# Patient Record
Sex: FEMALE | Race: WHITE | NOT HISPANIC OR LATINO | Employment: UNEMPLOYED | URBAN - METROPOLITAN AREA
[De-identification: names, ages, dates, MRNs, and addresses within clinical notes are randomized per-mention and may not be internally consistent; named-entity substitution may affect disease eponyms.]

---

## 2017-01-17 ENCOUNTER — OFFICE VISIT (OUTPATIENT)
Dept: URGENT CARE | Facility: CLINIC | Age: 22
End: 2017-01-17
Payer: COMMERCIAL

## 2017-01-17 PROCEDURE — G0383 LEV 4 HOSP TYPE B ED VISIT: HCPCS

## 2017-01-17 PROCEDURE — 99284 EMERGENCY DEPT VISIT MOD MDM: CPT

## 2017-03-24 ENCOUNTER — HOSPITAL ENCOUNTER (EMERGENCY)
Facility: HOSPITAL | Age: 22
Discharge: HOME | End: 2017-03-24
Attending: EMERGENCY MEDICINE | Admitting: EMERGENCY MEDICINE
Payer: COMMERCIAL

## 2017-03-24 ENCOUNTER — APPOINTMENT (EMERGENCY)
Dept: CT IMAGING | Facility: HOSPITAL | Age: 22
End: 2017-03-24
Payer: COMMERCIAL

## 2017-03-24 ENCOUNTER — APPOINTMENT (EMERGENCY)
Dept: ULTRASOUND IMAGING | Facility: HOSPITAL | Age: 22
End: 2017-03-24
Payer: COMMERCIAL

## 2017-03-24 VITALS
SYSTOLIC BLOOD PRESSURE: 136 MMHG | OXYGEN SATURATION: 95 % | DIASTOLIC BLOOD PRESSURE: 65 MMHG | WEIGHT: 153 LBS | HEART RATE: 91 BPM | RESPIRATION RATE: 16 BRPM | BODY MASS INDEX: 27.98 KG/M2 | TEMPERATURE: 98.3 F

## 2017-03-24 DIAGNOSIS — N83.201 RIGHT OVARIAN CYST: Primary | ICD-10-CM

## 2017-03-24 DIAGNOSIS — R10.31 RLQ ABDOMINAL PAIN: ICD-10-CM

## 2017-03-24 LAB
BACTERIA UR QL AUTO: ABNORMAL /HPF
BILIRUB UR QL STRIP: NEGATIVE
CLARITY UR: CLEAR
COLOR UR: YELLOW
GLUCOSE UR STRIP-MCNC: NEGATIVE MG/DL
HCG UR QL: NEGATIVE
HGB UR QL STRIP.AUTO: ABNORMAL
KETONES UR STRIP-MCNC: NEGATIVE MG/DL
LEUKOCYTE ESTERASE UR QL STRIP: NEGATIVE
NITRITE UR QL STRIP: NEGATIVE
NON-SQ EPI CELLS URNS QL MICRO: ABNORMAL /HPF
PH UR STRIP.AUTO: 7 [PH] (ref 4.5–8)
PROT UR STRIP-MCNC: NEGATIVE MG/DL
RBC #/AREA URNS AUTO: ABNORMAL /HPF
SP GR UR STRIP.AUTO: 1.02 (ref 1–1.03)
UROBILINOGEN UR QL STRIP.AUTO: 1 E.U./DL
WBC #/AREA URNS AUTO: ABNORMAL /HPF

## 2017-03-24 PROCEDURE — 81002 URINALYSIS NONAUTO W/O SCOPE: CPT

## 2017-03-24 PROCEDURE — 87086 URINE CULTURE/COLONY COUNT: CPT

## 2017-03-24 PROCEDURE — 96372 THER/PROPH/DIAG INJ SC/IM: CPT

## 2017-03-24 PROCEDURE — 93976 VASCULAR STUDY: CPT

## 2017-03-24 PROCEDURE — 76830 TRANSVAGINAL US NON-OB: CPT

## 2017-03-24 PROCEDURE — 74176 CT ABD & PELVIS W/O CONTRAST: CPT

## 2017-03-24 PROCEDURE — 81025 URINE PREGNANCY TEST: CPT

## 2017-03-24 PROCEDURE — 99284 EMERGENCY DEPT VISIT MOD MDM: CPT

## 2017-03-24 PROCEDURE — 76856 US EXAM PELVIC COMPLETE: CPT

## 2017-03-24 PROCEDURE — 81001 URINALYSIS AUTO W/SCOPE: CPT

## 2017-03-24 RX ORDER — KETOROLAC TROMETHAMINE 30 MG/ML
60 INJECTION, SOLUTION INTRAMUSCULAR; INTRAVENOUS ONCE
Status: COMPLETED | OUTPATIENT
Start: 2017-03-24 | End: 2017-03-24

## 2017-03-24 RX ORDER — NAPROXEN 500 MG/1
500 TABLET ORAL 2 TIMES DAILY WITH MEALS
Qty: 20 TABLET | Refills: 0 | Status: SHIPPED | OUTPATIENT
Start: 2017-03-24 | End: 2018-07-23 | Stop reason: ALTCHOICE

## 2017-03-24 RX ADMIN — KETOROLAC TROMETHAMINE 60 MG: 30 INJECTION, SOLUTION INTRAMUSCULAR at 21:48

## 2017-03-25 LAB — BACTERIA UR CULT: NORMAL

## 2018-01-10 NOTE — PROGRESS NOTES
2016         RE: Shyla Allen                               To: JOSIE Morton    MR#: [id number]   : [address]: [id number]                             Fax: [phone]   (Exam #: [id number])      The LMP of this 23year old,  2, para 1 patient was SEP 2 2015,   giving her an EDDA of 2016 and a current gestational age of 25 weeks   0 days by dates  A sonographic examination was performed on 2016   using real time equipment  The ultrasound examination was performed using   abdominal & vaginal techniques  The patient has a BMI of 28 7  Her blood   pressure today was 114/73  Earliest ultrasound found in her record: 11/9/15   9w 5d  2016 EDDA   Thank you very much for referring this very nice patient for a    consultation and fetal anatomic survey  This is Yuridia's second   pregnancy  Her first pregnancy resulted in a full-term  section   for failure to progress approximately 10 months ago  Other than her    section, she has no other significant medical or surgical   history  She denies the current use of tobacco, alcohol, or drugs  No   medications other than her vitamins and has a significant drug allergies  The patient does have a first cousin once removed with Down syndrome on   her father's side  She is unsure if that child was born to a mother of   advanced maternal age  A review of systems is otherwise negative  On   exam, the patient appears well, in no acute distress, and her abdomen is   nontender              Cardiac motion was observed at 151 bpm       INDICATIONS      fetal anatomical survey   previous       Exam Types      LEVEL II   Transvaginal      RESULTS      Fetus # 1 of 1   Vertex presentation   Fetal growth appeared normal   Placenta Location = Posterior, low lying   No placenta previa   Placenta Grade = I      MEASUREMENTS (* Included In Average GA)      OFD              6 1 cm   AC 16 1 cm        20 weeks 6 days* (68%)   BPD              4 5 cm        19 weeks 4 days* (40%)   HC              16 9 cm        19 weeks 4 days* (33%)   Femur            3 4 cm        20 weeks 5 days* (54%)      Nuchal Fold      3 9 mm      Humerus          3 1 cm        20 weeks 4 days  (63%)   Radius           2 9 cm        22 weeks 0 days   Ulna             3 1 cm        21 weeks 4 days   Tibia            2 8 cm        20 weeks 1 day   (50%)   Fibula           2 9 cm        19 weeks 5 days   Foot             3 7 cm        21 weeks 4 days      Cerebellum       2 0 cm        20 weeks 1 day   Biorbit          3 1 cm        20 weeks 1 day   CisternaMagna    5 5 mm      HC/AC           1 05   FL/AC           0 21   FL/BPD          0 75   EFW (Ac/Fl/Hc)   367 grams - 0 lbs 13 oz      THE AVERAGE GESTATIONAL AGE is 20 weeks 1 day +/- 10 days  AMNIOTIC FLUID      Largest Vertical Pocket = 4 8 cm   Amniotic Fluid: Normal      UTERINE ARTERIES                                  S/D   PI    RI    NOTCH       Left Uterine Artery        2 26  0 92  0 56       Right Uterine Artery       2 63  1 13  0 62      CERVICAL EVALUATION           Supine               Cervical Length: 3 40 cm        Other Test Results         Resp To T F  Pressure: No                    Funneling?: No              Dynamic Changes?: No      ANATOMY      Head                                    Normal   Face/Neck                               Normal   Th  Cav  Normal   Heart                                   Normal   Abd  Cav  Normal   Stomach                                 Normal   Right Kidney                            Normal   Left Kidney                             Normal   Bladder                                 Normal   Abd   Wall                               Normal   Spine                                   Normal   Extrems                                 Normal   Genitalia Normal   Placenta                                Normal   Umbl  Cord                              Normal   Uterus                                  Normal      ANATOMY DETAILS      Visualized Appearing Sonographically Normal:   HEAD: (Calvarium, BPD Level, Lateral Ventricles, Choroid Plexus,   Cerebellum, Cisterna Magna);    FACE/NECK: (Neck, Nuchal Fold, Profile,   Orbits, Nose/Lips, Palate, Face);    TH  CAV : (Diaphragm); HEART:   (Four Chamber View, Proximal Left Outflow, Proximal Right Outflow, Aortic   Arch, Ductal Arch, Short Axis of Greater Vessels, Cardiac Axis,   Interventricular Septum, Interatrial Septum, Cardiac Position);    ABD    CAV , STOMACH, RIGHT KIDNEY, LEFT KIDNEY, BLADDER, ABD  WALL, SPINE:   (Cervical Spine, Thoracic Spine, Lumbar Spine, Sacrum);    EXTREMS: (Lt   Humerus, Rt Humerus, Lt Forearm, Rt Forearm, Lt Hand, Rt Hand, Lt Femur,   Rt Femur, Lt Low Leg, Rt Low Leg, Lt Foot, Rt Foot);    GENITALIA,   PLACENTA, UMBL  CORD, UTERUS      ADNEXA      The left ovary appeared normal and measured 2 8 x 2 3 x 1 6 cm with a   volume of 5 4 cc  The right ovary appeared normal and measured 2 5 x 2 5 x   1 7 cm with a volume of 5 6 cc  IMPRESSION      Jon IUP   20 weeks and 1 day by this ultrasound  (EDDA=JUN 8 2016)   Vertex presentation   Fetal growth appeared normal   Normal anatomy survey   Regular fetal heart rate of 151 bpm   Posterior, low lying placenta   No placenta previa      GENERAL COMMENT      The patient has declined genetic screening, and we discussed that a normal   ultrasound does not exclude all congenital birth defects or karyotypic   abnormalities  The fetal anatomic survey is complete  There is no sonographic evidence   of fetal abnormalities at this time  Good fetal movement and tone are   seen    The amniotic fluid volume appears normal   The placenta is   posterior and it appears sonographically normal although it is low-lying measuring 9 mm from the internal os  No notching of either uterine artery   waveform is appreciated and resistive indices are normal  A transvaginal   ultrasound was performed to assess the cervix, which was not seen well   transabdominally  The cervical length was 3 4 centimeters, which is   normal for the current gestational age  There was no significant   funneling or dynamic changes appreciated  The patient was informed of   today's findings and all of her questions were answered  The limitations   of ultrasound were reviewed with the patient, which she accepts  The patient has a short interval pregnancy  This is a known risk factor   for fetal growth restriction and maternal depletion and therefore I   recommend a fetal growth ultrasound in the third trimester as well as   encourage the patient to continue taking her prenatal vitamins  The   patient has a history of a previous  section  She is unsure if   she would like a trial of labor after  section or a repeat    section  I discussed with her that studies indicate an anterior   delivery interval of less than 18 Months places her at a threefold   increased risk of uterine rupture and given this increased risk, I   generally recommend consideration of a repeat  section although a   trial of labor is not unreasonable in the appropriately counseled patient  Samara was scheduled for a followup growth ultrasound at 28 weeks in   order to reevaluate the placenta location and type of fetal growth given   her short interval pregnancy  Please note, in addition to the time spent discussing the results of the   ultrasound, I spent approximately 15 minutes of face-to-face time with the   patient, greater than 50% of which was spent in counseling and the   coordination of care for this patient  Thank you very much for allowing us to participate in the care of this   very nice patient    Should you have any questions, please do not hesitate   to contact our office  RECOMMENDATION      Growth Ultrasound: at 28 weeks      MAGY Dalal M D     Electronically signed 01/21/16 13:50

## 2018-01-12 NOTE — PROGRESS NOTES
2016         RE: Austen Julian                               To: Juan Pablo Sylvester, M D    MR#: [id number]   : [address]: [id number]                             Fax: [phone]   (Exam #: [id number])      The LMP of this 23year old,  G2, P1-*-*-1 patient was SEP 2 2015, giving   her an EDDA of 2016 and a current gestational age of 26 weeks 6 days   by dates  A sonographic examination was performed on 2016 using   real time equipment  The patient has a BMI of 30 4  Her blood pressure   today was 112/75  Earliest ultrasound found in her record: 11/9/15   9w 5d  2016 EDDA      Cardiac motion was observed at 150 bpm       INDICATIONS      previous    diabetes, gestational, class A2      Exam Types      Level I      RESULTS      Fetus # 1 of 1   Vertex presentation   Fetal growth appeared normal   Placenta Location = Posterior   No placenta previa   Placenta Grade = I      MEASUREMENTS (* Included In Average GA)      AC              32 9 cm        37 weeks 0 days* (>95%)   BPD              7 6 cm        30 weeks 4 days* (<5%)   HC              30 3 cm        33 weeks 1 day * (40%)   Femur            6 2 cm        32 weeks 0 days* (36%)      Cerebellum       4 3 cm        34 weeks 6 days   CisternaMagna    7 2 mm      HC/AC           0 92   FL/AC           0 19   FL/BPD          0 81   EFW (Ac/Fl/Hc)  2546 grams - 5 lbs 10 oz                 (74%)      THE AVERAGE GESTATIONAL AGE is 33 weeks 1 day +/- 18 days  AMNIOTIC FLUID      Q1: 4 6      Q2: 5 6      Q3: 4 1      Q4: 6 2   JAVIER Total = 20 4 cm   Amniotic Fluid: Normal      ANATOMY      Head                                    Normal   Heart                                   Normal   Abd  Cav                                 Normal   Stomach                                 Normal   Right Kidney                            Normal   Left Kidney                             Normal   Bladder Normal   Placenta                                Normal      ANATOMY DETAILS      Visualized Appearing Sonographically Normal:   HEAD: (Calvarium, BPD Level, Lateral Ventricles, Choroid Plexus,   Cerebellum, Cisterna Magna); HEART: (Four Chamber View, Proximal Left   Outflow, Proximal Right Outflow, Cardiac Axis, Interventricular Septum,   Interatrial Septum, Cardiac Position);    ABD  CAV , STOMACH, RIGHT   KIDNEY, LEFT KIDNEY, BLADDER, PLACENTA      ANATOMY COMMENTS      Fetal anatomy has been previously documented; no anomalies were   identified  No fetal structural abnormality is identified on the Level I   survey today  Follow up anatomy of the lateral ventricles, 4 chamber view,   outflow tracts, diaphragm,  kidneys, stomach and bladder appear normal    Fetal interval growth and amniotic fluid volume are normal  Fetal anatomy   has been previously documented; no anomalies were identified  The prior US   was limited in the area of the **** which were seen today and appear   normal  The prior US was limited in the area of the *** which is still   limited on todays scan  No fetal structural abnormality is identified on   the Level I survey today  Follow up anatomy of the lateral ventricles, 4   chamber view, outflow tracts, diaphragm,  kidneys, stomach and bladder   appear normal   Fetal interval growth and amniotic fluid volume are normal       IMPRESSION      Jon IUP   33 weeks and 1 day by this ultrasound  (EDDA=JUN 7 2016)   Vertex presentation   Fetal growth appeared normal   Regular fetal heart rate of 150 bpm   Posterior placenta   No placenta previa      GENERAL COMMENT         I had the pleasure of seeing Gerber Bryant  in the [facility]    today for followup growth scan  She reports normal daily fetal movements  She denies any vaginal bleeding, leakage of fluid, or significant   contractions or pelvic pressure   She does have gestational diabetes and is   maintained on 26 units of Levemir insulin at night  She also has a history   of a prior  will be obtaining another  at 39 weeks and   this current pregnancy  On today's ultrasound, the fetus was in a vertex presentation  The   amniotic fluid appeared very normal today  Fetal growth was within normal   range  The abdominal circumference is rather large and measures 4 weeks   ahead of gestational age  The umbilical artery Doppler pulsatility index   was very normal today and thus today's ultrasound was very reassuring  The   interval anatomy seen today showed no obvious anomalies  She did go on to   have a reactive nonstress test today with moderate variability, the   presence of accelerations, and no decelerations  We discussed the importance of initiating fetal kick counting at least   once daily  We discussed the "10 kicks in 2 hour rule"  I instructed her   to report to you immediately should criteria not be met  Kick counts   should begin at 28 weeks gestation  IMPORTANT  FINDINGS ON TODAY'S ULTRASOUND: Large abdominal circumference   on today's ultrasound         IN SUMMARY:  Today's ultrasound was reassuring as the fetus is growing   well with normal amniotic fluid and a normal umbilical artery Doppler flow   study  The fetus was in a vertex presentation  She should continue to do   her kick counts on a daily basis  A fetal growth evaluation is recommended   in 4 weeks and this was scheduled today  Nonstress tests should be done   twice weekly and fluid checks done once weekly until delivered  She should   continue to fax her blood glucose levels into her diabetes management team       Face-to-face time, in addition to time spent discussing ultrasound results   was 10 minutes, with greater than 50% of the time used for counseling and   coordination of care  A description of the counseling/coordination of care   is described above        Thank you very much for allowing us to participate in the care of your   patient  If you have any questions or concerns about today's visit please   do not hesitate to call me  Thank you very much  Marcos OLIVIA  Maternal Fetal Medicine      Maria Victoria Cm, MAGY GALINDO S  JOSIE Chapman     Maternal-Fetal Medicine   Electronically signed 04/20/16 13:54

## 2018-01-15 NOTE — PROGRESS NOTES
MAY 18 2016         RE: Precious Enamorado                               To: JOSIE Shankar    MR#: [id number]   : [address]: [id number]                             Fax: [phone]   (Exam #: [id number])      The LMP of this 23year old,  G2, P1-0-0-1 patient was SEP 2 2015, giving   her an EDDA of 2016 and a current gestational age of 42 weeks 6 days   by dates  A sonographic examination was performed on MAY 18 2016 using   real time equipment  The ultrasound examination was performed using   abdominal technique  The patient has a BMI of 31 2  Her blood pressure   today was 134/75  Earliest ultrasound found in her record: 11/9/15   9w 5d  2016 EDDA      Cardiac motion was observed at 137 bpm       INDICATIONS      diabetes, gestational, class A2   previous    fetal growth      Exam Types      Level I      RESULTS      Fetus # 1 of 1   Vertex presentation   Placenta Location = Anterior   Placenta Grade = II      The NST was reactive with no decelerations  MEASUREMENTS (* Included In Average GA)      AC              35 7 cm        39 weeks 6 days* (>95%)   BPD              8 5 cm        34 weeks 1 day * (12%)   HC              31 2 cm        34 weeks 4 days* (<5%)   Femur            6 9 cm        35 weeks 0 days* (29%)      Cerebellum       5 0 cm        36 weeks 4 days      HC/AC           0 88   FL/AC           0 19   FL/BPD          0 81   Ceph Index      0 76   EFW (Ac/Fl/Hc)  3235 grams - 7 lbs 2 oz                 (63%)      THE AVERAGE GESTATIONAL AGE is 35 weeks 6 days +/- 21 days  AMNIOTIC FLUID      Q1: 2 8      Q2: 3 7      Q3: 1 5      Q4: 4 8   JAVIER Total = 12 6 cm      ANATOMY DETAILS      Visualized Appearing Sonographically Normal:   HEAD: (Calvarium, BPD Level, Lateral Ventricles, Choroid Plexus,   Cerebellum, Cisterna Magna);     HEART: (Proximal Left Outflow, Proximal   Right Outflow, Cardiac Axis, Interventricular Septum, Interatrial Septum,   Cardiac Position);    STOMACH, RIGHT KIDNEY, LEFT KIDNEY, BLADDER, PLACENTA      Suboptimally Visualized:   HEART: (Four Chamber View)      IMPRESSION      Jon IUP   35 weeks and 6 days by this ultrasound  (EDDA=2016)   Vertex presentation   Regular fetal heart rate of 137 bpm   Anterior placenta      GENERAL COMMENT      On exam today the patient appears well, in no acute distress, and denies   any complaints  Her abdomen is non-tender  Fetal weight is at the 63rd%tile for gestational age; however, the [facility] is   measuring greater than the 95th%tile for gestational age  A large   abdominal circumference can sometimes indicate macrosomia and is seen   commonly in poorly controlled diabetes  The patient is diagnosed with   diabetes and has had some elevation in her postprandials especially most   recently placed on insulin, 20 units of Levemir in the evening and 5 units   of Apidra at lunch and dinner  Good fetal movement and tone are seen  The amniotic fluid volume appears   normal   The placenta is anterior and it appears sonographically normal     The anatomic structures listed above could not be optimally imaged today   because of fetal position; however, these structures were seen on a prior   scan and appeared sonographically normal       The patient was informed of today's findings and all of her questions were   answered  The limitations of ultrasound were reviewed with the patient,   which she accepts  We discussed followup in detail and I recommend she continues to be   diligent about her blood sugars over the next 2 weeks prior to her   scheduled  delivery  She will continue defects in her blood   sugars so we can make appropriate adjustments to reduce the risk for    hypoglycemia, hyperbilirubinemia and NICU admission  She will   continue twice weekly antepartum surveillance until delivery        Please note, in addition to the time spent discussing the results of the   ultrasound, I spent approximately 10 minutes of face-to-face time with the   patient, greater than 50% of which was spent in counseling and the   coordination of care for this patient  Thank you very much for allowing us to participate in the care of this   very nice patient  Should you have any questions, please do not hesitate   to contact our office  MAGY Sarkar M D     Electronically signed 05/18/16 14:42

## 2018-01-15 NOTE — PROGRESS NOTES
Active Problems    1  Insulin controlled gestational diabetes mellitus (GDM) in third trimester (918 83)   (O24 414)    Allergies    1  No Known Drug Allergies    2  No Known Environmental Allergies   3  No Known Food Allergies    Vitals  Signs [Data Includes: Current Encounter]   Recorded: [id number] 50:17WU   Systolic: 987  Diastolic: 76  Height: 5 ft 1 in  Weight: 160 lb   BMI Calculated: 30 23  BSA Calculated: 1 72  Pain Scale: 0    Procedure    G/P 2/1   EDC 16   EGA 34 6     /76   Indication: gestational diabetes  Duration of Test 27 minutes  Result: Reactive and > 15 bpm with movement  Baseline Rate 130 bpm    Deceleration: None  Uterine Activity: Mild, Irregular  JAVIER: 13 2 cms  Comment: occ mild ctx noted  vtx   Placental Grade: II     Required # Stimuli Response: No    Fetal kick counts were reviewed with the patient  Recommend NST: twice weekly  Recommend JAVIER: weekly  Current Meds   1  Charito Contour Next Test In [employer]; TEST 4 TIMES DAILY; Therapy: 31XXU0287 to (Evaluate:58Ggr5445)  Requested for: 2016; Last   Rx:2016 Ordered   2  Charito Microlet Lancets Miscellaneous; 4 times a day as directed; Therapy: 05ZAW6754 to (Evaluate:42Ryk5595)  Requested for: 2016; Last   Rx:2016 Ordered   3  Levemir FlexTouch 100 UNIT/ML Subcutaneous Solution Pen-injector; Inject 20 units   Levemir at bedtime, titrate as directed ;   Therapy: 17Ypz0052 to (Evaluate:62Fha6595)  Requested for: 2016; Last   Rx:2016 Ordered   4  NovoFine 30G X 8 MM Miscellaneous; daily with insulin injection; Therapy: 30Yja0004 to (Evaluate:52Jug8399)  Requested for: 2016; Last   Rx:2016 Ordered   5  Prenatal Vitamins TABS; TAKE 1 TABLET Daily per pt;    Therapy: (Recorded:2014) to Recorded    Future Appointments    Date/Time Provider Specialty Site   2016 03:00 PM  [place] [facility], Schedule  [facility] OUT[facility]   2016 02:30 PM  [place], Schedule  [facility]   2016 01:00 PM  [place], Schedule  [facility]   2016 01:30 PM  [place], Schedule  [facility]  [address] [facility]     Signatures   Electronically signed by : Giselle Giron, ; May  4 2016  1:53PM EST                       (Author)    Electronically signed by : IRMA Lopez ,MD; May  4 2016  4:58PM EST                       (Author)

## 2018-02-09 ENCOUNTER — HOSPITAL ENCOUNTER (EMERGENCY)
Facility: HOSPITAL | Age: 23
Discharge: HOME | End: 2018-02-09
Payer: COMMERCIAL

## 2018-02-09 ENCOUNTER — APPOINTMENT (EMERGENCY)
Dept: CT IMAGING | Facility: HOSPITAL | Age: 23
End: 2018-02-09
Payer: COMMERCIAL

## 2018-02-09 VITALS
OXYGEN SATURATION: 100 % | TEMPERATURE: 98.9 F | WEIGHT: 175 LBS | BODY MASS INDEX: 32.2 KG/M2 | HEART RATE: 88 BPM | SYSTOLIC BLOOD PRESSURE: 148 MMHG | RESPIRATION RATE: 18 BRPM | DIASTOLIC BLOOD PRESSURE: 78 MMHG | HEIGHT: 62 IN

## 2018-02-09 DIAGNOSIS — S46.911A STRAIN OF RIGHT SHOULDER, INITIAL ENCOUNTER: ICD-10-CM

## 2018-02-09 DIAGNOSIS — S09.90XA CLOSED HEAD INJURY, INITIAL ENCOUNTER: Primary | ICD-10-CM

## 2018-02-09 DIAGNOSIS — S00.03XA CONTUSION OF SCALP, INITIAL ENCOUNTER: ICD-10-CM

## 2018-02-09 PROCEDURE — 70450 CT HEAD/BRAIN W/O DYE: CPT

## 2018-02-09 PROCEDURE — 99284 EMERGENCY DEPT VISIT MOD MDM: CPT

## 2018-02-09 PROCEDURE — 96372 THER/PROPH/DIAG INJ SC/IM: CPT

## 2018-02-09 RX ORDER — KETOROLAC TROMETHAMINE 30 MG/ML
30 INJECTION, SOLUTION INTRAMUSCULAR; INTRAVENOUS ONCE
Status: COMPLETED | OUTPATIENT
Start: 2018-02-09 | End: 2018-02-09

## 2018-02-09 RX ORDER — ACETAMINOPHEN 325 MG/1
650 TABLET ORAL ONCE
Status: COMPLETED | OUTPATIENT
Start: 2018-02-09 | End: 2018-02-09

## 2018-02-09 RX ADMIN — ACETAMINOPHEN 650 MG: 325 TABLET, FILM COATED ORAL at 22:35

## 2018-02-09 RX ADMIN — KETOROLAC TROMETHAMINE 30 MG: 30 INJECTION, SOLUTION INTRAMUSCULAR at 22:35

## 2018-02-10 NOTE — ED PROVIDER NOTES
History  Chief Complaint   Patient presents with    Head Injury     Pt c/o falling on ice yesterday and hitting back of head  Denies LOC  C/o headache, dizziness, and blurred vision today      26-year-old female presents here with a chief complaint of headache  She reports that she fell last night on ice and injured her right shoulder and struck the back of her head  She reports that today she was at work and she began to developed a headache  She presents here with continued headache is generalized in nature she has some tenderness over the occipital scalp  She denies loss of consciousness but there is concern for mild concussion  Her right shoulder has full passive range of motion she has some tenderness with abduction but nothing that would be concerning for fracture this is most likely strain of the right shoulder she has tenderness over the posterior rotator cuff musculature  Prior to Admission Medications   Prescriptions Last Dose Informant Patient Reported? Taking?   naproxen (NAPROSYN) 500 mg tablet   No No   Sig: Take 1 tablet by mouth 2 (two) times a day with meals for 10 days      Facility-Administered Medications: None       Past Medical History:   Diagnosis Date    DM type 1 (diabetes mellitus, type 1) ([facility])     Migraine        Past Surgical History:   Procedure Laterality Date     SECTION      [place]  DELIVERY ONLY N/A 6/3/2016    Procedure:  SECTION () REPEAT;  Surgeon: Paula Cornejo MD;  Location: [facility];  Service: Obstetrics       History reviewed  No pertinent family history  I have reviewed and agree with the history as documented  Social History   Substance Use Topics    Smoking status: Never Smoker    Smokeless tobacco: Never Used    Alcohol use No        Review of Systems   Constitutional: Negative for activity change, fatigue and fever  HENT: Negative for congestion, ear pain, rhinorrhea and sore throat  Eyes: Negative  Respiratory: Negative for cough, shortness of breath and wheezing  Gastrointestinal: Negative for abdominal pain, diarrhea, nausea and vomiting  Endocrine: Negative  Genitourinary: Negative for difficulty urinating, dyspareunia, dysuria, flank pain, frequency, menstrual problem, pelvic pain, urgency, vaginal bleeding, vaginal discharge and vaginal pain  Musculoskeletal: Negative for arthralgias and myalgias  Skin: Negative for color change and pallor  Neurological: Positive for headaches  Negative for dizziness, speech difficulty and weakness  Hematological: Negative for adenopathy  Psychiatric/Behavioral: Negative for confusion  Physical Exam  ED Triage Vitals [02/09/18 2025]   Temperature Pulse Respirations Blood Pressure SpO2   98 9 °F (37 2 °C) 95 17 155/74 100 %      Temp Source Heart Rate Source Patient Position - Orthostatic VS BP Location FiO2 (%)   Oral Monitor Sitting Left arm --      Pain Score       5           Orthostatic Vital Signs  Vitals:    02/09/18 2025   BP: 155/74   Pulse: 95   Patient Position - Orthostatic VS: Sitting       Physical Exam   Constitutional: She is oriented to person, place, and time  She appears well-developed and well-nourished  She is cooperative  Non-toxic appearance  She does not have a sickly appearance  She does not appear ill  No distress  HENT:   Head: Normocephalic and atraumatic  Right Ear: Tympanic membrane and external ear normal    Left Ear: Tympanic membrane and external ear normal    Nose: No rhinorrhea, sinus tenderness or nasal deformity  No epistaxis  Right sinus exhibits no maxillary sinus tenderness and no frontal sinus tenderness  Left sinus exhibits no maxillary sinus tenderness and no frontal sinus tenderness  Mouth/Throat: Oropharynx is clear and moist and mucous membranes are normal  Normal dentition  Eyes: EOM are normal  Pupils are equal, round, and reactive to light  Neck: Normal range of motion  Neck supple  Cardiovascular: Normal rate, regular rhythm and normal heart sounds  No murmur heard  Pulmonary/Chest: Effort normal and breath sounds normal  No accessory muscle usage  No respiratory distress  She has no wheezes  She has no rales  She exhibits no tenderness  Abdominal: Soft  She exhibits no distension  There is no guarding  Musculoskeletal: Normal range of motion  She exhibits no edema or tenderness  Cervical back: She exhibits normal range of motion and no bony tenderness  Lymphadenopathy:     She has no cervical adenopathy  Neurological: She is alert and oriented to person, place, and time  She exhibits normal muscle tone  Skin: Skin is warm and dry  No rash noted  No erythema  Psychiatric: She has a normal mood and affect  Nursing note and vitals reviewed  ED Medications  Medications   acetaminophen (TYLENOL) tablet 650 mg (not administered)   ketorolac (TORADOL) injection 30 mg (not administered)       Diagnostic Studies  Results Reviewed     None                 CT head without contrast   Final Result by Lyndsey Saravia MD (02/09 2137)      No acute intracranial abnormality  Left maxillary sinus disease  Workstation performed: [id number]                    Procedures  Procedures       Phone Contacts  ED Phone Contact    ED Course  ED Course                                MDM  Number of Diagnoses or Management Options  Closed head injury, initial encounter: new and requires workup  Contusion of scalp, initial encounter: new and requires workup  Strain of right shoulder, initial encounter: new and requires workup  Diagnosis management comments: CT of the head unremarkable no intracranial hemorrhage  Most likely post concussive headache posttraumatic headache  Recommend decreased activity follow-up with PCP for further evaluation  Return if worsening or not improving         Amount and/or Complexity of Data Reviewed  Tests in the radiology section of CPT®: reviewed and ordered      CritCare Time    Disposition  Final diagnoses:   Closed head injury, initial encounter   Strain of right shoulder, initial encounter   Contusion of scalp, initial encounter     Time reflects when diagnosis was documented in both MDM as applicable and the Disposition within this note     Time User Action Codes Description Comment    2/9/2018 10:18 PM Philly Navarrete Add [S09 90XA] Closed head injury, initial encounter     2/9/2018 10:18 PM Philly Navarrete Add [C73 150I] Strain of right shoulder, initial encounter     2/9/2018 10:18 PM Philly Navarrete Add [S00 03XA] Contusion of scalp, initial encounter       ED Disposition     ED Disposition Condition Comment    Discharge  Azucena Richardson Acabou discharge to home/self care  Condition at discharge: Good        Follow-up Information     Follow up With Specialties Details Why Contact Info    Yoel Park MD Internal Medicine Schedule an appointment as soon as possible for a visit For Continued Evaluation [place] [address] [facility]  [phone]          Patient's Medications   Discharge Prescriptions    No medications on file     No discharge procedures on file      ED Provider  Electronically Signed by           ANYI Jeffrey  02/09/18 9293

## 2018-02-10 NOTE — DISCHARGE INSTRUCTIONS
Contusion in Adults   WHAT YOU NEED TO KNOW:   A contusion is a bruise that appears on your skin after an injury  A bruise happens when small blood vessels tear but skin does not  When blood vessels tear, blood leaks into nearby tissue, such as soft tissue or muscle  DISCHARGE INSTRUCTIONS:   Return to the emergency department if:   · You have new trouble moving the injured area  · You have tingling or numbness in or near the injured area  · Your hand or foot below the bruise gets cold or turns pale  Contact your healthcare provider if:   · You find a new lump in the injured area  · Your symptoms do not improve with treatment after 4 to 5 days  · You have questions or concerns about your condition or care  Medicines: You may need any of the following:  · NSAIDs  help decrease swelling and pain or fever  This medicine is available with or without a doctor's order  NSAIDs can cause stomach bleeding or kidney problems in certain people  If you take blood thinner medicine, always ask your healthcare provider if NSAIDs are safe for you  Always read the medicine label and follow directions  · Prescription pain medicine  may be given  Do not wait until the pain is severe before you take your medicine  · Take your medicine as directed  Contact your healthcare provider if you think your medicine is not helping or if you have side effects  Tell him of her if you are allergic to any medicine  Keep a list of the medicines, vitamins, and herbs you take  Include the amounts, and when and why you take them  Bring the list or the pill bottles to follow-up visits  Carry your medicine list with you in case of an emergency  Follow up with your healthcare provider as directed: You may need to return within a week to check your injury again  Write down your questions so you remember to ask them during your visits    Help a contusion heal:   · Rest the injured area  or use it less than usual  If you bruised your leg or foot, you may need crutches or a cane to help you walk  This will help you keep weight off your injured body part  · Apply ice  to decrease swelling and pain  Ice may also help prevent tissue damage  Use an ice pack, or put crushed ice in a plastic bag  Cover it with a towel and place it on your bruise for 15 to 20 minutes every hour or as directed  · Use compression  to support the area and decrease swelling  Wrap an elastic bandage around the area over the bruised muscle  Make sure the bandage is not too tight  You should be able to fit 1 finger between the bandage and your skin  · Elevate (raise) your injured body part  above the level of your heart to help decrease pain and swelling  Use pillows, blankets, or rolled towels to elevate the area as often as you can  · Do not drink alcohol  as directed  Alcohol may slow healing  · Do not stretch injured muscles  right after your injury  Ask your healthcare provider when and how you may safely stretch after your injury  Gentle stretches can help increase your flexibility  · Do not massage the area or put heating pads  on the bruise right after your injury  Heat and massage may slow healing  Your healthcare provider may tell you to apply heat after several days  At that time, heat will start to help the injury heal   Prevent another contusion:   · Stretch and warm up before you play sports or exercise  · Wear protective gear when you play sports  Examples are shin guards and padding  · If you begin a new physical activity, start slowly to give your body a chance to adjust   © 2017 [address] [employer] [employer] Information is for End User's use only and may not be sold, redistributed or otherwise used for commercial purposes  All illustrations and images included in CareNotes® are the copyrighted property of [employer]  or [employer]  The above information is an  only   It is not intended as medical advice for individual conditions or treatments  Talk to your doctor, nurse or pharmacist before following any medical regimen to see if it is safe and effective for you  Head Injury   WHAT YOU NEED TO KNOW:   A head injury is most often caused by a blow to the head  This may occur from a fall, bicycle injury, sports injury, being struck in the head, or a motor vehicle accident  DISCHARGE INSTRUCTIONS:   Call 911 or have someone else call for any of the following:   · You cannot be woken  · You have a seizure  · You stop responding to others or you faint  · You have blurry or double vision  · Your speech becomes slurred or confused  · You have arm or leg weakness, loss of feeling, or new problems with coordination  · Your pupils are larger than usual or one pupil is a different size than the other  · You have blood or clear fluid coming out of your ears or nose  Return to the emergency department if:   · You have repeated or forceful vomiting  · You feel confused  · Your headache gets worse or becomes severe  · You or someone caring for you notices that you are harder to wake than usual   Contact your healthcare provider if:   · Your symptoms last longer than 6 weeks after the injury  · You have questions or concerns about your condition or care  Medicines:   · Acetaminophen  decreases pain  Acetaminophen is available without a doctor's order  Ask how much to take and how often to take it  Follow directions  Acetaminophen can cause liver damage if not taken correctly  · Take your medicine as directed  Contact your healthcare provider if you think your medicine is not helping or if you have side effects  Tell him or her if you are allergic to any medicine  Keep a list of the medicines, vitamins, and herbs you take  Include the amounts, and when and why you take them  Bring the list or the pill bottles to follow-up visits   Carry your medicine list with you in case of an emergency  Self-care:   · Rest  or do quiet activities for 24 to 48 hours  Limit your time watching TV, using the computer, or doing tasks that require a lot of thinking  Slowly return to your normal activities as directed  Do not play sports or do activities that may cause you to get hit in the head  Ask your healthcare provider when you can return to sports  · Apply ice  on your head for 15 to 20 minutes every hour or as directed  Use an ice pack, or put crushed ice in a plastic bag  Cover it with a towel before you apply it to your skin  Ice helps prevent tissue damage and decreases swelling and pain  · Have someone stay with you for 24 hours  or as directed  This person can monitor you for complications and call [phone]  When you are awake the person should ask you a few questions to see if you are thinking clearly  An example would be to ask your name or your address  Prevent another head injury:   · Wear a helmet that fits properly  Do this when you play sports, or ride a bike, scooter, or skateboard  Helmets help decrease your risk of a serious head injury  Talk to your healthcare provider about other ways you can protect yourself if you play sports  · Wear your seat belt every time you are in a car  This helps to decrease your risk for a head injury if you are in a car accident  Follow up with your healthcare provider as directed:  Write down your questions so you remember to ask them during your visits  © 2017 [address] [employer] [employer] Information is for End User's use only and may not be sold, redistributed or otherwise used for commercial purposes  All illustrations and images included in CareNotes® are the copyrighted property of A D A M , Inc  or [employer]  The above information is an  only  It is not intended as medical advice for individual conditions or treatments   Talk to your doctor, nurse or pharmacist before following any medical regimen to see if it is safe and effective for you  Scalp Contusion in Adults   WHAT YOU NEED TO KNOW:   A scalp contusion is a bruise on your scalp  There is bleeding under the scalp, but the skin is not broken  You may have swelling at the site of the bruise  DISCHARGE INSTRUCTIONS:   Home care:   · Have someone stay with you for 24 to 48 hours after the injury  Give him the signs of serious injury listed below, such as a seizure or trouble moving  You will need immediate care if you develop signs of a serious injury  · Apply ice to your bruise  Ice helps decrease swelling and pain  Ice may also help prevent tissue damage  Use an ice pack, or put crushed ice in a plastic bag  Cover it with a towel and place it on your bruise for 15 to 20 minutes every hour or as directed  Follow up with your healthcare provider as directed:  Write down your questions so you remember to ask them during your visits  Contact your healthcare provider if:   · You have a headache or neck pain that is getting worse  · You are drowsy and confused  · You have trouble staying balanced or walking  · You are irritable for no reason  · You have problems with your vision  · You cannot stop vomiting  Return to the emergency department or have someone call 911 if:   · You have a seizure  · You cannot be awakened  · You are not able to move part of your body  · Your pupils are different sizes  · You have blood or clear fluid coming out of your nose, ears, or mouth  © 2017 [address] Information is for End User's use only and may not be sold, redistributed or otherwise used for commercial purposes  All illustrations and images included in CareNotes® are the copyrighted property of A D A M , Inc  or [address]  The above information is an  only  It is not intended as medical advice for individual conditions or treatments   Talk to your doctor, nurse or pharmacist before following any medical regimen to see if it is safe and effective for you  Shoulder Pain   WHAT YOU NEED TO KNOW:   Shoulder pain is a common problem and can affect your daily activities  Pain can be caused by a problem within your shoulder  Shoulder pain may also be caused by pain that spreads to your shoulder from another part of your body  DISCHARGE INSTRUCTIONS:   Return to the emergency department if:   · You have severe pain  · You cannot move your arm or shoulder  · You have numbness or tingling in your shoulder or arm  Contact your healthcare provider if:   · Your pain gets worse or does not go away with treatment  · You have trouble moving your arm or shoulder  · You have questions or concerns about your condition or care  Medicines: You may need any of the following:  · Acetaminophen  decreases pain and fever  It is available without a doctor's order  Ask how much to take and how often to take it  Follow directions  Acetaminophen can cause liver damage if not taken correctly  · NSAIDs , such as ibuprofen, help decrease swelling, pain, and fever  This medicine is available with or without a doctor's order  NSAIDs can cause stomach bleeding or kidney problems in certain people  If you take blood thinner medicine, always ask your healthcare provider if NSAIDs are safe for you  Always read the medicine label and follow directions  · Take your medicine as directed  Contact your healthcare provider if you think your medicine is not helping or if you have side effects  Tell him of her if you are allergic to any medicine  Keep a list of the medicines, vitamins, and herbs you take  Include the amounts, and when and why you take them  Bring the list or the pill bottles to follow-up visits  Carry your medicine list with you in case of an emergency    Follow up with your healthcare provider or orthopedist as directed:  Write down your questions so you remember to ask them during your visits  Manage your symptoms:   · Apply ice  on your shoulder for 20 to 30 minutes every 2 hours or as directed  Use an ice pack, or put crushed ice in a plastic bag  Cover it with a towel  Ice helps prevent tissue damage and decreases swelling and pain  · Apply heat if ice does not help your symptoms  Apply heat on your shoulder for 20 to 30 minutes every 2 hours for as many days as directed  Heat helps decrease pain and muscle spasms  · Go to physical or occupational therapy as directed  A physical therapist teaches you exercises to help improve movement and strength, and to decrease pain  An occupational therapist teaches you skills to help with your daily activities  Prevent shoulder pain:   · Stretch and strengthen your shoulder  Use proper technique during exercises and sports  · Limit activities as directed  Try to avoid repeated overhead movements  © 2017 26[address] [facility] Information is for End User's use only and may not be sold, redistributed or otherwise used for commercial purposes  All illustrations and images included in CareNotes® are the copyrighted property of [employer]  or [facility]  The above information is an  only  It is not intended as medical advice for individual conditions or treatments  Talk to your doctor, nurse or pharmacist before following any medical regimen to see if it is safe and effective for you

## 2018-03-04 ENCOUNTER — OFFICE VISIT (OUTPATIENT)
Dept: URGENT CARE | Facility: CLINIC | Age: 23
End: 2018-03-04
Payer: COMMERCIAL

## 2018-03-04 VITALS
HEART RATE: 98 BPM | RESPIRATION RATE: 16 BRPM | TEMPERATURE: 98.8 F | OXYGEN SATURATION: 99 % | SYSTOLIC BLOOD PRESSURE: 135 MMHG | DIASTOLIC BLOOD PRESSURE: 75 MMHG

## 2018-03-04 DIAGNOSIS — K04.7 DENTAL INFECTION: Primary | ICD-10-CM

## 2018-03-04 PROCEDURE — 99213 OFFICE O/P EST LOW 20 MIN: CPT | Performed by: NURSE PRACTITIONER

## 2018-03-04 RX ORDER — PENICILLIN V POTASSIUM 500 MG/1
1 TABLET ORAL 4 TIMES DAILY
COMMUNITY
Start: 2017-01-17 | End: 2018-07-23 | Stop reason: ALTCHOICE

## 2018-03-04 RX ORDER — AMOXICILLIN 500 MG/1
500 CAPSULE ORAL EVERY 8 HOURS SCHEDULED
Qty: 21 CAPSULE | Refills: 0 | Status: SHIPPED | COMMUNITY
Start: 2018-03-04 | End: 2018-03-11

## 2018-03-05 NOTE — PROGRESS NOTES
[facility]        NAME: Caesar Collet is a 23 y o  female  : 1995    MRN: [id number]  DATE: 2018  TIME: 7:20 PM    Assessment and Plan   Dental infection [K04 7]  1  Dental infection  amoxicillin (AMOXIL) 500 mg capsule         Patient Instructions       Follow up with PCP in 3-5 days  Proceed to  ER if symptoms worsen  Chief Complaint     Chief Complaint   Patient presents with    Dental Pain     x 1 day         History of Present Illness       Dental Pain    The current episode started yesterday (Started with some pain in left upper jaw yesterday  Swelling above left upper molars strated today  Has had problem with these molars in the past )  The problem has been gradually worsening  The pain is at a severity of 5/10  The pain is moderate  She has tried nothing for the symptoms  Review of Systems   Review of Systems   Constitutional: Negative  HENT: Positive for dental problem (pain and swelling left upper gum  )  Eyes: Negative  Respiratory: Negative  Cardiovascular: Negative  Gastrointestinal: Negative  Genitourinary: Negative  Neurological: Negative  Psychiatric/Behavioral: Negative            Current Medications       Current Outpatient Prescriptions:     amoxicillin (AMOXIL) 500 mg capsule, Take 1 capsule (500 mg total) by mouth every 8 (eight) hours for 7 days, Disp: 21 capsule, Rfl: 0    naproxen (NAPROSYN) 500 mg tablet, Take 1 tablet by mouth 2 (two) times a day with meals for 10 days, Disp: 20 tablet, Rfl: 0    penicillin V potassium (VEETID) 500 mg tablet, Take 1 tablet by mouth 4 (four) times a day, Disp: , Rfl:     Current Allergies     Allergies as of 2018    (No Known Allergies)            The following portions of the patient's history were reviewed and updated as appropriate: allergies, current medications, past family history, past medical history, past social history, past surgical history and problem list      Past Medical History:   Diagnosis Date    DM type 1 (diabetes mellitus, type 1) ([address] )     Migraine        Past Surgical History:   Procedure Laterality Date     SECTION      [place]  DELIVERY ONLY N/A 6/3/2016    Procedure:  SECTION () REPEAT;  Surgeon: Paula Cornejo MD;  Location: [facility];  Service: Obstetrics       No family history on file  Medications have been verified  Objective   /75   Pulse 98   Temp 98 8 °F (37 1 °C)   Resp 16   SpO2 99%        Physical Exam     Physical Exam   Constitutional: She is oriented to person, place, and time  She appears well-developed and well-nourished  No distress  HENT:   Head: Normocephalic and atraumatic  Right Ear: External ear normal    Left Ear: External ear normal    Mouth/Throat: Oropharynx is clear and moist        Eyes: Conjunctivae and EOM are normal  Pupils are equal, round, and reactive to light  Neck: Normal range of motion  Neck supple  Cardiovascular: Normal rate, regular rhythm and normal heart sounds  Pulmonary/Chest: Effort normal and breath sounds normal  No respiratory distress  She has no wheezes  She has no rales  Abdominal: Soft  Lymphadenopathy:     She has no cervical adenopathy  Neurological: She is alert and oriented to person, place, and time  She has normal reflexes  Skin: Skin is warm and dry  No rash noted  She is not diaphoretic  Psychiatric: She has a normal mood and affect  Nursing note and vitals reviewed  She needs to call dentist tomorrow for appointment ASAP

## 2018-04-08 LAB
ABO/RH(D) (HISTORICAL): NORMAL
ALBUMIN SERPL BCP-MCNC: 4.1 G/DL (ref 3.5–5.7)
ALP SERPL-CCNC: 60 IU/L (ref 40–150)
ALT SERPL W P-5'-P-CCNC: 18 IU/L (ref 0–50)
ANION GAP SERPL CALCULATED.3IONS-SCNC: 11.5 MM/L
APTT PPP: 21.6 SEC (ref 24.4–37.6)
AST SERPL W P-5'-P-CCNC: 18 U/L (ref 7–26)
BACTERIA UR QL AUTO: ABNORMAL
BASOPHILS # BLD AUTO: 0 X3/UL (ref 0–0.3)
BASOPHILS # BLD AUTO: 0.5 % (ref 0–2)
BILIRUB SERPL-MCNC: 0.3 MG/DL (ref 0.3–1)
BILIRUB UR QL STRIP: NEGATIVE
BLD GP AB SCN SERPL QL: NEGATIVE
BUN SERPL-MCNC: 10 MG/DL (ref 7–25)
CALCIUM SERPL-MCNC: 11.9 MG/DL (ref 8.6–10.5)
CHLORIDE SERPL-SCNC: 104 MM/L (ref 98–107)
CLARITY UR: CLEAR
CO2 SERPL-SCNC: 23 MM/L (ref 21–31)
COLOR UR: YELLOW
CREAT SERPL-MCNC: 0.56 MG/DL (ref 0.6–1.2)
DEPRECATED RDW RBC AUTO: 13.7 % (ref 11.5–14.5)
EGFR (HISTORICAL): > 60 GFR
EGFR AFRICAN AMERICAN (HISTORICAL): > 60 GFR
EOSINOPHIL # BLD AUTO: 0 X3/UL (ref 0–0.5)
EOSINOPHIL NFR BLD AUTO: 0.3 % (ref 0–5)
GLUCOSE (HISTORICAL): 147 MG/DL (ref 65–99)
GLUCOSE UR STRIP-MCNC: NEGATIVE MG/DL
HCG, QUANTITATIVE (HISTORICAL): ABNORMAL MU/ML (ref 0–2.9)
HCT VFR BLD AUTO: 36.5 % (ref 37–47)
HGB BLD-MCNC: 12.9 G/DL (ref 12–16)
HGB UR QL STRIP.AUTO: ABNORMAL
INR PPP: 1.01 (ref 0.9–1.5)
KETONES UR STRIP-MCNC: NEGATIVE MG/DL
LEUKOCYTE ESTERASE UR QL STRIP: NEGATIVE
LYMPHOCYTES # BLD AUTO: 1.2 X3/UL (ref 1.2–4.2)
LYMPHOCYTES NFR BLD AUTO: 15.9 % (ref 20.5–51.1)
Lab: NORMAL
MCH RBC QN AUTO: 31 PG (ref 26–34)
MCHC RBC AUTO-ENTMCNC: 35.2 G/DL (ref 31–36)
MCV RBC AUTO: 87.9 FL (ref 81–99)
MONOCYTES # BLD AUTO: 0.6 X3/UL (ref 0–1)
MONOCYTES NFR BLD AUTO: 8.3 % (ref 1.7–12)
MUCUS THREADS (HISTORICAL): ABNORMAL /HPF
NEUTROPHILS # BLD AUTO: 5.5 X3/UL (ref 1.4–6.5)
NEUTS SEG NFR BLD AUTO: 75 % (ref 42.2–75.2)
NITRITE UR QL STRIP: NEGATIVE
NON-SQ EPI CELLS URNS QL MICRO: ABNORMAL /HPF
OSMOLALITY, SERUM (HISTORICAL): 272 MOSM (ref 262–291)
PH UR STRIP.AUTO: 7.5 [PH] (ref 4.5–8)
PLATELET # BLD AUTO: 193 X3/UL (ref 130–400)
PMV BLD AUTO: 8.5 FL (ref 8.6–11.7)
POTASSIUM SERPL-SCNC: 3.5 MM/L (ref 3.5–5.5)
PROT UR STRIP-MCNC: NEGATIVE MG/DL
PROTHROMBIN TIME (HISTORICAL): 11.7 SEC (ref 10.1–12.9)
RBC # BLD AUTO: 4.15 X6/UL (ref 3.9–5.2)
RBC #/AREA URNS AUTO: ABNORMAL /HPF
SODIUM SERPL-SCNC: 135 MM/L (ref 134–143)
SP GR UR STRIP.AUTO: 1.02 (ref 1–1.03)
TOTAL PROTEIN (HISTORICAL): 6.6 G/DL (ref 6.4–8.9)
UROBILINOGEN UR QL STRIP.AUTO: 0.2 EU/DL (ref 0.2–8)
WBC # BLD AUTO: 7.3 X3/UL (ref 4.8–10.8)
WBC #/AREA URNS AUTO: ABNORMAL /HPF

## 2018-04-27 LAB
ABO/RH(D) (HISTORICAL): NORMAL
BACTERIA UR QL AUTO: ABNORMAL
BASOPHILS # BLD AUTO: 0 X3/UL (ref 0–0.3)
BASOPHILS # BLD AUTO: 0.4 % (ref 0–2)
BILIRUB UR QL STRIP: NEGATIVE
BLD GP AB SCN SERPL QL: NEGATIVE
CLARITY UR: CLEAR
COLOR UR: YELLOW
DEPRECATED RDW RBC AUTO: 13.3 % (ref 11.5–14.5)
EOSINOPHIL # BLD AUTO: 0.1 X3/UL (ref 0–0.5)
EOSINOPHIL NFR BLD AUTO: 0.8 % (ref 0–5)
EXTERNAL HIV CONFIRMATION: NORMAL
EXTERNAL HIV SCREEN: NORMAL
GLUCOSE UR STRIP-MCNC: NEGATIVE MG/DL
HCT VFR BLD AUTO: 37.2 % (ref 37–47)
HGB BLD-MCNC: 13.1 G/DL (ref 12–16)
HGB UR QL STRIP.AUTO: ABNORMAL
KETONES UR STRIP-MCNC: NEGATIVE MG/DL
LEUKOCYTE ESTERASE UR QL STRIP: NEGATIVE
LYMPHOCYTES # BLD AUTO: 1.4 X3/UL (ref 1.2–4.2)
LYMPHOCYTES NFR BLD AUTO: 18.7 % (ref 20.5–51.1)
MCH RBC QN AUTO: 30.6 PG (ref 26–34)
MCHC RBC AUTO-ENTMCNC: 35.2 G/DL (ref 31–36)
MCV RBC AUTO: 87.1 FL (ref 81–99)
MONOCYTES # BLD AUTO: 0.6 X3/UL (ref 0–1)
MONOCYTES NFR BLD AUTO: 8.3 % (ref 1.7–12)
MUCUS THREADS (HISTORICAL): PRESENT /HPF
NEUTROPHILS # BLD AUTO: 5.2 X3/UL (ref 1.4–6.5)
NEUTS SEG NFR BLD AUTO: 71.8 % (ref 42.2–75.2)
NITRITE UR QL STRIP: NEGATIVE
NON-SQ EPI CELLS URNS QL MICRO: ABNORMAL /HPF
PH UR STRIP.AUTO: 6.5 [PH] (ref 4.5–8)
PLATELET # BLD AUTO: 191 X3/UL (ref 130–400)
PMV BLD AUTO: 9.4 FL (ref 8.6–11.7)
PROT UR STRIP-MCNC: NEGATIVE MG/DL
RBC # BLD AUTO: 4.27 X6/UL (ref 3.9–5.2)
RBC #/AREA URNS AUTO: ABNORMAL /HPF
RPR SCREEN (HISTORICAL): NORMAL
SP GR UR STRIP.AUTO: 1.02 (ref 1–1.03)
UROBILINOGEN UR QL STRIP.AUTO: 0.2 EU/DL (ref 0.2–8)
WBC # BLD AUTO: 7.2 X3/UL (ref 4.8–10.8)
WBC #/AREA URNS AUTO: ABNORMAL /HPF

## 2018-04-28 LAB
HEPATITIS B SURFACE ANTIGEN (HISTORICAL): NEGATIVE
HEPATITIS C ANTIBODY (HISTORICAL): <0.1 S/CO (ref 0–0.9)
HIV1 (HISTORICAL): NON REACTIVE
RUBELLA, IGG (HISTORICAL): 4.68 INDEX

## 2018-05-08 LAB
ADEQUACY: (HISTORICAL): NORMAL
CHLAMYDIA DFA, NAA OR PCR (HISTORICAL): NEGATIVE
DIAGNOSIS (HISTORICAL): NORMAL
N GONORRHOEAE, AMPLIFIED DNA (HISTORICAL): NEGATIVE
NOTE: (HISTORICAL): NORMAL
PERF. INST. (HISTORICAL): NORMAL
QC REVIEWED BY (HISTORICAL): NORMAL

## 2018-07-23 ENCOUNTER — ROUTINE PRENATAL (OUTPATIENT)
Dept: PERINATAL CARE | Facility: CLINIC | Age: 23
End: 2018-07-23
Payer: COMMERCIAL

## 2018-07-23 VITALS
SYSTOLIC BLOOD PRESSURE: 126 MMHG | HEART RATE: 95 BPM | BODY MASS INDEX: 31.65 KG/M2 | WEIGHT: 172 LBS | DIASTOLIC BLOOD PRESSURE: 83 MMHG | HEIGHT: 62 IN

## 2018-07-23 DIAGNOSIS — O09.292 HISTORY OF GESTATIONAL DIABETES IN PRIOR PREGNANCY, CURRENTLY PREGNANT IN SECOND TRIMESTER: Primary | ICD-10-CM

## 2018-07-23 DIAGNOSIS — Z86.32 HISTORY OF GESTATIONAL DIABETES IN PRIOR PREGNANCY, CURRENTLY PREGNANT IN SECOND TRIMESTER: Primary | ICD-10-CM

## 2018-07-23 DIAGNOSIS — Z3A.21 21 WEEKS GESTATION OF PREGNANCY: ICD-10-CM

## 2018-07-23 DIAGNOSIS — O34.219 HISTORY OF CESAREAN SECTION COMPLICATING PREGNANCY: ICD-10-CM

## 2018-07-23 DIAGNOSIS — Z36.86 ENCOUNTER FOR ANTENATAL SCREENING FOR CERVICAL LENGTH: ICD-10-CM

## 2018-07-23 PROCEDURE — 76817 TRANSVAGINAL US OBSTETRIC: CPT | Performed by: OBSTETRICS & GYNECOLOGY

## 2018-07-23 PROCEDURE — 76811 OB US DETAILED SNGL FETUS: CPT | Performed by: OBSTETRICS & GYNECOLOGY

## 2018-07-23 PROCEDURE — 99241 PR OFFICE CONSULTATION NEW/ESTAB PATIENT 15 MIN: CPT | Performed by: OBSTETRICS & GYNECOLOGY

## 2018-07-23 NOTE — PROGRESS NOTES
A transvaginal ultrasound was performed  Sonographer note on use of High Level Disinfection Process (Trophon) for transvaginal probe# 2 used, serial [id number]    Irene Sanchez

## 2018-07-24 ENCOUNTER — LAB (OUTPATIENT)
Dept: LAB | Facility: HOSPITAL | Age: 23
End: 2018-07-24
Attending: SPECIALIST
Payer: COMMERCIAL

## 2018-07-24 ENCOUNTER — TRANSCRIBE ORDERS (OUTPATIENT)
Dept: ADMINISTRATIVE | Facility: HOSPITAL | Age: 23
End: 2018-07-24

## 2018-07-24 DIAGNOSIS — Z86.32 HISTORY OF GESTATIONAL DIABETES IN PRIOR PREGNANCY, CURRENTLY PREGNANT, SECOND TRIMESTER: ICD-10-CM

## 2018-07-24 DIAGNOSIS — Z86.32 HISTORY OF GESTATIONAL DIABETES IN PRIOR PREGNANCY, CURRENTLY PREGNANT IN SECOND TRIMESTER: ICD-10-CM

## 2018-07-24 DIAGNOSIS — O99.810 ABNORMAL GLUCOSE IN PREGNANCY, ANTEPARTUM: Primary | ICD-10-CM

## 2018-07-24 DIAGNOSIS — Z86.32 HISTORY OF GESTATIONAL DIABETES IN PRIOR PREGNANCY, CURRENTLY PREGNANT, SECOND TRIMESTER: Primary | ICD-10-CM

## 2018-07-24 DIAGNOSIS — R35.0 URINARY FREQUENCY: ICD-10-CM

## 2018-07-24 DIAGNOSIS — O09.292 HISTORY OF GESTATIONAL DIABETES IN PRIOR PREGNANCY, CURRENTLY PREGNANT, SECOND TRIMESTER: Primary | ICD-10-CM

## 2018-07-24 DIAGNOSIS — Z86.32 HISTORY OF GESTATIONAL DIABETES: ICD-10-CM

## 2018-07-24 DIAGNOSIS — Z3A.21 21 WEEKS GESTATION OF PREGNANCY: ICD-10-CM

## 2018-07-24 DIAGNOSIS — R35.0 URINARY FREQUENCY: Primary | ICD-10-CM

## 2018-07-24 DIAGNOSIS — O09.292 HISTORY OF GESTATIONAL DIABETES IN PRIOR PREGNANCY, CURRENTLY PREGNANT IN SECOND TRIMESTER: ICD-10-CM

## 2018-07-24 DIAGNOSIS — O09.292 HISTORY OF GESTATIONAL DIABETES IN PRIOR PREGNANCY, CURRENTLY PREGNANT, SECOND TRIMESTER: ICD-10-CM

## 2018-07-24 LAB
BACTERIA UR QL AUTO: ABNORMAL /HPF
BILIRUB UR QL STRIP: NEGATIVE
CLARITY UR: ABNORMAL
COLOR UR: ABNORMAL
EST. AVERAGE GLUCOSE BLD GHB EST-MCNC: 120 MG/DL
GLUCOSE 1H P 50 G GLC PO SERPL-MCNC: 241 MG/DL (ref 40–134)
GLUCOSE UR STRIP-MCNC: NEGATIVE MG/DL
HBA1C MFR BLD: 5.8 % (ref 4.2–6.3)
HGB UR QL STRIP.AUTO: NEGATIVE
KETONES UR STRIP-MCNC: ABNORMAL MG/DL
LEUKOCYTE ESTERASE UR QL STRIP: NEGATIVE
MUCOUS THREADS UR QL AUTO: ABNORMAL
NITRITE UR QL STRIP: POSITIVE
NON-SQ EPI CELLS URNS QL MICRO: ABNORMAL /HPF
PH UR STRIP.AUTO: 6.5 [PH] (ref 5–8)
PROT UR STRIP-MCNC: NEGATIVE MG/DL
RBC #/AREA URNS AUTO: ABNORMAL /HPF
SP GR UR STRIP.AUTO: 1.01 (ref 1–1.03)
UROBILINOGEN UR QL STRIP.AUTO: 0.2 E.U./DL
WBC #/AREA URNS AUTO: ABNORMAL /HPF

## 2018-07-24 PROCEDURE — 36415 COLL VENOUS BLD VENIPUNCTURE: CPT

## 2018-07-24 PROCEDURE — 87186 SC STD MICRODIL/AGAR DIL: CPT

## 2018-07-24 PROCEDURE — 83036 HEMOGLOBIN GLYCOSYLATED A1C: CPT

## 2018-07-24 PROCEDURE — 87086 URINE CULTURE/COLONY COUNT: CPT

## 2018-07-24 PROCEDURE — 82950 GLUCOSE TEST: CPT

## 2018-07-24 PROCEDURE — 81001 URINALYSIS AUTO W/SCOPE: CPT | Performed by: SPECIALIST

## 2018-07-24 PROCEDURE — 87077 CULTURE AEROBIC IDENTIFY: CPT

## 2018-07-26 LAB — BACTERIA UR CULT: ABNORMAL

## 2018-07-31 ENCOUNTER — TELEPHONE (OUTPATIENT)
Dept: PERINATAL CARE | Facility: CLINIC | Age: 23
End: 2018-07-31

## 2018-07-31 ENCOUNTER — OFFICE VISIT (OUTPATIENT)
Dept: PERINATAL CARE | Facility: CLINIC | Age: 23
End: 2018-07-31
Payer: COMMERCIAL

## 2018-07-31 VITALS
DIASTOLIC BLOOD PRESSURE: 66 MMHG | SYSTOLIC BLOOD PRESSURE: 101 MMHG | BODY MASS INDEX: 31.67 KG/M2 | HEIGHT: 62 IN | WEIGHT: 172.1 LBS | HEART RATE: 89 BPM

## 2018-07-31 DIAGNOSIS — Z86.32 HISTORY OF GESTATIONAL DIABETES IN PRIOR PREGNANCY, CURRENTLY PREGNANT IN SECOND TRIMESTER: ICD-10-CM

## 2018-07-31 DIAGNOSIS — O24.410 DIET CONTROLLED GESTATIONAL DIABETES MELLITUS (GDM) IN SECOND TRIMESTER: ICD-10-CM

## 2018-07-31 DIAGNOSIS — O99.810 ABNORMAL GLUCOSE IN PREGNANCY, ANTEPARTUM: ICD-10-CM

## 2018-07-31 DIAGNOSIS — Z3A.21 21 WEEKS GESTATION OF PREGNANCY: ICD-10-CM

## 2018-07-31 DIAGNOSIS — O09.292 HISTORY OF GESTATIONAL DIABETES IN PRIOR PREGNANCY, CURRENTLY PREGNANT IN SECOND TRIMESTER: ICD-10-CM

## 2018-07-31 DIAGNOSIS — O24.410 DIET CONTROLLED GESTATIONAL DIABETES MELLITUS (GDM) IN THIRD TRIMESTER: Primary | ICD-10-CM

## 2018-07-31 DIAGNOSIS — O34.219 HISTORY OF CESAREAN SECTION COMPLICATING PREGNANCY: ICD-10-CM

## 2018-07-31 PROCEDURE — G0108 DIAB MANAGE TRN  PER INDIV: HCPCS

## 2018-07-31 RX ORDER — BLOOD SUGAR DIAGNOSTIC
STRIP MISCELLANEOUS
Qty: 100 EACH | Refills: 5 | Status: SHIPPED | OUTPATIENT
Start: 2018-07-31 | End: 2018-08-02

## 2018-07-31 NOTE — PROGRESS NOTES
DATE:  18  RE: Neftaly Eid    : 1995    EDDA: Estimated Date of Delivery: 12/3/18    EGA: 22w1d    Dear Dr Juan Alberto Ryan,    Thank you for referring your patient to the Critical access hospital, [facility]  at [address]  The patient has a history of insulin controlled gestational diabetes in 2016  The patient received the following education for diabetes and pregnancy   Pathophysiology of diabetes and pregnancy  This includes maternal-fetal complications such as fetal macrosomia,  hypoglycemia, polyhydramnios, increased incidence of  section, pre-term labor and in severe cases, fetal demise and stillbirth   Self-monitoring of blood glucose levels: fasting (goal 60mg/dl to 90mg/dl) and two hours after the start of the meal less (goal less than 120mg/dl)  The patient was provided with a Verio flex blood glucose meter and supplies   Weight gain during in pregnancy  Based on the patients height of 62 inches, Patient was unsure of pre-pregnancy weight stating she thought approximately 165 pounds (BMI 30 17) we would recommend a total weight gain of 11-20 pounds for the pregnancy  o The patients current weight is 78 1 kg (172 lb 1 6 oz) pounds, and her weight gain to date is 7 pounds  Based on this, we are recommending the patient gain no more than 13 pounds for the remainder of the pregnancy   Medical Nutrition Therapy for Diabetes and Pregnancy:  o Basic review of macronutrients   o Meal pattern should consist of three small meals and three snacks daily  o Carbohydrate gram amounts per meal   o Instructions on how to read a food label  o Appropriate serving size of foods  o Incorporating protein at each meal and snack in the importance of protein in relationship to blood glucose control   o Individualized meal plan: 2000 gestational diabetes diet  o Use of food diary to maintain a meal plan     Ultrasounds every 4 weeks at the [address] to evaluate fetal growth   Sick day guidelines   Breastfeeding guidelines   Post-partum diet recommendations   Exercise Guidelines   Report blood glucose levels to [address] weekly or as directed  o Phone: [phone]  If no response in 24 hours, call [phone]   o Fax: [phone]  o Email: [email or web address]   Follow up: To be scheduled based on patient's blood sugars and patient's individual needs  Your patient was also provided insulin teaching  The patient has a history of insulin controlled gestational diabetes in 2016  Noted 1 hour   Please refer to Diabetes and Pregnancy Progress Record for complete documentation of patients blood glucose levels  The patient was instructed on the following:     Levemir Pen use  The patient was not started on insulin today  Considering history and 18 one hour glucose tolerance test result of 241 mg/dl patient was provided with insulin education  Vamshi Thapa Insulin administration times, insulin action   Hypoglycemia signs, symptoms and treatment   Increase in maternal-fetal surveillance with insulin initiation   Side effects of hyperglycemia in pregnancy including macrosomia,  hypoglycemia, polyhydramnios, pre-term labor and stillbirth   Continue to monitor blood glucoses via fingerstick fasting (goal 60 mg/dl to 90 mg/dl) and two hours post prandial (goal less than 120 mg/dl)   Follow up with our office on Thursday, 18 for evaluation of blood glucose levels   Non-stress testing two times weekly and JAVIER testing beginning at 32 weeks gestation per doctor   Ultrasounds every 4 weeks at the [address] per doctor   HbA1c every 6 to 8 weeks, CMP ordered  Thank you for the opportunity to participate in the care of this patient  I can be reached at [phone] should you have any questions    Time spent with patient [phone]; time spent face to face counseling greater than 50% of the appointment      Sincerely,     Taya Jordan, RD,LDN, CDE  Diabetes Educator  Diabetes and Pregnancy Program

## 2018-08-02 ENCOUNTER — TELEPHONE (OUTPATIENT)
Dept: PERINATAL CARE | Facility: CLINIC | Age: 23
End: 2018-08-02

## 2018-08-02 DIAGNOSIS — O24.414 INSULIN CONTROLLED GESTATIONAL DIABETES MELLITUS (GDM) IN SECOND TRIMESTER: Primary | ICD-10-CM

## 2018-08-02 DIAGNOSIS — O24.410 DIET CONTROLLED GESTATIONAL DIABETES MELLITUS (GDM) IN SECOND TRIMESTER: Primary | ICD-10-CM

## 2018-08-02 RX ORDER — INSULIN LISPRO 100 [IU]/ML
INJECTION, SOLUTION INTRAVENOUS; SUBCUTANEOUS
Qty: 5 PEN | Refills: 0 | Status: SHIPPED | OUTPATIENT
Start: 2018-08-02 | End: 2018-10-02 | Stop reason: SDUPTHER

## 2018-08-02 RX ORDER — BLOOD SUGAR DIAGNOSTIC
STRIP MISCELLANEOUS
Qty: 100 EACH | Refills: 5 | Status: SHIPPED | OUTPATIENT
Start: 2018-08-02 | End: 2018-11-28 | Stop reason: HOSPADM

## 2018-08-02 RX ORDER — LANCETS
EACH MISCELLANEOUS
Qty: 102 EACH | Refills: 5 | Status: SHIPPED | OUTPATIENT
Start: 2018-08-02 | End: 2020-07-09 | Stop reason: ALTCHOICE

## 2018-08-02 RX ORDER — INSULIN GLARGINE 100 [IU]/ML
INJECTION, SOLUTION SUBCUTANEOUS
Qty: 5 PEN | Refills: 0 | Status: SHIPPED | OUTPATIENT
Start: 2018-08-02 | End: 2018-08-03 | Stop reason: CLARIF

## 2018-08-02 RX ORDER — PEN NEEDLE, DIABETIC 30 GX3/16"
NEEDLE, DISPOSABLE MISCELLANEOUS 2 TIMES DAILY
Qty: 100 EACH | Refills: 5 | Status: SHIPPED | OUTPATIENT
Start: 2018-08-02 | End: 2018-08-29 | Stop reason: SDUPTHER

## 2018-08-02 NOTE — TELEPHONE ENCOUNTER
Date:  18  RE: Laura Linear    : 1995  Estimated Date of Delivery: 12/3/18  EGA: 22w3d  [facility]N: [facility]    GDM A2    Date Fasting Post-  breakfast Post-  lunch Post-  dinner Before bedtime Carbs Comments   -  95 123 159      8-1 115 158 112 145      8-2 125                                                     Current regimen:  2000 calorie diabetes and pregnancy diet with 3 meals/snacks including protein  SMBG fasting and 2 hours after meals with One Touch Verio meter  Plan:  Add Lantus- 20 units at hs  Add Novolog-6 units with dinner  Continue diet and SMBG   18 A1c 5 8%  Re-order labs week of 18      Date due to report next:  Monday, 18    Shay Solomon, RN  Diabetes Educator   Diabetes and Pregnancy Program

## 2018-08-03 ENCOUNTER — TELEPHONE (OUTPATIENT)
Dept: PERINATAL CARE | Facility: CLINIC | Age: 23
End: 2018-08-03

## 2018-08-03 DIAGNOSIS — O24.410 DIET CONTROLLED GESTATIONAL DIABETES MELLITUS (GDM) IN SECOND TRIMESTER: Primary | ICD-10-CM

## 2018-08-03 RX ORDER — INSULIN GLARGINE 100 [IU]/ML
INJECTION, SOLUTION SUBCUTANEOUS
Qty: 15 ML | Refills: 0 | Status: SHIPPED | OUTPATIENT
Start: 2018-08-03 | End: 2018-09-02 | Stop reason: SDUPTHER

## 2018-08-03 RX ORDER — BLOOD-GLUCOSE METER
EACH MISCELLANEOUS 4 TIMES DAILY
Qty: 1 KIT | Refills: 0 | Status: SHIPPED | OUTPATIENT
Start: 2018-08-03 | End: 2020-07-09 | Stop reason: ALTCHOICE

## 2018-08-03 NOTE — TELEPHONE ENCOUNTER
Date:  18  RE: Madison Boogie    : 1995  Estimated Date of Delivery: 12/3/18  EGA: 22w4d  [facility]GYN: [facility]    GDM A2    Date Fasting Post-  breakfast Post-  lunch Post-  dinner Before bedtime Carbs Comments     95 123 159      8-1 115 158 112 145      8-2 125                                                   Spoke with pharmacist regarding patient's insurance coverage for meter; insulin and pen needle prescriptions  Pharmacist reported that Accu-chek guide, Basaglar and Humalog as well as prescribed pen needles are covered by patient's insurance  Patient was also informed and will  prescriptions today  Current regimen:  2000 calorie diabetes and pregnancy diet with 3 meals/snacks including protein  SMBG fasting and 2 hours after meals with Accu-chek guide glucose meter not the Touch Verio meter  Plan:  Add Basaglar not Lantus- 20 units at hs  Add Humalog not Novolog-6 units with dinner  Patient will start insulin when she is able to  meter and supplies to begin testing blood glucose  Continue diet and SMBG   18 A1c 5 8%  Re-order labs week of 18      Date due to report next:  18    Franca Penny, RADHA,LDN,CDE  Diabetes Educator   Diabetes and Pregnancy Program

## 2018-08-06 ENCOUNTER — TELEPHONE (OUTPATIENT)
Dept: PERINATAL CARE | Facility: CLINIC | Age: 23
End: 2018-08-06

## 2018-08-06 NOTE — TELEPHONE ENCOUNTER
Date:  18  RE: Giancarlo Castillo    : 1995  Estimated Date of Delivery: 12/3/18  EGA: 23w0d  [facility]: [facility]      Date Fasting Post-  breakfast Post-  lunch Post-  dinner Comments   8-2  109 NR NR    8-3 NR NR NR NR    8-4 NR NR NR NR    8-5 124 130 236 81    8-6 115                           Current regimen:  2000 calorie diabetes and pregnancy diet with 3 meals/snacks including protein  SMBG fasting and 2 hours after meals with Accu-chek guide glucose meter not the Touch Verio meter  Unable to get basaglar from pharmacy who tells patient it will be available tonight at 6 PM  Patient did not start Humalog      Plan:  Start basaglar- 20 units at hs  Start Humalog- 6 units with dinner  Continue diet and SMBG      Date due to report next:  Thursday, 18    Giselle Meléndez RN  Diabetes Educator   Diabetes and Pregnancy Program

## 2018-08-09 ENCOUNTER — TELEPHONE (OUTPATIENT)
Dept: PERINATAL CARE | Facility: CLINIC | Age: 23
End: 2018-08-09

## 2018-08-13 ENCOUNTER — TELEPHONE (OUTPATIENT)
Dept: PERINATAL CARE | Facility: CLINIC | Age: 23
End: 2018-08-13

## 2018-08-13 DIAGNOSIS — Z3A.21 21 WEEKS GESTATION OF PREGNANCY: ICD-10-CM

## 2018-08-13 DIAGNOSIS — O34.219 HISTORY OF CESAREAN SECTION COMPLICATING PREGNANCY: ICD-10-CM

## 2018-08-13 DIAGNOSIS — O09.292 HISTORY OF GESTATIONAL DIABETES IN PRIOR PREGNANCY, CURRENTLY PREGNANT IN SECOND TRIMESTER: ICD-10-CM

## 2018-08-13 DIAGNOSIS — Z86.32 HISTORY OF GESTATIONAL DIABETES IN PRIOR PREGNANCY, CURRENTLY PREGNANT IN SECOND TRIMESTER: ICD-10-CM

## 2018-08-13 NOTE — TELEPHONE ENCOUNTER
Note      Date:  18  RE: Kyara Rota    : 1995  Estimated Date of Delivery: 12/3/18  EGA: 24w0d  [facility]N: [facility]        Date Fasting Post-  breakfast Post-  lunch Post-  dinner Before bedtime Carbs Comments    108 97 187 123         8/10 88 115 143 177          102 108 177 141          102 73 200 111                                                                   Current regimen:  basaglar- 24 units at bedtime  Humalog- 6 units before breakfast and before dinner   calorie diabetes and pregnancy diet with 3 meals/snacks including protein  SMBG fasting and 2 hours after meals with Accu-chek guide glucose meter      Plan:  Due to FBS >90 and 2 hour pp >120, adjustments as follows  basaglar- increase from 24 to 28 units at 9 or 10 pm daily  Encouraged to be consistent with timing  Humalog- continue 6 units with breakfast, add 6 units before lunch                  continue 6 units with dinner  Continue diet, encouraged to eat 3 meals and 3 snacks plus no more than 9 to 10 hours of fasting over night  Continue SMBG  Voice message left to contact office to review regimen and schedule follow-up     18 A1c 5 8%      Date due to report next:  Thursday, 18       Transcribed by:  Elena Zepeda blood sugar log via email    Selvin Hill, [address]  Diabetes Educator   Diabetes and Pregnancy Program

## 2018-08-16 ENCOUNTER — TELEPHONE (OUTPATIENT)
Dept: PERINATAL CARE | Facility: CLINIC | Age: 23
End: 2018-08-16

## 2018-08-16 DIAGNOSIS — O09.292 HISTORY OF GESTATIONAL DIABETES IN PRIOR PREGNANCY, CURRENTLY PREGNANT IN SECOND TRIMESTER: ICD-10-CM

## 2018-08-16 DIAGNOSIS — Z86.32 HISTORY OF GESTATIONAL DIABETES IN PRIOR PREGNANCY, CURRENTLY PREGNANT IN SECOND TRIMESTER: ICD-10-CM

## 2018-08-16 DIAGNOSIS — Z3A.21 21 WEEKS GESTATION OF PREGNANCY: ICD-10-CM

## 2018-08-16 DIAGNOSIS — O34.219 HISTORY OF CESAREAN SECTION COMPLICATING PREGNANCY: ICD-10-CM

## 2018-08-16 NOTE — TELEPHONE ENCOUNTER
Note      Date:  18  RE: Marla Moreira  : 1995  Estimated Date of Delivery: 12/3/18  EGA: 24w3d  [facility]N: [facility]        Date Fasting Post-  breakfast Post-  lunch Post-  dinner Comments    89 97 86 145      94 68 200 93     8/15 100 140 140 72      97                                                     Current regimen:  basaglar- 28 units at bedtime  Humalog- 6 units before breakfast, before lunch and before dinner  2000 calorie diabetes and pregnancy diet with 3 meals/snacks including protein  Review of diet log shows not following recommended diet  SMBG fasting and 2 hours after meals with Accu-chek guide glucose meter      Plan:  Due to FBS >90 and 2 hour pp >120, adjustments as follows  basaglar- 28 units at 9 or 10 pm daily  Encouraged to be consistent with timing  Humalog- continue 6 units with breakfast, 6 units before lunch                  and 6 units with dinner  Encouraged to eat 3 meals and 3 snacks including protein plus no more than 9 to 10 hours of fasting over night  Avoid ice cream and chips  Continue SMBG     Schedule follow-up appointment with dietician    18 A1c 5 8%      Date due to report next:  Monday, 18 or during appointment       Transcribed by:  Gerber Pickard blood sugar log via email     ANYI Morales  Diabetes Educator   Diabetes and Pregnancy Program

## 2018-08-20 ENCOUNTER — ULTRASOUND (OUTPATIENT)
Dept: PERINATAL CARE | Facility: CLINIC | Age: 23
End: 2018-08-20
Payer: COMMERCIAL

## 2018-08-20 VITALS
WEIGHT: 175 LBS | SYSTOLIC BLOOD PRESSURE: 130 MMHG | BODY MASS INDEX: 32.2 KG/M2 | DIASTOLIC BLOOD PRESSURE: 78 MMHG | HEIGHT: 62 IN

## 2018-08-20 DIAGNOSIS — Z86.32 HISTORY OF GESTATIONAL DIABETES IN PRIOR PREGNANCY, CURRENTLY PREGNANT IN SECOND TRIMESTER: ICD-10-CM

## 2018-08-20 DIAGNOSIS — O34.219 HISTORY OF CESAREAN SECTION COMPLICATING PREGNANCY: ICD-10-CM

## 2018-08-20 DIAGNOSIS — O09.292 HISTORY OF GESTATIONAL DIABETES IN PRIOR PREGNANCY, CURRENTLY PREGNANT IN SECOND TRIMESTER: ICD-10-CM

## 2018-08-20 DIAGNOSIS — Z3A.25 25 WEEKS GESTATION OF PREGNANCY: ICD-10-CM

## 2018-08-20 DIAGNOSIS — O24.414 INSULIN CONTROLLED GESTATIONAL DIABETES MELLITUS (GDM) IN SECOND TRIMESTER: Primary | ICD-10-CM

## 2018-08-20 PROCEDURE — 76816 OB US FOLLOW-UP PER FETUS: CPT | Performed by: OBSTETRICS & GYNECOLOGY

## 2018-08-20 NOTE — PROGRESS NOTES
MAGY Kyle 51: Ms Anton Mello was seen today at 25w0d for fetal growth and followup missed anatomy ultrasound  See ultrasound report under "OB Procedures" tab  Please don't hesitate to contact our office with any concerns or questions    Jania Neal MD

## 2018-08-20 NOTE — PATIENT INSTRUCTIONS
Thank you for choosing [facility] for your  care today  If you have any questions about your ultrasound or care, please do not hesitate to contact us or your primary obstetrician  Please stay in close contact with diabetes education

## 2018-08-28 ENCOUNTER — TELEPHONE (OUTPATIENT)
Dept: PERINATAL CARE | Facility: CLINIC | Age: 23
End: 2018-08-28

## 2018-08-28 NOTE — TELEPHONE ENCOUNTER
----- Message from Kaylene Del Real sent at 8/27/2018  2:24 PM EDT -----  Regarding: Reschedule pt to a new date  [facility],  I guess this pt cancelled  She disappeared off my list   I see she is rescheduled for Monday, 9/17 at 2 pm for a Level 1 ultrasound at [facility]  Please call her to schedule her with me afterwards  Thanks!   Hector Crespo

## 2018-08-29 ENCOUNTER — TELEPHONE (OUTPATIENT)
Dept: PERINATAL CARE | Facility: CLINIC | Age: 23
End: 2018-08-29

## 2018-08-29 DIAGNOSIS — O24.414 INSULIN CONTROLLED GESTATIONAL DIABETES MELLITUS (GDM) IN SECOND TRIMESTER: ICD-10-CM

## 2018-08-29 RX ORDER — PEN NEEDLE, DIABETIC 30 GX3/16"
NEEDLE, DISPOSABLE MISCELLANEOUS
Qty: 100 EACH | Refills: 1 | Status: SHIPPED | OUTPATIENT
Start: 2018-08-29 | End: 2018-10-18 | Stop reason: SDUPTHER

## 2018-09-02 DIAGNOSIS — O24.410 DIET CONTROLLED GESTATIONAL DIABETES MELLITUS (GDM) IN SECOND TRIMESTER: ICD-10-CM

## 2018-09-11 ENCOUNTER — APPOINTMENT (OUTPATIENT)
Dept: LAB | Facility: HOSPITAL | Age: 23
End: 2018-09-11
Attending: SPECIALIST
Payer: COMMERCIAL

## 2018-09-11 ENCOUNTER — TRANSCRIBE ORDERS (OUTPATIENT)
Dept: ADMINISTRATIVE | Facility: HOSPITAL | Age: 23
End: 2018-09-11

## 2018-09-11 DIAGNOSIS — R35.0 URINARY FREQUENCY: ICD-10-CM

## 2018-09-11 DIAGNOSIS — Z3A.28 28 WEEKS GESTATION OF PREGNANCY: Primary | ICD-10-CM

## 2018-09-11 DIAGNOSIS — Z3A.28 28 WEEKS GESTATION OF PREGNANCY: ICD-10-CM

## 2018-09-11 LAB
ABO GROUP BLD: NORMAL
BACTERIA UR QL AUTO: ABNORMAL /HPF
BILIRUB UR QL STRIP: NEGATIVE
BLD GP AB SCN SERPL QL: NEGATIVE
CLARITY UR: CLEAR
COLOR UR: YELLOW
ERYTHROCYTE [DISTWIDTH] IN BLOOD BY AUTOMATED COUNT: 13.9 % (ref 11.5–14.5)
GLUCOSE UR STRIP-MCNC: NEGATIVE MG/DL
HCT VFR BLD AUTO: 34.3 % (ref 34.8–46.1)
HGB BLD-MCNC: 11.7 G/DL (ref 12–16)
HGB UR QL STRIP.AUTO: NEGATIVE
KETONES UR STRIP-MCNC: NEGATIVE MG/DL
LEUKOCYTE ESTERASE UR QL STRIP: ABNORMAL
MCH RBC QN AUTO: 31.2 PG (ref 26–34)
MCHC RBC AUTO-ENTMCNC: 34 G/DL (ref 31–37)
MCV RBC AUTO: 92 FL (ref 81–99)
MUCOUS THREADS UR QL AUTO: ABNORMAL
NITRITE UR QL STRIP: NEGATIVE
NON-SQ EPI CELLS URNS QL MICRO: ABNORMAL /HPF
PH UR STRIP.AUTO: 6.5 [PH] (ref 5–8)
PLATELET # BLD AUTO: 188 THOUSANDS/UL (ref 149–390)
PMV BLD AUTO: 8.6 FL (ref 8.6–11.7)
PROT UR STRIP-MCNC: NEGATIVE MG/DL
RBC # BLD AUTO: 3.74 MILLION/UL (ref 3.9–5.2)
RBC #/AREA URNS AUTO: ABNORMAL /HPF
RH BLD: NEGATIVE
SP GR UR STRIP.AUTO: 1.02 (ref 1–1.03)
SPECIMEN EXPIRATION DATE: NORMAL
UROBILINOGEN UR QL STRIP.AUTO: 0.2 E.U./DL
WBC # BLD AUTO: 9.3 THOUSAND/UL (ref 4.8–10.8)
WBC #/AREA URNS AUTO: ABNORMAL /HPF

## 2018-09-11 PROCEDURE — 85027 COMPLETE CBC AUTOMATED: CPT

## 2018-09-11 PROCEDURE — 87086 URINE CULTURE/COLONY COUNT: CPT

## 2018-09-11 PROCEDURE — 87186 SC STD MICRODIL/AGAR DIL: CPT

## 2018-09-11 PROCEDURE — 86901 BLOOD TYPING SEROLOGIC RH(D): CPT

## 2018-09-11 PROCEDURE — 86900 BLOOD TYPING SEROLOGIC ABO: CPT

## 2018-09-11 PROCEDURE — 36415 COLL VENOUS BLD VENIPUNCTURE: CPT

## 2018-09-11 PROCEDURE — 81001 URINALYSIS AUTO W/SCOPE: CPT | Performed by: SPECIALIST

## 2018-09-11 PROCEDURE — 87077 CULTURE AEROBIC IDENTIFY: CPT

## 2018-09-11 PROCEDURE — 86850 RBC ANTIBODY SCREEN: CPT

## 2018-09-13 LAB — BACTERIA UR CULT: ABNORMAL

## 2018-09-17 ENCOUNTER — DOCUMENTATION (OUTPATIENT)
Dept: PERINATAL CARE | Facility: CLINIC | Age: 23
End: 2018-09-17

## 2018-09-17 ENCOUNTER — ULTRASOUND (OUTPATIENT)
Dept: PERINATAL CARE | Facility: CLINIC | Age: 23
End: 2018-09-17
Payer: COMMERCIAL

## 2018-09-17 VITALS
SYSTOLIC BLOOD PRESSURE: 117 MMHG | BODY MASS INDEX: 32.94 KG/M2 | WEIGHT: 179 LBS | DIASTOLIC BLOOD PRESSURE: 69 MMHG | HEIGHT: 62 IN | HEART RATE: 94 BPM

## 2018-09-17 DIAGNOSIS — Z3A.29 29 WEEKS GESTATION OF PREGNANCY: ICD-10-CM

## 2018-09-17 DIAGNOSIS — O34.219 PREGNANCY WITH HISTORY OF CESAREAN SECTION, ANTEPARTUM: ICD-10-CM

## 2018-09-17 DIAGNOSIS — O24.414 INSULIN CONTROLLED GESTATIONAL DIABETES MELLITUS (GDM) IN THIRD TRIMESTER: Primary | ICD-10-CM

## 2018-09-17 PROCEDURE — 76816 OB US FOLLOW-UP PER FETUS: CPT | Performed by: OBSTETRICS & GYNECOLOGY

## 2018-09-17 NOTE — PROGRESS NOTES
Normal growth and fluid seen  Recommend a follow-up ultrasound in four weeks  Recommend starting twice weekly NST and weekly JAVIER from 32 weeks on  Recommend delivery after 39 weeks for GDM A2  Recommend she continue to call in her blood sugars as requested to our diabetes educators      Genaro Smith MD

## 2018-09-17 NOTE — PROGRESS NOTES
Note      Date:  18  RE: Mario Salmon  : 1995  Estimated Date of Delivery: 12/3/18  EGA: 29w0d  [facility]N: [facility]     A2GDM     Date Fasting Post-  breakfast Post-  lunch Post-  dinner Comments     84 138 114      96 117 100 130      100 140 200 120      103 140 120 117       86 117 140 88      88 95 140 140      93 110 103 104     86 120 91 79     100 124 140 160     90 80 150 122     97 114 153 125     90 93 120 104     86 70 151 130     76 114 160 150     103 120 130 95     88 93 89 145     103 112 125 117     99 140 170 110    9/3 94 125 109 64     100 170 64 135     94 180 131 157     100 180 168 110     92 123 113 120     96 92 160 99     103 180 146 67    9/10 94 113 163 113     110 83 150 86     106 87 88 134     105 67 135 190     113 80                    Last reported glucose readings 18  Current regimen:  basaglar- 28 units at bedtime  Humalog- 6 units before breakfast, before lunch and before dinner  2000 calorie diabetes and pregnancy diet with 3 meals/snacks including protein  Review of diet log shows not following recommended diet  SMBG fasting and 2 hours after meals with Accu-chek guide glucose meter      Plan:  FBS >90 and 2 hours pp readings inconsistent from 64 to 200 with no consistent pattern  Encouraged patient to report weekly  Has a follow-up with dietician on 18 and recommended to keep a 3 day food log to discuss diet given varying post prandial readings  Due to FBS >90 increase basaglar from 28 to 34 units at 9 or 10 pm daily  Patient does need increase in Humalog but given inconsistent readings will not increase units before meals until diet evaluated      Humalog- continue 6 units with breakfast, 6 units before lunch                  and 6 units with dinner  Encouraged to eat 3 meals and 3 snacks including protein plus no more than 10 hours of fasting over night  Continue SMBG    7/24/18 A1c 5 8%  Due for repeat A1c  Has follow-up with MFM today       Date due to report next:  Thursday, 9-20-18 and then as requested   Minimal once a week for insulin dose adjustments         ANYI Baumann  Diabetes Educator   Diabetes and Pregnancy Program

## 2018-09-19 RX ORDER — INSULIN GLARGINE 100 [IU]/ML
INJECTION, SOLUTION SUBCUTANEOUS
Qty: 15 ML | Refills: 0 | Status: SHIPPED | OUTPATIENT
Start: 2018-09-19 | End: 2018-11-16 | Stop reason: SDUPTHER

## 2018-09-20 ENCOUNTER — TELEPHONE (OUTPATIENT)
Dept: PERINATAL CARE | Facility: CLINIC | Age: 23
End: 2018-09-20

## 2018-09-20 DIAGNOSIS — O24.414 INSULIN CONTROLLED GESTATIONAL DIABETES MELLITUS (GDM) IN SECOND TRIMESTER: Primary | ICD-10-CM

## 2018-09-20 NOTE — TELEPHONE ENCOUNTER
Note      Date:  18  RE: Parth Anthony  : 1995  Estimated Date of Delivery: 12/3/18  EGA: 29w3d  [facility]GYN: [facility]        Date Fasting Post-  breakfast Post-  lunch Post-  dinner Comments    113 80 170 120     9/15 100 150 130 165      107 108 99 132      108 114 130 164      104 102 139 119      94 130 115 122          Current regimen:  basaglar- 34 units at bedtime   Humalog- 6 units before breakfast, before lunch and before dinner   2000 calorie diabetes and pregnancy diet with 3 meals/snacks including protein  SMBG fasting and 2 hours after meals with Accu-chek guide glucose meter      Plan:  Basaglar- increase to 40 units at bedtime  Humalog-   Breakfast- 6 units  Lunch- increase to 7 units  Dawna- increase to 7 units  Follow-up appointment with RD scheduled 18  Continue diet and SMBG  Re-ordered HbA1c, prescription emailed to patient with request to complete this week      Date due to report next:  Thursday, 18       Melva Murdock blood sugar log via email    Jacinda Bernal RN BS CDE  Diabetes Educator   Diabetes and Pregnancy Program

## 2018-09-25 ENCOUNTER — TELEPHONE (OUTPATIENT)
Dept: PERINATAL CARE | Facility: CLINIC | Age: 23
End: 2018-09-25

## 2018-09-25 NOTE — TELEPHONE ENCOUNTER
----- Message from Lisa Jimenez sent at 9/24/2018  3:18 PM EDT -----  Regarding: Bad Day at [address] [facility],  This is another NO Show at [facility]  This is the 4th No Show  I hope the reminder phone calls have been working  Please reschedule her  Try to schedule her when she has a MFM appt since she comes from [place] the [place]  Thanks!   Alisson Reyes

## 2018-10-02 ENCOUNTER — TELEPHONE (OUTPATIENT)
Dept: PERINATAL CARE | Facility: CLINIC | Age: 23
End: 2018-10-02

## 2018-10-02 DIAGNOSIS — O24.414 INSULIN CONTROLLED GESTATIONAL DIABETES MELLITUS (GDM) IN SECOND TRIMESTER: ICD-10-CM

## 2018-10-02 RX ORDER — INSULIN LISPRO 100 [IU]/ML
INJECTION, SOLUTION INTRAVENOUS; SUBCUTANEOUS
Qty: 5 PEN | Refills: 0 | Status: SHIPPED | OUTPATIENT
Start: 2018-10-02 | End: 2018-11-28 | Stop reason: HOSPADM

## 2018-10-02 NOTE — TELEPHONE ENCOUNTER
Please refer to the [facility] ultrasound report in Ob Procedures for additional information regarding the visit to the [facility]  today

## 2018-10-02 NOTE — TELEPHONE ENCOUNTER
Date:  10/02/18  RE: France Hernandez    : 1995  Estimated Date of Delivery: 12/3/18  EGA: 31w1d  [facility]: [facility]      Date Fasting Post-  breakfast Post-  lunch Post-  dinner Before bedtime Carbs Comments    89 130 91 90       88 140 96 101       98 125 130 160       99 70 140 127       94 107 123 103       89 106 160 114       96 130 84 99       94 103 138 124       91 150 117 120       103 113 139 153       90 91 144 128          Current regimen:  basaglar- 40 units at bedtime   Humalog-  Breakfast- 6 units  Lunch- 7 units  Dinner- 7 units  2000 calorie diabetes and pregnancy diet with 3 meals/snacks including protein  SMBG fasting and 2 hours after meals with Accu-chek guide glucose meter  Patient was a no show for appointment with RD on 18  Plan:  basaglar- increase to 48 units at bedtime  Humalog-  Breakfast- [id number] 6 units  Lunch- increase to 8 units  Dinner- increase to 8 units  Continue diet and SMBG  Reminded to complete lab test ordered 18 this week if possible      Date due to report next:  Tuesday, 10-9-18    Shilpi Gordon RN  Diabetes Educator   Diabetes and Pregnancy Program

## 2018-10-08 ENCOUNTER — OFFICE VISIT (OUTPATIENT)
Dept: PERINATAL CARE | Facility: CLINIC | Age: 23
End: 2018-10-08
Payer: COMMERCIAL

## 2018-10-08 ENCOUNTER — ROUTINE PRENATAL (OUTPATIENT)
Dept: PERINATAL CARE | Facility: CLINIC | Age: 23
End: 2018-10-08
Payer: COMMERCIAL

## 2018-10-08 VITALS
SYSTOLIC BLOOD PRESSURE: 120 MMHG | HEART RATE: 90 BPM | HEIGHT: 62 IN | BODY MASS INDEX: 33.13 KG/M2 | DIASTOLIC BLOOD PRESSURE: 76 MMHG | WEIGHT: 180 LBS

## 2018-10-08 DIAGNOSIS — O24.414 INSULIN CONTROLLED GESTATIONAL DIABETES MELLITUS (GDM) IN THIRD TRIMESTER: Primary | ICD-10-CM

## 2018-10-08 DIAGNOSIS — O34.219 PREGNANCY WITH HISTORY OF CESAREAN SECTION, ANTEPARTUM: ICD-10-CM

## 2018-10-08 DIAGNOSIS — Z3A.32 32 WEEKS GESTATION OF PREGNANCY: ICD-10-CM

## 2018-10-08 PROBLEM — K02.9 DENTAL CARIES: Status: ACTIVE | Noted: 2017-01-17

## 2018-10-08 PROCEDURE — 76818 FETAL BIOPHYS PROFILE W/NST: CPT | Performed by: OBSTETRICS & GYNECOLOGY

## 2018-10-08 PROCEDURE — G0108 DIAB MANAGE TRN  PER INDIV: HCPCS | Performed by: DIETITIAN, REGISTERED

## 2018-10-08 PROCEDURE — 76815 OB US LIMITED FETUS(S): CPT | Performed by: OBSTETRICS & GYNECOLOGY

## 2018-10-08 NOTE — PROGRESS NOTES
DATE: 10/08/18   RE: Sydnee Shelton   : 1995  EDDA: Estimated Date of Delivery: 12/3/18  EGA:  32w0d    Dear Dr Charmayne New :     Thank you for referring your patient to the Diabetes and Pregnancy Program at [address] [place] [address]  The patient was seen today for medical nutrition therapy for the treatment of gestational diabetes during pregnancy  In addition to diabetes, the nutrition status is complicated by obesity  The following was reviewed with the patient:      Weight gain during in pregnancy  Based on the patients height of 62  inches, pre-pregnancy weight of 175 pounds (BMI 32), we would recommend a total weight gain of 11-20 pounds for the pregnancy  o The patients current weight is 180  pounds, and her weight gain to date is 5 pounds   Basic review of macronutrients   Meal pattern should consist of three small meals and three snacks daily  Patient reported she eats 3 meals & most often only has 2 snacks (often skips mid-morning snack)   Appropriate serving size of foods   Incorporating protein at each meal and snack in the importance of protein in relationship to blood glucose control   Individualized meal plan: 2000 calorie gestational diabetes diet was reviewed  Diet recall indicates patient is mostly following the diet  Advised her to add protein to mid-morning snack & to always have this snack  Also, needs to add protein to HS snack  Follows the diet perfectly for her 3 meals   Use of food diary to maintain a meal plan  Encouraged patient to have another HbA1c completed soon  States she still has the prescription  18 ultrasound indicated patient is growing appropriately for fetal age  Patient  Verified she is taking the recommended insulin doses she was advised to take   Did not bring her BG results to this appointment  States she is to report them tomorrow per last week's recommendation   Report blood glucose levels to [facility] weekly or as directed  o Phone: [phone]  If no response in 24 hours, call [phone]   o Fax: [phone]  o Email: [email or web address]   Follow up: As Needed    Thank you for the opportunity to participate in the care of this patient  I can be reached at [phone] should you have any questions  Time spent with patient 2:05-2:30 PM; time spent face to face counseling greater than 50% of the appointment      Sincerely,     Bettye Reyes  Diabetes Educator  Diabetes and Pregnancy Program

## 2018-10-09 ENCOUNTER — TELEPHONE (OUTPATIENT)
Dept: PERINATAL CARE | Facility: CLINIC | Age: 23
End: 2018-10-09

## 2018-10-09 NOTE — TELEPHONE ENCOUNTER
Date:  10/09/18  RE: Amberly Tellez    : 1995  Estimated Date of Delivery: 12/3/18  EGA: 32w2d  [facility]N: [facility]        Date Fasting Post-  breakfast Post-  lunch Post-  dinner Before bedtime Carbs Comments   10/1 90 81 105 69         10/2 83 130 101 96         10/3 77 67 107 140         10/4 81 90 119 130         10/5 73 97 127 66         10/6 80 87 140 118         10/7 90 110 120 115         10/8 80 150 108 71         10/9 79                  Current regimen:  basaglar- 48 units at bedtime   Humalog-  Breakfast- 6 units  Lunch- 8 units  Dinner-8 units  2000 calorie diabetes and pregnancy diet with 3 meals/snacks including protein  SMBG fasting and 2 hours after start of meals with Accu-chek guide glucose meter      Plan:  Continue current regimen  Patient had diet education visit yesterday which advised to include more protein regularly at meals and snacks  Advised when repeating meals that cause BG to be > 119 to consistently replace 1 carbohydrate with 1 protein serving to minimize BG rise  Reminded to complete lab test ordered 18 again, this week if possible      Date due to report next:  Tuesday, 10-16-18     Perry Trinh blood sugar log via email    Lokesh Coffey RN BS CDE  Diabetes Educator   Diabetes and Pregnancy Program     ------------  Attestation: I have reviewed the above note from our diabetic educator and agree with the plan as above      Nicolle Arcos, [address] 10/09/18

## 2018-10-12 ENCOUNTER — OB ABSTRACT (OUTPATIENT)
Dept: PERINATAL CARE | Facility: CLINIC | Age: 23
End: 2018-10-12

## 2018-10-12 ENCOUNTER — APPOINTMENT (OUTPATIENT)
Dept: LAB | Facility: HOSPITAL | Age: 23
End: 2018-10-12
Payer: COMMERCIAL

## 2018-10-12 ENCOUNTER — TRANSCRIBE ORDERS (OUTPATIENT)
Dept: ADMINISTRATIVE | Facility: HOSPITAL | Age: 23
End: 2018-10-12

## 2018-10-12 DIAGNOSIS — O34.219 PREGNANCY WITH HISTORY OF CESAREAN SECTION, ANTEPARTUM: ICD-10-CM

## 2018-10-12 DIAGNOSIS — O24.414 INSULIN CONTROLLED GESTATIONAL DIABETES MELLITUS (GDM) IN SECOND TRIMESTER: ICD-10-CM

## 2018-10-12 DIAGNOSIS — Z3A.32 32 WEEKS GESTATION OF PREGNANCY: ICD-10-CM

## 2018-10-12 DIAGNOSIS — R35.0 FREQUENCY OF URINATION: Primary | ICD-10-CM

## 2018-10-12 DIAGNOSIS — O24.410 DIET CONTROLLED GESTATIONAL DIABETES MELLITUS (GDM) IN THIRD TRIMESTER: ICD-10-CM

## 2018-10-12 DIAGNOSIS — O24.414 INSULIN CONTROLLED GESTATIONAL DIABETES MELLITUS (GDM) IN THIRD TRIMESTER: ICD-10-CM

## 2018-10-12 DIAGNOSIS — O09.292 HISTORY OF GESTATIONAL DIABETES IN PRIOR PREGNANCY, CURRENTLY PREGNANT IN SECOND TRIMESTER: ICD-10-CM

## 2018-10-12 DIAGNOSIS — Z86.32 HISTORY OF GESTATIONAL DIABETES IN PRIOR PREGNANCY, CURRENTLY PREGNANT IN SECOND TRIMESTER: ICD-10-CM

## 2018-10-12 LAB
ALBUMIN SERPL BCP-MCNC: 3.4 G/DL (ref 3.5–5.7)
ALP SERPL-CCNC: 59 U/L (ref 40–150)
ALT SERPL W P-5'-P-CCNC: 7 U/L (ref 7–52)
ANION GAP SERPL CALCULATED.3IONS-SCNC: 7 MMOL/L (ref 4–13)
AST SERPL W P-5'-P-CCNC: 14 U/L (ref 13–39)
BILIRUB SERPL-MCNC: 0.3 MG/DL (ref 0.2–1)
BUN SERPL-MCNC: 6 MG/DL (ref 7–25)
CALCIUM ALBUM COR SERPL-MCNC: 11.7 MG/DL (ref 8.3–10.1)
CALCIUM SERPL-MCNC: 11.2 MG/DL (ref 8.6–10.5)
CHLORIDE SERPL-SCNC: 102 MMOL/L (ref 98–107)
CO2 SERPL-SCNC: 24 MMOL/L (ref 21–31)
CREAT SERPL-MCNC: 0.54 MG/DL (ref 0.6–1.2)
EST. AVERAGE GLUCOSE BLD GHB EST-MCNC: 108 MG/DL
GFR SERPL CREATININE-BSD FRML MDRD: 132 ML/MIN/1.73SQ M
GLUCOSE SERPL-MCNC: 191 MG/DL (ref 65–99)
HBA1C MFR BLD: 5.4 % (ref 4.2–6.3)
POTASSIUM SERPL-SCNC: 3.4 MMOL/L (ref 3.5–5.5)
PROT SERPL-MCNC: 6.2 G/DL (ref 6.4–8.9)
SODIUM SERPL-SCNC: 133 MMOL/L (ref 134–143)

## 2018-10-12 PROCEDURE — 83036 HEMOGLOBIN GLYCOSYLATED A1C: CPT

## 2018-10-12 PROCEDURE — 80307 DRUG TEST PRSMV CHEM ANLYZR: CPT | Performed by: SPECIALIST

## 2018-10-12 PROCEDURE — 80053 COMPREHEN METABOLIC PANEL: CPT

## 2018-10-12 PROCEDURE — 36415 COLL VENOUS BLD VENIPUNCTURE: CPT

## 2018-10-12 NOTE — PROGRESS NOTES
Date:  10/12/18  RE: Byron Adkins    : 1995  Estimated Date of Delivery: 12/3/18  EGA: 32w4d  [facility]: [facility]        Date Fasting Post-  breakfast Post-  lunch Post-  dinner Before bedtime Carbs Comments   10- 79 118 120 81         10-10 90 94 113 111         10-11 67 77 130 98         10-12 90 61 120                Current regimen:  basaglar- 48 units at bedtime   Humalog-  Breakfast- 6 units  Lunch- 8 units  Dinner-8 units  2000 calorie diabetes and pregnancy diet with 3 meals/snacks including protein  SMBG fasting and 2 hours after start of meals with Accu-chek guide glucose meter      Plan:  Continue current regimen  Patient has been advised to include more protein regularly at meals and snacks  Advised when repeating meals that cause BG to be > 119 to consistently replace 1 carbohydrate with 1 protein serving to minimize BG rise  Patient may need adjustment of Humalog on next report     Reminded patient to complete lab test ordered for 10/12      Date due to report next:  Tuesday, 10/16    Griffin Dempsey  Diabetes Educator   Diabetes and Pregnancy Program     ------------

## 2018-10-13 LAB
AMPHETAMINES UR QL SCN: NEGATIVE NG/ML
BARBITURATES UR QL SCN: NEGATIVE NG/ML
BENZODIAZ UR QL SCN: NEGATIVE NG/ML
BZE UR QL: NEGATIVE NG/ML
CANNABINOIDS UR QL SCN: NEGATIVE NG/ML
METHADONE UR QL SCN: NEGATIVE NG/ML
OPIATES UR QL: NEGATIVE NG/ML
PCP UR QL: NEGATIVE NG/ML
PROPOXYPH UR QL: NEGATIVE NG/ML

## 2018-10-15 ENCOUNTER — ULTRASOUND (OUTPATIENT)
Dept: PERINATAL CARE | Facility: CLINIC | Age: 23
End: 2018-10-15
Payer: COMMERCIAL

## 2018-10-15 ENCOUNTER — APPOINTMENT (OUTPATIENT)
Dept: PERINATAL CARE | Facility: CLINIC | Age: 23
End: 2018-10-15
Payer: COMMERCIAL

## 2018-10-15 VITALS
HEART RATE: 87 BPM | SYSTOLIC BLOOD PRESSURE: 110 MMHG | DIASTOLIC BLOOD PRESSURE: 77 MMHG | BODY MASS INDEX: 32.76 KG/M2 | HEIGHT: 62 IN | WEIGHT: 178 LBS

## 2018-10-15 DIAGNOSIS — Z3A.33 33 WEEKS GESTATION OF PREGNANCY: ICD-10-CM

## 2018-10-15 DIAGNOSIS — O34.219 PREGNANCY WITH HISTORY OF CESAREAN SECTION, ANTEPARTUM: ICD-10-CM

## 2018-10-15 DIAGNOSIS — O24.414 INSULIN CONTROLLED GESTATIONAL DIABETES MELLITUS (GDM) IN THIRD TRIMESTER: Primary | ICD-10-CM

## 2018-10-15 PROCEDURE — 59025 FETAL NON-STRESS TEST: CPT | Performed by: OBSTETRICS & GYNECOLOGY

## 2018-10-15 PROCEDURE — 76816 OB US FOLLOW-UP PER FETUS: CPT | Performed by: OBSTETRICS & GYNECOLOGY

## 2018-10-15 PROCEDURE — 99212 OFFICE O/P EST SF 10 MIN: CPT | Performed by: OBSTETRICS & GYNECOLOGY

## 2018-10-15 NOTE — PROGRESS NOTES
MAGY Kyle 51: Ms Rony Rodriguez was seen today at 33w0d for NST (found under the pregnancy episode) which I reviewed the RN assessment and agree, and fetal growth ultrasound (see ultrasound report under OB procedures tab)  Please don't hesitate to contact our office with any concerns or questions    Elena Medrano MD

## 2018-10-15 NOTE — PATIENT INSTRUCTIONS
Thank you for choosing [facility] for your  care today  If you have any questions about your ultrasound or care, please do not hesitate to contact us or your primary obstetrician  Please get your flu shot ASAP

## 2018-10-16 ENCOUNTER — OB ABSTRACT (OUTPATIENT)
Dept: PERINATAL CARE | Facility: CLINIC | Age: 23
End: 2018-10-16

## 2018-10-16 NOTE — PROGRESS NOTES
Date:  10/16/18  RE: Olga Ortiz    : 1995  Estimated Date of Delivery: 12/3/18  EGA: 33w1d  [facility]: [facility]          Blood glucose log copied from patient e-mail  Current regimen:  basaglar- 48 units at bedtime   Humalog-  Breakfast- 6 units  Lunch- 8 units  Dinner-8 units  2000 calorie diabetes and pregnancy diet with 3 meals/snacks including protein  SMBG fasting and 2 hours after start of meals with Accu-chek guide glucose meter      Plan:  basaglar- increase to 56 units at bedtime  Humalog-  Breakfast- 6 units  Lunch- increase to 9 units  Dinner- increase to 9 units  Continue diet and SMBG switching to 1 hour after start of meals testing consistently  Spoke with patient yesterday to initiate this    HbA1c- 5 4% and CMP-  mg/dl on 10-12-18 re-order labs week of 18      Date due to report next:  Friday, 10-19-18    Jacinda Bernal RN  Diabetes Educator   Diabetes and Pregnancy Program

## 2018-10-18 DIAGNOSIS — O24.414 INSULIN CONTROLLED GESTATIONAL DIABETES MELLITUS (GDM) IN SECOND TRIMESTER: ICD-10-CM

## 2018-10-18 DIAGNOSIS — O24.414 INSULIN CONTROLLED GESTATIONAL DIABETES MELLITUS (GDM) IN THIRD TRIMESTER: Primary | ICD-10-CM

## 2018-10-18 RX ORDER — PEN NEEDLE, DIABETIC 30 GX3/16"
NEEDLE, DISPOSABLE MISCELLANEOUS
Qty: 100 EACH | Refills: 0 | Status: SHIPPED | OUTPATIENT
Start: 2018-10-18 | End: 2018-11-10 | Stop reason: SDUPTHER

## 2018-10-22 ENCOUNTER — OB ABSTRACT (OUTPATIENT)
Dept: PERINATAL CARE | Facility: CLINIC | Age: 23
End: 2018-10-22

## 2018-10-22 ENCOUNTER — ROUTINE PRENATAL (OUTPATIENT)
Dept: PERINATAL CARE | Facility: CLINIC | Age: 23
End: 2018-10-22
Payer: COMMERCIAL

## 2018-10-22 VITALS
DIASTOLIC BLOOD PRESSURE: 68 MMHG | HEIGHT: 62 IN | HEART RATE: 81 BPM | WEIGHT: 182.6 LBS | BODY MASS INDEX: 33.6 KG/M2 | SYSTOLIC BLOOD PRESSURE: 103 MMHG

## 2018-10-22 DIAGNOSIS — Z86.32 HISTORY OF GESTATIONAL DIABETES IN PRIOR PREGNANCY, CURRENTLY PREGNANT IN THIRD TRIMESTER: ICD-10-CM

## 2018-10-22 DIAGNOSIS — O09.293 HISTORY OF GESTATIONAL DIABETES IN PRIOR PREGNANCY, CURRENTLY PREGNANT IN THIRD TRIMESTER: ICD-10-CM

## 2018-10-22 DIAGNOSIS — O24.414 INSULIN CONTROLLED GESTATIONAL DIABETES MELLITUS (GDM) IN THIRD TRIMESTER: Primary | ICD-10-CM

## 2018-10-22 DIAGNOSIS — O34.219 PREGNANCY WITH HISTORY OF CESAREAN SECTION, ANTEPARTUM: ICD-10-CM

## 2018-10-22 DIAGNOSIS — Z3A.34 34 WEEKS GESTATION OF PREGNANCY: ICD-10-CM

## 2018-10-22 PROCEDURE — 76815 OB US LIMITED FETUS(S): CPT | Performed by: OBSTETRICS & GYNECOLOGY

## 2018-10-22 PROCEDURE — 59025 FETAL NON-STRESS TEST: CPT | Performed by: OBSTETRICS & GYNECOLOGY

## 2018-10-22 NOTE — PROGRESS NOTES
Date:  10/22/18  RE: Byron Adkins    : 1995  Estimated Date of Delivery: 12/3/18  EGA: 34w0d  [facility]: [facility]    Date Fasting Post-  breakfast Post-  lunch Post-  dinner Before bedtime Carbs Comments   10-   150 66      10- 90 89 140 109      10- 84 120 105 120      10-19 90 82 80 103                                      Current regimen:  basaglar- 56 units at bedtime   Humalog-  Breakfast- 6 units  Lunch- 9 units  Dinner-9 units  2000 calorie diabetes and pregnancy diet with 3 meals/snacks including protein  SMBG fasting and 1 hour after start of meals with Accu-chek guide glucose meter      Plan:  Continue current regimen    HbA1c- 5 4% and CMP-  mg/dl on 10-12-18 re-order labs week of 18     Date due to report next:  Tuesday, 10-23-18    Tammy Littlejohn RN  Diabetes Educator   Diabetes and Pregnancy Program

## 2018-10-23 ENCOUNTER — OB ABSTRACT (OUTPATIENT)
Dept: PERINATAL CARE | Facility: CLINIC | Age: 23
End: 2018-10-23

## 2018-10-23 NOTE — PROGRESS NOTES
Date:  10/23/18  RE: Katherine Hinds    : 1995  Estimated Date of Delivery: 12/3/18  EGA: 34w1d  [facility]N: [facility]      Date Fasting Post-  breakfast Post-  lunch Post-  dinner Before bedtime Carbs Comments   10-20 89 130 59 130      10-21 89 94 140 130                                                            Current regimen:  basaglar- 56 units at bedtime   Humalog-  Breakfast- 6 units  Lunch- 9 units  Dinner-9 units  2000 calorie diabetes and pregnancy diet with 3 meals/snacks including protein  SMBG fasting and 1 hour after start of meals with Accu-chek guide glucose meter      Plan:  Continue current regimen    HbA1c- 5 4% and CMP-  mg/dl on 10-12-18 re-order labs week of 18     Date due to report next:  Tuesday, 10-30-18    Jacinda Bernal, RN  Diabetes Educator   [facility]abetes [facility] Pregnancy [facility]

## 2018-10-26 ENCOUNTER — TRANSCRIBE ORDERS (OUTPATIENT)
Dept: ADMINISTRATIVE | Facility: HOSPITAL | Age: 23
End: 2018-10-26

## 2018-10-26 ENCOUNTER — APPOINTMENT (OUTPATIENT)
Dept: LAB | Facility: HOSPITAL | Age: 23
End: 2018-10-26
Attending: SPECIALIST
Payer: COMMERCIAL

## 2018-10-26 DIAGNOSIS — R35.0 FREQUENCY OF URINATION: Primary | ICD-10-CM

## 2018-10-26 DIAGNOSIS — R35.0 FREQUENCY OF URINATION: ICD-10-CM

## 2018-10-26 LAB
BACTERIA UR QL AUTO: ABNORMAL /HPF
BILIRUB UR QL STRIP: NEGATIVE
CLARITY UR: CLEAR
COLOR UR: ABNORMAL
GLUCOSE UR STRIP-MCNC: NEGATIVE MG/DL
HGB UR QL STRIP.AUTO: ABNORMAL
KETONES UR STRIP-MCNC: NEGATIVE MG/DL
LEUKOCYTE ESTERASE UR QL STRIP: ABNORMAL
NITRITE UR QL STRIP: NEGATIVE
NON-SQ EPI CELLS URNS QL MICRO: ABNORMAL /HPF
PH UR STRIP.AUTO: 7 [PH] (ref 5–8)
PROT UR STRIP-MCNC: NEGATIVE MG/DL
RBC #/AREA URNS AUTO: ABNORMAL /HPF
SP GR UR STRIP.AUTO: 1.01 (ref 1–1.03)
UROBILINOGEN UR QL STRIP.AUTO: 0.2 E.U./DL
WBC #/AREA URNS AUTO: ABNORMAL /HPF

## 2018-10-26 PROCEDURE — 81001 URINALYSIS AUTO W/SCOPE: CPT

## 2018-10-26 PROCEDURE — 87086 URINE CULTURE/COLONY COUNT: CPT

## 2018-10-26 PROCEDURE — 87186 SC STD MICRODIL/AGAR DIL: CPT

## 2018-10-26 PROCEDURE — 87077 CULTURE AEROBIC IDENTIFY: CPT

## 2018-10-28 LAB — BACTERIA UR CULT: ABNORMAL

## 2018-10-30 ENCOUNTER — ROUTINE PRENATAL (OUTPATIENT)
Dept: PERINATAL CARE | Facility: CLINIC | Age: 23
End: 2018-10-30
Payer: COMMERCIAL

## 2018-10-30 VITALS
DIASTOLIC BLOOD PRESSURE: 84 MMHG | WEIGHT: 182.2 LBS | BODY MASS INDEX: 33.53 KG/M2 | HEART RATE: 93 BPM | SYSTOLIC BLOOD PRESSURE: 137 MMHG | HEIGHT: 62 IN

## 2018-10-30 DIAGNOSIS — Z3A.35 35 WEEKS GESTATION OF PREGNANCY: ICD-10-CM

## 2018-10-30 DIAGNOSIS — O24.414 INSULIN CONTROLLED GESTATIONAL DIABETES MELLITUS (GDM) IN THIRD TRIMESTER: ICD-10-CM

## 2018-10-30 PROCEDURE — 76815 OB US LIMITED FETUS(S): CPT | Performed by: OBSTETRICS & GYNECOLOGY

## 2018-10-30 PROCEDURE — 59025 FETAL NON-STRESS TEST: CPT | Performed by: OBSTETRICS & GYNECOLOGY

## 2018-10-30 NOTE — PROGRESS NOTES
nursing education was provided by Karolina Kim RN today on kick counting, labor precautions, and on warning signs that should prompt her to call her physician  The NST procedure and expected outcome were explained to patient  Preeclampsia precautions were reviewed where applicable  She was instructed to call her obstetrician with any questions or concerns, to monitor fetal movement, and to notify her obstetrician if she notices decreased fetal movement    She verbalized understanding and all questions were answered  -Karolina Kim RN

## 2018-10-30 NOTE — PROGRESS NOTES
Please refer to the [facility] ultrasound report in Ob Procedures for additional information regarding the visit to the [facility]  today

## 2018-10-31 ENCOUNTER — OB ABSTRACT (OUTPATIENT)
Dept: PERINATAL CARE | Facility: CLINIC | Age: 23
End: 2018-10-31

## 2018-10-31 NOTE — PROGRESS NOTES
Date:  10/31/18  RE: Mejia Metcalf    : 1995  Estimated Date of Delivery: 12/3/18  EGA: 35w2d  OB[facility]GYN: [facility]        Patient e-mailed above glucose readings and copied into EMR  Current regimen:  basaglar- 56 units at bedtime   Humalog-  Breakfast- 6 units  Lunch- 9 units  Dinner-9 units  2000 calorie diabetes and pregnancy diet with 3 meals/snacks including protein  SMBG fasting and 1 hour after start of meals with Accu-chek guide glucose meter      Plan:  Continue above regimen expect increase Humalog before dinner from 9 to 10 units, avoid pizza if possible or add 2 more units of Humalog to before meal to cover pizza  HbA1c- 5 4%, repeat A1c 18      Date due to report next:  Monday, 18 or sooner if needed       ANYI Lundberg  Diabetes Educator   Diabetes [facility] Pregnancy Program

## 2018-11-01 ENCOUNTER — HOSPITAL ENCOUNTER (OUTPATIENT)
Facility: HOSPITAL | Age: 23
Discharge: HOME | End: 2018-11-01
Attending: SPECIALIST | Admitting: SPECIALIST
Payer: COMMERCIAL

## 2018-11-01 VITALS
DIASTOLIC BLOOD PRESSURE: 74 MMHG | WEIGHT: 183 LBS | BODY MASS INDEX: 33.68 KG/M2 | HEIGHT: 62 IN | RESPIRATION RATE: 18 BRPM | HEART RATE: 121 BPM | SYSTOLIC BLOOD PRESSURE: 137 MMHG | TEMPERATURE: 98.8 F

## 2018-11-01 PROCEDURE — 99202 OFFICE O/P NEW SF 15 MIN: CPT

## 2018-11-01 NOTE — PROGRESS NOTES
L&D Triage Note - OB/GYN  Pepe Pagan 20 y o  female MRN: [id number]  Unit/Bed#: L&D 461-55 Encounter: [id number]      Assessment:  20 y o   at 35w3d for rule out  labor  Plan:  1  Clinically improved, reports no contractions  2  Closed cervix on examination  3  Category 1 tracing and no contractions on tocometer  4  Will discharge patient  Discussed alarm symptoms that would warrant immediate return or need to call her OBGYN  Advised Tylenol 650mg q6hrs PO PRN for pain  Discussed case with Dr Rivera Exon      ______________________________________________________________________      Chief Compliant: Contractions earlier in the afternoon  Subjective:  20 y o  [id number] at 35w3d presents from her OB/GYN office after experiencing contractions every two   Patient reports eating lunch without discomfort then began having contractions and back pain radiating to her left leg  Symptoms began about 2pm and was seen by her Obstetritian around 3pm  In the office, cervix was closed but some effacement  She was brought to L&D by EMS  On arrival, about 5pm, patients reported no contractions  She described pelvic pressure rather than pain  She denied vaginal bleeding or fluid leakage and reports good fetal movement  OB history is significant for two previous C-sections due to arrest of dilation  Current and previous pregnancy were complicated by gestation diabetes  She is on Basaglar insulin 56u at night, Humalog 7u with breakfast and 9u with lunch and dinner  She reports good compliance  Home glucose readings range  without any lows  She has also been treated for recurrent UTIs during this pregnancy           Objective:  Vitals:    18 1715   BP: 137/74   Pulse: (!) 121   Resp:    Temp:        SVE: closed/ 80% / -2  FHT:  135 / Moderate 6 - 25 bpm / 15*15, no decelerations  [place]: irritability  SVE performed by Dr Keira Jaquez MD 2018 5:48 PM

## 2018-11-05 ENCOUNTER — ROUTINE PRENATAL (OUTPATIENT)
Dept: PERINATAL CARE | Facility: CLINIC | Age: 23
End: 2018-11-05
Payer: COMMERCIAL

## 2018-11-05 VITALS
BODY MASS INDEX: 33.86 KG/M2 | SYSTOLIC BLOOD PRESSURE: 117 MMHG | HEART RATE: 72 BPM | DIASTOLIC BLOOD PRESSURE: 72 MMHG | WEIGHT: 184 LBS | HEIGHT: 62 IN

## 2018-11-05 DIAGNOSIS — O24.414 INSULIN CONTROLLED GESTATIONAL DIABETES MELLITUS (GDM) IN THIRD TRIMESTER: Primary | ICD-10-CM

## 2018-11-05 DIAGNOSIS — Z3A.36 36 WEEKS GESTATION OF PREGNANCY: ICD-10-CM

## 2018-11-05 PROCEDURE — 59025 FETAL NON-STRESS TEST: CPT | Performed by: OBSTETRICS & GYNECOLOGY

## 2018-11-05 PROCEDURE — 76815 OB US LIMITED FETUS(S): CPT | Performed by: OBSTETRICS & GYNECOLOGY

## 2018-11-06 PROBLEM — Z86.32 HISTORY OF GESTATIONAL DIABETES IN PRIOR PREGNANCY, CURRENTLY PREGNANT IN SECOND TRIMESTER: Status: RESOLVED | Noted: 2018-07-23 | Resolved: 2018-11-06

## 2018-11-06 PROBLEM — O09.292 HISTORY OF GESTATIONAL DIABETES IN PRIOR PREGNANCY, CURRENTLY PREGNANT IN SECOND TRIMESTER: Status: RESOLVED | Noted: 2018-07-23 | Resolved: 2018-11-06

## 2018-11-06 NOTE — PROGRESS NOTES
MAGY Kyle 51: Ms Yaron Morales was seen today at 36w1d for NST (found under the pregnancy episode) which I reviewed the RN assessment and agree, and JAVIER (see ultrasound report under OB procedures tab)  Please don't hesitate to contact our office with any concerns or questions    Iam Lam MD

## 2018-11-09 ENCOUNTER — OB ABSTRACT (OUTPATIENT)
Dept: PERINATAL CARE | Facility: CLINIC | Age: 23
End: 2018-11-09

## 2018-11-09 NOTE — PROGRESS NOTES
Date:  18  RE: Kari Jay    : 1995  Estimated Date of Delivery: 12/3/18  EGA: 36w4d  [facility]GYN: [facility]    Insulin controlled gestational diabetes    Date Fasting Post-  breakfast Post-  lunch Post-  dinner Before bedtime Carbs Comments   10/30/18 90 115 110 80      10/31/18 90 130 120 115      18 87         18 90 109 160 120      11/3/18 89 120 76 120      18 84 76 112 108      18 84 82 105 120      18 82 120 110 115      18 88 130 115 108      18 80 95        18 88 120            Current regimen:  basaglar- 56 units at bedtime   Humalog-  Breakfast- 6 units  Lunch- 9 units  Dinner-10 units  2000 calorie diabetes and pregnancy diet with 3 meals/snacks including protein  SMBG fasting and 1 hour after start of meals with Accu-chek guide glucose meter      Plan:  Continue above Basaglar & Humalog regimen  Was advised to  avoid pizza if possible or add 2 more units of Humalog to before meal to cover pizza  HbA1c- 5 4%, repeat A1c 18      Date due to report next:  Thursday, 11-15-18 or sooner if needed       ANYI Navarro  Diabetes Educator   Diabetes [facility] Pregnancy Program

## 2018-11-10 DIAGNOSIS — O24.414 INSULIN CONTROLLED GESTATIONAL DIABETES MELLITUS (GDM) IN THIRD TRIMESTER: ICD-10-CM

## 2018-11-10 DIAGNOSIS — O24.414 INSULIN CONTROLLED GESTATIONAL DIABETES MELLITUS (GDM) IN SECOND TRIMESTER: ICD-10-CM

## 2018-11-10 RX ORDER — PEN NEEDLE, DIABETIC 32GX 5/32"
NEEDLE, DISPOSABLE MISCELLANEOUS
Qty: 100 EACH | Refills: 0 | Status: SHIPPED | OUTPATIENT
Start: 2018-11-10 | End: 2020-07-09 | Stop reason: ALTCHOICE

## 2018-11-12 ENCOUNTER — ULTRASOUND (OUTPATIENT)
Dept: PERINATAL CARE | Facility: CLINIC | Age: 23
End: 2018-11-12
Payer: COMMERCIAL

## 2018-11-12 VITALS
DIASTOLIC BLOOD PRESSURE: 70 MMHG | HEIGHT: 62 IN | WEIGHT: 183.6 LBS | SYSTOLIC BLOOD PRESSURE: 113 MMHG | HEART RATE: 97 BPM | BODY MASS INDEX: 33.79 KG/M2

## 2018-11-12 DIAGNOSIS — Z3A.37 37 WEEKS GESTATION OF PREGNANCY: ICD-10-CM

## 2018-11-12 DIAGNOSIS — O24.414 INSULIN CONTROLLED GESTATIONAL DIABETES MELLITUS (GDM) IN THIRD TRIMESTER: Primary | ICD-10-CM

## 2018-11-12 DIAGNOSIS — O34.219 PREGNANCY WITH HISTORY OF CESAREAN SECTION, ANTEPARTUM: ICD-10-CM

## 2018-11-12 PROCEDURE — 76816 OB US FOLLOW-UP PER FETUS: CPT | Performed by: OBSTETRICS & GYNECOLOGY

## 2018-11-12 PROCEDURE — 76818 FETAL BIOPHYS PROFILE W/NST: CPT | Performed by: OBSTETRICS & GYNECOLOGY

## 2018-11-12 PROCEDURE — 99213 OFFICE O/P EST LOW 20 MIN: CPT | Performed by: OBSTETRICS & GYNECOLOGY

## 2018-11-12 PROCEDURE — 59025 FETAL NON-STRESS TEST: CPT | Performed by: OBSTETRICS & GYNECOLOGY

## 2018-11-16 ENCOUNTER — OB ABSTRACT (OUTPATIENT)
Dept: PERINATAL CARE | Facility: CLINIC | Age: 23
End: 2018-11-16

## 2018-11-16 DIAGNOSIS — O24.410 DIET CONTROLLED GESTATIONAL DIABETES MELLITUS (GDM) IN SECOND TRIMESTER: ICD-10-CM

## 2018-11-16 RX ORDER — INSULIN GLARGINE 100 [IU]/ML
INJECTION, SOLUTION SUBCUTANEOUS
Qty: 15 ML | Refills: 0 | Status: SHIPPED | OUTPATIENT
Start: 2018-11-16 | End: 2018-11-28 | Stop reason: HOSPADM

## 2018-11-16 NOTE — PROGRESS NOTES
Date:  18  RE: Zelda Pitts    : 1995  Estimated Date of Delivery: 12/3/18  EGA: 37w4d  [facility]GYN: [facility]    Insulin controlled gestational diabetes         Current regimen:  basaglar- 56 units at bedtime   Humalog-  Breakfast- 6 units  Lunch- 9 units  Dinner-10 units  2000 calorie diabetes and pregnancy diet with 3 meals/snacks including protein  SMBG fasting and 1 hour after start of meals with Accu-chek guide glucose meter      Plan:  Continue above Basaglar & Humalog regimen  Was advised to  avoid pizza if possible or add 2 more units of Humalog to before meal to cover pizza  HbA1c- 5 4%, repeat A1c 18      Date due to report next:  Tuesday, 18 or sooner if needed       Frankey Lex  Diabetes Educator   Diabetes [facility] Pregnancy Program

## 2018-11-18 DIAGNOSIS — O24.410 DIET CONTROLLED GESTATIONAL DIABETES MELLITUS (GDM) IN SECOND TRIMESTER: ICD-10-CM

## 2018-11-19 ENCOUNTER — ROUTINE PRENATAL (OUTPATIENT)
Dept: PERINATAL CARE | Facility: CLINIC | Age: 23
End: 2018-11-19
Payer: COMMERCIAL

## 2018-11-19 VITALS
HEIGHT: 62 IN | WEIGHT: 184 LBS | BODY MASS INDEX: 33.86 KG/M2 | HEART RATE: 91 BPM | SYSTOLIC BLOOD PRESSURE: 120 MMHG | DIASTOLIC BLOOD PRESSURE: 77 MMHG

## 2018-11-19 DIAGNOSIS — O34.219 PREGNANCY WITH HISTORY OF CESAREAN SECTION, ANTEPARTUM: ICD-10-CM

## 2018-11-19 DIAGNOSIS — Z3A.38 38 WEEKS GESTATION OF PREGNANCY: ICD-10-CM

## 2018-11-19 DIAGNOSIS — O24.414 INSULIN CONTROLLED GESTATIONAL DIABETES MELLITUS (GDM) IN THIRD TRIMESTER: Primary | ICD-10-CM

## 2018-11-19 PROCEDURE — 76815 OB US LIMITED FETUS(S): CPT | Performed by: OBSTETRICS & GYNECOLOGY

## 2018-11-19 PROCEDURE — 59025 FETAL NON-STRESS TEST: CPT | Performed by: OBSTETRICS & GYNECOLOGY

## 2018-11-19 RX ORDER — INSULIN GLARGINE 100 [IU]/ML
INJECTION, SOLUTION SUBCUTANEOUS
Refills: 0 | OUTPATIENT
Start: 2018-11-19

## 2018-11-20 ENCOUNTER — OB ABSTRACT (OUTPATIENT)
Dept: PERINATAL CARE | Facility: CLINIC | Age: 23
End: 2018-11-20

## 2018-11-20 NOTE — PROGRESS NOTES
Date:  18  RE: Millicent Richard    : 1995  Estimated Date of Delivery: 12/3/18  EGA: 38w1d  [facility]GYN: [facility]        Blood glucose log from patient e-mail  Current regimen:  basaglar- 56 units at bedtime   Humalog- add 2 more units of Humalog before meal to cover pizza  Breakfast- 6 units  Lunch- 9 units  Dinner-10 units  2000 calorie diabetes and pregnancy diet with 3 meals/snacks including protein  SMBG fasting and 1 hour after start of meals with Accu-chek guide glucose meter      Plan:  Continue current regimen  Take 28 units, 1/2 dose, of basaglar , 18 evening prior to scheduled  18  HbA1c- 5 4%, no need to repeat      Date due to report next:  None     aJcinda Bernal, RN  Diabetes Educator   Diabetes [facility] Pregnancy Program

## 2018-11-26 ENCOUNTER — ANESTHESIA (INPATIENT)
Dept: LABOR AND DELIVERY | Facility: HOSPITAL | Age: 23
End: 2018-11-26
Payer: COMMERCIAL

## 2018-11-26 ENCOUNTER — HOSPITAL ENCOUNTER (INPATIENT)
Facility: HOSPITAL | Age: 23
LOS: 2 days | Discharge: HOME | End: 2018-11-28
Attending: SPECIALIST | Admitting: SPECIALIST
Payer: COMMERCIAL

## 2018-11-26 ENCOUNTER — ANESTHESIA EVENT (INPATIENT)
Dept: LABOR AND DELIVERY | Facility: HOSPITAL | Age: 23
End: 2018-11-26
Payer: COMMERCIAL

## 2018-11-26 DIAGNOSIS — Z3A.38 38 WEEKS GESTATION OF PREGNANCY: Primary | ICD-10-CM

## 2018-11-26 PROBLEM — Z67.91 RH NEGATIVE STATE IN ANTEPARTUM PERIOD: Status: ACTIVE | Noted: 2018-11-26

## 2018-11-26 PROBLEM — O26.899 RH NEGATIVE STATE IN ANTEPARTUM PERIOD: Status: ACTIVE | Noted: 2018-11-26

## 2018-11-26 PROBLEM — Z98.891 STATUS POST REPEAT LOW TRANSVERSE CESAREAN SECTION: Status: ACTIVE | Noted: 2018-11-26

## 2018-11-26 LAB
ABO GROUP BLD: NORMAL
BASE EXCESS BLDCOA CALC-SCNC: -2.6 MMOL/L (ref 3–11)
BASE EXCESS BLDCOV CALC-SCNC: -3.2 MMOL/L (ref 1–9)
BLD GP AB SCN SERPL QL: POSITIVE
BLOOD GROUP ANTIBODIES SERPL: NORMAL
ERYTHROCYTE [DISTWIDTH] IN BLOOD BY AUTOMATED COUNT: 13.6 % (ref 11.6–15.1)
GLUCOSE SERPL-MCNC: 99 MG/DL (ref 65–140)
HCO3 BLDCOA-SCNC: 22.9 MMOL/L (ref 17.3–27.3)
HCO3 BLDCOV-SCNC: 21.9 MMOL/L (ref 12.2–28.6)
HCT VFR BLD AUTO: 37.3 % (ref 34.8–46.1)
HGB BLD-MCNC: 12.5 G/DL (ref 11.5–15.4)
MCH RBC QN AUTO: 30.4 PG (ref 26.8–34.3)
MCHC RBC AUTO-ENTMCNC: 33.5 G/DL (ref 31.4–37.4)
MCV RBC AUTO: 91 FL (ref 82–98)
O2 CT VFR BLDCOA CALC: 18.6 ML/DL
OXYHGB MFR BLDCOA: 81.4 %
OXYHGB MFR BLDCOV: 83.3 %
PCO2 BLDCOA: 42.3 MM[HG] (ref 30–60)
PCO2 BLDCOV: 39.6 MM HG (ref 27–43)
PH BLDCOA: 7.35 [PH] (ref 7.23–7.43)
PH BLDCOV: 7.36 [PH] (ref 7.19–7.49)
PLATELET # BLD AUTO: 180 THOUSANDS/UL (ref 149–390)
PMV BLD AUTO: 11 FL (ref 8.9–12.7)
PO2 BLDCOA: 36.9 MM HG (ref 5–25)
PO2 BLDCOV: 38.8 MM HG (ref 15–45)
RBC # BLD AUTO: 4.11 MILLION/UL (ref 3.81–5.12)
RH BLD: NEGATIVE
SAO2 % BLDCOV: 19.1 ML/DL
SPECIMEN EXPIRATION DATE: NORMAL
WBC # BLD AUTO: 12.06 THOUSAND/UL (ref 4.31–10.16)

## 2018-11-26 PROCEDURE — 82948 REAGENT STRIP/BLOOD GLUCOSE: CPT

## 2018-11-26 PROCEDURE — 4A1HXCZ MONITORING OF PRODUCTS OF CONCEPTION, CARDIAC RATE, EXTERNAL APPROACH: ICD-10-PCS | Performed by: SPECIALIST

## 2018-11-26 PROCEDURE — 86592 SYPHILIS TEST NON-TREP QUAL: CPT | Performed by: OBSTETRICS & GYNECOLOGY

## 2018-11-26 PROCEDURE — 86900 BLOOD TYPING SEROLOGIC ABO: CPT | Performed by: OBSTETRICS & GYNECOLOGY

## 2018-11-26 PROCEDURE — 82805 BLOOD GASES W/O2 SATURATION: CPT | Performed by: SPECIALIST

## 2018-11-26 PROCEDURE — 88302 TISSUE EXAM BY PATHOLOGIST: CPT | Performed by: PATHOLOGY

## 2018-11-26 PROCEDURE — 86870 RBC ANTIBODY IDENTIFICATION: CPT | Performed by: SPECIALIST

## 2018-11-26 PROCEDURE — 86850 RBC ANTIBODY SCREEN: CPT | Performed by: OBSTETRICS & GYNECOLOGY

## 2018-11-26 PROCEDURE — 86901 BLOOD TYPING SEROLOGIC RH(D): CPT | Performed by: OBSTETRICS & GYNECOLOGY

## 2018-11-26 PROCEDURE — 85027 COMPLETE CBC AUTOMATED: CPT | Performed by: OBSTETRICS & GYNECOLOGY

## 2018-11-26 PROCEDURE — 0UB70ZZ EXCISION OF BILATERAL FALLOPIAN TUBES, OPEN APPROACH: ICD-10-PCS | Performed by: SPECIALIST

## 2018-11-26 RX ORDER — DEXAMETHASONE SODIUM PHOSPHATE 4 MG/ML
8 INJECTION, SOLUTION INTRA-ARTICULAR; INTRALESIONAL; INTRAMUSCULAR; INTRAVENOUS; SOFT TISSUE ONCE AS NEEDED
Status: ACTIVE | OUTPATIENT
Start: 2018-11-26 | End: 2018-11-27

## 2018-11-26 RX ORDER — ONDANSETRON 2 MG/ML
4 INJECTION INTRAMUSCULAR; INTRAVENOUS ONCE AS NEEDED
Status: DISCONTINUED | OUTPATIENT
Start: 2018-11-26 | End: 2018-11-28 | Stop reason: HOSPADM

## 2018-11-26 RX ORDER — SIMETHICONE 80 MG
80 TABLET,CHEWABLE ORAL 4 TIMES DAILY PRN
Status: DISCONTINUED | OUTPATIENT
Start: 2018-11-26 | End: 2018-11-28 | Stop reason: HOSPADM

## 2018-11-26 RX ORDER — ACETAMINOPHEN 325 MG/1
650 TABLET ORAL EVERY 4 HOURS PRN
Status: DISCONTINUED | OUTPATIENT
Start: 2018-11-26 | End: 2018-11-28 | Stop reason: HOSPADM

## 2018-11-26 RX ORDER — IBUPROFEN 600 MG/1
600 TABLET ORAL EVERY 6 HOURS PRN
Status: DISCONTINUED | OUTPATIENT
Start: 2018-11-27 | End: 2018-11-28 | Stop reason: HOSPADM

## 2018-11-26 RX ORDER — DOCUSATE SODIUM 100 MG/1
100 CAPSULE, LIQUID FILLED ORAL DAILY PRN
Status: DISCONTINUED | OUTPATIENT
Start: 2018-11-26 | End: 2018-11-28 | Stop reason: HOSPADM

## 2018-11-26 RX ORDER — SODIUM CHLORIDE, SODIUM LACTATE, POTASSIUM CHLORIDE, CALCIUM CHLORIDE 600; 310; 30; 20 MG/100ML; MG/100ML; MG/100ML; MG/100ML
125 INJECTION, SOLUTION INTRAVENOUS CONTINUOUS
Status: DISCONTINUED | OUTPATIENT
Start: 2018-11-26 | End: 2018-11-26

## 2018-11-26 RX ORDER — ONDANSETRON 2 MG/ML
4 INJECTION INTRAMUSCULAR; INTRAVENOUS EVERY 8 HOURS PRN
Status: DISCONTINUED | OUTPATIENT
Start: 2018-11-26 | End: 2018-11-26

## 2018-11-26 RX ORDER — ONDANSETRON 2 MG/ML
4 INJECTION INTRAMUSCULAR; INTRAVENOUS EVERY 8 HOURS PRN
Status: DISCONTINUED | OUTPATIENT
Start: 2018-11-26 | End: 2018-11-28 | Stop reason: HOSPADM

## 2018-11-26 RX ORDER — KETOROLAC TROMETHAMINE 30 MG/ML
15 INJECTION, SOLUTION INTRAMUSCULAR; INTRAVENOUS EVERY 6 HOURS SCHEDULED
Status: DISPENSED | OUTPATIENT
Start: 2018-11-26 | End: 2018-11-27

## 2018-11-26 RX ORDER — MORPHINE SULFATE 1 MG/ML
INJECTION, SOLUTION EPIDURAL; INTRATHECAL; INTRAVENOUS AS NEEDED
Status: DISCONTINUED | OUTPATIENT
Start: 2018-11-26 | End: 2018-11-26 | Stop reason: SURG

## 2018-11-26 RX ORDER — SODIUM CHLORIDE 9 MG/ML
125 INJECTION, SOLUTION INTRAVENOUS CONTINUOUS
Status: DISCONTINUED | OUTPATIENT
Start: 2018-11-26 | End: 2018-11-28 | Stop reason: HOSPADM

## 2018-11-26 RX ORDER — KETOROLAC TROMETHAMINE 30 MG/ML
30 INJECTION, SOLUTION INTRAMUSCULAR; INTRAVENOUS EVERY 6 HOURS
Status: DISCONTINUED | OUTPATIENT
Start: 2018-11-26 | End: 2018-11-28

## 2018-11-26 RX ORDER — OXYTOCIN/RINGER'S LACTATE 30/500 ML
62.5 PLASTIC BAG, INJECTION (ML) INTRAVENOUS CONTINUOUS
Status: ACTIVE | OUTPATIENT
Start: 2018-11-26 | End: 2018-11-26

## 2018-11-26 RX ORDER — MORPHINE SULFATE 0.5 MG/ML
INJECTION, SOLUTION EPIDURAL; INTRATHECAL; INTRAVENOUS
Status: DISPENSED
Start: 2018-11-26 | End: 2018-11-26

## 2018-11-26 RX ORDER — OXYCODONE HYDROCHLORIDE 5 MG/1
5 TABLET ORAL EVERY 4 HOURS PRN
Status: DISCONTINUED | OUTPATIENT
Start: 2018-11-27 | End: 2018-11-28 | Stop reason: HOSPADM

## 2018-11-26 RX ORDER — FENTANYL CITRATE/PF 50 MCG/ML
25 SYRINGE (ML) INJECTION
Status: DISCONTINUED | OUTPATIENT
Start: 2018-11-26 | End: 2018-11-28 | Stop reason: HOSPADM

## 2018-11-26 RX ORDER — OXYTOCIN/RINGER'S LACTATE 30/500 ML
PLASTIC BAG, INJECTION (ML) INTRAVENOUS CONTINUOUS PRN
Status: DISCONTINUED | OUTPATIENT
Start: 2018-11-26 | End: 2018-11-26 | Stop reason: SURG

## 2018-11-26 RX ORDER — EPHEDRINE SULFATE 50 MG/ML
INJECTION, SOLUTION INTRAVENOUS AS NEEDED
Status: DISCONTINUED | OUTPATIENT
Start: 2018-11-26 | End: 2018-11-26 | Stop reason: SURG

## 2018-11-26 RX ORDER — ALBUTEROL SULFATE 2.5 MG/3ML
2.5 SOLUTION RESPIRATORY (INHALATION) ONCE AS NEEDED
Status: DISCONTINUED | OUTPATIENT
Start: 2018-11-26 | End: 2018-11-28 | Stop reason: HOSPADM

## 2018-11-26 RX ORDER — METOCLOPRAMIDE HYDROCHLORIDE 5 MG/ML
5 INJECTION INTRAMUSCULAR; INTRAVENOUS EVERY 6 HOURS PRN
Status: ACTIVE | OUTPATIENT
Start: 2018-11-26 | End: 2018-11-27

## 2018-11-26 RX ORDER — BUPIVACAINE HYDROCHLORIDE 7.5 MG/ML
INJECTION, SOLUTION INTRASPINAL AS NEEDED
Status: DISCONTINUED | OUTPATIENT
Start: 2018-11-26 | End: 2018-11-26 | Stop reason: SURG

## 2018-11-26 RX ORDER — CEFAZOLIN SODIUM 2 G/50ML
2000 SOLUTION INTRAVENOUS ONCE
Status: COMPLETED | OUTPATIENT
Start: 2018-11-26 | End: 2018-11-26

## 2018-11-26 RX ORDER — ONDANSETRON 2 MG/ML
4 INJECTION INTRAMUSCULAR; INTRAVENOUS EVERY 4 HOURS PRN
Status: ACTIVE | OUTPATIENT
Start: 2018-11-26 | End: 2018-11-27

## 2018-11-26 RX ORDER — OXYCODONE HYDROCHLORIDE 10 MG/1
10 TABLET ORAL EVERY 4 HOURS PRN
Status: DISCONTINUED | OUTPATIENT
Start: 2018-11-27 | End: 2018-11-28 | Stop reason: HOSPADM

## 2018-11-26 RX ORDER — HYDROMORPHONE HCL/PF 1 MG/ML
1 SYRINGE (ML) INJECTION EVERY 2 HOUR PRN
Status: DISCONTINUED | OUTPATIENT
Start: 2018-11-27 | End: 2018-11-28 | Stop reason: HOSPADM

## 2018-11-26 RX ORDER — BUPIVACAINE HYDROCHLORIDE 7.5 MG/ML
INJECTION, SOLUTION INTRASPINAL
Status: COMPLETED
Start: 2018-11-26 | End: 2018-11-26

## 2018-11-26 RX ORDER — OXYCODONE HYDROCHLORIDE AND ACETAMINOPHEN 5; 325 MG/1; MG/1
2 TABLET ORAL EVERY 6 HOURS PRN
Status: DISPENSED | OUTPATIENT
Start: 2018-11-26 | End: 2018-11-27

## 2018-11-26 RX ORDER — KETOROLAC TROMETHAMINE 30 MG/ML
INJECTION, SOLUTION INTRAMUSCULAR; INTRAVENOUS AS NEEDED
Status: DISCONTINUED | OUTPATIENT
Start: 2018-11-26 | End: 2018-11-26 | Stop reason: SURG

## 2018-11-26 RX ORDER — CALCIUM CARBONATE 200(500)MG
1000 TABLET,CHEWABLE ORAL DAILY PRN
Status: DISCONTINUED | OUTPATIENT
Start: 2018-11-26 | End: 2018-11-28 | Stop reason: HOSPADM

## 2018-11-26 RX ORDER — NALOXONE HYDROCHLORIDE 0.4 MG/ML
0.1 INJECTION, SOLUTION INTRAMUSCULAR; INTRAVENOUS; SUBCUTANEOUS
Status: ACTIVE | OUTPATIENT
Start: 2018-11-26 | End: 2018-11-27

## 2018-11-26 RX ORDER — DIPHENHYDRAMINE HYDROCHLORIDE 50 MG/ML
25 INJECTION INTRAMUSCULAR; INTRAVENOUS EVERY 6 HOURS PRN
Status: DISCONTINUED | OUTPATIENT
Start: 2018-11-26 | End: 2018-11-28 | Stop reason: HOSPADM

## 2018-11-26 RX ADMIN — SODIUM CHLORIDE 125 ML/HR: 0.9 INJECTION, SOLUTION INTRAVENOUS at 18:11

## 2018-11-26 RX ADMIN — BUPIVACAINE HYDROCHLORIDE IN DEXTROSE 1.6 ML: 7.5 INJECTION, SOLUTION SUBARACHNOID at 10:14

## 2018-11-26 RX ADMIN — MORPHINE SULFATE 0.1 MG: 1 INJECTION, SOLUTION EPIDURAL; INTRATHECAL; INTRAVENOUS at 10:14

## 2018-11-26 RX ADMIN — Medication 62.5 MILLI-UNITS/MIN: at 12:30

## 2018-11-26 RX ADMIN — KETOROLAC TROMETHAMINE 30 MG: 30 INJECTION, SOLUTION INTRAMUSCULAR at 11:21

## 2018-11-26 RX ADMIN — EPHEDRINE SULFATE 7.5 MG: 50 INJECTION, SOLUTION INTRAMUSCULAR; INTRAVENOUS; SUBCUTANEOUS at 10:30

## 2018-11-26 RX ADMIN — KETOROLAC TROMETHAMINE 30 MG: 30 INJECTION, SOLUTION INTRAMUSCULAR at 17:46

## 2018-11-26 RX ADMIN — DOCUSATE SODIUM 100 MG: 100 CAPSULE, LIQUID FILLED ORAL at 17:45

## 2018-11-26 RX ADMIN — SODIUM CHLORIDE: 0.9 INJECTION, SOLUTION INTRAVENOUS at 10:29

## 2018-11-26 RX ADMIN — Medication 250 MILLI-UNITS/MIN: at 11:15

## 2018-11-26 RX ADMIN — Medication 250 MILLI-UNITS/MIN: at 10:41

## 2018-11-26 RX ADMIN — DIPHENHYDRAMINE HYDROCHLORIDE 25 MG: 50 INJECTION, SOLUTION INTRAMUSCULAR; INTRAVENOUS at 20:47

## 2018-11-26 RX ADMIN — SODIUM CHLORIDE 125 ML/HR: 0.9 INJECTION, SOLUTION INTRAVENOUS at 08:21

## 2018-11-26 RX ADMIN — SODIUM CHLORIDE: 0.9 INJECTION, SOLUTION INTRAVENOUS at 11:15

## 2018-11-26 RX ADMIN — CEFAZOLIN SODIUM 2000 MG: 2 SOLUTION INTRAVENOUS at 10:17

## 2018-11-26 RX ADMIN — DIPHENHYDRAMINE HYDROCHLORIDE 25 MG: 50 INJECTION, SOLUTION INTRAMUSCULAR; INTRAVENOUS at 14:26

## 2018-11-26 NOTE — ANESTHESIA PROCEDURE NOTES
Spinal Block    Patient location during procedure: OB  Start time: 11/26/2018 10:07 AM  Reason for block: primary anesthetic  Staffing  Anesthesiologist: Sarah Sapp  Performed: anesthesiologist   Preanesthetic Checklist  Completed: patient identified, site marked, surgical consent, pre-op evaluation, timeout performed, IV checked, risks and benefits discussed and monitors and equipment checked  Spinal Block  Patient position: sitting  Prep: Betasept  Patient monitoring: heart rate, cardiac monitor, continuous pulse ox and frequent blood pressure checks  Approach: midline  Location: L3-4  Injection technique: single-shot  Needle  Needle type: pencil-tip   Needle gauge: 24 G  Needle length: 10 cm  Assessment  Sensory level: T4  Injection Assessment:  no paresthesia on injection, negative aspiration for heme and positive aspiration for clear CSF    Post-procedure:  sterile dressing applied

## 2018-11-26 NOTE — DISCHARGE SUMMARY
CS Discharge Summary - Earlene Galvin 20 y o  female MRN: [id number]    Unit/Bed#: L&D 387-75 Encounter: [id number]    Admission Date: 2018     Discharge Date: 2018    Admitting Diagnosis:   1  IUP at 39w0d  2  Hx of prior C/S x2  3  Desire for repeat  section  4  Dental carries  5  A2GDM    Discharge Diagnosis:   Same, delivered    Procedures:   repeat  section, low transverse incision    Admitting Attending: Dr Ez Pereira  Delivery Attending: Dr Ez Pereira  Discharge Attending: Dr Jaylin Marroquin service: none  Consult attending: none    Hospital Course:     Earlene Galvin is a 20 y o  [id number] who was admitted at 39w0d for a scheduled repeat  section  She then underwent an uncomplicated  section delivery and delivered a viable female  at 36  APGARS were 8, 9 at 1 and 5 minutes, respectively   weighed 7lb 1oz  Placenta was delivered at [phone]   was then transferred to  nursery  Patient tolerated the procedure well and was transferred to recovery in stable condition  The patient's post partum course was unremarkable    Preoperative hemaglobin was 12 5, postoperative was 10  Her postoperative pain was well controlled with oral analgesics  On day of discharge, she was ambulating and able to reasonably perform all ADLs  She was voiding and had appropriate bowel function  Pain was well controlled  She was discharged home on post-operative day #2 without complications  Patient was instructed to follow up with her OB as an outpatient and was given appropriate warnings to call provider if she develops signs of infection or uncontrolled pain  Complications:   None    Condition at discharge:   good     Provisions for Follow-Up Care:  See after visit summary for information related to follow-up care and any pertinent home health orders        Disposition:   Home    Planned Readmission:   No    Discharge Medications:   Prenatal vitamin daily for 6 months or the duration of nursing whichever is longer  Motrin 600 mg orally every 6 hours as needed for pain  Tylenol (over the counter) per bottle directions as needed for pain  Hydrocortisone cream 1% (over the counter) applied 1-2x daily to hemorrhoids as needed  Witch hazel pads for hemorrhoidal discomfort as needed      Discharge instructions :   -Do not place anything (no partner, tampons or douche) in your vagina for 6 weeks  -You may walk for exercise for the first 6 weeks then gradually return to your usual activities    -Please do not drive for 1 week if you have no stitches and for 2 weeks if you have stitches or underwent a  delivery     -You may take baths or shower per your preference    -Please look at your bust (breasts) in the mirror daily and call provider for redness or tenderness or increased warmth  - If you have had a  please look at your incision daily as well and call provider for increasing redness or steady drainage from the incision    -Please call your provider if temperature > 100 4*F or 38* C, worsening pain or a foul discharge

## 2018-11-26 NOTE — H&P
History & Physical - OB/GYN   Olga Ortiz 20 y o  female MRN: [id number]  Unit/Bed#: L&D 329-02 Encounter: [id number]    Olga Ortiz is a patient of Dr Mary Ellen Ramirez    Chief complaint:  "I am here for my "    HPI:  Olga Ortiz is a 20 y o  [id number] female with an EDDA of 12/3/2018, by Ultrasound at 39w0d weeks gestation who is being admitted for Elective  Section, Repeat  This pregnancy is complicated by O1DMQ  She has a history of two prior  sections  She would like permanent sterilization and has consent obtained in the office  Contractions:  none  Fetal movement:  present  Vaginal bleeding:   none  Leaking of fluid:  none    Pregnancy Complications:  P7XDA  Rh negative status    PMH:  Past Medical History:   Diagnosis Date    Migraine        PSH:  Past Surgical History:   Procedure Laterality Date     SECTION      [place]  DELIVERY ONLY N/A 6/3/2016    Procedure:  SECTION () REPEAT;  Surgeon: Prachi Aguilera MD;  Location: [facility];  Service: Obstetrics       Social Hx:  Denies EtOH, cigarette, and recreational drug use in pregnancy    OB Hx:  Obstetric History       T2      L2     SAB0   TAB0   Ectopic0   Multiple0   Live Births2       # Outcome Date GA Lbr Richard/2nd Weight Sex Delivery Anes PTL Lv   3 Current            2 Term 16 39w2d  3572 g (7 lb 14 oz) F CS-LTranv Spinal N BERNADETTE      Apgar1:  9                Apgar5: 9   1 Term 04/04/15 40w0d  3941 g (8 lb 11 oz) M CS-LTranv Spinal N BERNADETTE          Meds:  No current facility-administered medications on file prior to encounter  Current Outpatient Prescriptions on File Prior to Encounter   Medication Sig Dispense Refill    ACCU-CHEK FASTCLIX LANCETS MISC Use 4 a day and as directed   102 each 5    ACCU-CHEK GUIDE test strip Test 4 times a day and as instructed 100 each 5    Blood Glucose Monitoring Suppl (ACCU-CHEK GUIDE) w/Device KIT by Device route 4 (four) times a day 1 kit 0    HUMALOG KWIKPEN 100 units/mL injection pen 6 units with breakfast, 8 units with lunch and dinner daily, titrate weekly as directed 5 pen 0    Prenatal Multivit-Min-Fe-FA (PRENATAL VITAMINS PO) Take by mouth         Allergies:  No Known Allergies    Review of Systems   Constitutional: Negative for fatigue and fever  Respiratory: Negative for cough, shortness of breath and wheezing  Gastrointestinal: Negative for diarrhea, nausea and vomiting  Genitourinary: Negative for flank pain, genital sores, hematuria, vaginal bleeding and vaginal discharge  Musculoskeletal: Positive for back pain  Physical Exam   Constitutional: She is oriented to person, place, and time  She appears well-developed and well-nourished  HENT:   Head: Normocephalic and atraumatic  Nose: Nose normal    Eyes: Pupils are equal, round, and reactive to light  EOM are normal    Neck: Normal range of motion  No JVD present  Cardiovascular: Normal rate, regular rhythm and normal heart sounds  Exam reveals no gallop and no friction rub  No murmur heard  Pulmonary/Chest: Effort normal and breath sounds normal  No stridor  No respiratory distress  She has no wheezes  She has no rales  Abdominal: Soft  Bowel sounds are normal  She exhibits no distension  There is no tenderness  There is no rebound and no guarding  gravid   Musculoskeletal: Normal range of motion  Neurological: She is alert and oriented to person, place, and time  Skin: Skin is warm and dry  No rash noted  No pallor  Psychiatric: She has a normal mood and affect  Her behavior is normal  Judgment and thought content normal    Vitals reviewed        Cervix:   deferred    Fetal heart rate:   Variability: Moderate 6-25 bpm    [place]:   Contraction Frequency (minutes): irregular    EFW: 7 lb     GBS: unknown    Membranes: intact    Labs:  Hemoglobin: 11 7 9/11/18; admit pending  Blood type: A-  Antibody: negative  Group B strep: unknown  HIV: negative  Hepatitis B: negative  RPR: non-reactive   Rubella: Immune  Varicella Unknown  1 hour Glucose: abnormal 241    Assessment:   20 y o   at 39w0d admit for repeat  section and bilateral tubal    Plan:   1  IUP at 39w0d   - Intermittent FHR and tocometry monitoring  2  A2GDM   - POCT glucose now  3  Routine antepartum labs   - CBC, RPR, T&S stat  4  Analgesia   - Epidural per patient request  5   FEN   - NPO, IV NS for hydration    D/w Dr Adelita Goldberg, DO  PGY-2 OB/GYN   2018 8:52 AM

## 2018-11-26 NOTE — PROGRESS NOTES
Genevieve Alba  [id number]  11/26/2018  4:26 PM    POST-OP CHECK     S:  Genevieve Alba doing well  Pain controlled  Denies nausea/vomiting  Denies chest pain, shortness of breath  No complaints at this time  O:  Vitals:    11/26/18 1415   BP: 121/72   Pulse: 96   Resp: 18   Temp: 98 3 °F (36 8 °C)   SpO2: 98%       Exam:  Gen: in NAD  CV: RRR  Resp: CTAB  Abd: soft, non-tender, incision c/d/i, fundus firm and +1cm from U  Ext: SCDs b/l    A:  Genevieve Alba is s/p RLTCS with BTL     P:  Routine postop check  IV/PO pain meds as needed  Clear diet, advance as tolerated  Antiemetics for nausea/vomiting prn  Follow up am labs  Adequate UOP, continue to monitor  SCDs for DVT ppx, encourage ambulation as tolerated      Lai Lopez, DO  PGY-2 OB/GYN   11/26/2018 4:26 PM

## 2018-11-26 NOTE — LACTATION NOTE
This note was copied from a baby's chart  CONSULT - LACTATION  Baby Girl  Mercedes Car 0 days female MRN: [id number]    [address] NURSERY Room / Bed: L&D 306(N)/L&D 306(N) Encounter: [id number]    Maternal Information     MOTHER:  Hector Goodwin  Maternal Age: 20 y o    OB History: #: 1, Date: 04/04/15, Sex: Male, Weight: 3941 g (8 lb 11 oz), GA: 40w0d, Delivery: , Low Transverse, Apgar1: None, Apgar5: None, Living: Living, Birth Comments: None    #: 2, Date: 16, Sex: Female, Weight: 3572 g (7 lb 14 oz), GA: 39w2d, Delivery: , Low Transverse, Apgar1: 9, Apgar5: 9, Living: Living, Birth Comments: None    #: 3, Date: 18, Sex: Female, Weight: 3204 g (7 lb 1 oz), GA: 39w0d, Delivery: , Low Transverse, Apgar1: 8, Apgar5: 9, Living: Living, Birth Comments: None   Previouse breast reduction surgery?  No    Lactation history:   Has patient previously breast fed: Yes   How long had patient previously breast fed: attempt and 1 month with supplement   Previous breast feeding complications: Breast/nipple pain, Low milk supply, Other (Comment)     Past Surgical History:   Procedure Laterality Date     SECTION      [place]  DELIVERY ONLY N/A 6/3/2016    Procedure: Narcisa Never () REPEAT;  Surgeon: Marcos Bergeron MD;  Location: [facility];  Service: Obstetrics       Birth information:  YOB: 2020   Time of birth: 10:41 AM   Sex: female   Delivery type: , Low Transverse   Birth Weight: 3204 g (7 lb 1 oz)   Percent of Weight Change: 0%     Gestational Age: 39w0d   [unfilled]    Assessment     Breast and nipple assessment: normal assessment     Assessment: normal assessment    Feeding assessment: feeding well  LATCH:  Latch: Grasps breast, tongue down, lips flanged, rhythmic sucking   Audible Swallowing: A few with stimulation   Type of Nipple: Everted (After stimulation)   Comfort (Breast/Nipple): Filling, red/small blisters/bruises, mild/moderate discomfort   Hold (Positioning): Partial assist, teach one side, mother does other, staff holds   [facility] Score: 7          Feeding recommendations:  breast feed on demand     Met with mother  Provided mother with Ready, Set, Baby booklet  Discussed Skin to Skin contact an benefits to mom and baby  Talked about the delay of the first bath until baby has adjusted  Spoke about the benefits of rooming in  Feeding on cue and what that means for recognizing infant's hunger  Avoidance of pacifiers for the first month discussed  Talked about exclusive breastfeeding for the first 6 months  Positioning and latch reviewed as well as showing images of other feeding positions  Discussed the properties of a good latch in any position  Reviewed hand/manual expression  Discussed s/s that baby is getting enough milk and some s/s that breastfeeding dyad may need further help  Gave information on common concerns, what to expect the first few weeks after delivery, preparing for other caregivers, and how partners can help  Resources for support also provided  Spent time working on different positions that would facilitate better transfer of breastmilk  Deep latch and good suck on left breast using football hold  [employer] MOB and FOB to call for assistance, questions and concerns  Extension number for inpatient lactation support provided      Jovana Leonard RN 11/26/2018 3:41 PM

## 2018-11-26 NOTE — ANESTHESIA PREPROCEDURE EVALUATION
Review of Systems/Medical History  Patient summary reviewed    No history of anesthetic complications     Cardiovascular  Negative cardio ROS Exercise tolerance (METS): >4,     Pulmonary  Negative pulmonary ROS        GI/Hepatic  Negative GI/hepatic ROS          Negative  ROS        Endo/Other  Diabetes well controlled gestational Insulin,      GYN  Currently pregnant , Prior pregnancy/OB history : 3 Parity: 2,          Hematology  Negative hematology ROS      Musculoskeletal  Negative musculoskeletal ROS        Neurology    Headaches,    Psychology   Negative psychology ROS              Physical Exam    Airway    Mallampati score: II  TM Distance: >3 FB  Neck ROM: full     Dental   No notable dental hx     Cardiovascular  Comment: Negative ROS, Rhythm: regular, Rate: normal,     Pulmonary  Breath sounds clear to auscultation,     Other Findings        Anesthesia Plan  ASA Score- 2     Anesthesia Type- spinal with ASA Monitors  Additional Monitors:   Airway Plan:         Plan Factors-Patient not instructed to abstain from smoking on day of procedure  Patient did not smoke on day of surgery  Induction-     Postoperative Plan-     Informed Consent- Anesthetic plan and risks discussed with patient  I personally reviewed this patient with the CRNA  Discussed and agreed on the Anesthesia Plan with the CRNA  Dara Soulier

## 2018-11-26 NOTE — OP NOTE
OPERATIVE REPORT  PATIENT NAME: Edil Sommers    :  1995  MRN: [id number]  Pt Location: [facility] L&D OR ROOM 01    SURGERY DATE: 2018     Section Procedure Note    Indications:   Maternal request for repeat  section    Pre-operative Diagnosis:   39w0d pregnancy  A2GDM  Prior  section x2    Post-operative Diagnosis:   RLTCS and bilateral fimbriectomy    Attending: Stacy Quezada MD  Resident: Taya Pierre DO    Maternal Findings:  Noticeably large, clear window at lower uterine segment  Normal tubes and ovaries bilaterally  No difficulty from skin incision to delivery     Findings:  Viable female weighing 7lbs 1oz;  Apgar scores of 8 at one minute and 9 at five minutes  Clear amniotic fluid  Normal placenta with 3-vessel cord    Arterial and Venous Gases:  Umbilical Cord Venous Blood Gas:    Results from last 7 days  Lab Units 18  1013   PH COV  7 360   PCO2 COV mm HG 39 6   HCO3 COV mmol/L 21 9   BASE EXC COV mmol/L -3 2*   O2 CT CD VB mL/dL 19 1   O2 HGB, VENOUS CORD % 54 4     Umbilical Cord Arterial Blood Gas:    Results from last 7 days  Lab Units 18  1013   PH COA  7 352   PCO2 COA  42 3   PO2 COA mm HG 36 9*   HCO3 COA mmol/L 22 9   BASE EXC COA mmol/L -2 6*   O2 CONTENT CORD ART ml/dl 18 6   O2 HGB, ARTERIAL CORD % 81 4       Specimens: Arterial and venous cord gases, cord blood, segment of umbilical cord, placenta to storage    Estimated Blood Loss: 500 mL    Drains: Barrera catheter           Complications:  None; patient tolerated the procedure well  Disposition: PACU            Condition: stable    Procedure Details   The patient was seen prior to the procedure  Risks, benefits, possible complications, alternate treatment options, and expected outcomes were discussed with the patient    The patient agreed with the proposed plan and gave informed consent for a scheduled repeat  section with bilateral tubal ligation as patient desired permanent sterilization  Consent for the tubal was signed in the office and confirmed prior to the procedure  The patient was taken to the [facility] Operating Room where she received spinal anesthesia  For infection prophylaxis, she received 2g Ancef preoperatively  Fetal heart tones in the OR were assessed and noted to be within normal limits and a Barrera catheter and SCDs were placed  The abdomen was prepped with Chloraprep, the vagina was prepped with Betadine, and following appropriate drying time, the patient was draped in the usual sterile manner  A Time Out was held and the above information confirmed  The patient was identified as Jacqueline Delcid and the procedure verified as a  Delivery for RLTCS  A Pfannenstiel incision was made and carried down through the underlying subcutaneous tissue to the fascia using a scalpel  The rectus fascia was then nicked in the midline and dissected laterally using Bragg scissors  The superior edge of the  fascial incision was grasped with Kocher clamps bilaterally, tented upward and the underlying rectus muscles were dissected off sharply with Bragg scissors  This was repeated on the inferior edge of the fascia and dissected down to the pubic rami  The rectus muscles were  and the peritoneum was identified, entered, and extended longitudinally with blunt dissection  The bladder blade was inserted  The lower uterine segment was noted to be intact but entirely clear  An allis was used to bluntly dissect the uterine segment and perform the amniotomy bluntly  The fetal headwas palpated, elevated, and delivered through the uterine incision followed by the body without difficulty  There was noted to be spontaneous cry and good tone  The umbilical cord was doubly clamped and cut after 30 seconds to allow for delayed cord clamping  The infant was handed off to the  providers    Arterial and venous cord gases, cord blood, and a segment of umbilical cord were obtained for evaluation  The placenta delivered spontaneously with uterine fundal massage and appeared normal  The uterus was exteriorized and cleaned out with a moist lap sponge  The uterine incision was closed with a running locked suture of 0 Vicryl  A second layer of the same suture was used to imbricate the first   Hemostasis was noted to be excellent  Normal saline solution was used to irrigate the posterior culdesac  The left fallopian tube was grasped with a Lilia and angled anteriorly  Metzenbaum scissors were used to dissect the left fimbriae and fallopian tube sharply  0 chromic was used to ligate the left remaining mesosalpinx  Bleeding was noted to be hemostatic  The right fallopian tube was grasped with a Francisco Javier and angled anteriorly  There were larger vessels coursing inferior to the Fallopian tube  Fimbriectomy was unable to be performed  A small window was created in an avascular portion of the mesosalpinx  0 chromic suture was used to proximally and distally ligate the fallopian tube  There was noted to be some bleeding from the mesosalpinx  An additional 0-chromic stitch was thrown in a figure 8 fashion through the mesosalpinx to the remaining fallopian tube proximally  Adequate hemostasis was noted  The doubly ligated right fallopian tube was dissected sharply with Metzenbaum scissors  There was hemostasis at the dissection site  The uterus was then returned to the abdomen  The paracolic gutters were inspected and cleared of all clots and debris with irrigation and moist lap sponges  The peritoneum was closed with 2-0 plain in a running non-lucked fashion  The rectus muscles were closed in a running non-locked fashion of 2-0 plain  Then fascia was closed with a running suture of 0 Vicryl  The skin was closed with a subcuticular running suture of 4-0 Monocryl  Benzoin and steri-strip dressings were applied        The patient appeared to tolerate the procedure very well  Lap sponge, needle, and instrument counts were correct x2  The patient was transferred to her postpartum recovery room in stable condition and her infant went to the  nursery  Attending Attestation: Dr Koby Davalos MD was present for the entire procedure      Vero Taylor,   PGY-2 OB/GYN   2018 11:51 AM

## 2018-11-27 LAB
ABO GROUP BLD: NORMAL
BASOPHILS # BLD AUTO: 0.03 THOUSANDS/ΜL (ref 0–0.1)
BASOPHILS NFR BLD AUTO: 0 % (ref 0–1)
BLD GP AB SCN SERPL QL: NEGATIVE
EOSINOPHIL # BLD AUTO: 0.06 THOUSAND/ΜL (ref 0–0.61)
EOSINOPHIL NFR BLD AUTO: 1 % (ref 0–6)
ERYTHROCYTE [DISTWIDTH] IN BLOOD BY AUTOMATED COUNT: 13.8 % (ref 11.6–15.1)
FETAL CELL SCN BLD QL ROSETTE: NEGATIVE
HCT VFR BLD AUTO: 30.5 % (ref 34.8–46.1)
HGB BLD-MCNC: 10 G/DL (ref 11.5–15.4)
IMM GRANULOCYTES # BLD AUTO: 0.1 THOUSAND/UL (ref 0–0.2)
IMM GRANULOCYTES NFR BLD AUTO: 1 % (ref 0–2)
LYMPHOCYTES # BLD AUTO: 1.65 THOUSANDS/ΜL (ref 0.6–4.47)
LYMPHOCYTES NFR BLD AUTO: 20 % (ref 14–44)
MCH RBC QN AUTO: 30.6 PG (ref 26.8–34.3)
MCHC RBC AUTO-ENTMCNC: 32.8 G/DL (ref 31.4–37.4)
MCV RBC AUTO: 93 FL (ref 82–98)
MONOCYTES # BLD AUTO: 0.54 THOUSAND/ΜL (ref 0.17–1.22)
MONOCYTES NFR BLD AUTO: 7 % (ref 4–12)
NEUTROPHILS # BLD AUTO: 5.82 THOUSANDS/ΜL (ref 1.85–7.62)
NEUTS SEG NFR BLD AUTO: 71 % (ref 43–75)
NRBC BLD AUTO-RTO: 0 /100 WBCS
PLATELET # BLD AUTO: 134 THOUSANDS/UL (ref 149–390)
PMV BLD AUTO: 11.4 FL (ref 8.9–12.7)
RBC # BLD AUTO: 3.27 MILLION/UL (ref 3.81–5.12)
RH BLD: NEGATIVE
RPR SER QL: NORMAL
WBC # BLD AUTO: 8.2 THOUSAND/UL (ref 4.31–10.16)

## 2018-11-27 PROCEDURE — 85025 COMPLETE CBC W/AUTO DIFF WBC: CPT | Performed by: OBSTETRICS & GYNECOLOGY

## 2018-11-27 PROCEDURE — 86901 BLOOD TYPING SEROLOGIC RH(D): CPT | Performed by: SPECIALIST

## 2018-11-27 PROCEDURE — 86850 RBC ANTIBODY SCREEN: CPT | Performed by: SPECIALIST

## 2018-11-27 PROCEDURE — 86900 BLOOD TYPING SEROLOGIC ABO: CPT | Performed by: SPECIALIST

## 2018-11-27 PROCEDURE — 85461 HEMOGLOBIN FETAL: CPT | Performed by: SPECIALIST

## 2018-11-27 RX ADMIN — KETOROLAC TROMETHAMINE 30 MG: 30 INJECTION, SOLUTION INTRAMUSCULAR at 00:11

## 2018-11-27 RX ADMIN — HUMAN RHO(D) IMMUNE GLOBULIN 300 MCG: 300 INJECTION, SOLUTION INTRAMUSCULAR at 11:41

## 2018-11-27 RX ADMIN — DOCUSATE SODIUM 100 MG: 100 CAPSULE, LIQUID FILLED ORAL at 17:16

## 2018-11-27 RX ADMIN — Medication 1 TABLET: at 09:34

## 2018-11-27 RX ADMIN — OXYCODONE HYDROCHLORIDE AND ACETAMINOPHEN 2 TABLET: 5; 325 TABLET ORAL at 01:04

## 2018-11-27 RX ADMIN — OXYCODONE HYDROCHLORIDE AND ACETAMINOPHEN 2 TABLET: 5; 325 TABLET ORAL at 17:16

## 2018-11-27 RX ADMIN — DOCUSATE SODIUM 100 MG: 100 CAPSULE, LIQUID FILLED ORAL at 09:35

## 2018-11-27 RX ADMIN — KETOROLAC TROMETHAMINE 30 MG: 30 INJECTION, SOLUTION INTRAMUSCULAR at 06:12

## 2018-11-27 RX ADMIN — OXYCODONE HYDROCHLORIDE 5 MG: 5 TABLET ORAL at 21:08

## 2018-11-27 RX ADMIN — OXYCODONE HYDROCHLORIDE AND ACETAMINOPHEN 2 TABLET: 5; 325 TABLET ORAL at 10:24

## 2018-11-27 RX ADMIN — KETOROLAC TROMETHAMINE 30 MG: 30 INJECTION, SOLUTION INTRAMUSCULAR at 12:02

## 2018-11-27 NOTE — PROGRESS NOTES
Progress Note - OB/GYN   Kimmy Melchor 20 y o  female MRN: [id number]  Unit/Bed#: L&D 306-01 Encounter: [id number]    Assessment:  Post partum Day #1 s/p rLTCS + BTL, stable, baby in room    Plan:  1) Post op   - Barrera removed at midnight (order said 0600), voiding spontaneously    - EBL 500cc, Hgb 12 5 -> 10 0  2) Rh negative   - Rhogam ordered  3) Continue routine post partum care   Encourage ambulation   Encourage breastfeeding   Patient would like discharge home tomorrow, on POD#2     Subjective/Objective   Chief Complaint:     Post delivery  Patient is doing well  Lochia WNL  Pain well controlled  Subjective:     Pain: yes, cramping, improved with meds  Tolerating PO: yes  Voiding: yes  Flatus: no  BM: yes  Ambulating: yes  Breastfeeding:  yes  Chest pain: no  Shortness of breath: no  Leg pain: no  Lochia: WNL    Objective:     Vitals: /65 (BP Location: Right arm)   Pulse 81   Temp 98 °F (36 7 °C) (Oral)   Resp 18   Ht 5' 2" (1 575 m)   Wt 83 5 kg (184 lb)   LMP 02/01/2018 (Within Weeks)   SpO2 98%   Breastfeeding? Yes   BMI 33 65 kg/m²       Intake/Output Summary (Last 24 hours) at 11/27/18 0626  Last data filed at 11/27/18 0300   Gross per 24 hour   Intake          3727 08 ml   Output             3600 ml   Net           127 08 ml       Lab Results   Component Value Date    WBC 8 20 11/27/2018    HGB 10 0 (L) 11/27/2018    HCT 30 5 (L) 11/27/2018    MCV 93 11/27/2018     (L) 11/27/2018       Physical Exam:     Gen: AAOx3, NAD  CV: RRR  Lungs: CTA b/l  Abd: Soft, non-tender, non-distended, no rebound or guarding  Uterine fundus firm and non-tender, 1 cm below the umbilicus     Ext: Non tender    Incision: dressing in place, c/d/i      Cinda Linares MD  OB/GYN PGY-1  11/27/2018  6:26 AM

## 2018-11-27 NOTE — LACTATION NOTE
This note was copied from a baby's chart  Assisted mom with breastfeeding  Baby crying at breast and not wanting to latch  Enc  Mom to hand express into baby's mouth, but baby became more agitated  We were able to calm baby by dripping glucose water drops onto nipple  She then latched and fed well for 5 minutes, but then fell asleep and would not eat any more

## 2018-11-27 NOTE — UTILIZATION REVIEW
Notification of Maternity Inpatient Admission/Maternity Inpatient Authorization Request  This is a Notification of Maternity Inpatient Admission/Maternity Inpatient Authorization Request to our facility [address] [place] [address]  Please be advised that this patient is currently in our facility under Inpatient Status  Below you will find the Birth/[place] Summary, Attending Physician and Facilitys information including NPI#  and contact information for the Utilization Review Department where the patient is receiving care services  Facility: [address] [place] [address]  Address: Derik Murphy, [address]  Phone: [phone] Tax ID: [id number]  NPI: [id number]  Medicare ID: [id number]    Place of Service Code: 24   Place of Service Name: Inpatient Hospital  Presentation Date & Time: 2018  7:54 AM  Inpatient Admission Date & Time: 18  Discharge Date & Time: No discharge date for patient encounter  Discharge Disposition (if discharged): Home/Self Care  Attending Physician & NPI: Chris Mayen Md [[id number]]  JOSIE Munguia    Specialty- Obstetrics and Gynecology  [facility] ID- [id number]  Primary Office:  [address] [place] [address] AFFILIATED WITH [address]  Phone 1: [phone]  Fax: [phone]  Mother of [place] Information: Sara Suarez   MRN: [id number] YOB: 1995   Mother's Admitting Diagnosis: Encounter for  delivery without indication [O82]  Estimated Date of Delivery: 12/3/18  Type of Delivery: , Low Transverse    Delivering clinician: Chris Mayen   OB History      Para Term  AB Living    3 3 3     3    SAB TAB Ectopic Multiple Live Births          0 3        [place] Name & MRN:   Information for the patient's :  Joe Caicedo Girl  Chris Piper) [[id number]]     [place] Delivery Information:  Sex: female  Delivered 2018 10:41 AM by , Low Transverse; Gestational Age: 36w0d    [place] Measurements:  Weight: 7 lb 1 oz (3204 g); Height: 20"    APGAR 1 minute 5 minutes 10 minutes   Totals: 8 9      Thank you,  [address] [facility] Review Department  Phone: Devin signs - [phone]; Fax [phone]  ATTENTION: Please call with any questions or concerns to [phone]  and carefully follow the prompts so that you are directed to the right person  Send all requests for admission clinical reviews, approved or denied determinations and any other requests to fax [phone]   All voicemails are confidential

## 2018-11-27 NOTE — LACTATION NOTE
This note was copied from a baby's chart  Mother verbalized breastfeeding is going fair but she is having some difficulty  Enc to call for assistance as needed,phone # given

## 2018-11-27 NOTE — LACTATION NOTE
This note was copied from a baby's chart  Assisted mom with breastfeeding  Baby slips off breast easily and is chewing more than sucking  Tight frenulum noted  Demo  to mom how to position in football hold, how to hand express, and how to get a deep latch  Baby fed about 10 minutes out of the 20-25 minutes we attempted, she was also sleepy  Discussed her seeking help at the [facility] support center if she continues to have breastfeeding difficulties  Enc   Mom to call for assistance as needed,phone # given

## 2018-11-28 VITALS
WEIGHT: 184 LBS | SYSTOLIC BLOOD PRESSURE: 118 MMHG | HEART RATE: 80 BPM | RESPIRATION RATE: 18 BRPM | TEMPERATURE: 98.1 F | DIASTOLIC BLOOD PRESSURE: 68 MMHG | HEIGHT: 62 IN | BODY MASS INDEX: 33.86 KG/M2 | OXYGEN SATURATION: 98 %

## 2018-11-28 RX ORDER — IBUPROFEN 600 MG/1
600 TABLET ORAL EVERY 6 HOURS PRN
Qty: 30 TABLET | Refills: 0 | Status: SHIPPED | OUTPATIENT
Start: 2018-11-28 | End: 2021-05-18

## 2018-11-28 RX ORDER — CALCIUM CARBONATE 200(500)MG
1000 TABLET,CHEWABLE ORAL DAILY PRN
Refills: 0 | Status: SHIPPED | OUTPATIENT
Start: 2018-11-28 | End: 2020-07-09 | Stop reason: ALTCHOICE

## 2018-11-28 RX ADMIN — OXYCODONE HYDROCHLORIDE 10 MG: 10 TABLET ORAL at 09:55

## 2018-11-28 RX ADMIN — DOCUSATE SODIUM 100 MG: 100 CAPSULE, LIQUID FILLED ORAL at 09:54

## 2018-11-28 RX ADMIN — IBUPROFEN 600 MG: 600 TABLET, FILM COATED ORAL at 00:51

## 2018-11-28 RX ADMIN — OXYCODONE HYDROCHLORIDE 10 MG: 10 TABLET ORAL at 15:29

## 2018-11-28 RX ADMIN — Medication 1 TABLET: at 07:24

## 2018-11-28 NOTE — PLAN OF CARE
Problem: PAIN - ADULT  Goal: Verbalizes/displays adequate comfort level or baseline comfort level  Interventions:  - Encourage patient to monitor pain and request assistance  - Assess pain using appropriate pain scale  - Administer analgesics based on type and severity of pain and evaluate response  - Implement non-pharmacological measures as appropriate and evaluate response  - Consider cultural and social influences on pain and pain management  - Notify physician/advanced practitioner if interventions unsuccessful or patient reports new pain   Outcome: Progressing      Problem: SAFETY ADULT  Goal: Patient will remain free of falls  INTERVENTIONS:  - Assess patient frequently for physical needs  -  Identify cognitive and physical deficits and behaviors that affect risk of falls    -  [place] fall precautions as indicated by assessment   - Educate patient/family on patient safety including physical limitations  - Instruct patient to call for assistance with activity based on assessment  - Modify environment to reduce risk of injury  - Consider OT/PT consult to assist with strengthening/mobility   Outcome: Progressing    Goal: Maintain or return to baseline ADL function  INTERVENTIONS:  -  Assess patient's ability to carry out ADLs; assess patient's baseline for ADL function and identify physical deficits which impact ability to perform ADLs (bathing, care of mouth/teeth, toileting, grooming, dressing, etc )  - Assess/evaluate cause of self-care deficits   - Assess range of motion  - Assess patient's mobility; develop plan if impaired  - Assess patient's need for assistive devices and provide as appropriate  - Encourage maximum independence but intervene and supervise when necessary  ¯ Involve family in performance of ADLs  ¯ Assess for home care needs following discharge   ¯ Request OT consult to assist with ADL evaluation and planning for discharge  ¯ Provide patient education as appropriate   Outcome: Progressing    Goal: Maintain or return mobility status to optimal level  INTERVENTIONS:  - Assess patient's baseline mobility status (ambulation, transfers, stairs, etc )    - Identify cognitive and physical deficits and behaviors that affect mobility  - Identify mobility aids required to assist with transfers and/or ambulation (gait belt, sit-to-stand, lift, walker, cane, etc )  - [place] fall precautions as indicated by assessment  - Record patient progress and toleration of activity level on Mobility SBAR; progress patient to next Phase/Stage  - Instruct patient to call for assistance with activity based on assessment  - Request Rehabilitation consult to assist with strengthening/weightbearing, etc    Outcome: Progressing      Problem: Knowledge Deficit  Goal: Patient/family/caregiver demonstrates understanding of disease process, treatment plan, medications, and discharge instructions  Complete learning assessment and assess knowledge base    Interventions:  - Provide teaching at level of understanding  - Provide teaching via preferred learning methods   Outcome: Progressing      Problem: DISCHARGE PLANNING  Goal: Discharge to home or other facility with appropriate resources  INTERVENTIONS:  - Identify barriers to discharge w/patient and caregiver  - Arrange for needed discharge resources and transportation as appropriate  - Identify discharge learning needs (meds, wound care, etc )  - Arrange for interpretive services to assist at discharge as needed  - Refer to Case Management Department for coordinating discharge planning if the patient needs post-hospital services based on physician/advanced practitioner order or complex needs related to functional status, cognitive ability, or social support system   Outcome: Progressing

## 2018-11-28 NOTE — LACTATION NOTE
This note was copied from a baby's chart  Discussed risks for early supplementation: over feeding, longer digestion times, engorgement for mom, lower milk supply for mom, and nipple confusion  Benefits of breast feeding for infant's intestinal tract, less engorgement for mom, protection from multiple disease processes as infant develops, avoidance of over feeding for infant, less nipple confusion, and increased health benefits for mom  Met with mother to go over feeding log since birth for the first week  Emphasized 8 or more (12) feedings in a 24 hour period, what to expect for the number of diapers per day of life and the progression of properties of the  stooling pattern  Discussed s/s that breastfeeding is going well after day 4 and when to get help from a pediatrician or lactation support person after day 4  Booklet included Breast Pumping Instructions, When You Go Back to Work or School, and Breastfeeding Resources for after discharge including access to the number for the [employer]  Encouraged MOB to call for assistance, questions, and concerns about breastfeeding  Extension provided

## 2018-11-28 NOTE — PROGRESS NOTES
Progress Note - OB/GYN   Kennedi Barrera 20 y o  female MRN: [id number]  Unit/Bed#: L&D 306-01 Encounter: [id number]    Assessment:  Post partum Day #2 s/p rLTCS + BTL, stable, baby in room    Plan:  1) Post op   - Voiding spontaneously   - EBL 500cc, Hgb 12 5 -> 10 0  2) Rh negative   - Rhogam ordered  3) Continue routine post partum care   Encourage ambulation   Encourage breastfeeding   Patient would like discharge today    Subjective/Objective   Chief Complaint:     Post delivery  Patient is doing well  Lochia WNL  Pain well controlled  Subjective:     Pain: yes, cramping, improved with meds  Tolerating PO: yes  Voiding: yes  Flatus: yes  BM: no  Ambulating: yes  Breastfeeding:  yes  Chest pain: no  Shortness of breath: no  Leg pain: no  Lochia: WNL    Objective:     Vitals: /67 (BP Location: Left arm)   Pulse 94   Temp 98 3 °F (36 8 °C) (Oral)   Resp 12   Ht 5' 2" (1 575 m)   Wt 83 5 kg (184 lb)   LMP 02/01/2018 (Within Weeks)   SpO2 97%   Breastfeeding? Yes   BMI 33 65 kg/m²       Intake/Output Summary (Last 24 hours) at 11/28/18 0602  Last data filed at 11/28/18 0501   Gross per 24 hour   Intake                0 ml   Output             3300 ml   Net            -3300 ml       Lab Results   Component Value Date    WBC 8 20 11/27/2018    HGB 10 0 (L) 11/27/2018    HCT 30 5 (L) 11/27/2018    MCV 93 11/27/2018     (L) 11/27/2018       Physical Exam:     Gen: AAOx3, NAD  CV: RRR  Lungs: CTA b/l  Abd: Soft, non-tender, non-distended, no rebound or guarding  Uterine fundus firm and non-tender, 1 cm below the umbilicus     Ext: Non tender    Incision: dressing in place, c/d/i      Gio Tong MD  OB/GYN PGY-1  11/28/2018  6:02 AM

## 2018-11-28 NOTE — DISCHARGE INSTRUCTIONS
WHAT YOU NEED TO KNOW:   A , or  section, is abdominal surgery to deliver your baby  DISCHARGE INSTRUCTIONS:   Call 911 for any of the following:   · You feel lightheaded, short of breath, and have chest pain  · You cough up blood  Seek care immediately if:   · Blood soaks through your bandage  · Your stitches come apart  · Your arm or leg feels warm, tender, and painful  It may look swollen and red  Contact your OB if:   · You have heavy vaginal bleeding that fills 1 or more sanitary pads in 1 hour  · You have a fever  · Your incision is swollen, red, or draining pus  · You have questions or concerns about yourself or your baby  Medicines: You may  need any of the following:  · Prescription pain medicine  may be given  Ask how to take this medicine safely  · Acetaminophen  decreases pain and fever  It is available without a doctor's order  Ask how much to take and how often to take it  Follow directions  Acetaminophen can cause liver damage if not taken correctly  · NSAIDs , such as ibuprofen, help decrease swelling, pain, and fever  NSAIDs can cause stomach bleeding or kidney problems in certain people  If you take blood thinner medicine, always ask your healthcare provider if NSAIDs are safe for you  Always read the medicine label and follow directions  · Take your medicine as directed  Contact your healthcare provider if you think your medicine is not helping or if you have side effects  Tell him or her if you are allergic to any medicine  Keep a list of the medicines, vitamins, and herbs you take  Include the amounts, and when and why you take them  Bring the list or the pill bottles to follow-up visits  Carry your medicine list with you in case of an emergency  Wound care:  Carefully wash your wound with soap and water every day  Keep your wound clean and dry  Wear loose, comfortable clothes that do not rub against your wound   Ask your OB about bathing and showering  Limit activity as directed:   · Ask when it is safe for you to drive, walk up stairs, lift heavy objects, and have sex  · Ask when it is okay to exercise, and what types of exercise to do  Start slowly and do more as you get stronger  Drink liquids as directed:  Liquids help keep you hydrated after your procedure and decrease your risk for a blood clot  Ask how much liquid to drink each day and which liquids are best for you  Follow up with your OB as directed: You may need to return to have your stitches or staples removed  Write down your questions so you remember to ask them during your visits  © 2017 [address] Information is for End User's use only and may not be sold, redistributed or otherwise used for commercial purposes  All illustrations and images included in CareNotes® are the copyrighted property of A D [employer] M [employer]  or [employer]  The above information is an  only  It is not intended as medical advice for individual conditions or treatments  Talk to your doctor, nurse or pharmacist before following any medical regimen to see if it is safe and effective for you   Section   WHAT YOU SHOULD KNOW:   A  delivery, or , is abdominal surgery to deliver your baby  There are many reasons you may need a   · A  may be scheduled before labor if you had a  with your last baby  It may be scheduled if your baby is not positioned normally, or you are pregnant with more than 1 baby  · Your caregiver may perform an emergency  during labor to prevent life-threatening complications for you or your baby  A  may be done if your cervix does not dilate after several hours of active labor  · Other reasons for a  include maternal infections and problems with the placenta  AFTER YOU LEAVE:   Medicines:   · Prescription pain medicine  may be given   Ask how to take this medicine safely  · Acetaminophen  decreases pain and fever  It is available without a doctor's order  Ask how much to take and how often to take it  Follow directions  Acetaminophen can cause liver damage if not taken correctly  · NSAIDs  help decrease swelling and pain or fever  This medicine is available with or without a doctor's order  NSAIDs can cause stomach bleeding or kidney problems in certain people  If you take blood thinner medicine, always ask your obstetrician if NSAIDs are safe for you  Always read the medicine label and follow directions  · Take your medicine as directed  Contact your obstetrician (OB) if you think your medicine is not helping or if you have side effects  Tell him if you are allergic to any medicine  Keep a list of the medicines, vitamins, and herbs you take  Include the amounts, and when and why you take them  Bring the list or the pill bottles to follow-up visits  Carry your medicine list with you in case of an emergency  Follow up with your OB as directed: You may need to return to have your stitches or staples removed  Write down your questions so you remember to ask them during your visits  Wound care:  Carefully wash your wound with soap and water every day  Keep your wound clean and dry  Wear loose, comfortable clothes that do not rub against your wound  Ask your OB about bathing and showering  Drink plenty of liquids: You can lower your risk for a blood clot if you drink plenty of liquids  Ask how much liquid to drink each day and which liquids are best for you  Limit activity until you have fully recovered from surgery:   · Ask when it is safe for you to drive, walk up stairs, lift heavy objects, and have sex  · Ask when it is okay to exercise, and what types of exercise to do  Start slowly and do more as you get stronger  Contact your OB if:   · You have heavy vaginal bleeding that fills 1 or more sanitary pads in 1 hour      · You have a fever      · Your incision is swollen, red, or draining pus  · You have questions or concerns about yourself or your baby  Seek care immediately or call 911 if:   · Blood soaks through your bandage  · Your stitches come apart  · You feel lightheaded, short of breath, and have chest pain  · You cough up blood  · Your arm or leg feels warm, tender, and painful  It may look swollen and red  © 2014 [address] is for End User's use only and may not be sold, redistributed or otherwise used for commercial purposes  All illustrations and images included in CareNotes® are the copyrighted property of A D A M [employer] Inc  or [employer]  The above information is an  only  It is not intended as medical advice for individual conditions or treatments  Talk to your doctor, nurse or pharmacist before following any medical regimen to see if it is safe and effective for you

## 2018-12-04 LAB — PLACENTA IN STORAGE: NORMAL

## 2019-10-24 ENCOUNTER — OFFICE VISIT (OUTPATIENT)
Dept: URGENT CARE | Facility: CLINIC | Age: 24
End: 2019-10-24
Payer: COMMERCIAL

## 2019-10-24 VITALS
BODY MASS INDEX: 34.04 KG/M2 | TEMPERATURE: 99.8 F | HEIGHT: 62 IN | HEART RATE: 106 BPM | DIASTOLIC BLOOD PRESSURE: 60 MMHG | OXYGEN SATURATION: 97 % | RESPIRATION RATE: 18 BRPM | SYSTOLIC BLOOD PRESSURE: 120 MMHG | WEIGHT: 185 LBS

## 2019-10-24 DIAGNOSIS — R42 VERTIGO: Primary | ICD-10-CM

## 2019-10-24 PROCEDURE — G0382 LEV 3 HOSP TYPE B ED VISIT: HCPCS | Performed by: PHYSICIAN ASSISTANT

## 2019-10-24 RX ORDER — MECLIZINE HYDROCHLORIDE 25 MG/1
25 TABLET ORAL EVERY 12 HOURS PRN
Qty: 30 TABLET | Refills: 0 | Status: SHIPPED | OUTPATIENT
Start: 2019-10-24 | End: 2020-07-09 | Stop reason: ALTCHOICE

## 2019-10-24 NOTE — PROGRESS NOTES
[facility]        NAME: Luis Daniel Goins is a 30 y o  female  : 1995    MRN: [id number]  DATE: 2019  TIME: 4:53 PM    Assessment and Plan   Vertigo [R42]  1  Vertigo  meclizine (ANTIVERT) 25 mg tablet         Patient Instructions     Follow up with PCP in 3-5 days  Proceed to  ER if symptoms worsen  Chief Complaint     Chief Complaint   Patient presents with    Dizziness     c/o of dizziness and nausea for two days  History of Present Illness       28-year-old female presents for evaluation of dizziness and nausea  States it does not ongoing for approximately 2 days  Patient describes the dizziness as the room is spinning  She has no history of vertigo  She denies any lightheadedness, headaches, change in vision  Denies weakness or change in speech  Denies recent illness  She notes no chance of pregnancy  Denies syncope  Review of Systems   Review of Systems   Constitutional: Negative for chills, fatigue and fever  HENT: Negative for congestion, ear pain, sinus pain, sore throat and trouble swallowing  Eyes: Negative for pain, discharge and redness  Respiratory: Negative for cough, chest tightness, shortness of breath and wheezing  Cardiovascular: Negative for chest pain, palpitations and leg swelling  Gastrointestinal: Negative for abdominal pain, diarrhea, nausea and vomiting  Musculoskeletal: Negative for arthralgias, joint swelling and myalgias  Skin: Negative for rash  Neurological: Positive for dizziness  Negative for tremors, syncope, speech difficulty, weakness, light-headedness, numbness and headaches  Current Medications       Current Outpatient Medications:     ACCU-CHEK FASTCLIX LANCETS MISC, Use 4 a day and as directed   (Patient not taking: Reported on 10/24/2019), Disp: 102 each, Rfl: 5    BD PEN NEEDLE NERISSA U/F 32G X 4 MM MISC, USE 4 TIMES A DAY AS DIRECTED (Patient not taking: Reported on 10/24/2019), Disp: 100 each, Rfl: 0    Blood Glucose Monitoring Suppl (ACCU-CHEK GUIDE) w/Device KIT, by Device route 4 (four) times a day (Patient not taking: Reported on 10/24/2019), Disp: 1 kit, Rfl: 0    calcium carbonate (TUMS) 500 mg chewable tablet, Chew 2 tablets (1,000 mg total) daily as needed for indigestion or heartburn (Patient not taking: Reported on 10/24/2019), Disp: , Rfl: 0    ibuprofen (MOTRIN) 600 mg tablet, Take 1 tablet (600 mg total) by mouth every 6 (six) hours as needed for mild pain (Patient not taking: Reported on 10/24/2019), Disp: 30 tablet, Rfl: 0    meclizine (ANTIVERT) 25 mg tablet, Take 1 tablet (25 mg total) by mouth every 12 (twelve) hours as needed for dizziness or nausea, Disp: 30 tablet, Rfl: 0    Prenatal Multivit-Min-Fe-FA (PRENATAL VITAMINS PO), Take by mouth, Disp: , Rfl:     Current Allergies     Allergies as of 10/24/2019    (No Known Allergies)            The following portions of the patient's history were reviewed and updated as appropriate: allergies, current medications, past family history, past medical history, past social history, past surgical history and problem list      Past Medical History:   Diagnosis Date    Migraine        Past Surgical History:   Procedure Laterality Date     SECTION      [place]  DELIVERY ONLY N/A 6/3/2016    Procedure: True Spice () REPEAT;  Surgeon: Klever Swanson MD;  Location: [facility];  Service: Obstetrics    [place]  DELIVERY ONLY N/A 2018    Procedure: True Spice () REPEAT;  Surgeon: Klever Swanson MD;  Location: [facility];  Service: Obstetrics    TUBAL LIGATION N/A 2018    Procedure: LIGATION/COAGULATION TUBAL;  Surgeon: Klever Swanson MD;  Location: [facility];  Service: Obstetrics       Family History   Problem Relation Age of Onset    Down syndrome Cousin         paternal cousin         Medications have been verified          Objective   /60   Pulse (!) 106   Temp 99 8 °F (37 7 °C) (Tympanic)   Resp 18   Ht 5' 2" (1 575 m)   Wt 83 9 kg (185 lb)   LMP 09/23/2019   SpO2 97%   BMI 33 84 kg/m²        Physical Exam     Physical Exam   Constitutional: She is oriented to person, place, and time  She appears well-developed and well-nourished  No distress  HENT:   Head: Normocephalic  Right Ear: External ear normal    Left Ear: External ear normal    Mouth/Throat: Oropharynx is clear and moist    Eyes: Pupils are equal, round, and reactive to light  Conjunctivae and EOM are normal    Neck: Normal range of motion  Neck supple  Cardiovascular: Normal rate, regular rhythm and normal heart sounds  No murmur heard  Pulmonary/Chest: Effort normal and breath sounds normal  No respiratory distress  She has no wheezes  Abdominal: Soft  Bowel sounds are normal  There is no tenderness  Musculoskeletal: Normal range of motion  Lymphadenopathy:     She has no cervical adenopathy  Neurological: She is alert and oriented to person, place, and time  She has normal reflexes  Skin: Skin is warm and dry  Psychiatric: She has a normal mood and affect  Nursing note and vitals reviewed

## 2019-10-25 ENCOUNTER — HOSPITAL ENCOUNTER (EMERGENCY)
Facility: HOSPITAL | Age: 24
Discharge: HOME | End: 2019-10-25
Attending: EMERGENCY MEDICINE | Admitting: EMERGENCY MEDICINE
Payer: COMMERCIAL

## 2019-10-25 VITALS
SYSTOLIC BLOOD PRESSURE: 131 MMHG | HEART RATE: 95 BPM | OXYGEN SATURATION: 99 % | WEIGHT: 180 LBS | DIASTOLIC BLOOD PRESSURE: 72 MMHG | TEMPERATURE: 98.3 F | HEIGHT: 61 IN | RESPIRATION RATE: 18 BRPM | BODY MASS INDEX: 33.99 KG/M2

## 2019-10-25 DIAGNOSIS — R42 VERTIGO: Primary | ICD-10-CM

## 2019-10-25 LAB — GLUCOSE SERPL-MCNC: 159 MG/DL (ref 65–140)

## 2019-10-25 PROCEDURE — 82948 REAGENT STRIP/BLOOD GLUCOSE: CPT

## 2019-10-25 PROCEDURE — 99284 EMERGENCY DEPT VISIT MOD MDM: CPT | Performed by: EMERGENCY MEDICINE

## 2019-10-25 PROCEDURE — 99284 EMERGENCY DEPT VISIT MOD MDM: CPT

## 2019-10-25 RX ORDER — DIAZEPAM 2 MG/1
2 TABLET ORAL EVERY 6 HOURS PRN
Qty: 20 TABLET | Refills: 0 | Status: SHIPPED | OUTPATIENT
Start: 2019-10-25 | End: 2020-07-09 | Stop reason: ALTCHOICE

## 2019-10-25 RX ORDER — ONDANSETRON 4 MG/1
4 TABLET, ORALLY DISINTEGRATING ORAL EVERY 8 HOURS PRN
Qty: 20 TABLET | Refills: 0 | Status: SHIPPED | OUTPATIENT
Start: 2019-10-25 | End: 2020-07-09 | Stop reason: ALTCHOICE

## 2019-10-25 NOTE — ED PROVIDER NOTES
History  Chief Complaint   Patient presents with    Dizziness     pt c/o dizziness and nausea that started 3 days ago and was seen at urgent care and was given medication for vertigo and it did not help; pt woke up this evening feeling worse and had new onset of tingling in r hand    Nausea     Patient is a 78-year-old female  She has had vertigo in the past   She has been experiencing vertigo now for several days  It is never lasted this long  It is intermittent  It is positional   It is associated with nausea but no vomiting  No tinnitus or other auditory complaints  No hearing loss  No sinusitis or URI  She had no other focal neurologic symptoms  She was ambulating well  She went to urgent care and was prescribed Antivert  This morning she awoke with vertigo  She also had some numbness to her right hand that last couple seconds  Otherwise no other numbness or paresthesias  No paralysis or weakness  No problem with her speech  No problems with her vision  Currently patient is without vertigo  Prior to Admission Medications   Prescriptions Last Dose Informant Patient Reported? Taking? ACCU-CHEK FASTCLIX LANCETS MISC  Self No No   Sig: Use 4 a day and as directed     Patient not taking: Reported on 10/24/2019   BD PEN NEEDLE NERISSA U/F 32G X 4 MM MISC   No No   Sig: USE 4 TIMES A DAY AS DIRECTED   Patient not taking: Reported on 10/24/2019   Blood Glucose Monitoring Suppl (ACCU-CHEK GUIDE) w/Device KIT  Self No No   Sig: by Device route 4 (four) times a day   Patient not taking: Reported on 10/24/2019   Prenatal Multivit-Min-Fe-FA (PRENATAL VITAMINS PO)  Self Yes No   Sig: Take by mouth   calcium carbonate (TUMS) 500 mg chewable tablet   No No   Sig: Chew 2 tablets (1,000 mg total) daily as needed for indigestion or heartburn   Patient not taking: Reported on 10/24/2019   ibuprofen (MOTRIN) 600 mg tablet   No No   Sig: Take 1 tablet (600 mg total) by mouth every 6 (six) hours as needed for mild pain   Patient not taking: Reported on 10/24/2019   meclizine (ANTIVERT) 25 mg tablet   No No   Sig: Take 1 tablet (25 mg total) by mouth every 12 (twelve) hours as needed for dizziness or nausea      Facility-Administered Medications: None       Past Medical History:   Diagnosis Date    Migraine        Past Surgical History:   Procedure Laterality Date     SECTION      [place]  DEL[place] ONLY N/A 6/3/2016    Procedure: Sherie White () REPEAT;  Surgeon: Mony Abdul MD;  Location: [facility];  Service: Obstetrics    [place]  DEL[place] ONLY N/A 2018    Procedure: Sherie White () REPEAT;  Surgeon: Mony Abdul MD;  Location: [facility];  Service: Obstetrics    TUBAL LIGATION N/A 2018    Procedure: LIGATION/COAGULATION TUBAL;  Surgeon: Mony Abdul MD;  Location: [facility];  Service: Obstetrics       Family History   Problem Relation Age of Onset    Down syndrome Cousin         paternal cousin     I have reviewed and agree with the history as documented  Social History     Tobacco Use    Smoking status: Never Smoker    Smokeless tobacco: Never Used   Substance Use Topics    Alcohol use: No    Drug use: No        Review of Systems   Constitutional: Negative for chills and fever  HENT: Negative for rhinorrhea and sore throat  Eyes: Negative for pain, redness and visual disturbance  Respiratory: Negative for cough and shortness of breath  Cardiovascular: Negative for chest pain and leg swelling  Gastrointestinal: Positive for nausea  Negative for abdominal pain, diarrhea and vomiting  Endocrine: Negative for polydipsia and polyuria  Genitourinary: Negative for dysuria, frequency, hematuria, vaginal bleeding and vaginal discharge  Musculoskeletal: Negative for back pain and neck pain  Skin: Negative for rash and wound  Allergic/Immunologic: Negative for immunocompromised state  Neurological: Positive for dizziness   Negative for weakness, numbness and headaches  Hematological: Does not bruise/bleed easily  Psychiatric/Behavioral: Negative for hallucinations and suicidal ideas  All other systems reviewed and are negative  Physical Exam  Physical Exam   Constitutional: She is oriented to person, place, and time  She appears well-developed and well-nourished  No distress  HENT:   Head: Normocephalic and atraumatic  Mouth/Throat: Oropharynx is clear and moist    Tympanic membranes are normal    Eyes: Pupils are equal, round, and reactive to light  Conjunctivae and EOM are normal  Right eye exhibits no discharge  Left eye exhibits no discharge  No scleral icterus  No nystagmus  Neck: Normal range of motion  Neck supple  Cardiovascular: Normal rate, regular rhythm, normal heart sounds and intact distal pulses  Exam reveals no gallop and no friction rub  No murmur heard  Pulmonary/Chest: Effort normal and breath sounds normal  No stridor  No respiratory distress  She has no wheezes  She has no rales  Abdominal: Soft  Bowel sounds are normal  She exhibits no distension  There is no tenderness  There is no rebound and no guarding  Musculoskeletal: Normal range of motion  She exhibits no edema, tenderness or deformity  Neurological: She is alert and oriented to person, place, and time  She has normal strength  No cranial nerve deficit or sensory deficit  GCS eye subscore is 4  GCS verbal subscore is 5  GCS motor subscore is 6  Finger to nose testing is normal   Heel to shin testing is normal   There is no ataxia  Normal gait  Skin: Skin is warm and dry  No rash noted  She is not diaphoretic  Psychiatric: She has a normal mood and affect  Her behavior is normal    Vitals reviewed        Vital Signs  ED Triage Vitals [10/25/19 0500]   Temperature Pulse Respirations Blood Pressure SpO2   98 3 °F (36 8 °C) 95 18 131/72 99 %      Temp Source Heart Rate Source Patient Position - Orthostatic VS BP Location FiO2 (%)   Oral Monitor Sitting Right arm --      Pain Score       No Pain           Vitals:    10/25/19 0500   BP: 131/72   Pulse: 95   Patient Position - Orthostatic VS: Sitting         Visual Acuity      ED Medications  Medications - No data to display    Diagnostic Studies  Results Reviewed     None                 No orders to display              Procedures  Procedures       ED Course                               [facility]  Number of Diagnoses or Management Options  Diagnosis management comments: Neurologic exam is normal   There is nothing to suggest a cerebellar stroke  The numbness to the right hand only lasted a couple seconds  I do not feel this represents a TIA  She is currently without vertigo  There is no neck pain to suggest a vertebral artery dissection  Again there are no cerebellar signs on physical exam   This vertigo is most likely peripheral   Appropriate for discharge and outpatient management  Disposition  Final diagnoses:   Vertigo     Time reflects when diagnosis was documented in both MDM as applicable and the Disposition within this note     Time User Action Codes Description Comment    10/25/2019  5:39 AM June Garcia Add [R42] Vertigo       ED Disposition     ED Disposition Condition Date/Time Comment    Discharge Stable Fri Oct 25, 2019  5:39 AM Tosin Gilliland discharge to home/self care              Follow-up Information     Follow up With Specialties Details Why Contact Info    Adrian Navarro DO Otolaryngology In 1 week  [address]  [phone]            Patient's Medications   Discharge Prescriptions    DIAZEPAM (VALIUM) 2 MG TABLET    Take 1 tablet (2 mg total) by mouth every 6 (six) hours as needed (vertigo)       Start Date: 10/25/2019End Date: --       Order Dose: 2 mg       Quantity: 20 tablet    Refills: 0    ONDANSETRON (ZOFRAN-ODT) 4 MG DISINTEGRATING TABLET    Take 1 tablet (4 mg total) by mouth every 8 (eight) hours as needed for nausea or vomiting       Start Date: 10/25/2019End Date: --       Order Dose: 4 mg       Quantity: 20 tablet    Refills: 0     No discharge procedures on file      ED Provider  Electronically Signed by           Kim Kim MD  10/25/19 6909

## 2020-02-12 ENCOUNTER — OFFICE VISIT (OUTPATIENT)
Dept: SURGERY | Facility: CLINIC | Age: 25
End: 2020-02-12
Payer: COMMERCIAL

## 2020-02-12 VITALS
BODY MASS INDEX: 33.79 KG/M2 | HEIGHT: 61 IN | DIASTOLIC BLOOD PRESSURE: 62 MMHG | HEART RATE: 92 BPM | WEIGHT: 179 LBS | SYSTOLIC BLOOD PRESSURE: 130 MMHG | TEMPERATURE: 99.5 F | RESPIRATION RATE: 18 BRPM

## 2020-02-12 DIAGNOSIS — R10.32 LEFT GROIN PAIN: Primary | ICD-10-CM

## 2020-02-12 PROCEDURE — 99203 OFFICE O/P NEW LOW 30 MIN: CPT | Performed by: SURGERY

## 2020-02-12 NOTE — PROGRESS NOTES
Assessment/Plan:    Left groin pain  Carlos Bain is a 30year old female with PMH of three prior  sections presenting for a 2 week history of bulge in left groin region that began when she bent to lift something at home  She was seen by her gynecologist and was given 1 week of antibiotic therapy with the suspicion that the mass was lymphadenopathy  She returned to GYN 1 week later with the mass still present  She was then referred here for evaluation for left inguinal hernia  The pain associated with the mass worsened Monday 2/10 after she caught a falling patient at work  She describes a constant tearing pain since this time and has not tried any treatments for it  She admits to mild discomfort when bearing down for a bowel movement but denies bloody stools, constipation, dysuria or hematuria  She takes no daily medications and has NKDA  Family history: Father- HTN and hyperthyroidism, mother-hyperthyroidism  Surgical history: 2 uncomplicated  sections and 1 uncomplicated  section with tubal ligation  Social history: Never smoker, very infrequent alcohol use, no illicit drug use    Physical Exam:  Well appearing female in no acute distress  Obese body habitus  Heart: RRR no MRG  Lungs: CTA b/l no WRR  Abdomen: Soft NTND abdomen, Slight tenderness to palpation in LLQ  No asymmetry noted  No obvious bulges noted in the left groin  No fascial defects noted in umbilicus, right or left inguinal area  Assessment:  30year old female with history of three prior  sections presenting for a 2 week history of bulge in left groin region  Left groin pain  At this time there are no obvious defects in the fascial layer  Plan:  Please follow up in one month for re-evaluation of symptoms  Surgery not recommended at this time  Please call the office if symptoms worsen         Diagnoses and all orders for this visit:    Left groin pain          Subjective:      Patient ID: Mariana Montalvomika is a 30 y o  female  Attending Physician Statement  I discussed with the student  I agree with the student's documented findings and plan of care  The following portions of the patient's history were reviewed and updated as appropriate: allergies, current medications, past family history, past medical history, past social history, past surgical history and problem list     Review of Systems   Constitutional: Negative  HENT: Negative  Eyes: Negative  Respiratory: Negative  Gastrointestinal: Negative for abdominal distention, abdominal pain, blood in stool, constipation and diarrhea  Endocrine: Negative  Genitourinary: Negative  Musculoskeletal: Negative  Skin: Negative  Objective:      /62 (BP Location: Left arm, Patient Position: Sitting, Cuff Size: Standard)   Pulse 92   Temp 99 5 °F (37 5 °C) (Tympanic)   Resp 18   Ht 5' 1" (1 549 m)   Wt 81 2 kg (179 lb)   LMP 01/23/2020   BMI 33 82 kg/m²          Physical Exam   Constitutional: She appears well-developed and well-nourished  No distress  Cardiovascular: Normal rate, regular rhythm and normal heart sounds  Abdominal: Soft  Bowel sounds are normal  She exhibits no distension and no mass  There is no tenderness  There is no guarding  Soft NTND abdomen, Slight tenderness to palpation in LLQ  No asymmetry noted  No obvious bulges noted in the left groin  No fascial defects noted in umbilicus, right or left inguinal area  Skin: No rash noted  She is not diaphoretic  No erythema

## 2020-02-12 NOTE — PROGRESS NOTES
Assessment/Plan:    Left groin pain  Johnnie Weaver is a 30year old female with PMH of three prior  sections presenting for a 2 week history of bulge in left groin region that began when she bent to lift something at home  She was seen by her gynecologist and was given 1 week of antibiotic therapy with the suspicion that the mass was lymphadenopathy  She returned to GYN 1 week later with the mass still present  She was then referred here for evaluation for left inguinal hernia  The pain associated with the mass worsened Monday 2/10 after she caught a falling patient at work  She describes a constant tearing pain since this time and has not tried any treatments for it  She admits to mild discomfort when bearing down for a bowel movement but denies bloody stools, constipation, dysuria or hematuria  She takes no daily medications and has NKDA  Family history: Father- HTN and hyperthyroidism, mother-hyperthyroidism  Surgical history: 2 uncomplicated  sections and 1 uncomplicated  section with tubal ligation  Social history: Never smoker, very infrequent alcohol use, no illicit drug use    Physical Exam:  Well appearing female in no acute distress  Obese body habitus  Heart: RRR no MRG  Lungs: CTA b/l no WRR  Abdomen: Soft NTND abdomen, Slight tenderness to palpation in LLQ  No asymmetry noted  No obvious bulges noted in the left groin  No fascial defects noted in umbilicus, right or left inguinal area  Assessment:  30year old female with history of three prior  sections presenting for a 2 week history of bulge in left groin region  Left groin pain  At this time there are no obvious defects in the fascial layer  Plan:  Please follow up in one month for re-evaluation of symptoms  Surgery not recommended at this time  Please call the office if symptoms worsen  Her symptom complex may be secondary to an acute folliculitis related to shaving in her left groin      I advised avoidance of shaving in this area and I look for to seeing her back in a month's time to reassess her symptom complex  Diagnoses and all orders for this visit:    Left groin pain          Subjective:      Patient ID: Theron Terry is a 30 y o  female  Patient is here today for [facility]  Patient stated that she noticed the inguinal hernia two weeks ago when she went to bend over to pick something up she felt a ball at the groin area  Patient made an appointment with Dr Bobby Clement thinking it had to do with her  scar and he did put her on antibiotics  Patient is still having pain with some nausea last night  Kimball Needle      The following portions of the patient's history were reviewed and updated as appropriate: allergies, current medications, past family history, past medical history, past social history, past surgical history and problem list     Review of Systems   Constitutional: Negative  HENT: Negative  Eyes: Negative  Respiratory: Negative  Cardiovascular: Negative  Gastrointestinal: Negative  Endocrine: Negative  Genitourinary: Negative  Musculoskeletal: Negative  Skin: Negative  Allergic/Immunologic: Negative  Neurological: Negative  Hematological: Negative  Psychiatric/Behavioral: Negative  Objective:      /62 (BP Location: Left arm, Patient Position: Sitting, Cuff Size: Standard)   Pulse 92   Temp 99 5 °F (37 5 °C) (Tympanic)   Resp 18   Ht 5' 1" (1 549 m)   Wt 81 2 kg (179 lb)   LMP 2020   BMI 33 82 kg/m²          Physical Exam   Constitutional: She is oriented to person, place, and time  She appears well-developed and well-nourished  HENT:   Head: Normocephalic and atraumatic  Eyes: Pupils are equal, round, and reactive to light  Conjunctivae are normal    Neck: Normal range of motion  Neck supple  Cardiovascular: Normal rate and regular rhythm  Pulmonary/Chest: Effort normal and breath sounds normal    Abdominal: Soft  Bowel sounds are normal    Abdomen soft, obese  No evidence of active soft tissue infection in the groin either on the right or left side  No evidence of ventral incisional hernias no evidence of inguinal or femoral hernias no evidence of umbilical hernias  Musculoskeletal: Normal range of motion  Neurological: She is alert and oriented to person, place, and time  Skin: Skin is warm and dry     Psychiatric: Her behavior is normal  Judgment and thought content normal

## 2020-02-12 NOTE — ASSESSMENT & PLAN NOTE
Rodger Vincent is a 30year old female with PMH of three prior  sections presenting for a 2 week history of bulge in left groin region that began when she bent to lift something at home  She was seen by her gynecologist and was given 1 week of antibiotic therapy with the suspicion that the mass was lymphadenopathy  She returned to GYN 1 week later with the mass still present  She was then referred here for evaluation for left inguinal hernia  The pain associated with the mass worsened Monday 2/10 after she caught a falling patient at work  She describes a constant tearing pain since this time and has not tried any treatments for it  She admits to mild discomfort when bearing down for a bowel movement but denies bloody stools, constipation, dysuria or hematuria  She takes no daily medications and has NKDA  Family history: Father- HTN and hyperthyroidism, mother-hyperthyroidism  Surgical history: 2 uncomplicated  sections and 1 uncomplicated  section with tubal ligation  Social history: Never smoker, very infrequent alcohol use, no illicit drug use    Physical Exam:  Well appearing female in no acute distress  Obese body habitus  Heart: RRR no MRG  Lungs: CTA b/l no WRR  Abdomen: Soft NTND abdomen, Slight tenderness to palpation in LLQ  No asymmetry noted  No obvious bulges noted in the left groin  No fascial defects noted in umbilicus, right or left inguinal area  Assessment:  30year old female with history of three prior  sections presenting for a 2 week history of bulge in left groin region  Left groin pain  At this time there are no obvious defects in the fascial layer  Plan:  Please follow up in one month for re-evaluation of symptoms  Surgery not recommended at this time  Please call the office if symptoms worsen

## 2020-03-16 ENCOUNTER — OFFICE VISIT (OUTPATIENT)
Dept: SURGERY | Facility: CLINIC | Age: 25
End: 2020-03-16
Payer: COMMERCIAL

## 2020-03-16 VITALS
BODY MASS INDEX: 33.61 KG/M2 | WEIGHT: 178 LBS | HEIGHT: 61 IN | TEMPERATURE: 98.5 F | HEART RATE: 92 BPM | SYSTOLIC BLOOD PRESSURE: 120 MMHG | DIASTOLIC BLOOD PRESSURE: 72 MMHG

## 2020-03-16 DIAGNOSIS — R10.32 LEFT GROIN PAIN: Primary | ICD-10-CM

## 2020-03-16 PROBLEM — T14.8XXA MUSCULOSKELETAL STRAIN: Status: ACTIVE | Noted: 2020-03-16

## 2020-03-16 PROCEDURE — 99213 OFFICE O/P EST LOW 20 MIN: CPT | Performed by: SURGERY

## 2020-03-16 NOTE — PATIENT INSTRUCTIONS
Ibuprofen 400 mg to 600 mg by mouth every 6 hours as needed for left groin pain or discomfort    Take the medication with a snack or meal

## 2020-03-16 NOTE — PROGRESS NOTES
Assessment/Plan:    Musculoskeletal strain  The patient returns for 1 month follow-up regarding her history of left groin pain  Patient denies any new or progressive symptoms referable to the left groin  She does admit to being asymptomatic most times  She does note exacerbation or recurrence of symptoms with strenuous activity  Interestingly she also notes an increase in symptoms around the time of her menses  On physical examination she is awake alert oriented  Competent reliable as a historian  Inspection and palpation of the left groin reveals no evidence of lump or a palpable mass to suggest the presence of a left inguinal hernia or left inguinal lymphadenopathy  I believe her symptom complex is most likely secondary to musculoskeletal strain  I have advised ibuprofen 400 mg to 600 mg by mouth every 6 hours to be taken with a snack remedial as needed for the pain  I have encouraged her to follow up with the practice at any point the future with questions or concerns  Left groin pain  Patient notes intermittent left groin pain associated both with activity as well as around the time of her menses  I have advised ibuprofen to be taken p r n  For they musculoskeletal pain  I have advised follow-up with Dr Roberth Quezada of gynecology who she is scheduled to see in May for the left groin pain associated with menses to further investigate the possibility of an endometriosis playing a role in her symptom complex  Diagnoses and all orders for this visit:    Left groin pain          Subjective:      Patient ID: Khurram Verduzco is a 30 y o  female  Patient is here today for one follow up [facility]  Patient states that the hernia has been good unless she picks something she gets pain at the left inguinal site and also can notice it when she gets mesntrual cycle   Docia Donate      The following portions of the patient's history were reviewed and updated as appropriate: allergies, current medications, past family history, past medical history, past social history, past surgical history and problem list     Review of Systems   Constitutional: Negative  HENT: Negative  Eyes: Negative  Respiratory: Negative  Cardiovascular: Negative  Gastrointestinal: Negative  Endocrine: Negative  Genitourinary: Negative  Musculoskeletal: Negative  Skin: Negative  Allergic/Immunologic: Negative  Neurological: Negative  Hematological: Negative  Psychiatric/Behavioral: Negative  Objective:      /72 (BP Location: Left arm, Patient Position: Sitting, Cuff Size: Standard)   Pulse 92   Temp 98 5 °F (36 9 °C) (Tympanic)   Ht 5' 1" (1 549 m)   Wt 80 7 kg (178 lb)   BMI 33 63 kg/m²          Physical Exam   Constitutional: She is oriented to person, place, and time  She appears well-developed and well-nourished  HENT:   Head: Normocephalic and atraumatic  Eyes: Pupils are equal, round, and reactive to light  Conjunctivae are normal    Neck: Normal range of motion  Neck supple  Cardiovascular: Normal rate and regular rhythm  Pulmonary/Chest: Effort normal and breath sounds normal    Abdominal: Soft  Bowel sounds are normal    Normal examination of the left groin  No physical findings to suggest the presence of an inguinal or incisional hernia  Musculoskeletal: Normal range of motion  Neurological: She is alert and oriented to person, place, and time  Skin: Skin is warm and dry     Psychiatric: Her behavior is normal  Judgment and thought content normal

## 2020-03-16 NOTE — ASSESSMENT & PLAN NOTE
Patient notes intermittent left groin pain associated both with activity as well as around the time of her menses  I have advised ibuprofen to be taken p r n  For they musculoskeletal pain  I have advised follow-up with Dr Luis Welch of gynecology who she is scheduled to see in May for the left groin pain associated with menses to further investigate the possibility of an endometriosis playing a role in her symptom complex

## 2020-03-16 NOTE — ASSESSMENT & PLAN NOTE
The patient returns for 1 month follow-up regarding her history of left groin pain  Patient denies any new or progressive symptoms referable to the left groin  She does admit to being asymptomatic most times  She does note exacerbation or recurrence of symptoms with strenuous activity  Interestingly she also notes an increase in symptoms around the time of her menses  On physical examination she is awake alert oriented  Competent reliable as a historian  Inspection and palpation of the left groin reveals no evidence of lump or a palpable mass to suggest the presence of a left inguinal hernia or left inguinal lymphadenopathy  I believe her symptom complex is most likely secondary to musculoskeletal strain  I have advised ibuprofen 400 mg to 600 mg by mouth every 6 hours to be taken with a snack remedial as needed for the pain  I have encouraged her to follow up with the practice at any point the future with questions or concerns

## 2020-06-08 ENCOUNTER — OFFICE VISIT (OUTPATIENT)
Dept: URGENT CARE | Facility: CLINIC | Age: 25
End: 2020-06-08
Payer: COMMERCIAL

## 2020-06-08 VITALS
DIASTOLIC BLOOD PRESSURE: 86 MMHG | HEART RATE: 97 BPM | TEMPERATURE: 97.9 F | WEIGHT: 174 LBS | SYSTOLIC BLOOD PRESSURE: 140 MMHG | BODY MASS INDEX: 32.02 KG/M2 | OXYGEN SATURATION: 98 % | RESPIRATION RATE: 16 BRPM | HEIGHT: 62 IN

## 2020-06-08 DIAGNOSIS — S61.211A LACERATION OF LEFT INDEX FINGER WITHOUT FOREIGN BODY WITHOUT DAMAGE TO NAIL, INITIAL ENCOUNTER: Primary | ICD-10-CM

## 2020-06-08 PROCEDURE — 90471 IMMUNIZATION ADMIN: CPT | Performed by: PHYSICIAN ASSISTANT

## 2020-06-08 PROCEDURE — G0382 LEV 3 HOSP TYPE B ED VISIT: HCPCS | Performed by: PHYSICIAN ASSISTANT

## 2020-06-08 PROCEDURE — 12001 RPR S/N/AX/GEN/TRNK 2.5CM/<: CPT | Performed by: PHYSICIAN ASSISTANT

## 2020-06-08 PROCEDURE — 90715 TDAP VACCINE 7 YRS/> IM: CPT

## 2020-06-30 ENCOUNTER — APPOINTMENT (OUTPATIENT)
Dept: LAB | Facility: HOSPITAL | Age: 25
End: 2020-06-30
Attending: SPECIALIST
Payer: COMMERCIAL

## 2020-06-30 ENCOUNTER — TRANSCRIBE ORDERS (OUTPATIENT)
Dept: LAB | Facility: HOSPITAL | Age: 25
End: 2020-06-30

## 2020-06-30 DIAGNOSIS — Z01.818 PRE-OP EVALUATION: ICD-10-CM

## 2020-06-30 DIAGNOSIS — Z01.818 PRE-OP EVALUATION: Primary | ICD-10-CM

## 2020-06-30 LAB
ALBUMIN SERPL BCP-MCNC: 4.4 G/DL (ref 3.5–5.7)
ANION GAP SERPL CALCULATED.3IONS-SCNC: 9 MMOL/L (ref 4–13)
BUN SERPL-MCNC: 6 MG/DL (ref 7–25)
CALCIUM ALBUM COR SERPL-MCNC: 11.6 MG/DL (ref 8.3–10.1)
CALCIUM SERPL-MCNC: 11.9 MG/DL (ref 8.3–10.1)
CALCIUM SERPL-MCNC: 11.9 MG/DL (ref 8.6–10.5)
CHLORIDE SERPL-SCNC: 98 MMOL/L (ref 98–107)
CO2 SERPL-SCNC: 27 MMOL/L (ref 21–31)
CREAT SERPL-MCNC: 0.55 MG/DL (ref 0.6–1.2)
ERYTHROCYTE [DISTWIDTH] IN BLOOD BY AUTOMATED COUNT: 13.7 % (ref 11.5–14.5)
GFR SERPL CREATININE-BSD FRML MDRD: 130 ML/MIN/1.73SQ M
GLUCOSE SERPL-MCNC: 216 MG/DL (ref 65–99)
HCT VFR BLD AUTO: 41.8 % (ref 42–47)
HGB BLD-MCNC: 14.4 G/DL (ref 12–16)
MCH RBC QN AUTO: 31.2 PG (ref 26–34)
MCHC RBC AUTO-ENTMCNC: 34.4 G/DL (ref 31–37)
MCV RBC AUTO: 91 FL (ref 81–99)
PLATELET # BLD AUTO: 188 THOUSANDS/UL (ref 149–390)
PMV BLD AUTO: 9.3 FL (ref 8.6–11.7)
POTASSIUM SERPL-SCNC: 3.8 MMOL/L (ref 3.5–5.5)
RBC # BLD AUTO: 4.61 MILLION/UL (ref 3.9–5.2)
SODIUM SERPL-SCNC: 134 MMOL/L (ref 134–143)
WBC # BLD AUTO: 7.6 THOUSAND/UL (ref 4.8–10.8)

## 2020-06-30 PROCEDURE — 80048 BASIC METABOLIC PNL TOTAL CA: CPT

## 2020-06-30 PROCEDURE — 82040 ASSAY OF SERUM ALBUMIN: CPT

## 2020-06-30 PROCEDURE — 85027 COMPLETE CBC AUTOMATED: CPT

## 2020-06-30 PROCEDURE — 36415 COLL VENOUS BLD VENIPUNCTURE: CPT

## 2020-07-08 PROCEDURE — U0003 INFECTIOUS AGENT DETECTION BY NUCLEIC ACID (DNA OR RNA); SEVERE ACUTE RESPIRATORY SYNDROME CORONAVIRUS 2 (SARS-COV-2) (CORONAVIRUS DISEASE [COVID-19]), AMPLIFIED PROBE TECHNIQUE, MAKING USE OF HIGH THROUGHPUT TECHNOLOGIES AS DESCRIBED BY CMS-2020-01-R: HCPCS | Performed by: SPECIALIST

## 2020-07-09 ENCOUNTER — LAB REQUISITION (OUTPATIENT)
Dept: LAB | Facility: HOSPITAL | Age: 25
End: 2020-07-09
Payer: COMMERCIAL

## 2020-07-09 DIAGNOSIS — Z01.818 ENCOUNTER FOR OTHER PREPROCEDURAL EXAMINATION: ICD-10-CM

## 2020-07-09 DIAGNOSIS — Z11.59 ENCOUNTER FOR SCREENING FOR OTHER VIRAL DISEASES: ICD-10-CM

## 2020-07-09 NOTE — PRE-PROCEDURE INSTRUCTIONS
Pre-Surgery Instructions:   Medication Instructions    ibuprofen (MOTRIN) 600 mg tablet Instructed patient per Anesthesia Guidelines  LDo 7/9    Spoke to Nisa Alexander    My Surgical Experience    The following information was developed to assist you to prepare for your operation  What do I need to do before coming to the hospital?   Arrange for a responsible person to drive you to and from the hospital    Arrange care for your children at home  Children are not allowed in the recovery areas of the hospital  Plan to wear clothing that is easy to put on and take off  Urine HCG on arrival  Bathing  o Shower the evening before and the morning of your surgery with Hibiclens or DIAL, an antibacterial soap  Please refer to the Pre Op Showering Instructions for Surgery Patients Sheet   o Remove nail polish and all body piercing jewelry  o Do not shave any body part for at least 24 hours before surgery-this includes face, arms, legs and upper body  Food  o Nothing to eat or drink after midnight the night before your surgery  This includes candy and chewing gum  o Do not drink alcohol 24 hours before surgery  Medicine  o Follow instructions you received from your surgeon about which medicines you may take on the day of surgery  o If instructed to take medicine on the morning of surgery, take pills with just a small sip of water  Call your prescribing doctor for specific infroamtion on what to do if you take insulin    What should I bring to the hospital?    Bring:  Corry Owens or a walker, if you have them, for foot or knee surgery   A list of the daily medicines, vitamins, minerals, herbals and nutritional supplements you take   Include the dosages of medicines and the time you take them each day   Glasses, dentures or hearing aids   Minimal clothing; you will be wearing hospital sleepwear   Photo ID; required to verify your identity   If you have a Living Will or Power of , bring a copy of the documents   If you have an ostomy, bring an extra pouch and any supplies you use    Do not bring   Medicines or inhalers   Money, valuables or jewelry    What other information should I know about the day of surgery?  Notify your surgeons if you develop a cold, sore throat, cough, fever, rash or any other illness   Report to the Ambulatory Surgical/Same Day Surgery Unit   You will be instructed to stop at Registration only if you have not been pre-registered   Inform your  fi they do not stay that they will be asked by the staff to leave a phone number where they can be reached   Be available to be reached before surgery  In the event the operating room schedule changes, you may be asked to come in earlier or later than expected    *It is important to tell your doctor and others involved in your health care if you are taking or have been taking any non-prescription drugs, vitamins, minerals, herbals or other nutritional supplements   Any of these may interact with some food or medicines and cause a reaction

## 2020-07-10 LAB
INPATIENT: NORMAL
SARS-COV-2 RNA SPEC QL NAA+PROBE: NOT DETECTED

## 2020-07-12 ENCOUNTER — ANESTHESIA EVENT (OUTPATIENT)
Dept: PERIOP | Facility: HOSPITAL | Age: 25
End: 2020-07-12
Payer: COMMERCIAL

## 2020-07-12 NOTE — ANESTHESIA PREPROCEDURE EVALUATION
Review of Systems/Medical History  Patient summary reviewed  Chart reviewed      Cardiovascular  Negative cardio ROS    Pulmonary  Negative pulmonary ROS        GI/Hepatic  Negative GI/hepatic ROS          Negative  ROS        Endo/Other  Diabetes gestational ,   Obesity  morbid obesity   GYN  Negative gynecology ROS          Hematology  Negative hematology ROS      Musculoskeletal  Negative musculoskeletal ROS        Neurology    Headaches,    Psychology           Physical Exam    Airway    Mallampati score: II  TM Distance: >3 FB  Neck ROM: full     Dental   No notable dental hx     Cardiovascular  Comment: Negative ROS, Rhythm: regular, Rate: normal, Cardiovascular exam normal    Pulmonary  Pulmonary exam normal Breath sounds clear to auscultation,     Other Findings        Anesthesia Plan  ASA Score- 2     Anesthesia Type- IV sedation with anesthesia with ASA Monitors  Additional Monitors:   Airway Plan:         Plan Factors-    Induction- intravenous  Postoperative Plan- Plan for postoperative opioid use  Informed Consent- Anesthetic plan and risks discussed with patient  I personally reviewed this patient with the CRNA  Discussed and agreed on the Anesthesia Plan with the CRNA  Dandre Elaine

## 2020-07-13 ENCOUNTER — HOSPITAL ENCOUNTER (OUTPATIENT)
Facility: HOSPITAL | Age: 25
Setting detail: OUTPATIENT SURGERY
Discharge: HOME | End: 2020-07-13
Attending: SPECIALIST | Admitting: SPECIALIST
Payer: COMMERCIAL

## 2020-07-13 ENCOUNTER — ANESTHESIA (OUTPATIENT)
Dept: PERIOP | Facility: HOSPITAL | Age: 25
End: 2020-07-13
Payer: COMMERCIAL

## 2020-07-13 VITALS
TEMPERATURE: 98.4 F | HEIGHT: 62 IN | DIASTOLIC BLOOD PRESSURE: 85 MMHG | WEIGHT: 180 LBS | RESPIRATION RATE: 18 BRPM | OXYGEN SATURATION: 98 % | SYSTOLIC BLOOD PRESSURE: 141 MMHG | HEART RATE: 58 BPM | BODY MASS INDEX: 33.13 KG/M2

## 2020-07-13 DIAGNOSIS — N92.0 EXCESSIVE AND FREQUENT MENSTRUATION WITH REGULAR CYCLE: ICD-10-CM

## 2020-07-13 LAB
EXT PREGNANCY TEST URINE: NEGATIVE
EXT. CONTROL: NORMAL

## 2020-07-13 PROCEDURE — 81025 URINE PREGNANCY TEST: CPT | Performed by: ANESTHESIOLOGY

## 2020-07-13 PROCEDURE — 88305 TISSUE EXAM BY PATHOLOGIST: CPT | Performed by: PATHOLOGY

## 2020-07-13 RX ORDER — FENTANYL CITRATE 50 UG/ML
INJECTION, SOLUTION INTRAMUSCULAR; INTRAVENOUS AS NEEDED
Status: DISCONTINUED | OUTPATIENT
Start: 2020-07-13 | End: 2020-07-13 | Stop reason: SURG

## 2020-07-13 RX ORDER — PROPOFOL 10 MG/ML
INJECTION, EMULSION INTRAVENOUS CONTINUOUS PRN
Status: DISCONTINUED | OUTPATIENT
Start: 2020-07-13 | End: 2020-07-13 | Stop reason: SURG

## 2020-07-13 RX ORDER — ONDANSETRON 2 MG/ML
4 INJECTION INTRAMUSCULAR; INTRAVENOUS EVERY 6 HOURS PRN
Status: DISCONTINUED | OUTPATIENT
Start: 2020-07-13 | End: 2020-07-13 | Stop reason: HOSPADM

## 2020-07-13 RX ORDER — IBUPROFEN 200 MG
600 TABLET ORAL ONCE
Status: COMPLETED | OUTPATIENT
Start: 2020-07-13 | End: 2020-07-13

## 2020-07-13 RX ORDER — ONDANSETRON 2 MG/ML
4 INJECTION INTRAMUSCULAR; INTRAVENOUS ONCE AS NEEDED
Status: DISCONTINUED | OUTPATIENT
Start: 2020-07-13 | End: 2020-07-13 | Stop reason: HOSPADM

## 2020-07-13 RX ORDER — MIDAZOLAM HYDROCHLORIDE 2 MG/2ML
INJECTION, SOLUTION INTRAMUSCULAR; INTRAVENOUS AS NEEDED
Status: DISCONTINUED | OUTPATIENT
Start: 2020-07-13 | End: 2020-07-13 | Stop reason: SURG

## 2020-07-13 RX ORDER — LIDOCAINE HYDROCHLORIDE 10 MG/ML
INJECTION, SOLUTION EPIDURAL; INFILTRATION; INTRACAUDAL; PERINEURAL AS NEEDED
Status: DISCONTINUED | OUTPATIENT
Start: 2020-07-13 | End: 2020-07-13 | Stop reason: SURG

## 2020-07-13 RX ORDER — SODIUM CHLORIDE 9 MG/ML
INJECTION, SOLUTION INTRAVENOUS AS NEEDED
Status: DISCONTINUED | OUTPATIENT
Start: 2020-07-13 | End: 2020-07-13 | Stop reason: HOSPADM

## 2020-07-13 RX ORDER — FENTANYL CITRATE/PF 50 MCG/ML
25 SYRINGE (ML) INJECTION
Status: DISCONTINUED | OUTPATIENT
Start: 2020-07-13 | End: 2020-07-13 | Stop reason: HOSPADM

## 2020-07-13 RX ORDER — KETOROLAC TROMETHAMINE 30 MG/ML
INJECTION, SOLUTION INTRAMUSCULAR; INTRAVENOUS AS NEEDED
Status: DISCONTINUED | OUTPATIENT
Start: 2020-07-13 | End: 2020-07-13 | Stop reason: SURG

## 2020-07-13 RX ORDER — SODIUM CHLORIDE, SODIUM LACTATE, POTASSIUM CHLORIDE, CALCIUM CHLORIDE 600; 310; 30; 20 MG/100ML; MG/100ML; MG/100ML; MG/100ML
125 INJECTION, SOLUTION INTRAVENOUS CONTINUOUS
Status: DISCONTINUED | OUTPATIENT
Start: 2020-07-13 | End: 2020-07-13 | Stop reason: HOSPADM

## 2020-07-13 RX ORDER — PROPOFOL 10 MG/ML
INJECTION, EMULSION INTRAVENOUS AS NEEDED
Status: DISCONTINUED | OUTPATIENT
Start: 2020-07-13 | End: 2020-07-13 | Stop reason: SURG

## 2020-07-13 RX ADMIN — SODIUM CHLORIDE, SODIUM LACTATE, POTASSIUM CHLORIDE, AND CALCIUM CHLORIDE 125 ML/HR: .6; .31; .03; .02 INJECTION, SOLUTION INTRAVENOUS at 11:12

## 2020-07-13 RX ADMIN — IBUPROFEN 600 MG: 200 TABLET, FILM COATED ORAL at 13:12

## 2020-07-13 RX ADMIN — LIDOCAINE HYDROCHLORIDE 30 MG: 10 INJECTION, SOLUTION EPIDURAL; INFILTRATION; INTRACAUDAL; PERINEURAL at 11:53

## 2020-07-13 RX ADMIN — KETOROLAC TROMETHAMINE 30 MG: 30 INJECTION, SOLUTION INTRAMUSCULAR; INTRAVENOUS at 12:05

## 2020-07-13 RX ADMIN — FENTANYL CITRATE 25 MCG: 50 INJECTION INTRAMUSCULAR; INTRAVENOUS at 12:29

## 2020-07-13 RX ADMIN — FENTANYL CITRATE 50 MCG: 50 INJECTION INTRAMUSCULAR; INTRAVENOUS at 11:48

## 2020-07-13 RX ADMIN — PROPOFOL 50 MG: 10 INJECTION, EMULSION INTRAVENOUS at 11:57

## 2020-07-13 RX ADMIN — PROPOFOL 100 MCG/KG/MIN: 10 INJECTION, EMULSION INTRAVENOUS at 11:53

## 2020-07-13 RX ADMIN — MIDAZOLAM HYDROCHLORIDE 2 MG: 1 INJECTION, SOLUTION INTRAMUSCULAR; INTRAVENOUS at 11:48

## 2020-07-13 NOTE — H&P
H&P Note - Gynecology   Fabio Angeles 30 y o  female MRN: [id number]    Unit/Bed#: OR POOL Encounter: [id number]        ASSESSMENT:  30 y o  female with menometrorrhagia    PLAN:  D&C, zeeshan      Patient of Dr Mayra Dobbs      HPI:  24-year-old woman with menorrhagia over the past several cycles  She status post previous deliveries by  section as well as tubal ligation  She does not desire hormonal manipulation  OBJECTIVE    Historical Information     Past Medical History:   Diagnosis Date    Gestational diabetes     Migraine        Past Surgical History:   Procedure Laterality Date    CERVICAL BIOPSY  W/ LOOP ELECTRODE EXCISION  ?      SECTION  15, 16, 18    ND  DELIVERY ONLY N/A 6/3/2016    Procedure: Jh Ramses () REPEAT;  Surgeon: Ger Cash MD;  Location: [facility] ;  Service: Obstetrics    ND  DELIVERY ONLY N/A 2018    Procedure: Jh Ramses () REPEAT;  Surgeon: Ger Cash MD;  Location: [facility] ;  Service: Obstetrics    TUBAL LIGATION N/A 2018    Procedure: LIGATION/COAGULATION TUBAL;  Surgeon: Ger Cash MD;  Location: [facility] ;  Service: Obstetrics       OB History    Para Term  AB Living   3 3 3 0 0 3   SAB TAB Ectopic Multiple Live Births   0 0 0 0 3      # Outcome Date GA Lbr Richard/2nd Weight Sex Delivery Anes PTL Lv   3 Term 18 39w0d  3204 g (7 lb 1 oz) F CS-LTranv Spinal N BERNADETTE      Name: Elfego Cruz  ([facility])      Claudio Dang: 8  Apgar5: 9   2 Term 16 39w2d  3572 g (7 lb 14 oz) F CS-LTranv Spinal N BERNADETTE      Apgar1: 9  Apgar5: 9   1 Term 04/04/15 40w0d  3941 g (8 lb 11 oz) M CS-LTranv Spinal N BERNADETTE       Family History   Problem Relation Age of Onset   Linda Chandu Down syndrome Cousin         paternal cousin    Hyperthyroidism Mother     Hyperthyroidism Father     Hypertension Father     No Known Problems Sister     No Known Problems Brother     No Known Problems Paternal Grandmother     Diabetes Paternal Grandfather        Social History     Socioeconomic History    Marital status: /Civil Union     Spouse name: Not on file    Number of children: Not on file    Years of education: Not on file    Highest education level: Not on file   Occupational History    Not on file   Social Needs    Financial resource strain: Not on file    Food insecurity:     Worry: Not on file     Inability: Not on file    Transportation needs:     Medical: Not on file     Non-medical: Not on file   Tobacco Use    Smoking status: Never Smoker    Smokeless tobacco: Never Used   Substance and Sexual Activity    Alcohol use: Yes     Frequency: Monthly or less     Drinks per session: 1 or 2     Binge frequency: Never    Drug use: No    Sexual activity: Yes     Partners: Male     Birth control/protection: Female Sterilization   Lifestyle    Physical activity:     Days per week: Not on file     Minutes per session: Not on file    Stress: Not on file   Relationships    Social connections:     Talks on phone: Not on file     Gets together: Not on file     Attends [employer] service: Not on file     Active member of club or organization: Not on file     Attends meetings of clubs or organizations: Not on file     Relationship status: Not on file    Intimate partner violence:     Fear of current or ex partner: Not on file     Emotionally abused: Not on file     Physically abused: Not on file     Forced sexual activity: Not on file   Other Topics Concern    Not on file   Social History Narrative    Not on file       Social History     Substance and Sexual Activity   Alcohol Use Yes    Frequency: Monthly or less    Drinks per session: 1 or 2    Binge frequency: Never     Social History     Substance and Sexual Activity   Drug Use No     Social History     Tobacco Use   Smoking Status Never Smoker   Smokeless Tobacco Never Used       Medications Prior to Admission   Medication  ibuprofen (MOTRIN) 600 mg tablet     No current facility-administered medications on file prior to encounter  Current Outpatient Medications on File Prior to Encounter   Medication Sig Dispense Refill    ibuprofen (MOTRIN) 600 mg tablet Take 1 tablet (600 mg total) by mouth every 6 (six) hours as needed for mild pain 30 tablet 0       No Known Allergies    Patient Vitals for the past 24 hrs:   BP Temp Temp src Pulse Resp SpO2 Height Weight   07/13/20 1107 134/62 97 8 °F (36 6 °C) Temporal 66 16 98 % 5' 2" (1 575 m) 81 6 kg (180 lb)     Oxygen Therapy  SpO2: 98 %  I/O     None        No intake or output data in the 24 hours ending 07/13/20 1139    Physical Exam:   General: No Acute Distress   Cardiovascular: Regular Rate and Rhythm   Lungs: Clear to Auscultation Bilaterally, no wheezing, rhonchi or rales   Abdomen: non-tender, no rebound or guarding; small left inguinal hernia    Lower Extremities: negative Lee's sign bilaterally   Neuro: Alert, cooperative    Gyn:      Normal external genitalia                Vaginal: no lesions     Cervix: unremarkable appearance      Uterus: unremarkable size   anteverted     Adnexa: no palpable masses         Laboratory Studies:                    Invalid input(s): GLU, CA                                Invalid input(s): COLORUA        Invalid input(s): GLU            No results found for: HCG, PROGESTERONE          ABO Grouping   Date Value Ref Range Status   11/27/2018 A  Final   04/05/2015 A  Final     Rh Factor   Date Value Ref Range Status   11/27/2018 Negative  Final   04/05/2015 Negative  Final     Antibody Screen   Date Value Ref Range Status   04/27/2018 Negative  Final     Specimen Expiration Date   Date Value Ref Range Status   11/26/2018 [id number]  Final       Medications:  Medication Administration - last 24 hours from 07/12/2020 1139 to 07/13/2020 1139       Date/Time Order Dose Route Action Action by     07/13/2020 1112 lactated ringers infusion 125 mL/hr Intravenous New Bag Dalton Hernandez RN          Continuous Infusions:    lactated ringers 125 mL/hr Last Rate: 125 mL/hr (07/13/20 1112)       Scheduled Meds:    Current Facility-Administered Medications:  lactated ringers 125 mL/hr Intravenous Continuous Liam Del Real MD Last Rate: 125 mL/hr (07/13/20 1112)       PRN Meds:      Invasive  Devices: Invasive Devices     Peripheral Intravenous Line            Peripheral IV 07/13/20 Left Hand less than 1 day                Additional Vitals:    Temp  Min: 97 8 °F (36 6 °C)  Max: 97 8 °F (36 6 °C)    IBW: 50 1 kg            Other consulting services: I have personally reviewed pertinent reports  Imaging Studies: I have personally reviewed pertinent reports  EKG, Pathology, Microbiology, and Other Studies: I have personally reviewed pertinent reports          Code Status: Prior  Advance Directive and Living Will:      Power of :    POLST:

## 2020-07-13 NOTE — DISCHARGE INSTRUCTIONS
Dilation and Curettage   WHAT YOU SHOULD KNOW:   Dilation and curettage (D and C) is a procedure to remove tissue from the lining of your uterus  AFTER YOU LEAVE:   Medicines:   · Pain medicine: You may be given medicine to take away or decrease pain  Do not wait until the pain is severe before you take your medicine  · Antibiotics: This medicine will help fight or prevent an infection  · Take your medicine as directed  Call your healthcare provider if you think your medicine is not helping or if you have side effects  Tell him if you are allergic to any medicine  Keep a list of the medicines, vitamins, and herbs you take  Include the amounts, and when and why you take them  Bring the list or the pill bottles to follow-up visits  Carry your medicine list with you in case of an emergency  Follow up with your healthcare provider as directed:  Write down your questions so you remember to ask them during your visits  Activity:  Ask your primary healthcare provider when you can return to your normal activities  Contact your primary healthcare provider if:   · You have foul-smelling vaginal discharge  · You do not get your monthly period  · You feel depressed or anxious  · You feel very tired and weak  · You have questions or concerns about your condition or care  Seek care immediately or call 911 if:   · You have heavy vaginal bleeding that soaks 1 pad in 1 hour for 2 hours in a row  · You have a fever and abdominal cramps  · Your pain does not get better, even after treatment  © 2014 [address] [employer] is for End User's use only and may not be sold, redistributed or otherwise used for commercial purposes  All illustrations and images included in CareNotes® are the copyrighted property of [employer] , [employer]  or Talib Biggs  The above information is an  only  It is not intended as medical advice for individual conditions or treatments  Talk to your doctor, nurse or pharmacist before following any medical regimen to see if it is safe and effective for you

## 2020-07-13 NOTE — OP NOTE
OPERATIVE REPORT  PATIENT NAME: Scarlet Aranda    :  1995  MRN: [id number]  Pt Location: [facility] OR ROOM 02    SURGERY DATE: 2020    Surgeon(s) and Role:     Kailee Valentine MD - Primary    Preop Diagnosis:  Excessive and frequent menstruation with regular cycle [N92 0]    Post-Op Diagnosis Codes:     * Excessive and frequent menstruation with regular cycle [N92 0]    Procedure(s) (LRB):  DILATATION AND CURETTAGE (D&C) (N/A)  ABLATION ENDOMETRIAL KATHY (N/A)    Specimen(s):  ID Type Source Tests Collected by Time Destination   1 : ENDOCERVICAL CURRETTINGS Tissue Cervix, Endocervical TISSUE EXAM Storm Damon MD 2020 1200    2 : ENDOMETRIAL CURRETTINGS Tissue Endometrium TISSUE EXAM Storm Damon MD 2020 1200        Estimated Blood Loss:   Minimal    Drains:  * No LDAs found *    Anesthesia Type:   IV Sedation with Anesthesia    Operative Indications:  Excessive and frequent menstruation with regular cycle [N92 0]       Operative Findings:  Uterus sounded to 9 cm  Cavity smooth and regular in contour  Moderate amount of endometrial tissue obtained  Kathy Procedure carried out uneventfully  Complications:   None    Procedure and Technique:  Patient was taken to the operating room  She was placed in the dorsosupine position  She was then given some IV sedation  She was then changed to the dorsal lithotomy position and prepped and draped sterilely for a vaginal procedure  A speculum was placed in the vagina and the cervix was grasped with a single-tooth tenaculum  Endocervical curettings were then obtained using a kavorkian curette  Uterus was then sounded to 9 cm  Cervix was dilated using Ramos dilators  A small sharp curette was then introduced into the uterine cavity which was then curetted of a moderate amount of endometrial tissue  Cavity was noted to be smooth and regular in contour    Kathy procedure was then carried out uneventfully set at 4 cm depth and 2 minutes duration  All vaginal instruments were removed at the end of the procedure  Patient tolerated procedure well  Pad, instrument and needle counts were correct  The patient was then brought to the recovery room in stable condition     I was present for the entire procedure    Patient Disposition:  PACU     SIGNATURE: Roxana Schumacher MD  DATE: July 13, 2020  TIME: 12:17 PM

## 2020-07-13 NOTE — NURSING NOTE
Received patient offering no complaints  Resting quietly in bed  Tolerating po food and fluids  Complaining of lower abdominal cramping   6/10  Motrin ordered and given to patient  Ambulated to BR voided easily  Reported relief in cramping after voiding 2/10  Pad with small amount of dried blood  New pad given  Instructions reviewed  AVS provided  Instructed to call MD if any changes in condition  Left unit in stable condition with spouse

## 2020-07-13 NOTE — ANESTHESIA POSTPROCEDURE EVALUATION
Post-Op Assessment Note    CV Status:  Stable  Pain Score: 0    Pain management: adequate     Mental Status:  Alert   Hydration Status:  Stable   PONV Controlled:  Controlled   Airway Patency:  Patent   Post Op Vitals Reviewed: Yes      Staff: CRNA           BP   144/90   Temp      Pulse  81   Resp 20   SpO2 98

## 2021-01-11 ENCOUNTER — OFFICE VISIT (OUTPATIENT)
Dept: URGENT CARE | Facility: CLINIC | Age: 26
End: 2021-01-11
Payer: COMMERCIAL

## 2021-01-11 VITALS
HEART RATE: 98 BPM | RESPIRATION RATE: 18 BRPM | SYSTOLIC BLOOD PRESSURE: 122 MMHG | DIASTOLIC BLOOD PRESSURE: 84 MMHG | TEMPERATURE: 98.6 F | OXYGEN SATURATION: 97 %

## 2021-01-11 DIAGNOSIS — J06.9 ACUTE RESPIRATORY DISEASE: Primary | ICD-10-CM

## 2021-01-11 PROCEDURE — U0005 INFEC AGEN DETEC AMPLI PROBE: HCPCS | Performed by: PHYSICIAN ASSISTANT

## 2021-01-11 PROCEDURE — U0003 INFECTIOUS AGENT DETECTION BY NUCLEIC ACID (DNA OR RNA); SEVERE ACUTE RESPIRATORY SYNDROME CORONAVIRUS 2 (SARS-COV-2) (CORONAVIRUS DISEASE [COVID-19]), AMPLIFIED PROBE TECHNIQUE, MAKING USE OF HIGH THROUGHPUT TECHNOLOGIES AS DESCRIBED BY CMS-2020-01-R: HCPCS | Performed by: PHYSICIAN ASSISTANT

## 2021-01-11 PROCEDURE — G0382 LEV 3 HOSP TYPE B ED VISIT: HCPCS | Performed by: PHYSICIAN ASSISTANT

## 2021-01-11 NOTE — PROGRESS NOTES
[facility]        NAME: Cole Bravo is a 30 y o  female  : 1995    MRN: [id number]  DATE: 2021  TIME: 6:29 PM    Assessment and Plan   Acute respiratory disease [J06 9]  1  Acute respiratory disease  Novel Coronavirus (COVID-19), PCR [facility] - Office Collection       Patient Instructions     Vitamin D3 2000 IU daily  Vitamin C 1g every 12 hours  Multivitamin daily  Fluids and rest  Over the counter cold medication as needed  Follow up with PCP in 3-5 days  Proceed to  ER if symptoms worsen  Chief Complaint     Chief Complaint   Patient presents with    Cold Like Symptoms     Pt c/o cough x 2 weeks, nasal congestion, and left ear pain         History of Present Illness       Exposed to + COVID contact  URI   This is a new problem  Episode onset: 2 weeks  There has been no fever  Associated symptoms include congestion, coughing (occasionally productive), ear pain (L ear) and rhinorrhea  Pertinent negatives include no diarrhea, headaches, nausea, rash, sinus pain, sneezing, sore throat, vomiting or wheezing  Treatments tried: dayquil  Review of Systems   Review of Systems   Constitutional: Negative for activity change, appetite change, chills, fatigue and fever  HENT: Positive for congestion, ear pain (L ear) and rhinorrhea  Negative for ear discharge, postnasal drip, sinus pressure, sinus pain, sneezing, sore throat and trouble swallowing  Respiratory: Positive for cough (occasionally productive)  Negative for chest tightness, shortness of breath and wheezing  Gastrointestinal: Negative for diarrhea, nausea and vomiting  Musculoskeletal: Negative for myalgias  Skin: Negative for rash  Neurological: Negative for light-headedness and headaches           Current Medications       Current Outpatient Medications:     ibuprofen (MOTRIN) 600 mg tablet, Take 1 tablet (600 mg total) by mouth every 6 (six) hours as needed for mild pain (Patient not taking: Reported on 2021), Disp: 30 tablet, Rfl: 0    Current Allergies     Allergies as of 2021    (No Known Allergies)            The following portions of the patient's history were reviewed and updated as appropriate: allergies, current medications, past family history, past medical history, past social history, past surgical history and problem list      Past Medical History:   Diagnosis Date    Gestational diabetes     Migraine        Past Surgical History:   Procedure Laterality Date    CERVICAL BIOPSY  W/ LOOP ELECTRODE EXCISION  ?   SECTION  15, 16, 18    DILATION AND CURETTAGE OF UTERUS WITH HYSTEROSOCPY N/A 2020    Procedure: DILATATION AND CURETTAGE (D&C); Surgeon: Matti Frye MD;  Location: [address] [place] [address] OR;  Service: Gynecology    ENDOMETRIAL ABLATION N/A 2020    Procedure: Winsted Curet;  Surgeon: Matti Frye MD;  Location: [address] [place] [address] OR;  Service: Gynecology    ME  DELIVERY ONLY N/A 6/3/2016    Procedure: Mayme Moment () REPEAT;  Surgeon: Matti Frye MD;  Location: [facility];  Service: Obstetrics    ME  DELIVERY ONLY N/A 2018    Procedure: Mayme Moment () REPEAT;  Surgeon: Matti Frye MD;  Location: [facility];  Service: Obstetrics    TUBAL LIGATION N/A 2018    Procedure: LIGATION/COAGULATION TUBAL;  Surgeon: Matti Frye MD;  Location: [facility];  Service: Obstetrics       Family History   Problem Relation Age of Onset    Down syndrome Cousin         paternal cousin    Hyperthyroidism Mother     Hyperthyroidism Father     Hypertension Father     No Known Problems Sister     No Known Problems Brother     No Known Problems Paternal Grandmother     Diabetes Paternal Grandfather          Medications have been verified  Objective   /84   Pulse 98   Temp 98 6 °F (37 °C) (Temporal)   Resp 18   SpO2 97%   No LMP recorded         Physical Exam     Physical Exam  Vitals signs reviewed  Constitutional:       General: She is not in acute distress  Appearance: She is well-developed  She is not diaphoretic  HENT:      Head: Normocephalic  Right Ear: Tympanic membrane and external ear normal       Left Ear: Tympanic membrane and external ear normal       Nose: Nose normal       Mouth/Throat:      Pharynx: No oropharyngeal exudate or posterior oropharyngeal erythema  Eyes:      Conjunctiva/sclera: Conjunctivae normal    Cardiovascular:      Rate and Rhythm: Normal rate and regular rhythm  Heart sounds: Normal heart sounds  No murmur  No friction rub  No gallop  Pulmonary:      Effort: Pulmonary effort is normal  No respiratory distress  Breath sounds: Normal breath sounds  No wheezing or rales  Chest:      Chest wall: No tenderness  Lymphadenopathy:      Cervical: No cervical adenopathy  Skin:     General: Skin is warm  Neurological:      Mental Status: She is alert and oriented to person, place, and time  Psychiatric:         Behavior: Behavior normal          Thought Content:  Thought content normal          Judgment: Judgment normal

## 2021-01-11 NOTE — PATIENT INSTRUCTIONS
Vitamin D3 2000 IU daily  Vitamin C 1g every 12 hours  Multivitamin daily  Fluids and rest  Over the counter cold medication as needed  Follow up with PCP in 3-5 days  Proceed to  ER if symptoms worsen  [address]    Your healthcare provider and/or public health staff have evaluated you and have determined that you do not need to remain in the hospital at this time  At this time you can be isolated at home where you will be monitored by staff from your local or state health department  You should carefully follow the prevention and isolation steps below until a healthcare provider or local or state health department says that you can return to your normal activities  Stay home except to get medical care    People who are mildly ill with COVID-19 are able to isolate at home during their illness  You should restrict activities outside your home, except for getting medical care  Do not go to work, school, or public areas  Avoid using public transportation, ride-sharing, or taxis  Separate yourself from other people and animals in your home    People: As much as possible, you should stay in a specific room and away from other people in your home  Also, you should use a separate bathroom, if available  Animals: You should restrict contact with pets and other animals while you are sick with COVID-19, just like you would around other people  Although there have not been reports of pets or other animals becoming sick with COVID-19, it is still recommended that people sick with COVID-19 limit contact with animals until more information is known about the virus  When possible, have another member of your household care for your animals while you are sick  If you are sick with COVID-19, avoid contact with your pet, including petting, snuggling, being kissed or licked, and sharing food   If you must care for your pet or be around animals while you are sick, wash your hands before and after you interact with pets and wear a facemask  See COVID-19 and Animals for more information  Call ahead before visiting your doctor    If you have a medical appointment, call the healthcare provider and tell them that you have or may have COVID-19  This will help the healthcare providers office take steps to keep other people from getting infected or exposed  Wear a facemask    You should wear a facemask when you are around other people (e g , sharing a room or vehicle) or pets and before you enter a healthcare providers office  If you are not able to wear a facemask (for example, because it causes trouble breathing), then people who live with you should not stay in the same room with you, or they should wear a facemask if they enter your room  Cover your coughs and sneezes    Cover your mouth and nose with a tissue when you cough or sneeze  Throw used tissues in a lined trash can  Immediately wash your hands with soap and water for at least 20 seconds or, if soap and water are not available, clean your hands with an alcohol-based hand  that contains at least 60% alcohol  Clean your hands often    Wash your hands often with soap and water for at least 20 seconds, especially after blowing your nose, coughing, or sneezing; going to the bathroom; and before eating or preparing food  If soap and water are not readily available, use an alcohol-based hand  with at least 60% alcohol, covering all surfaces of your hands and rubbing them together until they feel dry  Soap and water are the best option if hands are visibly dirty  Avoid touching your eyes, nose, and mouth with unwashed hands  Avoid sharing personal household items    You should not share dishes, drinking glasses, cups, eating utensils, towels, or bedding with other people or pets in your home  After using these items, they should be washed thoroughly with soap and water      Clean all high-touch surfaces everyday    High touch surfaces include counters, tabletops, doorknobs, bathroom fixtures, toilets, phones, keyboards, tablets, and bedside tables  Also, clean any surfaces that may have blood, stool, or body fluids on them  Use a household cleaning spray or wipe, according to the label instructions  Labels contain instructions for safe and effective use of the cleaning product including precautions you should take when applying the product, such as wearing gloves and making sure you have good ventilation during use of the product  Monitor your symptoms    Seek prompt medical attention if your illness is worsening (e g , difficulty breathing)  Before seeking care, call your healthcare provider and tell them that you have, or are being evaluated for, COVID-19  Put on a facemask before you enter the facility  These steps will help the healthcare providers office to keep other people in the office or waiting room from getting infected or exposed  Ask your healthcare provider to call the local or [facility] health department  Persons who are placed under active monitoring or facilitated self-monitoring should follow instructions provided by their local health department or occupational health professionals, as appropriate  If you have a medical emergency and need to call 911, notify the dispatch personnel that you have, or are being evaluated for COVID-19  If possible, put on a facemask before emergency medical services arrive      Discontinuing home isolation    Patients with confirmed COVID-19 should remain under home isolation precautions until the following conditions are met:   - They have had no fever for at least 24 hours (that is one full day of no fever without the use medicine that reduces fevers)  AND  - other symptoms have improved (for example, when their cough or shortness of breath have improved)  AND  - If had mild or moderate illness, at least 10 days have passed since their symptoms first appeared or if severe illness (needed oxygen) or immunosuppressed, at least 20 days have passed since symptoms first appeared  Patients with confirmed COVID-19 should also notify close contacts (including their workplace) and ask that they self-quarantine  Currently, close contact is defined as being within 6 feet for 15 minutes or more from the period 24 hours starting 48 hours before symptom onset to the time at which the patient went into isolation  Close contacts of patients diagnosed with COVID-19 should be instructed by the patient to self-quarantine for 14 days from the last time of their last contact with the patient       Source: [employer] is

## 2021-01-13 LAB — SARS-COV-2 RNA SPEC QL NAA+PROBE: NOT DETECTED

## 2021-02-08 ENCOUNTER — OFFICE VISIT (OUTPATIENT)
Dept: URGENT CARE | Facility: CLINIC | Age: 26
End: 2021-02-08
Payer: COMMERCIAL

## 2021-02-08 VITALS
RESPIRATION RATE: 18 BRPM | HEIGHT: 62 IN | WEIGHT: 170 LBS | SYSTOLIC BLOOD PRESSURE: 150 MMHG | OXYGEN SATURATION: 97 % | HEART RATE: 84 BPM | BODY MASS INDEX: 31.28 KG/M2 | TEMPERATURE: 98.5 F | DIASTOLIC BLOOD PRESSURE: 84 MMHG

## 2021-02-08 DIAGNOSIS — K08.89 TOOTH PAIN: Primary | ICD-10-CM

## 2021-02-08 PROCEDURE — G0382 LEV 3 HOSP TYPE B ED VISIT: HCPCS | Performed by: PHYSICIAN ASSISTANT

## 2021-02-08 NOTE — PATIENT INSTRUCTIONS
No signs of infection   Stick to a soft diet today   Advil/Tylenol for the pain   Ice the area tonight   Tomorrow morning call the dental clinic

## 2021-02-08 NOTE — PROGRESS NOTES
[facility]        NAME: Tammi Mena is a 30 y o  female  : 1995    MRN: [id number]  DATE: 2021  TIME: 6:17 PM    Assessment and Plan   Tooth pain [K08 89]  1  Tooth pain       No signs of infection   Will see dental clinic tm    Patient Instructions   Patient Instructions   No signs of infection   Stick to a soft diet today   Advil/Tylenol for the pain   Ice the area tonight   Tomorrow morning call the dental clinic         Follow up with PCP in 3-5 days  Proceed to  ER if symptoms worsen  Chief Complaint     Chief Complaint   Patient presents with    Dental Pain     started last night, left side of face, pt sates that she lost a filling? History of Present Illness       The patient is a 57-year-old female presenting with left-sided dental pain that began last night  She does not know if the pain is coming from the bottom or top teeth  She states that she has fillings in a few of the back bottom teeth on the left side but that she thinks that the fillings fell out because she does not see them anymore  Denies fever, chills, chest pain, shortness of breath  States that she does not regularly see a dentist        Review of Systems   Review of Systems   Constitutional: Negative for activity change, appetite change, chills, diaphoresis, fatigue and fever  HENT: Positive for dental problem  Negative for drooling, sinus pressure and sinus pain  Respiratory: Negative for cough, chest tightness and shortness of breath  Cardiovascular: Negative for chest pain and palpitations  Gastrointestinal: Negative for abdominal pain, nausea and vomiting           Current Medications       Current Outpatient Medications:     ibuprofen (MOTRIN) 600 mg tablet, Take 1 tablet (600 mg total) by mouth every 6 (six) hours as needed for mild pain (Patient not taking: Reported on 2021), Disp: 30 tablet, Rfl: 0    Current Allergies     Allergies as of 2021    (No Known Allergies)            The following portions of the patient's history were reviewed and updated as appropriate: allergies, current medications, past family history, past medical history, past social history, past surgical history and problem list      Past Medical History:   Diagnosis Date    Gestational diabetes     Migraine        Past Surgical History:   Procedure Laterality Date    CERVICAL BIOPSY  W/ LOOP ELECTRODE EXCISION  2017?   SECTION  15, 16, 18    DILATION AND CURETTAGE OF UTERUS WITH HYSTEROSOCPY N/A 2020    Procedure: DILATATION AND CURETTAGE (D&C); Surgeon: Omar Akbar MD;  Location: [facility] MAIN OR;  Service: Gynecology    ENDOMETRIAL ABLATION N/A 2020    Procedure: Algis Menghini;  Surgeon: Omar Akbra MD;  Location: [facility] MAIN OR;  Service: Gynecology    [place]  DELIVERY ONLY N/A 6/3/2016    Procedure: Adam Navneet () REPEAT;  Surgeon: Omar Akbar MD;  [place] ;  Service: Obstetrics    [place]  DELIVERY ONLY N/A 2018    Procedure: Adam Navneet () REPEAT;  Surgeon: Omar Akbar MD;  [place] [facility] [place];  Service: Obstetrics    TUBAL LIGATION N/A 2018    Procedure: LIGATION/COAGULATION TUBAL;  Surgeon: Omar Akbar MD;  [place] ;  Service: Obstetrics       Family History   Problem Relation Age of Onset    Down syndrome Cousin         paternal cousin    Hyperthyroidism Mother     Hyperthyroidism Father     Hypertension Father     No Known Problems Sister     No Known Problems Brother     No Known Problems Paternal Grandmother     Diabetes Paternal Grandfather          Medications have been verified  Objective   /84   Pulse 84   Temp 98 5 °F (36 9 °C) (Temporal)   Resp 18   Ht 5' 2" (1 575 m)   Wt 77 1 kg (170 lb)   LMP 2021 (Exact Date)   SpO2 97%   BMI 31 09 kg/m²        Physical Exam     Physical Exam  Vitals signs reviewed     Constitutional: General: She is not in acute distress  Appearance: Normal appearance  She is normal weight  She is not ill-appearing, toxic-appearing or diaphoretic  HENT:      Head: Normocephalic and atraumatic  Mouth/Throat:        Comments: No signs of infection  No erythema, swelling, purulent discharge  Cardiovascular:      Rate and Rhythm: Normal rate and regular rhythm  Heart sounds: Normal heart sounds  No murmur  No friction rub  No gallop  Pulmonary:      Effort: Pulmonary effort is normal  No respiratory distress  Breath sounds: Normal breath sounds  No stridor  No wheezing, rhonchi or rales  Chest:      Chest wall: No tenderness  Skin:     General: Skin is warm and dry  Capillary Refill: Capillary refill takes less than 2 seconds  Neurological:      Mental Status: She is alert

## 2021-05-18 ENCOUNTER — HOSPITAL ENCOUNTER (EMERGENCY)
Facility: HOSPITAL | Age: 26
Discharge: HOME | End: 2021-05-18
Payer: COMMERCIAL

## 2021-05-18 ENCOUNTER — APPOINTMENT (EMERGENCY)
Dept: CT IMAGING | Facility: HOSPITAL | Age: 26
End: 2021-05-18
Payer: COMMERCIAL

## 2021-05-18 VITALS
SYSTOLIC BLOOD PRESSURE: 128 MMHG | OXYGEN SATURATION: 100 % | RESPIRATION RATE: 16 BRPM | WEIGHT: 172 LBS | DIASTOLIC BLOOD PRESSURE: 80 MMHG | BODY MASS INDEX: 31.65 KG/M2 | HEART RATE: 88 BPM | HEIGHT: 62 IN | TEMPERATURE: 98.1 F

## 2021-05-18 DIAGNOSIS — S06.0X0A CONCUSSION WITHOUT LOSS OF CONSCIOUSNESS, INITIAL ENCOUNTER: Primary | ICD-10-CM

## 2021-05-18 DIAGNOSIS — S09.90XA CLOSED HEAD INJURY, INITIAL ENCOUNTER: ICD-10-CM

## 2021-05-18 PROCEDURE — 99284 EMERGENCY DEPT VISIT MOD MDM: CPT | Performed by: NURSE PRACTITIONER

## 2021-05-18 PROCEDURE — 99284 EMERGENCY DEPT VISIT MOD MDM: CPT

## 2021-05-18 PROCEDURE — 70450 CT HEAD/BRAIN W/O DYE: CPT

## 2021-05-18 RX ORDER — ACETAMINOPHEN 325 MG/1
975 TABLET ORAL ONCE
Status: COMPLETED | OUTPATIENT
Start: 2021-05-18 | End: 2021-05-18

## 2021-05-18 RX ORDER — ONDANSETRON 4 MG/1
4 TABLET, ORALLY DISINTEGRATING ORAL ONCE
Status: COMPLETED | OUTPATIENT
Start: 2021-05-18 | End: 2021-05-18

## 2021-05-18 RX ORDER — ONDANSETRON 4 MG/1
4 TABLET, ORALLY DISINTEGRATING ORAL EVERY 8 HOURS PRN
Qty: 9 TABLET | Refills: 0 | Status: SHIPPED | OUTPATIENT
Start: 2021-05-18 | End: 2021-08-09 | Stop reason: ALTCHOICE

## 2021-05-18 RX ADMIN — ACETAMINOPHEN 975 MG: 325 TABLET, FILM COATED ORAL at 10:24

## 2021-05-18 RX ADMIN — ONDANSETRON 4 MG: 4 TABLET, ORALLY DISINTEGRATING ORAL at 10:24
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
## 2021-05-18 NOTE — Clinical Note
Luca Lee was seen and treated in our emergency department on 5/18/2021  Diagnosis:     Samara  may return to work on return date  She may return on this date: 05/21/2021         If you have any questions or concerns, please don't hesitate to call        ANYI Lockhart    ______________________________           _______________          _______________  Hospital Representative                              Date                                Time

## 2021-07-20 ENCOUNTER — NURSE TRIAGE (OUTPATIENT)
Dept: OTHER | Facility: OTHER | Age: 26
End: 2021-07-20

## 2021-07-20 DIAGNOSIS — Z20.822 SUSPECTED SEVERE ACUTE RESPIRATORY SYNDROME CORONAVIRUS 2 (SARS-COV-2) INFECTION: Primary | ICD-10-CM

## 2021-07-20 PROCEDURE — U0005 INFEC AGEN DETEC AMPLI PROBE: HCPCS | Performed by: FAMILY MEDICINE

## 2021-07-20 PROCEDURE — U0003 INFECTIOUS AGENT DETECTION BY NUCLEIC ACID (DNA OR RNA); SEVERE ACUTE RESPIRATORY SYNDROME CORONAVIRUS 2 (SARS-COV-2) (CORONAVIRUS DISEASE [COVID-19]), AMPLIFIED PROBE TECHNIQUE, MAKING USE OF HIGH THROUGHPUT TECHNOLOGIES AS DESCRIBED BY CMS-2020-01-R: HCPCS | Performed by: FAMILY MEDICINE

## 2021-07-20 NOTE — TELEPHONE ENCOUNTER
"I need to get my whole family tested for COVID  My Dad tested positive and I was with him last Wednesday  Now I have symptoms "    Patient stated that no other family members were exposed to her father or have symptoms  1  Were you within 6 feet or less, for up to 15 minutes or more with a person that has a confirmed COVID-19 test? Exposed to her father who tested positive yesterday  2  What was the date of your exposure? 7/14/2021  3  Are you experiencing any symptoms attributed to the virus?  (Assess for SOB, cough, fever, difficulty breathing)   Started 7/17/2021  Intermittent, productive to dry cough  Diarrhea  4  HIGH RISK: Do you have any history heart or lung conditions, weakened immune system, diabetes, Asthma, CHF, HIV, COPD, Chemo, renal failure, sickle cell, etc?   Denied  5  PREGNANCY: Are you pregnant or did you recently give birth?  Denied

## 2021-07-21 ENCOUNTER — TELEPHONE (OUTPATIENT)
Dept: OTHER | Facility: OTHER | Age: 26
End: 2021-07-21

## 2021-07-21 LAB — SARS-COV-2 RNA RESP QL NAA+PROBE: POSITIVE

## 2021-07-21 NOTE — TELEPHONE ENCOUNTER
The patient was called for notification of a test result for COVID-19  The patient answered and was provided with the following information:    Your test for the novel coronavirus, also known as COVID-19, was positive  The sample showed that the virus was present  We are going to go through some general information to keep you safe at home and schedule a virtual visit for you to be evaluated by your provider  If your symptoms are generally mild and stable, you are safe to isolate yourself at home  If you develop difficulty breathing before your scheduled visit with your provider, you should contact the office immediately or go to the nearest ED for evaluation  Treatment of coronavirus does not require an antibiotic  The most important thing you can do is to remain home except to receive medical care  Do not go to work, school, or public areas  Wash your hands often with soap and water for at least 20 seconds  Alternatively, you may use hand  with at least 60% alcohol content  Cover your coughs and sneezes and use a facemask when around others if possible  Clean all high-touch surfaces every day such as doorknobs and cellphones  Positive COVID-19 test results are reportable to the [place] Department of Health  You may receive a call from trained public health staff to conduct an interview  It is important to answer their call  They will ask you to verify who you are  During the call they will ask you about what symptoms you have, what you did before you got sick, and who you were close to while sick  The health department does this to make sure everyone stays healthy and to reduce the spread of the virus  If you would like to verify if the caller does in fact work in contact tracing, call the [address] [place] [address] at [employer] ([phone])       For additional information, please visit the [facility] website: www health [place] gov     If you have any additional questions, we can schedule a virtual visit for you with a provider or call the [facility] hotline [phone], option 7, for care advice    For additional information, please visit the Coronavirus FAQ on the [facility] home page ([employer]  org)  Patient called Ellie Redd today and left a message to establish care, and is waiting for a return call to set up an appointment

## 2021-07-21 NOTE — TELEPHONE ENCOUNTER
First attempt  The patient was called for notification of a test result for COVID-19  The patient did not answer the phone and a voicemail was left requesting a call back to [phone], Option 7

## 2021-07-22 ENCOUNTER — TELEMEDICINE (OUTPATIENT)
Dept: FAMILY MEDICINE CLINIC | Facility: CLINIC | Age: 26
End: 2021-07-22
Payer: COMMERCIAL

## 2021-07-22 DIAGNOSIS — U07.1 COVID-19 VIRUS INFECTION: Primary | ICD-10-CM

## 2021-07-22 PROCEDURE — 99212 OFFICE O/P EST SF 10 MIN: CPT | Performed by: NURSE PRACTITIONER

## 2021-07-22 NOTE — PROGRESS NOTES
Virtual Regular Visit    Verification of patient location:    Patient is currently located in the state of [place]  Patient is currently located in a state in which I am licensed    Assessment/Plan:    Problem List Items Addressed This Visit        Other    COVID-19 virus infection - Primary     recommend vit c, vit d, and zinc                     Reason for visit is   Chief Complaint   Patient presents with   31 60 98        Enc[facility] provider [facility], [facility]     Provider located at [address]  [address] [address] [phone]      Recent Visits  No visits were found meeting these conditions  Showing recent visits within past 7 days and meeting all other requirements  Today's Visits  Date Type Provider Dept   07/22/21 Telemedicine ANYI Blankenship [address] D[address] Hawk today's visits and meeting all other requirements  Future Appointments  No visits were found meeting these conditions  Showing future appointments within next 150 days and meeting all other requirements       The patient was identified by name and date of birth  Abdirahman Huerta was informed that this is a telemedicine visit and that the visit is being conducted through [address] [place] [address] Now and patient was informed that this is a secure, HIPAA-compliant platform  She agrees to proceed     My office door was closed  No one else was in the room  She acknowledged consent and understanding of privacy and security of the video platform  The patient has agreed to participate and understands they can discontinue the visit at any time  Patient is aware this is a billable service  Subjective  Abdirahman Huerta is a 27 y o  female who presents today for covid follow up  She reports on wednesay she went to visit her uncle in the hospital,  another uncle came to was coughing, he was later to be +covid  Alex Wiggins she reports a cough, Sunday headache   Father was same symptoms  +  She tested + yesterday  she reports cough, productive a times  No HA, no n/v  no fever  No sob  HPI     Past Medical History:   Diagnosis Date    Gestational diabetes     Migraine        Past Surgical History:   Procedure Laterality Date    CERVICAL BIOPSY  W/ LOOP ELECTRODE EXCISION  ?   SECTION  15, 16, 18    DILATION AND CURETTAGE OF UTERUS WITH HYSTEROSOCPY N/A 2020    Procedure: DILATATION AND CURETTAGE (D&C); Surgeon: Padma Camarillo MD;  Location: [facility] MAIN OR;  Service: Gynecology    ENDOMETRIAL ABLATION N/A 2020    Procedure: Amarilis Heimlich;  Surgeon: Padma Camarillo MD;  Location: [address] MAIN OR;  Service: Gynecology    [place]  DELIVERY ONLY N/A 6/3/2016    Procedure: Haily Díaz () REPEAT;  Surgeon: Padma Camarillo MD;  Location: [facility];  Service: Obstetrics    MA  DELIVERY ONLY N/A 2018    Procedure: Haily Díaz () REPEAT;  Surgeon: Padma Camarillo MD;  [place] ;  Service: Obstetrics    TUBAL LIGATION N/A 2018    Procedure: LIGATION/COAGULATION TUBAL;  Surgeon: Padma Camarillo MD;  Location[place] [facility] ;  Service: Obstetrics       Current Outpatient Medications   Medication Sig Dispense Refill    ondansetron (ZOFRAN-ODT) 4 mg disintegrating tablet Take 1 tablet (4 mg total) by mouth every 8 (eight) hours as needed for nausea or vomiting for up to 3 days 9 tablet 0     No current facility-administered medications for this visit  No Known Allergies    Review of Systems   Constitutional: Negative for activity change, chills, fatigue and fever  HENT: Negative for congestion, ear discharge, ear pain, sinus pressure, sinus pain, sore throat, tinnitus and trouble swallowing  Eyes: Negative for photophobia, pain, discharge, itching and visual disturbance  Respiratory: Positive for cough  Negative for chest tightness, shortness of breath and wheezing      Cardiovascular: Negative for chest pain and leg swelling  Gastrointestinal: Negative for abdominal distention, abdominal pain, constipation, diarrhea, nausea and vomiting  Endocrine: Negative for polydipsia, polyphagia and polyuria  Genitourinary: Negative for dysuria and frequency  Musculoskeletal: Negative for arthralgias, myalgias, neck pain and neck stiffness  Skin: Negative for color change  Neurological: Negative for dizziness, syncope, weakness, numbness and headaches  Hematological: Does not bruise/bleed easily  Psychiatric/Behavioral: Negative for behavioral problems, confusion, self-injury, sleep disturbance and suicidal ideas  The patient is not nervous/anxious  Video Exam    There were no vitals filed for this visit  Physical Exam  Constitutional:       Appearance: Normal appearance  Pulmonary:      Effort: Pulmonary effort is normal       Comments: Talking in full sentences, no tachypnea noted  Occasional dry cough noted  Neurological:      General: No focal deficit present  Mental Status: She is alert and oriented to person, place, and time  Psychiatric:         Mood and Affect: Mood normal          Behavior: Behavior normal           I spent 15 minutes with patient today in which greater than 50% of the time was spent in counseling/coordination of care regarding treatment plan     VIRTUAL VISIT DISCLAIMER      Hailey Ramon verbally agrees to participate in [employer]  Pt is aware that [employer] could be limited without vital signs or the ability to perform a full hands-on physical exam  Yuridia gAuilar understands she or the provider may request at any time to terminate the video visit and request the patient to seek care or treatment in person

## 2021-07-26 ENCOUNTER — TELEMEDICINE (OUTPATIENT)
Dept: FAMILY MEDICINE CLINIC | Facility: CLINIC | Age: 26
End: 2021-07-26
Payer: COMMERCIAL

## 2021-07-26 DIAGNOSIS — U07.1 COVID-19 VIRUS INFECTION: Primary | ICD-10-CM

## 2021-07-26 PROCEDURE — 99212 OFFICE O/P EST SF 10 MIN: CPT | Performed by: NURSE PRACTITIONER

## 2021-07-26 NOTE — LETTER
July 26, 2021     Patient: Corin Ha   YOB: 1995   Date of Visit: 7/26/2021       To Whom it May Concern:    Gio Stuart is under my professional care  She was seen in my office on 7/26/2021  She may return to work on 7/28/21  If you have any questions or concerns, please don't hesitate to call           Sincerely,          ANYI Chaparro        CC: No Recipients

## 2021-08-09 ENCOUNTER — OFFICE VISIT (OUTPATIENT)
Dept: FAMILY MEDICINE CLINIC | Facility: CLINIC | Age: 26
End: 2021-08-09
Payer: COMMERCIAL

## 2021-08-09 VITALS
HEIGHT: 62 IN | TEMPERATURE: 98.4 F | WEIGHT: 146 LBS | SYSTOLIC BLOOD PRESSURE: 120 MMHG | BODY MASS INDEX: 26.87 KG/M2 | OXYGEN SATURATION: 98 % | DIASTOLIC BLOOD PRESSURE: 78 MMHG | HEART RATE: 85 BPM

## 2021-08-09 DIAGNOSIS — Z23 ENCOUNTER FOR IMMUNIZATION: ICD-10-CM

## 2021-08-09 DIAGNOSIS — R73.09 ELEVATED GLUCOSE: ICD-10-CM

## 2021-08-09 DIAGNOSIS — Z00.00 ENCOUNTER FOR WELL ADULT EXAM WITHOUT ABNORMAL FINDINGS: ICD-10-CM

## 2021-08-09 DIAGNOSIS — Z12.4 SCREENING FOR CERVICAL CANCER: ICD-10-CM

## 2021-08-09 PROCEDURE — 3008F BODY MASS INDEX DOCD: CPT | Performed by: NURSE PRACTITIONER

## 2021-08-09 PROCEDURE — 99385 PREV VISIT NEW AGE 18-39: CPT | Performed by: NURSE PRACTITIONER

## 2021-08-09 PROCEDURE — 86580 TB INTRADERMAL TEST: CPT

## 2021-08-09 PROCEDURE — 1036F TOBACCO NON-USER: CPT | Performed by: NURSE PRACTITIONER

## 2021-08-09 PROCEDURE — 3725F SCREEN DEPRESSION PERFORMED: CPT | Performed by: NURSE PRACTITIONER

## 2021-08-09 NOTE — PROGRESS NOTES
[facility]    NAME: Corin Ha  AGE: 27 y o  SEX: female  : 1995     DATE: 2021     Assessment and Plan:     Problem List Items Addressed This Visit        Other    Elevated glucose     2020 elevated glucose at 212  Did have gestational diabetes in 2018, obtain AIC          Relevant Orders    Hemoglobin A1C      Other Visit Diagnoses     Encounter for well adult exam without abnormal findings        Relevant Orders    CBC and differential    Comprehensive metabolic panel    Lipid Panel with Direct LDL reflex    Screening for cervical cancer        Encounter for immunization        Relevant Orders    TB Skin Test (Completed)          Immunizations and preventive care screenings were discussed with patient today  Appropriate education was printed on patient's after visit summary  Counseling:  Alcohol/drug use: discussed moderation in alcohol intake, the recommendations for healthy alcohol use, and avoidance of illicit drug use  Dental Health: discussed importance of regular tooth brushing, flossing, and dental visits  Injury prevention: discussed safety/seat belts, safety helmets, smoke detectors, carbon dioxide detectors, and smoking near bedding or upholstery  Sexual health: discussed sexually transmitted diseases, partner selection, use of condoms, avoidance of unintended pregnancy, and contraceptive alternatives  · Exercise: the importance of regular exercise/physical activity was discussed  Recommend exercise 3-5 times per week for at least 30 minutes  No follow-ups on file  Chief Complaint:     Chief Complaint   Patient presents with   [address]     NP    Physical Exam      History of Present Illness:     Adult Annual Physical   Patient here for a comprehensive physical exam  The patient reports no problems   Needing ppd and work physical for employment as V[employer]    Diet and Physical Activity  · Diet/Nutrition: well balanced diet  · Exercise: no formal exercise  Depression Screening  PHQ-9 Depression Screening    PHQ-9:   Frequency of the following problems over the past two weeks:      Little interest or pleasure in doing things: 0 - not at all  Feeling down, depressed, or hopeless: 0 - not at all  PHQ-2 Score: 0       General Health  · Sleep: sleeps poorly  · Hearing: normal - bilateral   · Vision: wears glasses  · Dental: no dental visits for >1 year  /GYN Health  · Last menstrual period: Regular skips at time  · Contraceptive method: tubaligation   · History of STDs?: no      Review of Systems:     Review of Systems   Constitutional: Negative for activity change, chills, fatigue and fever  HENT: Negative for congestion, ear discharge, ear pain, sinus pressure, sinus pain, sore throat, tinnitus and trouble swallowing  Eyes: Negative for photophobia, pain, discharge, itching and visual disturbance  Respiratory: Negative for cough, chest tightness, shortness of breath and wheezing  Cardiovascular: Negative for chest pain and leg swelling  Gastrointestinal: Negative for abdominal distention, abdominal pain, constipation, diarrhea, nausea and vomiting  Endocrine: Negative for polydipsia, polyphagia and polyuria  Genitourinary: Negative for dysuria and frequency  Musculoskeletal: Negative for arthralgias, myalgias, neck pain and neck stiffness  Skin: Negative for color change  Neurological: Negative for dizziness, syncope, weakness, numbness and headaches  Hematological: Does not bruise/bleed easily  Psychiatric/Behavioral: Negative for behavioral problems, confusion, self-injury, sleep disturbance and suicidal ideas  The patient is not nervous/anxious         Past Medical History:     Past Medical History:   Diagnosis Date    Gestational diabetes     Migraine       Past Surgical History:     Past Surgical History:   Procedure Laterality Date  CERVICAL BIOPSY  W/ LOOP ELECTRODE EXCISION  2017?   SECTION  15, 16, 18    DILATION AND CURETTAGE OF UTERUS WITH HYSTEROSOCPY N/A 2020    Procedure: DILATATION AND CURETTAGE (D&C); Surgeon: Bradford Rodriguez MD;  Location: [facility] MAIN OR;  Service: Gynecology    ENDOMETRIAL ABLATION N/A 2020    Procedure: Tiffany Acre;  Surgeon: Bradford Rodriguez MD;  Location: [facility] MAIN OR;  Service: Gynecology    [place]  DELIVERY ONLY N/A 6/3/2016    Procedure: [place]i [place] () REPEAT;  Surgeon: Bradford Rodriguez MD;  [facility];  Service: Obstetrics    [place]  DELIVERY ONLY N/A 2018    Procedure: Addi [place] () REPEAT;  Surgeon: Bradford Rodriguez MD;  [facility];  Service: Obstetrics    TUBAL LIGATION N/A 2018    Procedure: LIGATION/COAGULATION TUBAL;  Surgeon: Bradford Rodriguez MD;  Location: [facility];  Service: Obstetrics      Social History:     Social History     Socioeconomic History    Marital status: /Civil Union     Spouse name: None    Number of children: None    Years of education: None    Highest education level: None   Occupational History    None   Tobacco Use    Smoking status: Never Smoker    Smokeless tobacco: Never Used   Vaping Use    Vaping Use: Never used   Substance and Sexual Activity    Alcohol use: Not Currently    Drug use: No    Sexual activity: Yes     Partners: Male     Birth control/protection: Female Sterilization   Other Topics Concern    None   Social History Narrative    None     Social Determinants of Health     Financial Resource Strain:     Difficulty of Paying Living Expenses:    Food Insecurity:     Worried About Running Out of Food in the Last Year:     Ran Out of Food in the Last Year:    Transportation Needs:     Lack of Transportation (Medical):      Lack of Transportation (Non-Medical):    Physical Activity:     Days of Exercise per Week:     Minutes of Exercise per Session:    Stress:     Feeling of Stress :    Social Connections:     Frequency of Communication with Friends and Family:     Frequency of Social Gatherings with Friends and Family:     Attends Temple Services:     Active Member of Clubs or Organizations:     Attends Club or Organization Meetings:     Marital Status:    Intimate Partner Violence:     Fear of Current or Ex-Partner:     Emotionally Abused:     Physically Abused:     Sexually Abused:       Family History:     Family History   Problem Relation Age of Onset    Hyperthyroidism Mother     Hyperthyroidism Father     Hypertension Father     No Known Problems Sister     No Known Problems Brother     No Known Problems Son     No Known Problems Daughter     No Known Problems Daughter     No Known Problems Paternal Grandmother     Diabetes Paternal Grandfather     Down syndrome Cousin         paternal cousin      Current Medications:     No current outpatient medications on file  No current facility-administered medications for this visit  Allergies:     No Known Allergies   Physical Exam:     /78 (BP Location: Left arm, Patient Position: Sitting)   Pulse 85   Temp 98 4 °F (36 9 °C)   Ht 5' 2" (1 575 m)   Wt 66 2 kg (146 lb)   SpO2 98%   BMI 26 70 kg/m²     BMI Counseling: Body mass index is 26 7 kg/m²  The BMI is above normal  Nutrition recommendations include reducing portion sizes  Exercise recommendations include moderate aerobic physical activity for 150 minutes/week  Physical Exam  Constitutional:       Appearance: Normal appearance  She is well-developed  HENT:      Head: Normocephalic  Right Ear: Tympanic membrane, ear canal and external ear normal       Left Ear: Tympanic membrane, ear canal and external ear normal       Nose: Nose normal  No congestion or rhinorrhea        Mouth/Throat:      Mouth: Mucous membranes are moist       Pharynx: No oropharyngeal exudate or posterior oropharyngeal erythema  Eyes:      Extraocular Movements: Extraocular movements intact  Conjunctiva/sclera: Conjunctivae normal       Pupils: Pupils are equal, round, and reactive to light  Cardiovascular:      Rate and Rhythm: Normal rate and regular rhythm  Pulses: Normal pulses  Heart sounds: Normal heart sounds  Pulmonary:      Effort: Pulmonary effort is normal       Breath sounds: Normal breath sounds  No wheezing or rhonchi  Abdominal:      General: Bowel sounds are normal  There is no distension  Palpations: Abdomen is soft  Tenderness: There is no abdominal tenderness  Musculoskeletal:         General: No swelling, tenderness, deformity or signs of injury  Normal range of motion  Cervical back: Normal range of motion and neck supple  Right lower leg: No edema  Left lower leg: No edema  Skin:     General: Skin is warm and dry  Findings: No bruising, erythema, lesion or rash  Neurological:      General: No focal deficit present  Mental Status: She is alert and oriented to person, place, and time  Psychiatric:         Mood and Affect: Mood normal          Behavior: Behavior normal          Thought Content:  Thought content normal          Judgment: Judgment normal           Jessica Taylor, [facility] [address]

## 2021-08-11 ENCOUNTER — CLINICAL SUPPORT (OUTPATIENT)
Dept: FAMILY MEDICINE CLINIC | Facility: CLINIC | Age: 26
End: 2021-08-11

## 2021-08-11 DIAGNOSIS — Z11.1 ENCOUNTER FOR PPD SKIN TEST READING: Primary | ICD-10-CM

## 2021-08-11 LAB
INDURATION: 0 MM
TB SKIN TEST: NEGATIVE

## 2021-08-13 ENCOUNTER — LAB (OUTPATIENT)
Dept: LAB | Facility: CLINIC | Age: 26
End: 2021-08-13
Payer: COMMERCIAL

## 2021-08-13 DIAGNOSIS — Z00.00 ENCOUNTER FOR WELL ADULT EXAM WITHOUT ABNORMAL FINDINGS: ICD-10-CM

## 2021-08-13 DIAGNOSIS — R73.09 ELEVATED GLUCOSE: ICD-10-CM

## 2021-08-13 LAB
ALBUMIN SERPL BCP-MCNC: 3.8 G/DL (ref 3.5–5)
ALP SERPL-CCNC: 56 U/L (ref 46–116)
ALT SERPL W P-5'-P-CCNC: 26 U/L (ref 12–78)
ANION GAP SERPL CALCULATED.3IONS-SCNC: 5 MMOL/L (ref 4–13)
AST SERPL W P-5'-P-CCNC: 18 U/L (ref 5–45)
BASOPHILS # BLD AUTO: 0.04 THOUSANDS/ΜL (ref 0–0.1)
BASOPHILS NFR BLD AUTO: 1 % (ref 0–1)
BILIRUB SERPL-MCNC: 0.83 MG/DL (ref 0.2–1)
BUN SERPL-MCNC: 7 MG/DL (ref 5–25)
CALCIUM SERPL-MCNC: 11.8 MG/DL (ref 8.3–10.1)
CHLORIDE SERPL-SCNC: 104 MMOL/L (ref 100–108)
CHOLEST SERPL-MCNC: 212 MG/DL (ref 50–200)
CO2 SERPL-SCNC: 25 MMOL/L (ref 21–32)
CREAT SERPL-MCNC: 0.49 MG/DL (ref 0.6–1.3)
EOSINOPHIL # BLD AUTO: 0.02 THOUSAND/ΜL (ref 0–0.61)
EOSINOPHIL NFR BLD AUTO: 0 % (ref 0–6)
ERYTHROCYTE [DISTWIDTH] IN BLOOD BY AUTOMATED COUNT: 12.8 % (ref 11.6–15.1)
EST. AVERAGE GLUCOSE BLD GHB EST-MCNC: 217 MG/DL
GFR SERPL CREATININE-BSD FRML MDRD: 133 ML/MIN/1.73SQ M
GLUCOSE P FAST SERPL-MCNC: 231 MG/DL (ref 65–99)
HBA1C MFR BLD: 9.2 %
HCT VFR BLD AUTO: 42.8 % (ref 34.8–46.1)
HDLC SERPL-MCNC: 50 MG/DL
HGB BLD-MCNC: 15.1 G/DL (ref 11.5–15.4)
IMM GRANULOCYTES # BLD AUTO: 0.05 THOUSAND/UL (ref 0–0.2)
IMM GRANULOCYTES NFR BLD AUTO: 1 % (ref 0–2)
LDLC SERPL CALC-MCNC: 116 MG/DL (ref 0–100)
LYMPHOCYTES # BLD AUTO: 1.47 THOUSANDS/ΜL (ref 0.6–4.47)
LYMPHOCYTES NFR BLD AUTO: 21 % (ref 14–44)
MCH RBC QN AUTO: 31.1 PG (ref 26.8–34.3)
MCHC RBC AUTO-ENTMCNC: 35.3 G/DL (ref 31.4–37.4)
MCV RBC AUTO: 88 FL (ref 82–98)
MONOCYTES # BLD AUTO: 0.55 THOUSAND/ΜL (ref 0.17–1.22)
MONOCYTES NFR BLD AUTO: 8 % (ref 4–12)
NEUTROPHILS # BLD AUTO: 5.01 THOUSANDS/ΜL (ref 1.85–7.62)
NEUTS SEG NFR BLD AUTO: 69 % (ref 43–75)
NRBC BLD AUTO-RTO: 0 /100 WBCS
PLATELET # BLD AUTO: 187 THOUSANDS/UL (ref 149–390)
PMV BLD AUTO: 11.5 FL (ref 8.9–12.7)
POTASSIUM SERPL-SCNC: 3.4 MMOL/L (ref 3.5–5.3)
PROT SERPL-MCNC: 7.5 G/DL (ref 6.4–8.2)
RBC # BLD AUTO: 4.85 MILLION/UL (ref 3.81–5.12)
SODIUM SERPL-SCNC: 134 MMOL/L (ref 136–145)
TRIGL SERPL-MCNC: 231 MG/DL
WBC # BLD AUTO: 7.14 THOUSAND/UL (ref 4.31–10.16)

## 2021-08-13 PROCEDURE — 36415 COLL VENOUS BLD VENIPUNCTURE: CPT

## 2021-08-13 PROCEDURE — 80061 LIPID PANEL: CPT

## 2021-08-13 PROCEDURE — 80053 COMPREHEN METABOLIC PANEL: CPT

## 2021-08-13 PROCEDURE — 83036 HEMOGLOBIN GLYCOSYLATED A1C: CPT

## 2021-08-13 PROCEDURE — 85025 COMPLETE CBC W/AUTO DIFF WBC: CPT

## 2021-08-25 ENCOUNTER — CLINICAL SUPPORT (OUTPATIENT)
Dept: FAMILY MEDICINE CLINIC | Facility: CLINIC | Age: 26
End: 2021-08-25
Payer: COMMERCIAL

## 2021-08-25 DIAGNOSIS — Z23 ENCOUNTER FOR IMMUNIZATION: Primary | ICD-10-CM

## 2021-08-25 PROCEDURE — 86580 TB INTRADERMAL TEST: CPT

## 2021-08-27 ENCOUNTER — CLINICAL SUPPORT (OUTPATIENT)
Dept: FAMILY MEDICINE CLINIC | Facility: CLINIC | Age: 26
End: 2021-08-27

## 2021-08-27 DIAGNOSIS — Z11.1 ENCOUNTER FOR PPD SKIN TEST READING: Primary | ICD-10-CM

## 2021-08-27 LAB
INDURATION: 0 MM
TB SKIN TEST: NEGATIVE

## 2022-02-24 ENCOUNTER — OFFICE VISIT (OUTPATIENT)
Dept: URGENT CARE | Facility: CLINIC | Age: 27
End: 2022-02-24
Payer: COMMERCIAL

## 2022-02-24 VITALS
WEIGHT: 140 LBS | HEART RATE: 86 BPM | RESPIRATION RATE: 18 BRPM | TEMPERATURE: 98.4 F | BODY MASS INDEX: 25.76 KG/M2 | HEIGHT: 62 IN | OXYGEN SATURATION: 100 %

## 2022-02-24 DIAGNOSIS — R05.1 ACUTE COUGH: Primary | ICD-10-CM

## 2022-02-24 PROCEDURE — G0382 LEV 3 HOSP TYPE B ED VISIT: HCPCS | Performed by: PHYSICIAN ASSISTANT

## 2022-02-24 PROCEDURE — 87636 SARSCOV2 & INF A&B AMP PRB: CPT | Performed by: PHYSICIAN ASSISTANT

## 2022-02-24 NOTE — PROGRESS NOTES
[facility]        NAME: Edin Nicole is a 27 y o  female  : 1995    MRN: [id number]  DATE: 2022  TIME: 9:02 AM    Assessment and Plan   Acute cough [R05 1]  1  Acute cough  Covid/Flu-Office Collect         Patient Instructions       Continue to monitor symptoms  If new or worsening symptoms develop, go immediately to Er  Drink plenty of fluids  Follow up with Family Doctor this week  Chief Complaint     Chief Complaint   Patient presents with    Cough     started tuesday     Headache    Fever    Chills         History of Present Illness       Cough  This is a new problem  Episode onset: 3 days ago  The problem has been gradually worsening  The problem occurs every few minutes  The cough is non-productive  Associated symptoms include a fever (Tmax 101), headaches, myalgias, nasal congestion, postnasal drip and a sore throat  Pertinent negatives include no chest pain, chills, ear pain, rash, rhinorrhea, shortness of breath or wheezing  Nothing aggravates the symptoms  Treatments tried: Tylenol  The treatment provided no relief  Not vaccinated  No known sick contacts  Pt works at [employer]  Review of Systems   Review of Systems   Constitutional: Positive for fever (Tmax 101)  Negative for chills, diaphoresis and fatigue  HENT: Positive for postnasal drip, sinus pressure, sinus pain, sneezing and sore throat  Negative for congestion, ear pain, rhinorrhea and voice change  Eyes: Negative  Respiratory: Positive for cough  Negative for chest tightness, shortness of breath and wheezing  Cardiovascular: Negative for chest pain and palpitations  Gastrointestinal: Negative for abdominal pain, constipation, diarrhea, nausea and vomiting  Endocrine: Negative  Genitourinary: Negative for dysuria  Musculoskeletal: Positive for myalgias  Negative for back pain and neck pain  Skin: Negative for pallor and rash  Allergic/Immunologic: Negative  Neurological: Positive for headaches  Negative for dizziness and syncope  Hematological: Negative  Psychiatric/Behavioral: Negative  Current Medications     No current outpatient medications on file  Current Allergies     Allergies as of 2022    (No Known Allergies)            The following portions of the patient's history were reviewed and updated as appropriate: allergies, current medications, past family history, past medical history, past social history, past surgical history and problem list      Past Medical History:   Diagnosis Date    Gestational diabetes     Migraine        Past Surgical History:   Procedure Laterality Date    CERVICAL BIOPSY  W/ LOOP ELECTRODE EXCISION  ?   SECTION  15, 16, 18    DILATION AND CURETTAGE OF UTERUS WITH HYSTEROSOCPY N/A 2020    Procedure: DILATATION AND CURETTAGE (D&C);   Surgeon: Jamila Kruse MD;  Location: [address] [place] [address] MAIN OR;  Service: Gynecology    ENDOMETRIAL ABLATION N/A 2020    Procedure: ABLATION ENDOMETRIAL JOSHUA;  Surgeon: Jamila Kruse MD;  Location: [address] [place], [place] [address] MAIN OR;  Service: Gynecology    [place]  DELIVERY ONLY N/A 6/3/2016    Procedure: [place] () REPEAT;  Surgeon: Jamila Kruse MD;  Location: [facility];  Service: Obstetrics    [place]  DELIVERY ONLY N/A 2018    Procedure: [place] () REPEAT;  Surgeon: Jamila Kruse MD;  Location: [facility];  Service: Obstetrics    TUBAL LIGATION N/A 2018    Procedure: LIGATION/COAGULATION TUBAL;  Surgeon: Jamila Kruse MD;  Location: [facility];  Service: Obstetrics       Family History   Problem Relation Age of Onset    Hyperthyroidism Mother     Hyperthyroidism Father     Hypertension Father     No Known Problems Sister     No Known Problems Brother     No Known Problems Son     No Known Problems Daughter     No Known Problems Daughter     No Known Problems Paternal Grandmother     Diabetes Paternal Grandfather     Down syndrome Cousin         paternal cousin         Medications have been verified  Objective   Pulse 86   Temp 98 4 °F (36 9 °C) (Oral)   Resp 18   Ht 5' 2" (1 575 m)   Wt 63 5 kg (140 lb)   LMP 02/10/2022 (Approximate)   SpO2 100%   BMI 25 61 kg/m²        Physical Exam     Physical Exam  Vitals and nursing note reviewed  Constitutional:       General: She is not in acute distress  Appearance: Normal appearance  She is well-developed  She is not ill-appearing or diaphoretic  HENT:      Head: Normocephalic and atraumatic  Right Ear: Tympanic membrane and external ear normal       Left Ear: Tympanic membrane and external ear normal       Nose: Congestion present  No rhinorrhea  Mouth/Throat:      Pharynx: Posterior oropharyngeal erythema present  No oropharyngeal exudate  Eyes:      General:         Right eye: No discharge  Left eye: No discharge  Conjunctiva/sclera: Conjunctivae normal    Cardiovascular:      Rate and Rhythm: Normal rate and regular rhythm  Heart sounds: Normal heart sounds  Pulmonary:      Effort: Pulmonary effort is normal  No respiratory distress  Breath sounds: Normal breath sounds  No wheezing, rhonchi or rales  Musculoskeletal:      Cervical back: Normal range of motion and neck supple  Lymphadenopathy:      Cervical: No cervical adenopathy  Skin:     General: Skin is warm  Capillary Refill: Capillary refill takes less than 2 seconds  Coloration: Skin is not pale  Findings: No rash  Neurological:      Mental Status: She is alert

## 2022-02-24 NOTE — PATIENT INSTRUCTIONS
Patient Instructions   COVID testing initiated  Results may take up to 5-10 days to return, but often come back sooner (2-4 days)     If the patient has a [facility] My Chart account, results may be accessed on line  If the patient does not have the [facility] Chart account, please establish one so results can be accessed  This will be the easiest and quickest way to get a copy of your test results if you require printed documentation  If patient is symptomatic and until results are obtained, home quarantine / self isolation strongly encouraged  If testing is done for screening purposes and patient is not symptomatic, we still recommend masking, social distancing, good hygiene practices be followed  If COVID test is positive, patient / care giver will be contacted by ordering provider or designated staff  If COVID test is positive, please call the primary care provider office to inform of positive test and request follow up evaluation appointment  (Generally, primary care providers are doing telemedicine visits with their positive COVID patients )  If COVID test is positive, please again review all information below  Further questions may be addressed by the primary care provider or the [address] [place] [address] [place] at [phone]  If the patient / caregiver has not heard about test results or has been unable to access results on the patient My Chart account in a timely fashion, please call the provider's office where test was ordered (or Hot Line if applicable)  to inquire about results  If results are negative and patient / care giver has been found to have already accessed results through the [facility] My Chart anders, no call will be made  Until results are obtained, home quarantine / self isolation strongly encouraged       If the patient would develop profound weakness, chest pain, shortness of breath please proceed to an emergency room for further evaluation otherwise we do recommend that patient follow-up with their primary care provider in the next 5-7 days if not improving  Symptomatic treatment as needed for symptoms relief based on age / medical status of patient  Things like warm salt water gargles, Tylenol or Ibuprofen (if not contraindicated), drinking plenty of fluids, nasal saline rinses / spray, warm tea with honey (not for patients less than 1 year of age),  etc may provide symptoms relief  [address]     Your healthcare provider and/or public health staff have evaluated you and have determined that you do not need to remain in the hospital at this time  At this time you can be isolated at home where you will be monitored by staff from your local or state health department  You should carefully follow the prevention and isolation steps below until a healthcare provider or local or state health department says that you can return to your normal activities  Stay home except to get medical care     People who are mildly ill with COVID-19 are able to isolate at home during their illness  You should restrict activities outside your home, except for getting medical care  Do not go to work, school, or public areas  Avoid using public transportation, ride-sharing, or taxis  Separate yourself from other people and animals in your home     People: As much as possible, you should stay in a specific room and away from other people in your home  Also, you should use a separate bathroom, if available  Animals: You should restrict contact with pets and other animals while you are sick with COVID-19, just like you would around other people  Although there have not been reports of pets or other animals becoming sick with COVID-19, it is still recommended that people sick with COVID-19 limit contact with animals until more information is known about the virus   When possible, have another member of your household care for your animals while you are sick  If you are sick with COVID-19, avoid contact with your pet, including petting, snuggling, being kissed or licked, and sharing food  If you must care for your pet or be around animals while you are sick, wash your hands before and after you interact with pets and wear a facemask  See COVID-19 and Animals for more information  Call ahead before visiting your doctor     If you have a medical appointment, call the healthcare provider and tell them that you have or may have COVID-19  This will help the healthcare providers office take steps to keep other people from getting infected or exposed  Wear a facemask     You should wear a facemask when you are around other people (e g , sharing a room or vehicle) or pets and before you enter a healthcare providers office  If you are not able to wear a facemask (for example, because it causes trouble breathing), then people who live with you should not stay in the same room with you, or they should wear a facemask if they enter your room  Cover your coughs and sneezes     Cover your mouth and nose with a tissue when you cough or sneeze  Throw used tissues in a lined trash can  Immediately wash your hands with soap and water for at least 20 seconds or, if soap and water are not available, clean your hands with an alcohol-based hand  that contains at least 60% alcohol  Clean your hands often     Wash your hands often with soap and water for at least 20 seconds, especially after blowing your nose, coughing, or sneezing; going to the bathroom; and before eating or preparing food  If soap and water are not readily available, use an alcohol-based hand  with at least 60% alcohol, covering all surfaces of your hands and rubbing them together until they feel dry  Soap and water are the best option if hands are visibly dirty  Avoid touching your eyes, nose, and mouth with unwashed hands       Avoid sharing personal household items     You should not share dishes, drinking glasses, cups, eating utensils, towels, or bedding with other people or pets in your home  After using these items, they should be washed thoroughly with soap and water  Clean all high-touch surfaces everyday     High touch surfaces include counters, tabletops, doorknobs, bathroom fixtures, toilets, phones, keyboards, tablets, and bedside tables  Also, clean any surfaces that may have blood, stool, or body fluids on them  Use a household cleaning spray or wipe, according to the label instructions  Labels contain instructions for safe and effective use of the cleaning product including precautions you should take when applying the product, such as wearing gloves and making sure you have good ventilation during use of the product  Monitor your symptoms     Seek prompt medical attention if your illness is worsening (e g , difficulty breathing)  Before seeking care, call your healthcare provider and tell them that you have, or are being evaluated for, COVID-19  Put on a facemask before you enter the facility  These steps will help the healthcare providers office to keep other people in the office or waiting room from getting infected or exposed  Ask your healthcare provider to call the local or [facility] health department  Persons who are placed under active monitoring or facilitated self-monitoring should follow instructions provided by their local health department or occupational health professionals, as appropriate  If you have a medical emergency and need to call 911, notify the dispatch personnel that you have, or are being evaluated for COVID-19  If possible, put on a facemask before emergency medical services arrive       Discontinuing home isolation     Patients with confirmed COVID-19 should remain under home isolation precautions until the following conditions are met:   § They have had no fever for at least 24 hours (that is one full day of no fever without the use medicine that reduces fevers)  AND  § other symptoms have improved (for example, when their cough or shortness of breath have improved)  AND  § at least 10 days have passed since their symptoms first appeared     Patients with confirmed COVID-19 should also notify close contacts (including their workplace) and ask that they self-quarantine  Currently, close contact is defined as being within 6 feet for for a cumulative total of 15 minutes or more over a 24 hour period starting from 2 days before illness onset  (or, for asymptomatic patients, 2 days prior to test specimen collection)  Close contacts of patients diagnosed with COVID-19 should be instructed by the patient to self-quarantine for 14 days from the last time of their last contact with the patient        Source: [employer]

## 2022-02-24 NOTE — LETTER
February 24, 2022     Patient: Nadeem Seo   YOB: 1995   Date of Visit: 2/24/2022       To Whom It May Concern:    If Covid tests are negative, patient may return to work/school when fever free for 24 hours without the use of a fever reducing agent  If covid test is positive, patient may return to work/school five days after the onset of symptoms as long as they are fever free for 24 hours without the use of a fever reducing agent  Upon return, the patient must then adhere to strict masking for an additional 5 days  If you have any questions or concerns, please don't hesitate to call           Sincerely,        Cosmo Dahl PA-C    CC: No Recipients

## 2022-02-25 LAB
FLUAV RNA RESP QL NAA+PROBE: NEGATIVE
FLUBV RNA RESP QL NAA+PROBE: NEGATIVE
SARS-COV-2 RNA RESP QL NAA+PROBE: POSITIVE

## 2022-03-10 ENCOUNTER — OFFICE VISIT (OUTPATIENT)
Dept: URGENT CARE | Facility: CLINIC | Age: 27
End: 2022-03-10
Payer: COMMERCIAL

## 2022-03-10 VITALS
WEIGHT: 140 LBS | HEIGHT: 62 IN | TEMPERATURE: 98.3 F | HEART RATE: 94 BPM | BODY MASS INDEX: 25.76 KG/M2 | OXYGEN SATURATION: 99 % | RESPIRATION RATE: 18 BRPM

## 2022-03-10 DIAGNOSIS — R07.81 RIB PAIN ON RIGHT SIDE: Primary | ICD-10-CM

## 2022-03-10 PROCEDURE — G0382 LEV 3 HOSP TYPE B ED VISIT: HCPCS | Performed by: PHYSICIAN ASSISTANT

## 2022-03-10 NOTE — PATIENT INSTRUCTIONS
Ice area, Tylenol/ibuprofen p r n  For pain if it returns  Get re-evaluated if new or worsening symptoms develop or if symptoms return otherwise follow-up with your PCP in 5-7 days

## 2022-03-10 NOTE — PROGRESS NOTES
[address] [facility]        NAME: Agustina Price is a 27 y o  female  : 1995    MRN: [id number]  DATE: March 10, 2022  TIME: 9:03 AM    Assessment and Plan   Rib pain on right side [R07 81]  1  Rib pain on right side           Patient Instructions   Patient Instructions   Ice area, Tylenol/ibuprofen p r n  For pain if it returns  Get re-evaluated if new or worsening symptoms develop or if symptoms return otherwise follow-up with your PCP in 5-7 days  Follow up with PCP in 3-5 days  Proceed to  ER if symptoms worsen  Chief Complaint     Chief Complaint   Patient presents with    Chest Pain     rib pain, right lower ribs, started yesterday, denies injury          History of Present Illness       Patient presents with right sided rib pain for 20 minutes last night  It went away for a while then returned and since then has come and gone  Last time she felt the pain was about 4 in the morning  Currently has no pain or any symptoms  Had some nausea last night too  Pain not affected by movements  Pushing on the area when the pain is occurring causes it to worsen  Pain described as "a side sticker" when it occurs  Denies SOB, injury to the area, fevers, vomiting, diarrhea, constipation, bruising, swelling, or deformity  Review of Systems   Review of Systems   Constitutional: Negative for fatigue and fever  Respiratory: Negative for cough, chest tightness and shortness of breath  Cardiovascular: Positive for chest pain  Negative for palpitations  Gastrointestinal: Negative for abdominal pain, diarrhea, nausea and vomiting  Neurological: Negative for weakness  Psychiatric/Behavioral: Negative for confusion  Current Medications     No current outpatient medications on file      Current Allergies     Allergies as of 03/10/2022    (No Known Allergies)            The following portions of the patient's history were reviewed and updated as appropriate: allergies, current medications, past family history, past medical history, past social history, past surgical history and problem list      Past Medical History:   Diagnosis Date    Gestational diabetes     Migraine        Past Surgical History:   Procedure Laterality Date    CERVICAL BIOPSY  W/ LOOP ELECTRODE EXCISION  2017?   SECTION  15, 16, 18    DILATION AND CURETTAGE OF UTERUS WITH HYSTEROSOCPY N/A 2020    Procedure: DILATATION AND CURETTAGE (D&C); Surgeon: Kimmy Early MD;  [facility];  Service: Gynecology    ENDOMETRIAL ABLATION N/A 2020    Procedure: ABLATION ENDOMETRIAL JOSHUA;  Surgeon: Kimmy Early MD;  Location: [facility];  Service: Gynecology    [place]  DELIVERY ONLY N/A 6/3/2016    Procedure: Zhang Td () REPEAT;  Surgeon: Kimmy Early MD;  [facility];  Service: Obstetrics    GA  DELIVERY ONLY N/A 2018    Procedure: Zhang Td () REPEAT;  Surgeon: Kimmy Early MD;  [place] ;  Service: Obstetrics    TUBAL LIGATION N/A 2018    Procedure: LIGATION/COAGULATION TUBAL;  Surgeon: Kimmy Early MD;  [place] ;  Service: Obstetrics       Family History   Problem Relation Age of Onset    Hyperthyroidism Mother     Hyperthyroidism Father     Hypertension Father     No Known Problems Sister     No Known Problems Brother     No Known Problems Son     No Known Problems Daughter     No Known Problems Daughter     No Known Problems Paternal Grandmother     Diabetes Paternal Grandfather     Down syndrome Cousin         paternal cousin         Medications have been verified  Objective   Pulse 94   Temp 98 3 °F (36 8 °C) (Temporal)   Resp 18   Ht 5' 2" (1 575 m)   Wt 63 5 kg (140 lb)   LMP 02/10/2022 (Approximate)   SpO2 99%   BMI 25 61 kg/m²        Physical Exam     Physical Exam  Constitutional:       General: She is not in acute distress       Appearance: Normal appearance  She is not ill-appearing or diaphoretic  Eyes:      Conjunctiva/sclera: Conjunctivae normal    Cardiovascular:      Rate and Rhythm: Normal rate and regular rhythm  Heart sounds: Normal heart sounds  Pulmonary:      Effort: Pulmonary effort is normal  No respiratory distress  Breath sounds: Normal breath sounds  No wheezing or rales  Abdominal:      General: Abdomen is flat  Bowel sounds are normal       Palpations: Abdomen is soft  Tenderness: There is no abdominal tenderness  There is no guarding or rebound  Musculoskeletal:        Arms:    Skin:     General: Skin is warm and dry  Neurological:      Mental Status: She is alert     Psychiatric:         Mood and Affect: Mood normal          Behavior: Behavior normal

## 2022-04-09 ENCOUNTER — HOSPITAL ENCOUNTER (EMERGENCY)
Facility: HOSPITAL | Age: 27
Discharge: HOME | End: 2022-04-09
Attending: EMERGENCY MEDICINE | Admitting: EMERGENCY MEDICINE
Payer: COMMERCIAL

## 2022-04-09 ENCOUNTER — APPOINTMENT (EMERGENCY)
Dept: RADIOLOGY | Facility: HOSPITAL | Age: 27
End: 2022-04-09
Payer: COMMERCIAL

## 2022-04-09 VITALS
TEMPERATURE: 98.4 F | RESPIRATION RATE: 18 BRPM | SYSTOLIC BLOOD PRESSURE: 116 MMHG | OXYGEN SATURATION: 98 % | HEART RATE: 76 BPM | DIASTOLIC BLOOD PRESSURE: 63 MMHG

## 2022-04-09 DIAGNOSIS — M79.602 LEFT ARM PAIN: Primary | ICD-10-CM

## 2022-04-09 PROCEDURE — 73060 X-RAY EXAM OF HUMERUS: CPT

## 2022-04-09 PROCEDURE — 99283 EMERGENCY DEPT VISIT LOW MDM: CPT

## 2022-04-09 PROCEDURE — 99284 EMERGENCY DEPT VISIT MOD MDM: CPT | Performed by: PHYSICIAN ASSISTANT

## 2022-04-09 RX ORDER — ACETAMINOPHEN 325 MG/1
975 TABLET ORAL ONCE
Status: COMPLETED | OUTPATIENT
Start: 2022-04-09 | End: 2022-04-09

## 2022-04-09 RX ADMIN — ACETAMINOPHEN 975 MG: 325 TABLET ORAL at 18:52

## 2022-04-09 RX ADMIN — IBUPROFEN 600 MG: 200 TABLET, FILM COATED ORAL at 18:52

## 2022-04-09 NOTE — DISCHARGE INSTRUCTIONS
Take 600mg of Ibuprofen every 6-8 hours with food as needed for pain  You may also take 1000mg of Tylenol every 6-8 hours as needed for pain with a maximum dose of 3000mg of tylenol per day  They are safe to take together or you may alternate them if you prefer

## 2022-04-09 NOTE — Clinical Note
Tamar Rodrigues was seen and treated in our emergency department on 4/9/2022  Diagnosis: arm pain    Kaley Chamberlain    She may return on this date: 04/10/2022         If you have any questions or concerns, please don't hesitate to call        Joshua Cruz PA-C    ______________________________           _______________          _______________  Hospital Representative                              Date                                Time

## 2022-04-09 NOTE — Clinical Note
Martin Woods was seen and treated in our emergency department on 4/9/2022  Diagnosis: arm pain    Familia Rivera    She may return on this date: 04/10/2022         If you have any questions or concerns, please don't hesitate to call        Stan Forrester PA-C    ______________________________           _______________          _______________  Hospital Representative                              Date                                Time

## 2022-04-10 NOTE — ED PROVIDER NOTES
History  Chief Complaint   Patient presents with    Arm Injury     left arm injury yesterday  Patient reports tripping and falling on left side     20-year-old female presents accompanied by her  complaining of left arm pain  Patient reports that she tripped over her dog's leash yesterday falling on her left side on her arm  She states that she did not notice significant pain yesterday however today she did  She denies hitting her head, neck or back  Denies any recent fevers, chills, chest pain, shortness of breath, abdominal pain, pelvic pain, urinary symptoms  She took Tylenol for her pain with minimal relief  None       Past Medical History:   Diagnosis Date    Gestational diabetes     Migraine        Past Surgical History:   Procedure Laterality Date    CERVICAL BIOPSY  W/ LOOP ELECTRODE EXCISION  ?   SECTION  15, 16, 18    DILATION AND CURETTAGE OF UTERUS WITH HYSTEROSOCPY N/A 2020    Procedure: DILATATION AND CURETTAGE (D&C);   Surgeon: Bree Ya MD;  Location: [address] [place] [address] OR;  Service: Gynecology    ENDOMETRIAL ABLATION N/A 2020    Procedure: ABLATION ENDOMETRIAL JOSHUA;  Surgeon: Bree Ya MD;  Location: [address] [place] [address] OR;  Service: Gynecology    [place]  DELIVERY ONLY N/A 6/3/2016    Procedure: Lizz Gordon () REPEAT;  Surgeon: Bree Ya MD;  Location: [facility];  Service: Obstetrics    [place]  DELIVERY ONLY N/A 2018    Procedure: Lizz Leyva () REPEAT;  Surgeon: Bree Ya MD;  Location: [facility];  Service: Obstetrics    TUBAL LIGATION N/A 2018    Procedure: LIGATION/COAGULATION TUBAL;  Surgeon: Bree Ya MD;  Location: [facility];  Service: Obstetrics       Family History   Problem Relation Age of Onset    Hyperthyroidism Mother     Hyperthyroidism Father     Hypertension Father     No Known Problems Sister     No Known Problems Brother     No Known Problems Son     No Known Problems Daughter     No Known Problems Daughter     No Known Problems Paternal Grandmother     Diabetes Paternal Grandfather     Down syndrome Cousin         paternal cousin     I have reviewed and agree with the history as documented  E-Cigarette/Vaping    E-Cigarette Use Never User      E-Cigarette/Vaping Substances    Nicotine No     THC No     CBD No     Flavoring No     Other No     Unknown No      Social History     Tobacco Use    Smoking status: Never Smoker    Smokeless tobacco: Never Used   Vaping Use    Vaping Use: Never used   Substance Use Topics    Alcohol use: Not Currently    Drug use: No       Review of Systems   Constitutional: Negative for chills, fatigue and fever  HENT: Negative for ear pain and sore throat  Eyes: Negative for pain  Respiratory: Negative for cough, shortness of breath and wheezing  Cardiovascular: Negative for chest pain, palpitations and leg swelling  Gastrointestinal: Negative for abdominal pain, constipation, diarrhea, nausea and vomiting  Endocrine: Negative for polyuria  Genitourinary: Negative for dysuria and pelvic pain  Musculoskeletal: Positive for arthralgias  Negative for myalgias, neck pain and neck stiffness  Skin: Negative for rash  Neurological: Negative for dizziness, syncope, light-headedness and headaches  All other systems reviewed and are negative  Physical Exam  Physical Exam  Constitutional:       General: She is not in acute distress  Appearance: Normal appearance  She is well-developed  HENT:      Head: Normocephalic and atraumatic  Right Ear: External ear normal       Left Ear: External ear normal       Mouth/Throat:      Pharynx: No oropharyngeal exudate  Eyes:      Extraocular Movements: Extraocular movements intact  Cardiovascular:      Rate and Rhythm: Normal rate and regular rhythm  Heart sounds: Normal heart sounds     Pulmonary:      Effort: Pulmonary effort is normal       Breath sounds: Normal breath sounds  Abdominal:      General: Bowel sounds are normal       Palpations: Abdomen is soft  Tenderness: There is no abdominal tenderness  Musculoskeletal:         General: Normal range of motion  Cervical back: Normal range of motion  Comments: Full range of motion of bilateral upper extremities  Some diffuse tenderness to palpation in left tricep  No significant bony tenderness throughout left clavicle, shoulder, elbow, wrist and hand  No scaphoid tenderness  Brisk cap refill  Distal sensation intact  5/5 strength in bilateral  strength and wrist extension  Skin:     General: Skin is warm  Capillary Refill: Capillary refill takes less than 2 seconds  Neurological:      General: No focal deficit present  Mental Status: She is alert and oriented to person, place, and time     Psychiatric:         Mood and Affect: Mood normal          Vital Signs  ED Triage Vitals   Temperature Pulse Respirations Blood Pressure SpO2   04/09/22 1823 04/09/22 1823 04/09/22 1823 04/09/22 1924 04/09/22 1823   98 4 °F (36 9 °C) 66 16 116/63 100 %      Temp Source Heart Rate Source Patient Position - Orthostatic VS BP Location FiO2 (%)   04/09/22 1823 04/09/22 1823 04/09/22 1924 04/09/22 1924 --   Tympanic Monitor Sitting Right arm       Pain Score       04/09/22 1823       6           Vitals:    04/09/22 1823 04/09/22 1924   BP:  116/63   Pulse: 66 76   Patient Position - Orthostatic VS:  Sitting         Visual Acuity      ED Medications  Medications   ibuprofen (MOTRIN) tablet 600 mg (600 mg Oral Given 4/9/22 1852)   acetaminophen (TYLENOL) tablet 975 mg (975 mg Oral Given 4/9/22 1852)       Diagnostic Studies  Results Reviewed     None                 XR humerus LEFT   ED Interpretation by Lesia Carrera PA-C (04/09 1919)   No obvious acute osseous abnormality                 Procedures  Procedures         ED Course MDM  Number of Diagnoses or Management Options  Left arm pain  Diagnosis management comments: Patient presented complaining of left arm pain after falling  No cardiopulmonary complaints  Pain is reproducible on exam   Clearly musculoskeletal   Doubt cardiac cause  Patient without significant bony tenderness  Plain film x-ray negative for any obvious fractures  Patient is neurovascularly intact  No weakness  Pain improved in the ED with Tylenol and ibuprofen  Work note was provided  Recommend PCP follow-up  Return precautions advised  Patient agreeable to plan  Disposition  Final diagnoses:   Left arm pain     Time reflects when diagnosis was documented in both MDM as applicable and the Disposition within this note     Time User Action Codes Description Comment    4/9/2022  6:37 PM Chelsey Schmitt Add [S05 461] Left arm pain       ED Disposition     ED Disposition Condition Date/Time Comment    Discharge Stable Sat Apr 9, 2022  6:37 PM Luis Daniel Goins discharge to home/self care  Follow-up Information     Follow up With Specialties Details Why [address], [address] [facility], Nurse Practitioner  As needed [address] [address] [address] [facility]  [phone]            There are no discharge medications for this patient  No discharge procedures on file      PDMP Review     None          ED Provider  Electronically Signed by           Kita Kern PA-C  04/09/22 2052

## 2022-05-24 NOTE — PROGRESS NOTES
Fetal testing is reassuring  She lives 60 min away and can only come for a weekly full bpp for testing      Lb Palacios MD
irregular
irregular
q1-2m
q2-4 min

## 2022-06-15 ENCOUNTER — HOSPITAL ENCOUNTER (EMERGENCY)
Facility: HOSPITAL | Age: 27
Discharge: HOME | End: 2022-06-15
Attending: EMERGENCY MEDICINE
Payer: COMMERCIAL

## 2022-06-15 VITALS
WEIGHT: 141 LBS | HEART RATE: 112 BPM | RESPIRATION RATE: 16 BRPM | TEMPERATURE: 98 F | DIASTOLIC BLOOD PRESSURE: 89 MMHG | OXYGEN SATURATION: 97 % | SYSTOLIC BLOOD PRESSURE: 157 MMHG | BODY MASS INDEX: 25.79 KG/M2

## 2022-06-15 DIAGNOSIS — L02.91 ABSCESS: ICD-10-CM

## 2022-06-15 DIAGNOSIS — R10.31 RIGHT GROIN PAIN: Primary | ICD-10-CM

## 2022-06-15 LAB
ALBUMIN SERPL BCP-MCNC: 3.8 G/DL (ref 3.5–5)
ALP SERPL-CCNC: 64 U/L (ref 46–116)
ALT SERPL W P-5'-P-CCNC: 20 U/L (ref 12–78)
ANION GAP SERPL CALCULATED.3IONS-SCNC: 13 MMOL/L (ref 4–13)
AST SERPL W P-5'-P-CCNC: 7 U/L (ref 5–45)
BACTERIA UR QL AUTO: NORMAL /HPF
BASOPHILS # BLD AUTO: 0.05 THOUSANDS/ΜL (ref 0–0.1)
BASOPHILS NFR BLD AUTO: 1 % (ref 0–1)
BILIRUB SERPL-MCNC: 0.28 MG/DL (ref 0.2–1)
BILIRUB UR QL STRIP: NEGATIVE
BUN SERPL-MCNC: 7 MG/DL (ref 5–25)
CALCIUM SERPL-MCNC: 11.5 MG/DL (ref 8.3–10.1)
CHLORIDE SERPL-SCNC: 97 MMOL/L (ref 100–108)
CLARITY UR: CLEAR
CO2 SERPL-SCNC: 21 MMOL/L (ref 21–32)
COLOR UR: YELLOW
CREAT SERPL-MCNC: 0.81 MG/DL (ref 0.6–1.3)
EOSINOPHIL # BLD AUTO: 0.11 THOUSAND/ΜL (ref 0–0.61)
EOSINOPHIL NFR BLD AUTO: 1 % (ref 0–6)
ERYTHROCYTE [DISTWIDTH] IN BLOOD BY AUTOMATED COUNT: 12 % (ref 11.6–15.1)
EXT PREG TEST URINE: NORMAL
EXT. CONTROL ED NAV: NORMAL
GFR SERPL CREATININE-BSD FRML MDRD: 99 ML/MIN/1.73SQ M
GLUCOSE SERPL-MCNC: 370 MG/DL (ref 65–140)
GLUCOSE UR STRIP-MCNC: ABNORMAL MG/DL
HCT VFR BLD AUTO: 41.8 % (ref 34.8–46.1)
HGB BLD-MCNC: 15.2 G/DL (ref 11.5–15.4)
HGB UR QL STRIP.AUTO: NEGATIVE
IMM GRANULOCYTES # BLD AUTO: 0.07 THOUSAND/UL (ref 0–0.2)
IMM GRANULOCYTES NFR BLD AUTO: 1 % (ref 0–2)
KETONES UR STRIP-MCNC: ABNORMAL MG/DL
LEUKOCYTE ESTERASE UR QL STRIP: ABNORMAL
LIPASE SERPL-CCNC: 73 U/L (ref 73–393)
LYMPHOCYTES # BLD AUTO: 2.3 THOUSANDS/ΜL (ref 0.6–4.47)
LYMPHOCYTES NFR BLD AUTO: 21 % (ref 14–44)
MCH RBC QN AUTO: 31.9 PG (ref 26.8–34.3)
MCHC RBC AUTO-ENTMCNC: 36.4 G/DL (ref 31.4–37.4)
MCV RBC AUTO: 88 FL (ref 82–98)
MONOCYTES # BLD AUTO: 0.82 THOUSAND/ΜL (ref 0.17–1.22)
MONOCYTES NFR BLD AUTO: 8 % (ref 4–12)
NEUTROPHILS # BLD AUTO: 7.5 THOUSANDS/ΜL (ref 1.85–7.62)
NEUTS SEG NFR BLD AUTO: 68 % (ref 43–75)
NITRITE UR QL STRIP: NEGATIVE
NON-SQ EPI CELLS URNS QL MICRO: NORMAL /HPF
NRBC BLD AUTO-RTO: 0 /100 WBCS
PH UR STRIP.AUTO: 6 [PH]
PLATELET # BLD AUTO: 199 THOUSANDS/UL (ref 149–390)
PMV BLD AUTO: 10.6 FL (ref 8.9–12.7)
POTASSIUM SERPL-SCNC: 3.6 MMOL/L (ref 3.5–5.3)
PROT SERPL-MCNC: 7.5 G/DL (ref 6.4–8.2)
PROT UR STRIP-MCNC: NEGATIVE MG/DL
RBC # BLD AUTO: 4.76 MILLION/UL (ref 3.81–5.12)
RBC #/AREA URNS AUTO: NORMAL /HPF
SODIUM SERPL-SCNC: 131 MMOL/L (ref 136–145)
SP GR UR STRIP.AUTO: 1.01 (ref 1–1.03)
UROBILINOGEN UR QL STRIP.AUTO: 0.2 E.U./DL
WBC # BLD AUTO: 10.85 THOUSAND/UL (ref 4.31–10.16)
WBC #/AREA URNS AUTO: NORMAL /HPF

## 2022-06-15 PROCEDURE — 87591 N.GONORRHOEAE DNA AMP PROB: CPT | Performed by: EMERGENCY MEDICINE

## 2022-06-15 PROCEDURE — 85025 COMPLETE CBC W/AUTO DIFF WBC: CPT | Performed by: EMERGENCY MEDICINE

## 2022-06-15 PROCEDURE — 99283 EMERGENCY DEPT VISIT LOW MDM: CPT

## 2022-06-15 PROCEDURE — 81025 URINE PREGNANCY TEST: CPT | Performed by: SURGERY

## 2022-06-15 PROCEDURE — 99284 EMERGENCY DEPT VISIT MOD MDM: CPT | Performed by: SURGERY

## 2022-06-15 PROCEDURE — 81001 URINALYSIS AUTO W/SCOPE: CPT | Performed by: EMERGENCY MEDICINE

## 2022-06-15 PROCEDURE — 80053 COMPREHEN METABOLIC PANEL: CPT | Performed by: EMERGENCY MEDICINE

## 2022-06-15 PROCEDURE — 93005 ELECTROCARDIOGRAM TRACING: CPT

## 2022-06-15 PROCEDURE — 83690 ASSAY OF LIPASE: CPT | Performed by: EMERGENCY MEDICINE

## 2022-06-15 PROCEDURE — 87491 CHLMYD TRACH DNA AMP PROBE: CPT | Performed by: EMERGENCY MEDICINE

## 2022-06-15 PROCEDURE — 36415 COLL VENOUS BLD VENIPUNCTURE: CPT

## 2022-06-15 RX ORDER — AMOXICILLIN AND CLAVULANATE POTASSIUM 875; 125 MG/1; MG/1
1 TABLET, FILM COATED ORAL ONCE
Status: DISCONTINUED | OUTPATIENT
Start: 2022-06-15 | End: 2022-06-16 | Stop reason: HOSPADM

## 2022-06-15 RX ORDER — AMOXICILLIN AND CLAVULANATE POTASSIUM 875; 125 MG/1; MG/1
1 TABLET, FILM COATED ORAL EVERY 12 HOURS SCHEDULED
Qty: 14 TABLET | Refills: 0 | Status: SHIPPED | OUTPATIENT
Start: 2022-06-15 | End: 2022-06-22

## 2022-06-15 RX ORDER — NAPROXEN 500 MG/1
500 TABLET ORAL 2 TIMES DAILY WITH MEALS
Qty: 10 TABLET | Refills: 0 | Status: SHIPPED | OUTPATIENT
Start: 2022-06-15 | End: 2022-06-27

## 2022-06-16 LAB
ATRIAL RATE: 101 BPM
P AXIS: 54 DEGREES
PR INTERVAL: 176 MS
QRS AXIS: 57 DEGREES
QRSD INTERVAL: 84 MS
QT INTERVAL: 314 MS
QTC INTERVAL: 407 MS
T WAVE AXIS: 32 DEGREES
VENTRICULAR RATE: 101 BPM

## 2022-06-16 PROCEDURE — 93010 ELECTROCARDIOGRAM REPORT: CPT | Performed by: INTERNAL MEDICINE

## 2022-06-16 NOTE — ED PROVIDER NOTES
History  Chief Complaint   Patient presents with    Groin Pain     B/l groin pain with lumps, worse with walking or bending over      Luis Daniel Buster is a 27 y o  female with a past medical history of gestational diabetes presenting today with right-sided groin pain  Patient reports that she began to experience right-sided groin pain beginning yesterday has been getting progressively worse prompting the ED visit  The patient reports that she has a small lump on the scan that is erythematous  She denies any fevers, chills, nausea, vomiting, diarrhea  She denies any urinary symptoms such as dysuria, hematuria or GI symptoms such as nausea, vomiting, diarrhea, hematochezia, hematuria, melena, cough  She has had symptoms like this in the past had similar lumps in the past, however they resolved spontaneously  No further complaints at this time  None       Past Medical History:   Diagnosis Date    Gestational diabetes     Migraine        Past Surgical History:   Procedure Laterality Date    CERVICAL BIOPSY  W/ LOOP ELECTRODE EXCISION  ?   SECTION  15, 16, 18    DILATION AND CURETTAGE OF UTERUS WITH HYSTEROSOCPY N/A 2020    Procedure: DILATATION AND CURETTAGE (D&C);   Surgeon: Mckenzie Henderson MD;  Location: [address] [place] [address] OR;  Service: Gynecology    ENDOMETRIAL ABLATION N/A 2020    Procedure: Richarddiamondn Small;  Surgeon: Mckenzie Henderson MD;  Location: [address] [place] [address] OR;  Service: Gynecology    PA  DELIVERY ONLY N/A 6/3/2016    Procedure: [place] () REPEAT;  Surgeon: Mckenzie Henderson MD;  [facility];  Service: Obstetrics    PA  DELIVERY ONLY N/A 2018    Procedure: [place] () REPEAT;  Surgeon: Mckenzie Henderson MD;  [facility];  Service: Obstetrics    TUBAL LIGATION N/A 2018    Procedure: LIGATION/COAGULATION TUBAL;  Surgeon: Mckenzie Henderson MD;  [facility];  Service: Obstetrics Family History   Problem Relation Age of Onset    Hyperthyroidism Mother     Hyperthyroidism Father     Hypertension Father     No Known Problems Sister     No Known Problems Brother     No Known Problems Son     No Known Problems Daughter     No Known Problems Daughter     No Known Problems Paternal Grandmother     Diabetes Paternal Grandfather     Down syndrome Cousin         paternal cousin     I have reviewed and agree with the history as documented  E-Cigarette/Vaping    E-Cigarette Use Never User      E-Cigarette/Vaping Substances    Nicotine No     THC No     CBD No     Flavoring No     Other No     Unknown No      Social History     Tobacco Use    Smoking status: Never Smoker    Smokeless tobacco: Never Used   Vaping Use    Vaping Use: Never used   Substance Use Topics    Alcohol use: Not Currently    Drug use: No       Review of Systems   Constitutional: Negative for activity change, chills, diaphoresis and fever  HENT: Negative for congestion, ear discharge, ear pain, rhinorrhea, sore throat and trouble swallowing  Eyes: Negative for pain, discharge, redness and visual disturbance  Respiratory: Negative for cough, chest tightness, shortness of breath and wheezing  Cardiovascular: Negative for chest pain, palpitations and leg swelling  Gastrointestinal: Negative for abdominal distention, abdominal pain, blood in stool, constipation, diarrhea, nausea and vomiting  Genitourinary: Negative for difficulty urinating, dysuria, flank pain, frequency, hematuria and urgency  Musculoskeletal: Negative for arthralgias, myalgias, neck pain and neck stiffness  Skin: Positive for wound  Negative for color change and rash  Neurological: Negative for dizziness, syncope, facial asymmetry, weakness, light-headedness, numbness and headaches  Physical Exam  Physical Exam  Vitals reviewed  Exam conducted with a chaperone present     Constitutional:       General: She is not in acute distress  Appearance: Normal appearance  She is not ill-appearing  HENT:      Head: Normocephalic and atraumatic  Right Ear: Tympanic membrane normal       Left Ear: Tympanic membrane normal       Nose: Nose normal  No congestion or rhinorrhea  Mouth/Throat:      Mouth: Mucous membranes are moist       Pharynx: Oropharynx is clear  No oropharyngeal exudate or posterior oropharyngeal erythema  Eyes:      Extraocular Movements: Extraocular movements intact  Conjunctiva/sclera: Conjunctivae normal       Pupils: Pupils are equal, round, and reactive to light  Cardiovascular:      Rate and Rhythm: Normal rate and regular rhythm  Pulses: Normal pulses  Heart sounds: Normal heart sounds  Pulmonary:      Effort: Pulmonary effort is normal  No respiratory distress  Breath sounds: Normal breath sounds  No wheezing  Abdominal:      General: Abdomen is flat  Bowel sounds are normal  There is no distension  Palpations: Abdomen is soft  There is no mass  Tenderness: There is no abdominal tenderness  There is no right CVA tenderness, left CVA tenderness or guarding  Hernia: No hernia is present  Musculoskeletal:         General: No swelling, tenderness or deformity  Normal range of motion  Cervical back: Normal range of motion and neck supple  No rigidity or tenderness  Right lower leg: No edema  Left lower leg: No edema  Skin:     General: Skin is warm and dry  Capillary Refill: Capillary refill takes less than 2 seconds  Coloration: Skin is not jaundiced  Findings: Erythema present  No rash  Comments: TTP over the mons pubis  There is no drainable abscess to the right groin noted at this time  Neurological:      General: No focal deficit present  Mental Status: She is alert and oriented to person, place, and time  Cranial Nerves: No cranial nerve deficit  Sensory: No sensory deficit        Motor: No weakness  Coordination: Coordination normal       Gait: Gait normal       Deep Tendon Reflexes: Reflexes normal          Vital Signs  ED Triage Vitals   Temperature Pulse Respirations Blood Pressure SpO2   06/15/22 1906 06/15/22 1906 06/15/22 1906 06/15/22 1906 06/15/22 1906   98 °F (36 7 °C) (!) 111 18 160/96 98 %      Temp src Heart Rate Source Patient Position - Orthostatic VS BP Location FiO2 (%)   -- 06/15/22 1948 06/15/22 1948 06/15/22 1948 --    Monitor Sitting Left arm       Pain Score       --                  Vitals:    06/15/22 1906 06/15/22 1948   BP: 160/96 157/89   Pulse: (!) 111 (!) 112   Patient Position - Orthostatic VS:  Sitting         Visual Acuity      ED Medications  Medications   amoxicillin-clavulanate (AUGMENTIN) 875-125 mg per tablet 1 tablet (has no administration in time range)       Diagnostic Studies  Results Reviewed     Procedure Component Value Units Date/Time    Urine Microscopic [[id number]]  (Normal) Collected: 06/15/22 2032    Lab Status: Final result Specimen: Urine, Clean Catch Updated: 06/15/22 2114     RBC, UA 0-1 /hpf      WBC, UA 0-1 /hpf      Epithelial Cells Occasional /hpf      Bacteria, UA None Seen /hpf     UA (URINE) with reflex to Scope [[id number]]  (Abnormal) Collected: 06/15/22 2032    Lab Status: Final result Specimen: Urine, Clean Catch Updated: 06/15/22 2054     Color, UA Yellow     Clarity, UA Clear     Specific Gravity, UA 1 015     pH, UA 6 0     Leukocytes, UA Elevated glucose may cause decreased leukocyte values  See urine microscopic for [facility] result/     Nitrite, UA Negative     Protein, UA Negative mg/dl      Glucose, UA >=1000 (1%) mg/dl      Ketones, UA Trace mg/dl      Urobilinogen, UA 0 2 E U /dl      Bilirubin, UA Negative     Blood, UA Negative    Chlamydia/GC amplified DNA by PCR [[id number]] Collected: 06/15/22 2032    Lab Status:  In process Specimen: Urine, Other Updated: 06/15/22 2039    POCT pregnancy, urine [[id number]]  (Normal) Resulted: 06/15/22 2032    Lab Status: Final result Updated: 06/15/22 2032     EXT PREG TEST UR (Ref: Negative) neg     Control valid    Lipase [[id number]]  (Normal) Collected: 06/15/22 1954    Lab Status: Final result Specimen: Blood from Arm, Right Updated: 06/15/22 2021     Lipase 73 u/L     Comprehensive metabolic panel [[id number]]  (Abnormal) Collected: 06/15/22 1954    Lab Status: Final result Specimen: Blood from Arm, Right Updated: 06/15/22 2021     Sodium 131 mmol/L      Potassium 3 6 mmol/L      Chloride 97 mmol/L      CO2 21 mmol/L      ANION GAP 13 mmol/L      BUN 7 mg/dL      Creatinine 0 81 mg/dL      Glucose 370 mg/dL      Calcium 11 5 mg/dL      AST 7 U/L      ALT 20 U/L      Alkaline Phosphatase 64 U/L      Total Protein 7 5 g/dL      Albumin 3 8 g/dL      Total Bilirubin 0 28 mg/dL      eGFR 99 ml/min/1 73sq m     Narrative:      [place] guidelines for Chronic Kidney Disease (CKD):     Stage 1 with normal or high GFR (GFR > 90 mL/min/1 73 square meters)    Stage 2 Mild CKD (GFR = 60-89 mL/min/1 73 square meters)    Stage 3A Moderate CKD (GFR = 45-59 mL/min/1 73 square meters)    Stage 3B Moderate CKD (GFR = 30-44 mL/min/1 73 square meters)    Stage 4 Severe CKD (GFR = 15-29 mL/min/1 73 square meters)    Stage 5 End Stage CKD (GFR <15 mL/min/1 73 square meters)  Note: GFR calculation is accurate only with a steady state creatinine    CBC and differential [[id number]]  (Abnormal) Collected: 06/15/22 1954    Lab Status: Final result Specimen: Blood from Arm, Right Updated: 06/15/22 2000     WBC 10 85 Thousand/uL      RBC 4 76 Million/uL      Hemoglobin 15 2 g/dL      Hematocrit 41 8 %      MCV 88 fL      MCH 31 9 pg      MCHC 36 4 g/dL      RDW 12 0 %      MPV 10 6 fL      Platelets 465 Thousands/uL      nRBC 0 /100 WBCs      Neutrophils Relative 68 %      Immat GRANS % 1 %      Lymphocytes Relative 21 %      Monocytes Relative 8 %      Eosinophils Relative 1 %      Basophils Relative 1 %      Neutrophils Absolute 7 50 Thousands/µL      Immature Grans Absolute 0 07 Thousand/uL      Lymphocytes Absolute 2 30 Thousands/µL      Monocytes Absolute 0 82 Thousand/µL      Eosinophils Absolute 0 11 Thousand/µL      Basophils Absolute 0 05 Thousands/µL                  No orders to display              Procedures  Procedures         ED Course  ED Course as of 06/15/22 2126   Wed Karl 15, 2022   2012 Asked nursing staff to move the patient to another room for more privacy  Will perform chaperoned examination at that time  2029 Glucose, Random(!): 370  Patient updated on elevated blood sugar and need for follow-up with primary care provider  MDM  Number of Diagnoses or Management Options  Abscess: minor  Right groin pain: minor  Diagnosis management comments: Recommended warm compress to the right groin area and close follow-up with general surgery for further evaluation  Strict return criteria were discussed with the patient including but not limited to fevers, chills, new or worsening symptoms  Need for follow up regarding elevated blood sugar was discussed with the patient  All questions answered appropriately          Amount and/or Complexity of Data Reviewed  Clinical lab tests: ordered and reviewed  Tests in the medicine section of CPT®: ordered and reviewed  Review and summarize past medical records: yes  Discuss the patient with other providers: yes    Risk of Complications, Morbidity, and/or Mortality  Presenting problems: moderate  Diagnostic procedures: low  Management options: low    Patient Progress  Patient progress: stable      Disposition  Final diagnoses:   Right groin pain   Abscess     Time reflects when diagnosis was documented in both MDM as applicable and the Disposition within this note     Time User Action Codes Description Comment    6/15/2022  9:16 PM Marisela Spine Add [R10 31] Right groin pain     6/15/2022  9:18 PM Spencer Fisher Add [L02 91] Abscess       ED Disposition     ED Disposition   Discharge    Condition   Stable    Date/Time   Wed Karl 15, 2022  9:16 PM    Comment   Cole Bravo discharge to home/self care  Follow-up Information     Follow up With Specialties Details Why Contact Info Additional [address] [facility] Emergency Department Emergency Medicine Go to  If symptoms worsen 34 [facility] [address] [facility] Emergency Department, [address] [facility], [place], [place],   [address], [address] [facility], Nurse Practitioner Schedule an appointment as soon as possible for a visit   [address] [facility] [phone] [facility] Surgery Call today To schedule an appointment for follow up within the next 1 week in regards to abscess of right groin   44 [facility], [address] [address] [facility], [place], [place], [address] [phone]          Patient's Medications   Discharge Prescriptions    AMOXICILLIN-CLAVULANATE (AUGMENTIN) 875-125 MG PER TABLET    Take 1 tablet by mouth every 12 (twelve) hours for 7 days       Start Date: 6/15/2022 End Date: 6/22/2022       Order Dose: 1 tablet       Quantity: 14 tablet    Refills: 0    NAPROXEN (NAPROSYN) 500 MG TABLET    Take 1 tablet (500 mg total) by mouth 2 (two) times a day with meals for 5 days       Start Date: 6/15/2022 End Date: 6/20/2022       Order Dose: 500 mg       Quantity: 10 tablet    Refills: 0           PDMP Review     None          ED Provider  Electronically Signed by           Luca Ward PA-C  06/15/22 1558

## 2022-06-16 NOTE — DISCHARGE INSTRUCTIONS
Please return to the ED if you begin to experience any new or worsening symptoms, chest pain, shortness of breath, lightheadedness, dizziness, changes to vision, passing out, numbness, tingling, or weakness in the extremities, difficulty walking/swallowing/breathing  Return with any fevers, chills, worsening pain to the right groin  Please follow-up with General surgery within the next 1 week for reevaluation

## 2022-06-17 LAB
C TRACH DNA SPEC QL NAA+PROBE: NEGATIVE
N GONORRHOEA DNA SPEC QL NAA+PROBE: NEGATIVE

## 2022-06-22 ENCOUNTER — CONSULT (OUTPATIENT)
Dept: SURGERY | Facility: CLINIC | Age: 27
End: 2022-06-22
Payer: COMMERCIAL

## 2022-06-22 VITALS
HEIGHT: 62 IN | SYSTOLIC BLOOD PRESSURE: 118 MMHG | WEIGHT: 141 LBS | DIASTOLIC BLOOD PRESSURE: 72 MMHG | BODY MASS INDEX: 25.95 KG/M2

## 2022-06-22 DIAGNOSIS — R10.31 RIGHT GROIN PAIN: ICD-10-CM

## 2022-06-22 DIAGNOSIS — L02.91 ABSCESS: ICD-10-CM

## 2022-06-22 PROCEDURE — 99203 OFFICE O/P NEW LOW 30 MIN: CPT | Performed by: SURGERY

## 2022-06-22 NOTE — PROGRESS NOTES
Assessment/Plan:     1  Right groin pain  -     Ambulatory Referral to General Surgery    2  Abscess  -     Ambulatory Referral to General Surgery      Resolved  F/u prn  It sounds like in the past she may have had a bartholin cyst which would be treated by gyn if it recurs  Subjective:      Patient ID: Alexis Cook is a 32 y o  female  Triage Notes:    Irlanda Jasmine is following up after a visit to the ED 6/15 for groin pain/boil for which abx were prescribed  Intermittent pain - improving  No current swelling/redness  She has a history of something similar but describes it as more within the labia  The following portions of the patient's history were reviewed and updated as appropriate:   She  has a past medical history of Gestational diabetes and Migraine  She   Patient Active Problem List    Diagnosis Date Noted    Type 2 diabetes mellitus with hyperglycemia, without long-term current use of insulin ([facility] 2022    Elevated glucose 2022     She  has a past surgical history that includes pr  delivery only (N/A, 6/3/2016); pr  delivery only (N/A, 2018); Tubal ligation (N/A, 2018);  section (15, 16, 18); Cervical biopsy w/ loop electrode excision (2017?); DILATION AND CURETTAGE OF UTERUS WITH HYSTEROSOCPY (N/A, 2020); and Endometrial ablation (N/A, 2020)  Her family history includes Diabetes in her paternal grandfather; Down syndrome in her cousin; Hypertension in her father; Hyperthyroidism in her father and mother; No Known Problems in her brother, daughter, daughter, paternal grandmother, sister, and son  She  reports that she has never smoked  She has never used smokeless tobacco  She reports previous alcohol use  She reports that she does not use drugs  No current outpatient medications on file prior to visit  No current facility-administered medications on file prior to visit  She has No Known Allergies       Review of Systems      Objective:      /72   Ht 5' 2" (1 575 m)   Wt 64 kg (141 lb)   BMI 25 79 kg/m²     Below is the patient's most recent value for Albumin, ALT, AST, BUN, Calcium, Chloride, Cholesterol, CO2, Creatinine, GFR, Glucose, HDL, Hematocrit, Hemoglobin, Hemoglobin A1C, LDL, Magnesium, Phosphorus, Platelets, Potassium, PSA, Sodium, Triglycerides, and WBC  Lab Results   Component Value Date    ALT 20 06/15/2022    AST 7 06/15/2022    BUN 7 06/15/2022    CALCIUM 11 5 (H) 06/15/2022    CL 97 (L) 06/15/2022    CO2 21 06/15/2022    CREATININE 0 81 06/15/2022    HDL 50 08/13/2021    HCT 41 8 06/15/2022    HGB 15 2 06/15/2022    HGBA1C 10 0 (A) 06/27/2022     06/15/2022    K 3 6 06/15/2022     04/08/2018    TRIG 231 (H) 08/13/2021    WBC 10 85 (H) 06/15/2022     Note: for a comprehensive list of the patient's lab results, access the Results Review activity       Physical Exam  Skin:     Comments: No swelling, tenderness or erythema             Procedures

## 2022-06-26 PROBLEM — K02.9 DENTAL CARIES: Status: RESOLVED | Noted: 2017-01-17 | Resolved: 2022-06-26

## 2022-06-26 PROBLEM — T14.8XXA MUSCULOSKELETAL STRAIN: Status: RESOLVED | Noted: 2020-03-16 | Resolved: 2022-06-26

## 2022-06-26 PROBLEM — Z98.891 STATUS POST REPEAT LOW TRANSVERSE CESAREAN SECTION: Status: RESOLVED | Noted: 2018-11-26 | Resolved: 2022-06-26

## 2022-06-26 PROBLEM — R10.32 LEFT GROIN PAIN: Status: RESOLVED | Noted: 2020-02-12 | Resolved: 2022-06-26

## 2022-06-26 PROBLEM — N92.0 MENORRHAGIA: Status: RESOLVED | Noted: 2020-07-13 | Resolved: 2022-06-26

## 2022-06-26 PROBLEM — U07.1 COVID-19 VIRUS INFECTION: Status: RESOLVED | Noted: 2021-07-22 | Resolved: 2022-06-26

## 2022-06-26 PROBLEM — R73.09 ELEVATED GLUCOSE: Status: RESOLVED | Noted: 2021-08-09 | Resolved: 2022-06-26

## 2022-06-27 ENCOUNTER — OFFICE VISIT (OUTPATIENT)
Dept: FAMILY MEDICINE CLINIC | Facility: CLINIC | Age: 27
End: 2022-06-27
Payer: COMMERCIAL

## 2022-06-27 VITALS
HEIGHT: 62 IN | BODY MASS INDEX: 26.13 KG/M2 | OXYGEN SATURATION: 98 % | SYSTOLIC BLOOD PRESSURE: 110 MMHG | TEMPERATURE: 99.4 F | DIASTOLIC BLOOD PRESSURE: 74 MMHG | WEIGHT: 142 LBS | HEART RATE: 96 BPM

## 2022-06-27 DIAGNOSIS — R73.09 ELEVATED GLUCOSE: ICD-10-CM

## 2022-06-27 DIAGNOSIS — E11.65 TYPE 2 DIABETES MELLITUS WITH HYPERGLYCEMIA, WITHOUT LONG-TERM CURRENT USE OF INSULIN (HCC): Primary | ICD-10-CM

## 2022-06-27 PROBLEM — O24.414 INSULIN CONTROLLED GESTATIONAL DIABETES MELLITUS (GDM) IN THIRD TRIMESTER: Status: RESOLVED | Noted: 2018-09-17 | Resolved: 2022-06-27

## 2022-06-27 PROBLEM — Z67.91 RH NEGATIVE STATE IN ANTEPARTUM PERIOD: Status: RESOLVED | Noted: 2018-11-26 | Resolved: 2022-06-27

## 2022-06-27 PROBLEM — O26.899 RH NEGATIVE STATE IN ANTEPARTUM PERIOD: Status: RESOLVED | Noted: 2018-11-26 | Resolved: 2022-06-27

## 2022-06-27 LAB
LEFT EYE DIABETIC RETINOPATHY: NORMAL
LEFT EYE IMAGE QUALITY: NORMAL
LEFT EYE MACULAR EDEMA: NORMAL
LEFT EYE OTHER RETINOPATHY: NORMAL
RIGHT EYE DIABETIC RETINOPATHY: NORMAL
RIGHT EYE IMAGE QUALITY: NORMAL
RIGHT EYE MACULAR EDEMA: NORMAL
RIGHT EYE OTHER RETINOPATHY: NORMAL
SEVERITY (EYE EXAM): NORMAL
SL AMB POCT HEMOGLOBIN AIC: 10 (ref ?–6.5)

## 2022-06-27 PROCEDURE — 3046F HEMOGLOBIN A1C LEVEL >9.0%: CPT | Performed by: NURSE PRACTITIONER

## 2022-06-27 PROCEDURE — 92250 FUNDUS PHOTOGRAPHY W/I&R: CPT | Performed by: NURSE PRACTITIONER

## 2022-06-27 PROCEDURE — 2025F 7 FLD RTA PHOTO W/O RTNOPTHY: CPT | Performed by: NURSE PRACTITIONER

## 2022-06-27 PROCEDURE — 99214 OFFICE O/P EST MOD 30 MIN: CPT | Performed by: NURSE PRACTITIONER

## 2022-06-27 PROCEDURE — 1036F TOBACCO NON-USER: CPT | Performed by: NURSE PRACTITIONER

## 2022-06-27 PROCEDURE — 83036 HEMOGLOBIN GLYCOSYLATED A1C: CPT | Performed by: NURSE PRACTITIONER

## 2022-06-27 PROCEDURE — 3008F BODY MASS INDEX DOCD: CPT | Performed by: NURSE PRACTITIONER

## 2022-06-27 RX ORDER — BLOOD-GLUCOSE METER
KIT MISCELLANEOUS
Qty: 1 KIT | Refills: 0 | Status: SHIPPED | OUTPATIENT
Start: 2022-06-27

## 2022-06-27 RX ORDER — METFORMIN HYDROCHLORIDE 500 MG/1
1000 TABLET, EXTENDED RELEASE ORAL
Qty: 180 TABLET | Refills: 1 | Status: SHIPPED | OUTPATIENT
Start: 2022-06-27 | End: 2022-07-12 | Stop reason: SDUPTHER

## 2022-06-27 RX ORDER — LANCETS 33 GAUGE
EACH MISCELLANEOUS
Qty: 100 EACH | Refills: 3 | Status: SHIPPED | OUTPATIENT
Start: 2022-06-27

## 2022-06-27 RX ORDER — PEN NEEDLE, DIABETIC 31 GX5/16"
NEEDLE, DISPOSABLE MISCELLANEOUS
Qty: 30 EACH | Refills: 0 | Status: SHIPPED | OUTPATIENT
Start: 2022-06-27 | End: 2022-07-22 | Stop reason: SDUPTHER

## 2022-06-27 RX ORDER — BLOOD SUGAR DIAGNOSTIC
STRIP MISCELLANEOUS
Qty: 100 EACH | Refills: 3 | Status: SHIPPED | OUTPATIENT
Start: 2022-06-27

## 2022-06-27 NOTE — PATIENT INSTRUCTIONS
10% - bad control"> 10% - bad control,Hemoglobin A1c (HbA1c) greater than 10% indicating poor diabetic control,Haemoglobin A1c greater than 10% indicating poor diabetic control">   Diabetes Mellitus Type 2 in Adults, Ambulatory Care   GENERAL INFORMATION:   Diabetes mellitus type 2  is a disease that affects how your body uses glucose (sugar)  Insulin helps move sugar out of the blood so it can be used for energy  Normally, when the blood sugar level increases, the pancreas makes more insulin  Type 2 diabetes develops because either the body cannot make enough insulin, or it cannot use the insulin correctly  After many years, your pancreas may stop making insulin  Common symptoms include the following:   · More hunger or thirst than usual     · Frequent urination     · Weight loss without trying     · Blurred vision  Seek immediate care for the following symptoms:   · Severe abdominal pain, or pain that spreads to your back  You may also be vomiting  · Trouble staying awake or focusing    · Shaking or sweating    · Blurred or double vision    · Breath has a fruity, sweet smell    · Breathing is deep and labored, or rapid and shallow    · Heartbeat is fast and weak  Treatment for diabetes mellitus type 2  includes keeping your blood sugar at a normal level  You must eat the right foods, and exercise regularly  You may also need medicine if you cannot control your blood sugar level with nutrition and exercise  Manage diabetes mellitus type 2:   · Check your blood sugar level  You will be taught how to check a small drop of blood in a glucose monitor  Ask your healthcare provider when and how often to check during the day  Ask your healthcare provider what your blood sugar levels should be when you check them  · Keep track of carbohydrates (sugar and starchy foods)  Your blood sugar level can get too high if you eat too many carbohydrates   Your dietitian will help you plan meals and snacks that have the right amount of carbohydrates  · Eat low-fat foods  Some examples are skinless chicken and low-fat milk  · Eat less sodium (salt)  Some examples of high-sodium foods to limit are soy sauce, potato chips, and soup  Do not add salt to food you cook  Limit your use of table salt  · Eat high-fiber foods  Foods that are a good source of fiber include vegetables, whole grain bread, and beans  · Limit alcohol  Alcohol affects your blood sugar level and can make it harder to manage your diabetes  Women should limit alcohol to 1 drink a day  Men should limit alcohol to 2 drinks a day  A drink of alcohol is 12 ounces of beer, 5 ounces of wine, or 1½ ounces of liquor  · Get regular exercise  Exercise can help keep your blood sugar level steady, decrease your risk of heart disease, and help you lose weight  Exercise for at least 30 minutes, 5 days a week  Include muscle strengthening activities 2 days each week  Work with your healthcare provider to create an exercise plan  · Check your feet each day  for injuries or open sores  Ask your healthcare provider for activities you can do if you have an open sore  · Quit smoking  If you smoke, it is never too late to quit  Smoking can worsen the problems that may occur with diabetes  Ask your healthcare provider for information about how to stop smoking if you are having trouble quitting  · Ask about your weight:  Ask healthcare providers if you need to lose weight, and how much to lose  Ask them to help you with a weight loss program  Even a 10 to 15 pound weight loss can help you manage your blood sugar level  · Carry medical alert identification  Wear medical alert jewelry or carry a card that says you have diabetes  Ask your healthcare provider where to get these items  · Ask about vaccines  Diabetes puts you at risk of serious illness if you get the flu, pneumonia, or hepatitis   Ask your healthcare provider if you should get a flu, pneumonia, or hepatitis B vaccine, and when to get the vaccine  Follow up with your healthcare provider as directed:  Write down your questions so you remember to ask them during your visits  CARE AGREEMENT:   You have the right to help plan your care  Learn about your health condition and how it may be treated  Discuss treatment options with your caregivers to decide what care you want to receive  You always have the right to refuse treatment  The above information is an  only  It is not intended as medical advice for individual conditions or treatments  Talk to your doctor, nurse or pharmacist before following any medical regimen to see if it is safe and effective for you  © 2014 [address] is for End User's use only and may not be sold, redistributed or otherwise used for commercial purposes  All illustrations and images included in CareNotes® are the copyrighted property of [employer]  or [employer]  Basic Carbohydrate Counting   AMBULATORY CARE:   Carbohydrate counting  is a way to plan your meals by counting the amount of carbohydrate in foods  Carbohydrates are the sugars, starches, and fiber found in fruit, grains, vegetables, and milk products  Carbohydrates increase your blood sugar levels  Carbohydrate counting can help you eat the right amount of carbohydrate to keep your blood sugar levels under control  What you need to know about planning meals using carbohydrate counting:  · A dietitian or healthcare provider will help you develop a healthy meal plan that works best for you  You will be taught how much carbohydrate to eat or drink for each meal and snack  Your meal plan will be based on your age, weight, usual food intake, and physical activity level  If you have diabetes, it will also include your blood sugar levels and diabetes medicine   Once you know how much carbohydrate you should eat, you can decide what type of food you want to eat  · You will need to know what foods contain carbohydrate and how much they contain  Keep track of the amount of carbohydrate in meals and snacks in order to follow your meal plan  Do not avoid carbohydrates or skip meals  Your blood sugar may fall too low if you do not eat enough carbohydrate or you skip meals  Foods that contain carbohydrate:   · Breads:  Each serving of food listed below contains about 15 g of carbohydrate   ? 1 slice of bread (1 ounce) or 1 flour or corn tortilla (6 inch)    ? ½ of a hamburger bun or ¼ of a large bagel (about 1 ounce)    ? 1 pancake (about 4 inches across and ¼ inch thick)    · Cereals and grains:  Serving sizes of ready-to-eat cereals vary  Look at the serving size and the total carbohydrate amount listed on the food label  Each serving of food listed below contains about 15 g of carbohydrate   ? ¾ cup of dry, unsweetened, ready-to-eat cereal or ¼ cup of low-fat granola     ? ½ cup of oatmeal or other cooked cereal     ? ? cup of cooked rice or pasta    · Starchy vegetables and beans:  Each serving of food listed below contains about 15 g of carbohydrate   ? ½ cup of corn, green peas, sweet potatoes, or mashed potatoes    ? ¼ of a large baked potato    ? ½ cup of beans, lentils, and peas (garbanzo, carmona, kidney, white, split, black-eyed)    · Crackers and snacks:  Each serving of food listed below contains about 15 g of carbohydrate   ? 3 genesis cracker squares or 8 animal crackers     ? 6 saltine-type crackers    ? 3 cups of popcorn or ¾ ounce of pretzels, potato chips, or tortilla chips    · Fruit:  Each serving of food listed below contains about 15 g of carbohydrate   ? 1 small (4 ounce) piece of fresh fruit or ¾ to 1 cup of fresh fruit    ? ½ cup of canned or frozen fruit, packed in natural juice    ? ½ cup (4 ounces) of unsweetened fruit juice    ?  2 tablespoons of dried fruit    · Desserts or sugary foods:  Each serving of food listed below contains about 15 g of carbohydrate   ? 2-inch square unfrosted cake or brownie     ? 2 small cookies    ? ½ cup of ice cream, frozen yogurt, or nondairy frozen yogurt    ? ¼ cup of sherbet or sorbet    ? 1 tablespoon of regular syrup, jam, or jelly    ? 2 tablespoons of light syrup    · Milk and yogurt:  Foods from the milk group contain about 12 g of carbohydrate per serving  ? 1 cup of fat-free or low-fat milk    ? 1 cup of soy milk    ? ? cup of fat-free, yogurt sweetened with artificial sweetener    · Non-starchy vegetables:  Each serving contains about 5 g of carbohydrate   Three servings of non-starch vegetables count as 1 carbohydrate serving  ? ½ cup of cooked vegetables or 1 cup of raw vegetables  This includes beets, broccoli, cabbage, cauliflower, cucumber, mushrooms, tomatoes, and zucchini    ? ½ cup of vegetable juice    How to use carbohydrate counting to plan meals:   · Count carbohydrate amounts using serving sizes:      ? Pasta dinner example: You plan to have pasta, tossed salad, and an 8-ounce glass of milk  Your healthcare provider tells you that you may have 4 carbohydrate servings for dinner  One carbohydrate serving of pasta is ? cup  One cup of pasta will equal 3 carbohydrate servings  An 8-ounce glass of milk will count as 1 carbohydrate serving  These amounts of food would equal 4 carbohydrate servings  One cup of tossed salad does not count toward your carbohydrate servings  · Count carbohydrate amounts using food labels:  Find the total amount of carbohydrate in a packaged food by reading the food label  Food labels tell you the serving size of the food and the total carbohydrate amount in each serving  Find the serving size on the food label and then decide how many servings you will eat  Multiply the number of servings you plan to eat by the carbohydrate amount per serving  ? Granola bar snack example:   Your meal plan allows you to have 2 carbohydrate servings (30 grams) of carbohydrate for a snack  You plan to eat 1 package of granola bars, which contains 2 bars  According to the food label, the serving size of food in this package is 1 bar  Each serving (1 bar) contains 25 grams of carbohydrate  The total amount of carbohydrate in this package of granola bars would be 50 g  Based on your meal plan, you should eat only 1 bar  Follow up with your doctor as directed:  Write down your questions so you remember to ask them during your visits  © Copyright [employer] 2022 Information is for End User's use only and may not be sold, redistributed or otherwise used for commercial purposes  All illustrations and images included in CareNotes® are the copyrighted property of [employer]  or [facility]   The above information is an  only  It is not intended as medical advice for individual conditions or treatments  Talk to your doctor, nurse or pharmacist before following any medical regimen to see if it is safe and effective for you  Foot Care for People with Diabetes   AMBULATORY CARE:   What you need to know about foot care:   · Foot care helps protect your feet and prevent foot ulcers or sores  Long-term high blood sugar levels can damage the blood vessels and nerves in your legs and feet  This damage makes it hard to feel pressure, pain, temperature, and touch  You may not be able to feel a cut or sore, or shoes that are too tight  Foot care is needed to prevent serious problems, such as an infection or amputation  · Diabetes may cause your toes to become crooked or curved under  These changes may affect the way you walk and can lead to increased pressure on your foot  The pressure can decrease blood flow to your feet  Lack of blood flow increases your risk for a foot ulcer  Do not ignore small problems, such as dry skin or small wounds  These can become life-threatening over time without proper care      Call your care team provider if: · Your feet become numb, weak, or hard to move  · You have pus draining from a sore on your foot  · You have a wound on your foot that gets bigger, deeper, or does not heal      · You see blisters, cuts, scratches, calluses, or sores on your foot  · You have a fever, and your feet become red, warm, and swollen  · Your toenails become thick, curled, or yellow  · You find it hard to check your feet because your vision is poor  · You have questions or concerns about your condition or care  How to care for your feet:   · Check your feet each day  Look at your whole foot, including the bottom, and between and under your toes  Check for wounds, corns, and calluses  Use a mirror to see the bottom of your feet  The skin on your feet may be shiny, tight, or darker than normal  Your feet may also be cold and pale  Feel your feet by running your hands along the tops, bottoms, sides, and between your toes  Redness, swelling, and warmth are signs of blood flow problems that can lead to a foot ulcer  Do not try to remove corns or calluses yourself  · Wash your feet each day with soap and warm water  Do not use hot water, because this can injure your foot  Dry your feet gently with a towel after you wash them  Dry between and under your toes  · Apply lotion or a moisturizer on your dry feet  Ask your care team provider what lotions are best to use  Do not put lotion or moisturizer between your toes  Moisture between your toes could lead to skin breakdown  · Cut your toenails correctly  File or cut your toenails straight across  Use a soft brush to clean around your toenails  If your toenails are very thick, you may need to have a care team provider or specialist cut them  · Protect your feet  Do not walk barefoot or wear your shoes without socks  Check your shoes for rocks or other objects that can hurt your feet  Wear cotton socks to help keep your feet dry   Wear socks without toe seams, or wear them with the seams inside out  Change your socks each day  Do not wear socks that are dirty or damp  · Wear shoes that fit well  Wear shoes that do not rub against any area of your feet  Your shoes should be ½ to ¾ inch (1 to 2 centimeters) longer than your feet  Your shoes should also have extra space around the widest part of your feet  Walking or athletic shoes with laces or straps that adjust are best  Ask your care team provider for help to choose shoes that fit you best  Ask him or her if you need to wear an insert, orthotic, or bandage on your feet  · Go to your follow-up visits  Your care team provider will do a foot exam at least once a year  You may need a foot exam more often if you have nerve damage, foot deformities, or ulcers  He will check for nerve damage and how well you can feel your feet  He will check your shoes to see if they fit well  · Do not smoke  Smoking can damage your blood vessels and put you at increased risk for foot ulcers  Ask your care team provider for information if you currently smoke and need help to quit  E-cigarettes or smokeless tobacco still contain nicotine  Talk to your care team provider before you use these products  Follow up with your diabetes care team provider or foot specialist as directed: You will need to have your feet checked at least once a year  You may need a foot exam more often if you have nerve damage, foot deformities, or ulcers  Write down your questions so you remember to ask them during your visits  © Copyright [employer] 2022 Information is for End User's use only and may not be sold, redistributed or otherwise used for commercial purposes  All illustrations and images included in CareNotes® are the copyrighted property of [employer]  or [place]   The above information is an  only  It is not intended as medical advice for individual conditions or treatments   Talk to your doctor, nurse or pharmacist before following any medical regimen to see if it is safe and effective for you  10% - bad control"> 10% - bad control,Hemoglobin A1c (HbA1c) greater than 10% indicating poor diabetic control,Haemoglobin A1c greater than 10% indicating poor diabetic control">   Diabetes Mellitus Type 2 in Adults, Ambulatory Care   GENERAL INFORMATION:   Diabetes mellitus type 2  is a disease that affects how your body uses glucose (sugar)  Insulin helps move sugar out of the blood so it can be used for energy  Normally, when the blood sugar level increases, the pancreas makes more insulin  Type 2 diabetes develops because either the body cannot make enough insulin, or it cannot use the insulin correctly  After many years, your pancreas may stop making insulin  Common symptoms include the following:   · More hunger or thirst than usual     · Frequent urination     · Weight loss without trying     · Blurred vision  Seek immediate care for the following symptoms:   · Severe abdominal pain, or pain that spreads to your back  You may also be vomiting  · Trouble staying awake or focusing    · Shaking or sweating    · Blurred or double vision    · Breath has a fruity, sweet smell    · Breathing is deep and labored, or rapid and shallow    · Heartbeat is fast and weak  Treatment for diabetes mellitus type 2  includes keeping your blood sugar at a normal level  You must eat the right foods, and exercise regularly  You may also need medicine if you cannot control your blood sugar level with nutrition and exercise  Manage diabetes mellitus type 2:   · Check your blood sugar level  You will be taught how to check a small drop of blood in a glucose monitor  Ask your healthcare provider when and how often to check during the day  Ask your healthcare provider what your blood sugar levels should be when you check them  · Keep track of carbohydrates (sugar and starchy foods)    Your blood sugar level can get too high if you eat too many carbohydrates  Your dietitian will help you plan meals and snacks that have the right amount of carbohydrates  · Eat low-fat foods  Some examples are skinless chicken and low-fat milk  · Eat less sodium (salt)  Some examples of high-sodium foods to limit are soy sauce, potato chips, and soup  Do not add salt to food you cook  Limit your use of table salt  · Eat high-fiber foods  Foods that are a good source of fiber include vegetables, whole grain bread, and beans  · Limit alcohol  Alcohol affects your blood sugar level and can make it harder to manage your diabetes  Women should limit alcohol to 1 drink a day  Men should limit alcohol to 2 drinks a day  A drink of alcohol is 12 ounces of beer, 5 ounces of wine, or 1½ ounces of liquor  · Get regular exercise  Exercise can help keep your blood sugar level steady, decrease your risk of heart disease, and help you lose weight  Exercise for at least 30 minutes, 5 days a week  Include muscle strengthening activities 2 days each week  Work with your healthcare provider to create an exercise plan  · Check your feet each day  for injuries or open sores  Ask your healthcare provider for activities you can do if you have an open sore  · Quit smoking  If you smoke, it is never too late to quit  Smoking can worsen the problems that may occur with diabetes  Ask your healthcare provider for information about how to stop smoking if you are having trouble quitting  · Ask about your weight:  Ask healthcare providers if you need to lose weight, and how much to lose  Ask them to help you with a weight loss program  Even a 10 to 15 pound weight loss can help you manage your blood sugar level  · Carry medical alert identification  Wear medical alert jewelry or carry a card that says you have diabetes  Ask your healthcare provider where to get these items  · Ask about vaccines    Diabetes puts you at risk of serious illness if you get the flu, pneumonia, or hepatitis  Ask your healthcare provider if you should get a flu, pneumonia, or hepatitis B vaccine, and when to get the vaccine  Follow up with your healthcare provider as directed:  Write down your questions so you remember to ask them during your visits  CARE AGREEMENT:   You have the right to help plan your care  Learn about your health condition and how it may be treated  Discuss treatment options with your caregivers to decide what care you want to receive  You always have the right to refuse treatment  The above information is an  only  It is not intended as medical advice for individual conditions or treatments  Talk to your doctor, nurse or pharmacist before following any medical regimen to see if it is safe and effective for you  © 2014 [address] is for End User's use only and may not be sold, redistributed or otherwise used for commercial purposes  All illustrations and images included in CareNotes® are the copyrighted property of [employer]  or [employer]ib Biggs  Type 2 Diabetes in Adults: New Diagnosis   AMBULATORY CARE:   Type 2 diabetes  is a disease that affects how your body uses glucose (sugar)  Normally, when the blood sugar level increases, the pancreas makes more insulin  Insulin helps move sugar out of the blood so it can be used for energy  Type 2 diabetes develops because either the body cannot make enough insulin, or it cannot use the insulin correctly  Type 2 diabetes can be controlled to prevent damage to your heart, blood vessels, and other organs  Common symptoms include the following:   · Numbness in your fingers or toes    · Blurred vision    · Frequent urination    · More hunger or thirst than usual    Have someone call your local emergency number (911 in the [address] [place] [address],[address]) if:   · You have any of the following signs of a stroke:      ? Numbness or drooping on one side of your face     ?  Weakness in an arm or leg    ? Confusion or difficulty speaking    ? Dizziness, a severe headache, or vision loss    · You have any of the following signs of a heart attack:      ? Squeezing, pressure, or pain in your chest    ? You may  also have any of the following:     § Discomfort or pain in your back, neck, jaw, stomach, or arm    § Shortness of breath    § Nausea or vomiting    § Lightheadedness or a sudden cold sweat    · You have trouble breathing  Seek care immediately if:   · You have severe abdominal pain  · You vomit for more than 2 hours  · You have trouble staying awake or focusing  · You are shaking or sweating  · You feel weak or more tired than usual     · You have blurred or double vision  · Your breath has a fruity, sweet smell  Call your doctor or diabetes care team if:   · Your arms and legs are swollen  · You have an upset stomach and cannot eat the foods on your meal plan  · You feel dizzy, have headaches, or are easily irritated  · Your skin is red, warm, dry, or swollen  · You have a wound that does not heal     · You have numbness in your arms or legs  · You have trouble coping with your illness, or you feel anxious or depressed  · You have questions or concerns about your condition or care  Treatment for type 2 diabetes  helps prevent or delay complications, including heart and kidney disease  You must eat healthy meals and do regular physical activity  You may  need any of the following:  · Diabetes medicines or insulin  may be given to decrease the amount of sugar in your blood  · Blood pressure medicine  may be given to lower your blood pressure  · Cholesterol-lowering medicine  may be given to prevent heart disease  · Antiplatelets , such as aspirin, help prevent blood clots  Take your antiplatelet medicine exactly as directed  These medicines make it more likely for you to bleed or bruise   If you are told to take aspirin, do not take acetaminophen or ibuprofen instead  · Take your medicine as directed  Contact your healthcare provider if you think your medicine is not helping or if you have side effects  Tell him or her if you are allergic to any medicine  Keep a list of the medicines, vitamins, and herbs you take  Include the amounts, and when and why you take them  Bring the list or the pill bottles to follow-up visits  Carry your medicine list with you in case of an emergency  Diabetes education:  Diabetes education will start right away  Diabetes education may also happen later to refresh your memory  Your diabetes care team may include physicians, nurse practitioners, and physician assistants  It may also include nurses, dietitians, exercise specialists, pharmacists, dentists, and podiatrists  Family members, or others who are close to you, may also be part of the team  You and your team will make goals and plans to manage diabetes and other health problems  The plans and goals will be specific to your needs  Members of your diabetes care team teach you the following:  · About nutrition:  A dietitian will help you make a meal plan to keep your blood sugar level steady  You will learn how food affects your blood sugar levels  You will also learn to keep track of sugar and starchy foods (carbohydrates)  Do not skip meals  Your blood sugar level may drop too low if you have taken diabetes medicine and do not eat  You may be taught to use the plate method for portion control  With the plate method, ½ of your plate contains vegetables  The other half is divided so that ¼ contains protein or meat, and ¼ contains starches, such as potatoes  · About physical activity and diabetes: You will learn why physical activity, such as walking, is important  You and your care team provider will make a plan for your activity  Your care team provider will tell you what a healthy weight is for you   He or she will help you make a plan to get to that weight and stay there  Maintain a healthy weight to help delay or prevent complications of diabetes  · About your blood sugar level: You will learn what your blood sugar level should be  You will be given information on when and how to check your blood sugar level  You may need to check by testing a drop of blood in a glucose monitor  You may instead be given a continuous glucose monitoring (CGM) device  A sensor is placed in your abdomen or on your arm  You put a transmitter on the sensor to get a reading that shows up on the monitor  You will learn what to do if your level is too high or too low  Write down the times of your checks and your levels  Take them to all follow-up appointments  · About diabetes medicine: You may need oral diabetes medicine, insulin, or both to help control your blood sugar levels  Your healthcare provider will teach you how and when to take oral diabetes medicine  You will also be taught about side effects oral diabetes medicine can cause  Insulin may be added if oral diabetes medicine becomes less effective over time  Insulin may be injected, or given through a pump or pen  You and your care team will discuss which method is best for you  ? An insulin pump  is an implanted device that gives your insulin 24 hours a day  An insulin pump prevents the need for multiple insulin injections in a day  ? An insulin pen  is a device prefilled with the right amount of insulin  ? You and your family members will be taught how to draw up and give insulin  if this is the best method for you  Your education team will also teach you how to dispose of needles and syringes  ? You will learn how much insulin you need  and when to give it  You will be taught when not to give insulin  You will also be taught what to do if your blood sugar level drops too low  This may happen if you take insulin and do not eat the right amount of carbohydrates      Other ways to help manage type 2 diabetes:   · Talk to your care team if you have increased stress about your diagnosis  When you feel stressed about your diagnosis, it can cause you not to take care of yourself properly  Your care team can help by offering tips about self-care  Your care team may suggest you talk to a mental health provider  The provider can listen and offer help with self-care issues  · Check your feet each day for sores  Wear shoes and socks that fit correctly  Do not trim your toenails  Go to a podiatrist  Ask your care team for more information about foot care  · Do not smoke  Nicotine and other chemicals in cigarettes and cigars can cause lung damage and make diabetes harder to manage  Ask your care team provider for information if you currently smoke and need help to quit  E-cigarettes or smokeless tobacco still contain nicotine  Talk to your care team provider before you use these products  · Drink water instead of sugary drinks such as soft drinks and fruit juices  Sugary drinks cause high blood sugar and cause an increase in your weight  Your healthcare provider may tell you that diet drinks do not help with weight loss  · Know the risks if you choose to drink alcohol  Alcohol can cause your blood sugar levels to be low if you use insulin  Alcohol can cause high blood sugar levels and weight gain if you drink too much  A drink of alcohol is 12 ounces of beer, 5 ounces of wine, or 1½ ounces of liquor  Your care team can tell you how many drinks are okay to have in 24 hours and in 1 week  · Check your blood pressure as directed  If you have high blood pressure (BP), talk to your care team about your BP goals  Together you can create a plan to lower your BP if needed and keep it in a healthy range  The plan may include lifestyle changes or medicines  A normal BP is 119/79 or lower  A normal blood pressure can help prevent or delay certain complications from diabetes  Examples include retinopathy (eye damage) and kidney damage  · Wear medical alert identification  Wear medical alert jewelry or carry a card that says you have diabetes  Ask your care team provider where to get these items  · Ask about vaccines you may need  You have a higher risk for serious illness if you get the flu, pneumonia, COVID-19, or hepatitis  Ask your provider if you should get a flu, pneumonia, or hepatitis B vaccine, and when to get the COVID-19 vaccine  Risks of type 2 diabetes: It is important to learn to keep your diabetes in control  Uncontrolled diabetes can damage your nerves, veins, and arteries  Your risk for dementia increases faster the longer your diabetes is not controlled  High blood sugar levels may damage other body tissues and organs over time  Damage to arteries may increase your risk for heart attack and stroke  Nerve damage may also lead to other heart, stomach, and nerve problems  Diabetes can become life-threatening if it is not controlled  Follow up with your care team providers as directed: You may need to return to have your A1c checked every 3 months  You will need to have your feet checked during at least 1 visit each year  You will need an eye exam 1 time each year to check for retinopathy  You will also need urine tests every year to check for kidney problems  You may need tests to monitor for heart disease, such as an EKG, stress test, blood pressure monitoring, and blood tests  Write down your questions so you remember to ask them during your visits  © Copyright [employer] 2022 Information is for End User's use only and may not be sold, redistributed or otherwise used for commercial purposes  All illustrations and images included in CareNotes® are the copyrighted property of [employer]  or [place]   The above information is an  only  It is not intended as medical advice for individual conditions or treatments   Talk to your doctor, nurse or pharmacist before following any medical regimen to see if it is safe and effective for you  Basic Carbohydrate Counting   AMBULATORY CARE:   Carbohydrate counting  is a way to plan your meals by counting the amount of carbohydrate in foods  Carbohydrates are the sugars, starches, and fiber found in fruit, grains, vegetables, and milk products  Carbohydrates increase your blood sugar levels  Carbohydrate counting can help you eat the right amount of carbohydrate to keep your blood sugar levels under control  What you need to know about planning meals using carbohydrate counting:  · A dietitian or healthcare provider will help you develop a healthy meal plan that works best for you  You will be taught how much carbohydrate to eat or drink for each meal and snack  Your meal plan will be based on your age, weight, usual food intake, and physical activity level  If you have diabetes, it will also include your blood sugar levels and diabetes medicine  Once you know how much carbohydrate you should eat, you can decide what type of food you want to eat  · You will need to know what foods contain carbohydrate and how much they contain  Keep track of the amount of carbohydrate in meals and snacks in order to follow your meal plan  Do not avoid carbohydrates or skip meals  Your blood sugar may fall too low if you do not eat enough carbohydrate or you skip meals  Foods that contain carbohydrate:   · Breads:  Each serving of food listed below contains about 15 g of carbohydrate   ? 1 slice of bread (1 ounce) or 1 flour or corn tortilla (6 inch)    ? ½ of a hamburger bun or ¼ of a large bagel (about 1 ounce)    ? 1 pancake (about 4 inches across and ¼ inch thick)    · Cereals and grains:  Serving sizes of ready-to-eat cereals vary  Look at the serving size and the total carbohydrate amount listed on the food label   Each serving of food listed below contains about 15 g of carbohydrate      ? ¾ cup of dry, unsweetened, ready-to-eat cereal or ¼ cup of low-fat granola     ? ½ cup of oatmeal or other cooked cereal     ? ? cup of cooked rice or pasta    · Starchy vegetables and beans:  Each serving of food listed below contains about 15 g of carbohydrate   ? ½ cup of corn, green peas, sweet potatoes, or mashed potatoes    ? ¼ of a large baked potato    ? ½ cup of beans, lentils, and peas (garbanzo, carmona, kidney, white, split, black-eyed)    · Crackers and snacks:  Each serving of food listed below contains about 15 g of carbohydrate   ? 3 genesis cracker squares or 8 animal crackers     ? 6 saltine-type crackers    ? 3 cups of popcorn or ¾ ounce of pretzels, potato chips, or tortilla chips    · Fruit:  Each serving of food listed below contains about 15 g of carbohydrate   ? 1 small (4 ounce) piece of fresh fruit or ¾ to 1 cup of fresh fruit    ? ½ cup of canned or frozen fruit, packed in natural juice    ? ½ cup (4 ounces) of unsweetened fruit juice    ? 2 tablespoons of dried fruit    · Desserts or sugary foods:  Each serving of food listed below contains about 15 g of carbohydrate   ? 2-inch square unfrosted cake or brownie     ? 2 small cookies    ? ½ cup of ice cream, frozen yogurt, or nondairy frozen yogurt    ? ¼ cup of sherbet or sorbet    ? 1 tablespoon of regular syrup, jam, or jelly    ? 2 tablespoons of light syrup    · Milk and yogurt:  Foods from the milk group contain about 12 g of carbohydrate per serving  ? 1 cup of fat-free or low-fat milk    ? 1 cup of soy milk    ? ? cup of fat-free, yogurt sweetened with artificial sweetener    · Non-starchy vegetables:  Each serving contains about 5 g of carbohydrate   Three servings of non-starch vegetables count as 1 carbohydrate serving  ? ½ cup of cooked vegetables or 1 cup of raw vegetables  This includes beets, broccoli, cabbage, cauliflower, cucumber, mushrooms, tomatoes, and zucchini    ?  ½ cup of vegetable juice    How to use carbohydrate counting to plan meals:   · Count carbohydrate amounts using serving sizes:      ? Pasta dinner example: You plan to have pasta, tossed salad, and an 8-ounce glass of milk  Your healthcare provider tells you that you may have 4 carbohydrate servings for dinner  One carbohydrate serving of pasta is ? cup  One cup of pasta will equal 3 carbohydrate servings  An 8-ounce glass of milk will count as 1 carbohydrate serving  These amounts of food would equal 4 carbohydrate servings  One cup of tossed salad does not count toward your carbohydrate servings  · Count carbohydrate amounts using food labels:  Find the total amount of carbohydrate in a packaged food by reading the food label  Food labels tell you the serving size of the food and the total carbohydrate amount in each serving  Find the serving size on the food label and then decide how many servings you will eat  Multiply the number of servings you plan to eat by the carbohydrate amount per serving  ? Granola bar snack example: Your meal plan allows you to have 2 carbohydrate servings (30 grams) of carbohydrate for a snack  You plan to eat 1 package of granola bars, which contains 2 bars  According to the food label, the serving size of food in this package is 1 bar  Each serving (1 bar) contains 25 grams of carbohydrate  The total amount of carbohydrate in this package of granola bars would be 50 g  Based on your meal plan, you should eat only 1 bar  Follow up with your doctor as directed:  Write down your questions so you remember to ask them during your visits  © Copyright [employer] 2022 Information is for End User's use only and may not be sold, redistributed or otherwise used for commercial purposes  All illustrations and images included in CareNotes® are the copyrighted property of A D A M [employer] Inc  or [facility] [place]   The above information is an  only   It is not intended as medical advice for individual conditions or treatments  Talk to your doctor, nurse or pharmacist before following any medical regimen to see if it is safe and effective for you  Foot Care for People with Diabetes   AMBULATORY CARE:   What you need to know about foot care:   · Foot care helps protect your feet and prevent foot ulcers or sores  Long-term high blood sugar levels can damage the blood vessels and nerves in your legs and feet  This damage makes it hard to feel pressure, pain, temperature, and touch  You may not be able to feel a cut or sore, or shoes that are too tight  Foot care is needed to prevent serious problems, such as an infection or amputation  · Diabetes may cause your toes to become crooked or curved under  These changes may affect the way you walk and can lead to increased pressure on your foot  The pressure can decrease blood flow to your feet  Lack of blood flow increases your risk for a foot ulcer  Do not ignore small problems, such as dry skin or small wounds  These can become life-threatening over time without proper care  Call your care team provider if:   · Your feet become numb, weak, or hard to move  · You have pus draining from a sore on your foot  · You have a wound on your foot that gets bigger, deeper, or does not heal      · You see blisters, cuts, scratches, calluses, or sores on your foot  · You have a fever, and your feet become red, warm, and swollen  · Your toenails become thick, curled, or yellow  · You find it hard to check your feet because your vision is poor  · You have questions or concerns about your condition or care  How to care for your feet:   · Check your feet each day  Look at your whole foot, including the bottom, and between and under your toes  Check for wounds, corns, and calluses  Use a mirror to see the bottom of your feet  The skin on your feet may be shiny, tight, or darker than normal  Your feet may also be cold and pale   Feel your feet by running your hands along the tops, bottoms, sides, and between your toes  Redness, swelling, and warmth are signs of blood flow problems that can lead to a foot ulcer  Do not try to remove corns or calluses yourself  · Wash your feet each day with soap and warm water  Do not use hot water, because this can injure your foot  Dry your feet gently with a towel after you wash them  Dry between and under your toes  · Apply lotion or a moisturizer on your dry feet  Ask your care team provider what lotions are best to use  Do not put lotion or moisturizer between your toes  Moisture between your toes could lead to skin breakdown  · Cut your toenails correctly  File or cut your toenails straight across  Use a soft brush to clean around your toenails  If your toenails are very thick, you may need to have a care team provider or specialist cut them  · Protect your feet  Do not walk barefoot or wear your shoes without socks  Check your shoes for rocks or other objects that can hurt your feet  Wear cotton socks to help keep your feet dry  Wear socks without toe seams, or wear them with the seams inside out  Change your socks each day  Do not wear socks that are dirty or damp  · Wear shoes that fit well  Wear shoes that do not rub against any area of your feet  Your shoes should be ½ to ¾ inch (1 to 2 centimeters) longer than your feet  Your shoes should also have extra space around the widest part of your feet  Walking or athletic shoes with laces or straps that adjust are best  Ask your care team provider for help to choose shoes that fit you best  Ask him or her if you need to wear an insert, orthotic, or bandage on your feet  · Go to your follow-up visits  Your care team provider will do a foot exam at least once a year  You may need a foot exam more often if you have nerve damage, foot deformities, or ulcers  He will check for nerve damage and how well you can feel your feet   He will check your shoes to see if they fit well  · Do not smoke  Smoking can damage your blood vessels and put you at increased risk for foot ulcers  Ask your care team provider for information if you currently smoke and need help to quit  E-cigarettes or smokeless tobacco still contain nicotine  Talk to your care team provider before you use these products  Follow up with your diabetes care team provider or foot specialist as directed: You will need to have your feet checked at least once a year  You may need a foot exam more often if you have nerve damage, foot deformities, or ulcers  Write down your questions so you remember to ask them during your visits  © [employer] 2022 Information is for End User's use only and may not be sold, redistributed or otherwise used for commercial purposes  All illustrations and images included in CareNotes® are the copyrighted property of A [employer] M [employer]  or [place]   The above information is an  only  It is not intended as medical advice for individual conditions or treatments  Talk to your doctor, nurse or pharmacist before following any medical regimen to see if it is safe and effective for you  10% - bad control"> 10% - bad control,Hemoglobin A1c (HbA1c) greater than 10% indicating poor diabetic control,Haemoglobin A1c greater than 10% indicating poor diabetic control">   Diabetes Mellitus Type 2 in Adults, Ambulatory Care   GENERAL INFORMATION:   Diabetes mellitus type 2  is a disease that affects how your body uses glucose (sugar)  Insulin helps move sugar out of the blood so it can be used for energy  Normally, when the blood sugar level increases, the pancreas makes more insulin  Type 2 diabetes develops because either the body cannot make enough insulin, or it cannot use the insulin correctly  After many years, your pancreas may stop making insulin    Common symptoms include the following:   · More hunger or thirst than usual · Frequent urination     · Weight loss without trying     · Blurred vision  Seek immediate care for the following symptoms:   · Severe abdominal pain, or pain that spreads to your back  You may also be vomiting  · Trouble staying awake or focusing    · Shaking or sweating    · Blurred or double vision    · Breath has a fruity, sweet smell    · Breathing is deep and labored, or rapid and shallow    · Heartbeat is fast and weak  Treatment for diabetes mellitus type 2  includes keeping your blood sugar at a normal level  You must eat the right foods, and exercise regularly  You may also need medicine if you cannot control your blood sugar level with nutrition and exercise  Manage diabetes mellitus type 2:   · Check your blood sugar level  You will be taught how to check a small drop of blood in a glucose monitor  Ask your healthcare provider when and how often to check during the day  Ask your healthcare provider what your blood sugar levels should be when you check them  · Keep track of carbohydrates (sugar and starchy foods)  Your blood sugar level can get too high if you eat too many carbohydrates  Your dietitian will help you plan meals and snacks that have the right amount of carbohydrates  · Eat low-fat foods  Some examples are skinless chicken and low-fat milk  · Eat less sodium (salt)  Some examples of high-sodium foods to limit are soy sauce, potato chips, and soup  Do not add salt to food you cook  Limit your use of table salt  · Eat high-fiber foods  Foods that are a good source of fiber include vegetables, whole grain bread, and beans  · Limit alcohol  Alcohol affects your blood sugar level and can make it harder to manage your diabetes  Women should limit alcohol to 1 drink a day  Men should limit alcohol to 2 drinks a day  A drink of alcohol is 12 ounces of beer, 5 ounces of wine, or 1½ ounces of liquor  · Get regular exercise    Exercise can help keep your blood sugar level steady, decrease your risk of heart disease, and help you lose weight  Exercise for at least 30 minutes, 5 days a week  Include muscle strengthening activities 2 days each week  Work with your healthcare provider to create an exercise plan  · Check your feet each day  for injuries or open sores  Ask your healthcare provider for activities you can do if you have an open sore  · Quit smoking  If you smoke, it is never too late to quit  Smoking can worsen the problems that may occur with diabetes  Ask your healthcare provider for information about how to stop smoking if you are having trouble quitting  · Ask about your weight:  Ask healthcare providers if you need to lose weight, and how much to lose  Ask them to help you with a weight loss program  Even a 10 to 15 pound weight loss can help you manage your blood sugar level  · Carry medical alert identification  Wear medical alert jewelry or carry a card that says you have diabetes  Ask your healthcare provider where to get these items  · Ask about vaccines  Diabetes puts you at risk of serious illness if you get the flu, pneumonia, or hepatitis  Ask your healthcare provider if you should get a flu, pneumonia, or hepatitis B vaccine, and when to get the vaccine  Follow up with your healthcare provider as directed:  Write down your questions so you remember to ask them during your visits  CARE AGREEMENT:   You have the right to help plan your care  Learn about your health condition and how it may be treated  Discuss treatment options with your caregivers to decide what care you want to receive  You always have the right to refuse treatment  The above information is an  only  It is not intended as medical advice for individual conditions or treatments  Talk to your doctor, nurse or pharmacist before following any medical regimen to see if it is safe and effective for you    © 2014 [address] is for End User's use only and may not be sold, redistributed or otherwise used for commercial purposes  All illustrations and images included in CareNotes® are the copyrighted property of [employer] M [employer]  or [employer]  Basic Carbohydrate Counting   AMBULATORY CARE:   Carbohydrate counting  is a way to plan your meals by counting the amount of carbohydrate in foods  Carbohydrates are the sugars, starches, and fiber found in fruit, grains, vegetables, and milk products  Carbohydrates increase your blood sugar levels  Carbohydrate counting can help you eat the right amount of carbohydrate to keep your blood sugar levels under control  What you need to know about planning meals using carbohydrate counting:  · A dietitian or healthcare provider will help you develop a healthy meal plan that works best for you  You will be taught how much carbohydrate to eat or drink for each meal and snack  Your meal plan will be based on your age, weight, usual food intake, and physical activity level  If you have diabetes, it will also include your blood sugar levels and diabetes medicine  Once you know how much carbohydrate you should eat, you can decide what type of food you want to eat  · You will need to know what foods contain carbohydrate and how much they contain  Keep track of the amount of carbohydrate in meals and snacks in order to follow your meal plan  Do not avoid carbohydrates or skip meals  Your blood sugar may fall too low if you do not eat enough carbohydrate or you skip meals  Foods that contain carbohydrate:   · Breads:  Each serving of food listed below contains about 15 g of carbohydrate   ? 1 slice of bread (1 ounce) or 1 flour or corn tortilla (6 inch)    ? ½ of a hamburger bun or ¼ of a large bagel (about 1 ounce)    ? 1 pancake (about 4 inches across and ¼ inch thick)    · Cereals and grains:  Serving sizes of ready-to-eat cereals vary   Look at the serving size and the total carbohydrate amount listed on the food label  Each serving of food listed below contains about 15 g of carbohydrate   ? ¾ cup of dry, unsweetened, ready-to-eat cereal or ¼ cup of low-fat granola     ? ½ cup of oatmeal or other cooked cereal     ? ? cup of cooked rice or pasta    · Starchy vegetables and beans:  Each serving of food listed below contains about 15 g of carbohydrate   ? ½ cup of corn, green peas, sweet potatoes, or mashed potatoes    ? ¼ of a large baked potato    ? ½ cup of beans, lentils, and peas (garbanzo, carmona, kidney, white, split, black-eyed)    · Crackers and snacks:  Each serving of food listed below contains about 15 g of carbohydrate   ? 3 genesis cracker squares or 8 animal crackers     ? 6 saltine-type crackers    ? 3 cups of popcorn or ¾ ounce of pretzels, potato chips, or tortilla chips    · Fruit:  Each serving of food listed below contains about 15 g of carbohydrate   ? 1 small (4 ounce) piece of fresh fruit or ¾ to 1 cup of fresh fruit    ? ½ cup of canned or frozen fruit, packed in natural juice    ? ½ cup (4 ounces) of unsweetened fruit juice    ? 2 tablespoons of dried fruit    · Desserts or sugary foods:  Each serving of food listed below contains about 15 g of carbohydrate   ? 2-inch square unfrosted cake or brownie     ? 2 small cookies    ? ½ cup of ice cream, frozen yogurt, or nondairy frozen yogurt    ? ¼ cup of sherbet or sorbet    ? 1 tablespoon of regular syrup, jam, or jelly    ? 2 tablespoons of light syrup    · Milk and yogurt:  Foods from the milk group contain about 12 g of carbohydrate per serving  ? 1 cup of fat-free or low-fat milk    ? 1 cup of soy milk    ? ? cup of fat-free, yogurt sweetened with artificial sweetener    · Non-starchy vegetables:  Each serving contains about 5 g of carbohydrate   Three servings of non-starch vegetables count as 1 carbohydrate serving  ? ½ cup of cooked vegetables or 1 cup of raw vegetables   This includes beets, broccoli, cabbage, cauliflower, cucumber, mushrooms, tomatoes, and zucchini    ? ½ cup of vegetable juice    How to use carbohydrate counting to plan meals:   · Count carbohydrate amounts using serving sizes:      ? Pasta dinner example: You plan to have pasta, tossed salad, and an 8-ounce glass of milk  Your healthcare provider tells you that you may have 4 carbohydrate servings for dinner  One carbohydrate serving of pasta is ? cup  One cup of pasta will equal 3 carbohydrate servings  An 8-ounce glass of milk will count as 1 carbohydrate serving  These amounts of food would equal 4 carbohydrate servings  One cup of tossed salad does not count toward your carbohydrate servings  · Count carbohydrate amounts using food labels:  Find the total amount of carbohydrate in a packaged food by reading the food label  Food labels tell you the serving size of the food and the total carbohydrate amount in each serving  Find the serving size on the food label and then decide how many servings you will eat  Multiply the number of servings you plan to eat by the carbohydrate amount per serving  ? Granola bar snack example: Your meal plan allows you to have 2 carbohydrate servings (30 grams) of carbohydrate for a snack  You plan to eat 1 package of granola bars, which contains 2 bars  According to the food label, the serving size of food in this package is 1 bar  Each serving (1 bar) contains 25 grams of carbohydrate  The total amount of carbohydrate in this package of granola bars would be 50 g  Based on your meal plan, you should eat only 1 bar  Follow up with your doctor as directed:  Write down your questions so you remember to ask them during your visits  © Copyright [employer] 2022 Information is for End User's use only and may not be sold, redistributed or otherwise used for commercial purposes   All illustrations and images included in CareNotes® are the copyrighted property of A D A M [employer] Inc  or [address] [employer]   The above information is an  only  It is not intended as medical advice for individual conditions or treatments  Talk to your doctor, nurse or pharmacist before following any medical regimen to see if it is safe and effective for you  Foot Care for People with Diabetes   AMBULATORY CARE:   What you need to know about foot care:   · Foot care helps protect your feet and prevent foot ulcers or sores  Long-term high blood sugar levels can damage the blood vessels and nerves in your legs and feet  This damage makes it hard to feel pressure, pain, temperature, and touch  You may not be able to feel a cut or sore, or shoes that are too tight  Foot care is needed to prevent serious problems, such as an infection or amputation  · Diabetes may cause your toes to become crooked or curved under  These changes may affect the way you walk and can lead to increased pressure on your foot  The pressure can decrease blood flow to your feet  Lack of blood flow increases your risk for a foot ulcer  Do not ignore small problems, such as dry skin or small wounds  These can become life-threatening over time without proper care  Call your care team provider if:   · Your feet become numb, weak, or hard to move  · You have pus draining from a sore on your foot  · You have a wound on your foot that gets bigger, deeper, or does not heal      · You see blisters, cuts, scratches, calluses, or sores on your foot  · You have a fever, and your feet become red, warm, and swollen  · Your toenails become thick, curled, or yellow  · You find it hard to check your feet because your vision is poor  · You have questions or concerns about your condition or care  How to care for your feet:   · Check your feet each day  Look at your whole foot, including the bottom, and between and under your toes  Check for wounds, corns, and calluses  Use a mirror to see the bottom of your feet  The skin on your feet may be shiny, tight, or darker than normal  Your feet may also be cold and pale  Feel your feet by running your hands along the tops, bottoms, sides, and between your toes  Redness, swelling, and warmth are signs of blood flow problems that can lead to a foot ulcer  Do not try to remove corns or calluses yourself  · Wash your feet each day with soap and warm water  Do not use hot water, because this can injure your foot  Dry your feet gently with a towel after you wash them  Dry between and under your toes  · Apply lotion or a moisturizer on your dry feet  Ask your care team provider what lotions are best to use  Do not put lotion or moisturizer between your toes  Moisture between your toes could lead to skin breakdown  · Cut your toenails correctly  File or cut your toenails straight across  Use a soft brush to clean around your toenails  If your toenails are very thick, you may need to have a care team provider or specialist cut them  · Protect your feet  Do not walk barefoot or wear your shoes without socks  Check your shoes for rocks or other objects that can hurt your feet  Wear cotton socks to help keep your feet dry  Wear socks without toe seams, or wear them with the seams inside out  Change your socks each day  Do not wear socks that are dirty or damp  · Wear shoes that fit well  Wear shoes that do not rub against any area of your feet  Your shoes should be ½ to ¾ inch (1 to 2 centimeters) longer than your feet  Your shoes should also have extra space around the widest part of your feet  Walking or athletic shoes with laces or straps that adjust are best  Ask your care team provider for help to choose shoes that fit you best  Ask him or her if you need to wear an insert, orthotic, or bandage on your feet  · Go to your follow-up visits  Your care team provider will do a foot exam at least once a year   You may need a foot exam more often if you have nerve damage, foot deformities, or ulcers  He will check for nerve damage and how well you can feel your feet  He will check your shoes to see if they fit well  · Do not smoke  Smoking can damage your blood vessels and put you at increased risk for foot ulcers  Ask your care team provider for information if you currently smoke and need help to quit  E-cigarettes or smokeless tobacco still contain nicotine  Talk to your care team provider before you use these products  Follow up with your diabetes care team provider or foot specialist as directed: You will need to have your feet checked at least once a year  You may need a foot exam more often if you have nerve damage, foot deformities, or ulcers  Write down your questions so you remember to ask them during your visits  © Copyright [employer] 2022 Information is for End User's use only and may not be sold, redistributed or otherwise used for commercial purposes  All illustrations and images included in CareNotes® are the copyrighted property of A D A M , Inc  or [facility] [employer]   The above information is an  only  It is not intended as medical advice for individual conditions or treatments  Talk to your doctor, nurse or pharmacist before following any medical regimen to see if it is safe and effective for you

## 2022-06-27 NOTE — ASSESSMENT & PLAN NOTE
Lab Results   Component Value Date    HGBA1C 10 0 (A) 06/27/2022   DW patient options and will start the Metformin and Ozempic and rto for a recheck 3 months

## 2022-06-27 NOTE — PROGRESS NOTES
Assessment/Plan:         Problem List Items Addressed This Visit        Endocrine    Type 2 diabetes mellitus with hyperglycemia, without long-term current use of insulin ([facility] [address] ) - Primary       Lab Results   Component Value Date    HGBA1C 10 0 (A) 06/27/2022   DW patient options and will start the Metformin and Ozempic and rto for a recheck 3 months            Relevant Medications    metFORMIN (GLUCOPHAGE-XR) 500 mg 24 hr tablet    Semaglutide,0 25 or 0 5MG/DOS, 2 MG/1 5ML SOPN    Blood Glucose Monitoring Suppl (OneTouch Verio Reflect) w/Device KIT    glucose blood (OneTouch Verio) test strip    OneTouch Delica Lancets 85T MISC    Insulin Pen Needle (B-D UF III MINI PEN NEEDLES) 31G X 5 MM MISC    Other Relevant Orders    Ambulatory referral to Diabetic Education    Hemoglobin A1C    Comprehensive metabolic panel    CBC and differential    Microalbumin / creatinine urine ratio    Lipid Panel with Direct LDL reflex    TSH, 3rd generation with Free T4 reflex       Other    Elevated glucose    Relevant Medications    metFORMIN (GLUCOPHAGE-XR) 500 mg 24 hr tablet    Semaglutide,0 25 or 0 5MG/DOS, 2 MG/1 5ML SOPN    Blood Glucose Monitoring Suppl (OneTouch Verio Reflect) w/Device KIT    glucose blood (OneTouch Verio) test strip    OneTouch Delica Lancets 95T MISC    Insulin Pen Needle (B-D UF III MINI PEN NEEDLES) 31G X 5 MM MISC    Other Relevant Orders    POCT hemoglobin A1c (Completed)    IRIS Diabetic eye exam    Hemoglobin A1C    Comprehensive metabolic panel    CBC and differential    Microalbumin / creatinine urine ratio    Lipid Panel with Direct LDL reflex    TSH, 3rd generation with Free T4 reflex            Subjective:      Patient ID: More Brantley is a 27 y o  female  Patient here today to establish care  Patient was in the Emergency room recently had pain in her groin was diagnosed with abscess in her groin and went away with the Augmentin   Patient was also found to have elevated blood sugars in the ED and here in the office her HA1C was 10%  Patient was on medication insulin for her pregnancy and has not seen endocrine  The following portions of the patient's history were reviewed and updated as appropriate:   She  has a past medical history of Gestational diabetes and Migraine  She   Patient Active Problem List    Diagnosis Date Noted    Type 2 diabetes mellitus with hyperglycemia, without long-term current use of insulin ([facility] [address] ) 2022    Elevated glucose 2022     She  has a past surgical history that includes pr  delivery only (N/A, 6/3/2016); pr  delivery only (N/A, 2018); Tubal ligation (N/A, 2018);  section (15, 16, 18); Cervical biopsy w/ loop electrode excision (2017?); DILATION AND CURETTAGE OF UTERUS WITH HYSTEROSOCPY (N/A, 2020); and Endometrial ablation (N/A, 2020)  Her family history includes Diabetes in her paternal grandfather; Down syndrome in her cousin; Hypertension in her father; Hyperthyroidism in her father and mother; No Known Problems in her brother, daughter, daughter, paternal grandmother, sister, and son  She  reports that she has never smoked  She has never used smokeless tobacco  She reports previous alcohol use  She reports that she does not use drugs  Current Outpatient Medications   Medication Sig Dispense Refill    Blood Glucose Monitoring Suppl (OneTouch Verio Reflect) w/Device KIT Check blood sugars once daily  Please substitute with appropriate alternative as covered by patient's insurance  Dx: E11 65 1 kit 0    glucose blood (OneTouch Verio) test strip Check blood sugars once daily  Please substitute with appropriate alternative as covered by patient's insurance   Dx: E11 65 100 each 3    Insulin Pen Needle (B-D UF III MINI PEN NEEDLES) 31G X 5 MM MISC Use every 7 days 30 each 0    metFORMIN (GLUCOPHAGE-XR) 500 mg 24 hr tablet Take 2 tablets (1,000 mg total) by mouth daily with breakfast 180 tablet 1    OneTouch Delica Lancets 25X MISC Check blood sugars once daily  Please substitute with appropriate alternative as covered by patient's insurance  Dx: E11 65 100 each 3    Semaglutide,0 25 or 0 5MG/DOS, 2 MG/1 5ML SOPN Inject 0 25 mg under the skin every 7 days for 30 days, THEN 0 5 mg every 7 days  4 5 mL 0     No current facility-administered medications for this visit  She has No Known Allergies       Review of Systems   Constitutional: Negative for activity change, appetite change, chills, diaphoresis, fatigue, fever and unexpected weight change  HENT: Negative for congestion, ear pain, hearing loss, postnasal drip, sinus pressure, sinus pain, sneezing, sore throat, trouble swallowing and voice change  Eyes: Negative for pain, redness and visual disturbance  Last eye exam was while ago and is wearing glasses for the past 2 years    Respiratory: Negative for apnea, cough, choking, chest tightness, shortness of breath, wheezing and stridor  Cardiovascular: Negative for chest pain, palpitations and leg swelling  Gastrointestinal: Negative for abdominal distention, abdominal pain, anal bleeding, blood in stool, constipation, diarrhea, nausea and vomiting  Endocrine: Positive for heat intolerance  Negative for cold intolerance, polydipsia, polyphagia and polyuria  Genitourinary: Negative  Musculoskeletal: Negative for arthralgias, back pain, gait problem, joint swelling, myalgias, neck pain and neck stiffness  Skin: Negative  Allergic/Immunologic: Negative for environmental allergies, food allergies and immunocompromised state  Neurological: Positive for dizziness  Negative for tremors, seizures, syncope, facial asymmetry, speech difficulty, weakness, light-headedness, numbness and headaches  Hematological: Negative for adenopathy  Does not bruise/bleed easily     Psychiatric/Behavioral: Negative for agitation, behavioral problems, confusion, dysphoric mood, hallucinations and sleep disturbance  The patient is not nervous/anxious and is not hyperactive  Objective:      /74   Pulse 96   Temp 99 4 °F (37 4 °C)   Ht 5' 2" (1 575 m)   Wt 64 4 kg (142 lb)   SpO2 98%   BMI 25 97 kg/m²          Physical Exam  Vitals and nursing note reviewed  Constitutional:       General: She is not in acute distress  Appearance: She is well-developed  HENT:      Head: Normocephalic and atraumatic  Right Ear: Tympanic membrane normal       Left Ear: Tympanic membrane normal       Nose: Nose normal       Mouth/Throat:      Mouth: Mucous membranes are moist    Eyes:      Pupils: Pupils are equal, round, and reactive to light  Neck:      Thyroid: No thyromegaly  Cardiovascular:      Rate and Rhythm: Normal rate and regular rhythm  Pulses: no weak pulses          Dorsalis pedis pulses are 2+ on the right side and 2+ on the left side  Posterior tibial pulses are 2+ on the right side and 2+ on the left side  Heart sounds: Normal heart sounds  No murmur heard  Pulmonary:      Effort: Pulmonary effort is normal  No respiratory distress  Breath sounds: Normal breath sounds  No wheezing  Abdominal:      General: Bowel sounds are normal       Palpations: Abdomen is soft  Musculoskeletal:         General: Normal range of motion  Cervical back: Normal range of motion  Feet:      Right foot:      Skin integrity: No ulcer, skin breakdown, erythema, warmth, callus or dry skin  Left foot:      Skin integrity: No ulcer, skin breakdown, erythema, warmth, callus or dry skin  Skin:     General: Skin is warm and dry  Neurological:      General: No focal deficit present  Mental Status: She is alert and oriented to person, place, and time  Psychiatric:         Mood and Affect: Mood normal          Behavior: Behavior normal          Thought Content:  Thought content normal          Judgment: Judgment normal          Patient's shoes and socks removed  Right Foot/Ankle   Right Foot Inspection  Skin Exam: skin normal and skin intact  No dry skin, no warmth, no callus, no erythema, no maceration, no abnormal color, no pre-ulcer, no ulcer and no callus  Toe Exam: ROM and strength within normal limits  Sensory   Vibration: intact  Proprioception: intact  Monofilament testing: intact    Vascular  Capillary refills: < 3 seconds  The right DP pulse is 2+  The right PT pulse is 2+  Left Foot/Ankle  Left Foot Inspection  Skin Exam: skin normal and skin intact  No dry skin, no warmth, no erythema, no maceration, normal color, no pre-ulcer, no ulcer and no callus  Toe Exam: ROM and strength within normal limits  Sensory   Vibration: intact  Proprioception: intact  Monofilament testing: intact    Vascular  Capillary refills: < 3 seconds  The left DP pulse is 2+  The left PT pulse is 2+       Assign Risk Category  No deformity present  No loss of protective sensation  No weak pulses  Risk: 0

## 2022-06-28 ENCOUNTER — TELEPHONE (OUTPATIENT)
Dept: ADMINISTRATIVE | Facility: OTHER | Age: 27
End: 2022-06-28

## 2022-06-28 NOTE — TELEPHONE ENCOUNTER
----- Message from Luis Conner MA sent at 6/27/2022  2:42 PM EDT -----  Regarding: HIV and PAP  06/27/22 2:42 PM    Hello, our patient Murtaza Galarza has had Pap Smear (HPV) aka Cervical Cancer Screening completed/performed  Please assist in updating the patient chart by making an External outreach to Dr Leobardo De La Rosa facility located in [place]  The date of service is 2 weeks ago  06/27/22 2:43 PM    Hello, our patient Murtaza Galarza has had HIV completed/performed  Please assist in updating the patient chart by pulling the document from Lab Tab within Chart Review  The date of service is 4/27/2018       Thank you,  NANCI Combs PG

## 2022-06-28 NOTE — TELEPHONE ENCOUNTER
Upon review of the In Basket request and the patient's chart, initial outreach has been made via fax, please see Contacts section for details       Thank you  Jr Gomez

## 2022-06-28 NOTE — TELEPHONE ENCOUNTER
Upon review of the In Basket request we were able to locate, review, and update the patient chart as requested for HIV  Any additional questions or concerns should be emailed to the Practice Liaisons via [email or web address] email, please do not reply via In Basket      Thank you  Mary Kay Gordon

## 2022-06-28 NOTE — LETTER
Procedure Request Form: Cervical Cancer Screening      Date Requested: 22  Patient: Tosin Gilliland  Patient : [id number]   Referring Provider: Linn Greer, CRNP        Date of Procedure ______________________________       The above patient has informed us that they have completed their   most recent Cervical Cancer Screening at your facility  Please complete   this form and attach all corresponding procedure reports/results  Comments __________________________________________________________  ____________________________________________________________________  ____________________________________________________________________  ____________________________________________________________________    Facility Completing Procedure _________________________________________    Form Completed By (print name) _______________________________________      Signature __________________________________________________________      These reports are needed for  compliance  Please fax this completed form and a copy of the procedure report to our office located at [place] [address] as soon as possible to [phone] wes Maria: Phone [phone]    We thank you for your assistance in treating our mutual patient

## 2022-06-29 ENCOUNTER — APPOINTMENT (OUTPATIENT)
Dept: LAB | Facility: CLINIC | Age: 27
End: 2022-06-29
Payer: COMMERCIAL

## 2022-06-29 DIAGNOSIS — R73.09 ELEVATED GLUCOSE: ICD-10-CM

## 2022-06-29 DIAGNOSIS — E11.65 TYPE 2 DIABETES MELLITUS WITH HYPERGLYCEMIA, WITHOUT LONG-TERM CURRENT USE OF INSULIN (HCC): ICD-10-CM

## 2022-06-29 LAB — TSH SERPL DL<=0.05 MIU/L-ACNC: 2.09 UIU/ML (ref 0.45–4.5)

## 2022-06-29 PROCEDURE — 84443 ASSAY THYROID STIM HORMONE: CPT

## 2022-06-29 PROCEDURE — 36415 COLL VENOUS BLD VENIPUNCTURE: CPT

## 2022-06-29 NOTE — TELEPHONE ENCOUNTER
Upon review of the In Basket request we were able to locate, review, and update the patient chart as requested for Pap Smear (HPV) aka Cervical Cancer Screening  Any additional questions or concerns should be emailed to the Practice Liaisons via [email or web address] email, please do not reply via In Basket      Thank you  Yusuf Joshua

## 2022-06-30 DIAGNOSIS — E11.65 TYPE 2 DIABETES MELLITUS WITH HYPERGLYCEMIA, WITHOUT LONG-TERM CURRENT USE OF INSULIN (HCC): Primary | ICD-10-CM

## 2022-07-11 ENCOUNTER — APPOINTMENT (EMERGENCY)
Dept: RADIOLOGY | Facility: HOSPITAL | Age: 27
End: 2022-07-11
Payer: COMMERCIAL

## 2022-07-11 ENCOUNTER — HOSPITAL ENCOUNTER (EMERGENCY)
Facility: HOSPITAL | Age: 27
Discharge: HOME | End: 2022-07-11
Attending: EMERGENCY MEDICINE
Payer: COMMERCIAL

## 2022-07-11 VITALS
RESPIRATION RATE: 18 BRPM | DIASTOLIC BLOOD PRESSURE: 67 MMHG | TEMPERATURE: 98.7 F | OXYGEN SATURATION: 100 % | HEART RATE: 69 BPM | SYSTOLIC BLOOD PRESSURE: 135 MMHG

## 2022-07-11 DIAGNOSIS — E87.6 HYPOKALEMIA: ICD-10-CM

## 2022-07-11 DIAGNOSIS — E87.6 HYPOKALEMIA: Primary | ICD-10-CM

## 2022-07-11 DIAGNOSIS — R06.02 SOB (SHORTNESS OF BREATH): Primary | ICD-10-CM

## 2022-07-11 LAB
ALBUMIN SERPL BCP-MCNC: 4.3 G/DL (ref 3.5–5)
ALP SERPL-CCNC: 48 U/L (ref 46–116)
ALT SERPL W P-5'-P-CCNC: 27 U/L (ref 12–78)
ANION GAP SERPL CALCULATED.3IONS-SCNC: 11 MMOL/L (ref 4–13)
AST SERPL W P-5'-P-CCNC: 22 U/L (ref 5–45)
ATRIAL RATE: 78 BPM
BASOPHILS # BLD AUTO: 0.04 THOUSANDS/ΜL (ref 0–0.1)
BASOPHILS NFR BLD AUTO: 1 % (ref 0–1)
BILIRUB SERPL-MCNC: 0.52 MG/DL (ref 0.2–1)
BUN SERPL-MCNC: 7 MG/DL (ref 5–25)
CALCIUM SERPL-MCNC: 11.9 MG/DL (ref 8.3–10.1)
CARDIAC TROPONIN I PNL SERPL HS: <2 NG/L
CHLORIDE SERPL-SCNC: 101 MMOL/L (ref 100–108)
CO2 SERPL-SCNC: 26 MMOL/L (ref 21–32)
CREAT SERPL-MCNC: 0.67 MG/DL (ref 0.6–1.3)
EOSINOPHIL # BLD AUTO: 0.1 THOUSAND/ΜL (ref 0–0.61)
EOSINOPHIL NFR BLD AUTO: 1 % (ref 0–6)
ERYTHROCYTE [DISTWIDTH] IN BLOOD BY AUTOMATED COUNT: 12 % (ref 11.6–15.1)
GFR SERPL CREATININE-BSD FRML MDRD: 119 ML/MIN/1.73SQ M
GLUCOSE SERPL-MCNC: 184 MG/DL (ref 65–140)
HCT VFR BLD AUTO: 41.7 % (ref 34.8–46.1)
HGB BLD-MCNC: 15 G/DL (ref 11.5–15.4)
IMM GRANULOCYTES # BLD AUTO: 0.03 THOUSAND/UL (ref 0–0.2)
IMM GRANULOCYTES NFR BLD AUTO: 0 % (ref 0–2)
LYMPHOCYTES # BLD AUTO: 2.09 THOUSANDS/ΜL (ref 0.6–4.47)
LYMPHOCYTES NFR BLD AUTO: 30 % (ref 14–44)
MCH RBC QN AUTO: 31.6 PG (ref 26.8–34.3)
MCHC RBC AUTO-ENTMCNC: 36 G/DL (ref 31.4–37.4)
MCV RBC AUTO: 88 FL (ref 82–98)
MONOCYTES # BLD AUTO: 0.6 THOUSAND/ΜL (ref 0.17–1.22)
MONOCYTES NFR BLD AUTO: 9 % (ref 4–12)
NEUTROPHILS # BLD AUTO: 4.06 THOUSANDS/ΜL (ref 1.85–7.62)
NEUTS SEG NFR BLD AUTO: 59 % (ref 43–75)
NRBC BLD AUTO-RTO: 0 /100 WBCS
P AXIS: 51 DEGREES
PLATELET # BLD AUTO: 235 THOUSANDS/UL (ref 149–390)
PMV BLD AUTO: 10.3 FL (ref 8.9–12.7)
POTASSIUM SERPL-SCNC: 3 MMOL/L (ref 3.5–5.3)
PR INTERVAL: 152 MS
PROT SERPL-MCNC: 7.8 G/DL (ref 6.4–8.2)
QRS AXIS: 89 DEGREES
QRSD INTERVAL: 88 MS
QT INTERVAL: 364 MS
QTC INTERVAL: 414 MS
RBC # BLD AUTO: 4.75 MILLION/UL (ref 3.81–5.12)
SODIUM SERPL-SCNC: 138 MMOL/L (ref 136–145)
T WAVE AXIS: 10 DEGREES
VENTRICULAR RATE: 78 BPM
WBC # BLD AUTO: 6.92 THOUSAND/UL (ref 4.31–10.16)

## 2022-07-11 PROCEDURE — 84484 ASSAY OF TROPONIN QUANT: CPT | Performed by: EMERGENCY MEDICINE

## 2022-07-11 PROCEDURE — 71046 X-RAY EXAM CHEST 2 VIEWS: CPT

## 2022-07-11 PROCEDURE — 93010 ELECTROCARDIOGRAM REPORT: CPT | Performed by: INTERNAL MEDICINE

## 2022-07-11 PROCEDURE — 99285 EMERGENCY DEPT VISIT HI MDM: CPT

## 2022-07-11 PROCEDURE — 93005 ELECTROCARDIOGRAM TRACING: CPT

## 2022-07-11 PROCEDURE — 80053 COMPREHEN METABOLIC PANEL: CPT | Performed by: EMERGENCY MEDICINE

## 2022-07-11 PROCEDURE — 85025 COMPLETE CBC W/AUTO DIFF WBC: CPT | Performed by: EMERGENCY MEDICINE

## 2022-07-11 PROCEDURE — 36415 COLL VENOUS BLD VENIPUNCTURE: CPT

## 2022-07-11 PROCEDURE — 99285 EMERGENCY DEPT VISIT HI MDM: CPT | Performed by: PHYSICIAN ASSISTANT

## 2022-07-11 RX ORDER — POTASSIUM CHLORIDE 20 MEQ/1
40 TABLET, EXTENDED RELEASE ORAL ONCE
Status: COMPLETED | OUTPATIENT
Start: 2022-07-11 | End: 2022-07-11

## 2022-07-11 RX ADMIN — POTASSIUM CHLORIDE 40 MEQ: 1500 TABLET, EXTENDED RELEASE ORAL at 19:39

## 2022-07-12 ENCOUNTER — OFFICE VISIT (OUTPATIENT)
Dept: DIABETES SERVICES | Facility: CLINIC | Age: 27
End: 2022-07-12
Payer: COMMERCIAL

## 2022-07-12 ENCOUNTER — TELEPHONE (OUTPATIENT)
Dept: DIABETES SERVICES | Facility: CLINIC | Age: 27
End: 2022-07-12

## 2022-07-12 VITALS — WEIGHT: 135 LBS | HEIGHT: 62 IN | BODY MASS INDEX: 24.84 KG/M2

## 2022-07-12 DIAGNOSIS — E11.65 TYPE 2 DIABETES MELLITUS WITH HYPERGLYCEMIA, WITHOUT LONG-TERM CURRENT USE OF INSULIN (HCC): ICD-10-CM

## 2022-07-12 DIAGNOSIS — R73.09 ELEVATED GLUCOSE: ICD-10-CM

## 2022-07-12 PROCEDURE — 97802 MEDICAL NUTRITION INDIV IN: CPT | Performed by: DIETITIAN, REGISTERED

## 2022-07-12 RX ORDER — METFORMIN HYDROCHLORIDE 500 MG/1
500 TABLET, EXTENDED RELEASE ORAL
Qty: 180 TABLET | Refills: 1
Start: 2022-07-12 | End: 2023-01-08

## 2022-07-12 NOTE — TELEPHONE ENCOUNTER
I just saw Kayden Ahr  She reports diarrhea whenever she eats which she attributes to the metformin  Noted that it is XR, I wonder if decreasing to once daily at bedtime as Ozempic ramps up would be better tolerated  Of note, she states FBG were in the 250s and now in the 140s  Additionally, diet is largely devoid of fruit and vegetables except rarely, therefore it is low in potassium which may have exacerbated incident last night  Thanks

## 2022-07-12 NOTE — PROGRESS NOTES
Medical Nutrition Therapy        Assessment    Visit Type: Initial visit    Chief complaint Fani Cueto was recently diagnosed with type 2 diabetes, however it appears she has had lab work for almost a year indicating hyperglycemia  Currently on Ozempic and metformin and FBG levels have decreased by over 100 points  She c/o diarrhea and some nausea  HPI: Melvina diet history reveals inconsistent eating  She has eliminated sugary drinks and is eating smaller portions  Diet provides minimal whole grains, vegetables, or fruit  Currently meals range from 0 to 60 grams of carbohydrate  Explained basic pathophysiology of diabetes and impact of diet on blood glucose levels  Together we discussed what foods contain CHO, reading a food label, and serving sizes  Used the portion booklet to teach Fani Cueto more about food groups and basic carbohydrate counting  Created an individualized meal plan for Fani Cueto with 3 meals and 1-2 snacks providing 30-45 g carb per meal and 15 g carb per snack  This plan will help promote weight loss  Put together sample meals for Yuridia's reference  Fani Cueto demonstrated good understanding, Fani Cueto will call with questions or for more education      Ht Readings from Last 1 Encounters:   07/12/22 5' 2" (1 575 m)     Wt Readings from Last 2 Encounters:   07/12/22 61 2 kg (135 lb)   06/27/22 64 4 kg (142 lb)     Weight Change: Yes -60# in the past 2 years per patient    Medical Diagnosis/ICD10: E11 65    Barriers to Learning: no barriers    Do you follow any special diet presently?: No, just cut back on portions (eats when she's hungry)  Who shops: patient  Who cooks: patient    Food Log: Completed via the method of food recall    Breakfast: 9 am, 2 eggs, coffee w/ flavored creamer  Morning Snack:1/2 pascual  Lunch:meatball hot pocket  Afternoon Snack: may have fruit  Dinner: 1 cup rice, 1/3 cup beans, 1 chicken drumstick (had a small piece of cake on her birthday recently), occasional canned vegetables  Evening Snack:none  Beverages: coffee, water (recently cut out the soda)  Eating out/Take out: not much recently  Exercise nothing specific, home caring for 3 children under 8  Calorie needs 1500kcals/day Carbs: 30-45g/meal, 15g/snack         Nutrition Diagnosis:  Inconsistent carbohydrate intake  intake related to Physiological causes requiring careful timing and consistency in the amount of carbohydrate (i e  diabetes mellitus, hypoglycemia) as evidenced by  Estimated carbohydrate intake that is different from recommended types or ingested on an irregular basis    Intervention: label reading, carbohydrate counting, increased plant based foods, meal planning, individualized meal plan, monitoring portion control, exercise guidelines and food diary     Treatment Goals: Patient understands education and recommendations, Patient will increase their intake of plant based foods, Patient will count carbohydrates and Patient will exercise    Monitoring and evaluation:    Term code indicator  FH 1 6 3 Carbohydrate Intake Criteria: Follow meal plan  Term code indicator  FH 1 3 2 Food Intake Criteria: Choose more vegetables and fruit  Term code indicator  CH 2 2 Treatments/Therapy/Alternative Medicine Criteria: Start getting some physical activity, 20-30 minute walk daily is good    Patients Response to Instruction:  Comprehensiongood  Motivationgood  Expected Compliancegood    Thank you for coming to the [place] [address] for education today  Please feel free to call with any questions or concerns      [address] [address] [phone]

## 2022-07-12 NOTE — PATIENT INSTRUCTIONS
Follow meal plan  Keep 3 day food diary  Try to get out an walk or do any other exercise a few days a week  Minh Montiel or Daisy De La Rosa will call you for follow up

## 2022-07-19 ENCOUNTER — APPOINTMENT (OUTPATIENT)
Dept: LAB | Facility: CLINIC | Age: 27
End: 2022-07-19
Payer: COMMERCIAL

## 2022-07-19 DIAGNOSIS — E87.6 HYPOKALEMIA: ICD-10-CM

## 2022-07-19 DIAGNOSIS — E83.52 HYPERCALCEMIA: Primary | ICD-10-CM

## 2022-07-19 DIAGNOSIS — E83.52 HYPERCALCEMIA: ICD-10-CM

## 2022-07-19 LAB
ANION GAP SERPL CALCULATED.3IONS-SCNC: 6 MMOL/L (ref 4–13)
BUN SERPL-MCNC: 6 MG/DL (ref 5–25)
CALCIUM SERPL-MCNC: 11.7 MG/DL (ref 8.3–10.1)
CHLORIDE SERPL-SCNC: 107 MMOL/L (ref 96–108)
CO2 SERPL-SCNC: 26 MMOL/L (ref 21–32)
CREAT SERPL-MCNC: 0.49 MG/DL (ref 0.6–1.3)
GFR SERPL CREATININE-BSD FRML MDRD: 132 ML/MIN/1.73SQ M
GLUCOSE P FAST SERPL-MCNC: 140 MG/DL (ref 65–99)
POTASSIUM SERPL-SCNC: 3.2 MMOL/L (ref 3.5–5.3)
SODIUM SERPL-SCNC: 139 MMOL/L (ref 135–147)

## 2022-07-19 PROCEDURE — 80048 BASIC METABOLIC PNL TOTAL CA: CPT

## 2022-07-19 PROCEDURE — 36415 COLL VENOUS BLD VENIPUNCTURE: CPT

## 2022-07-19 PROCEDURE — 83970 ASSAY OF PARATHORMONE: CPT

## 2022-07-20 LAB — PTH-INTACT SERPL-MCNC: 55.3 PG/ML (ref 18.4–80.1)

## 2022-07-22 ENCOUNTER — OFFICE VISIT (OUTPATIENT)
Dept: FAMILY MEDICINE CLINIC | Facility: CLINIC | Age: 27
End: 2022-07-22
Payer: COMMERCIAL

## 2022-07-22 VITALS
HEART RATE: 79 BPM | TEMPERATURE: 97.9 F | OXYGEN SATURATION: 98 % | HEIGHT: 62 IN | BODY MASS INDEX: 24.99 KG/M2 | SYSTOLIC BLOOD PRESSURE: 126 MMHG | WEIGHT: 135.8 LBS | DIASTOLIC BLOOD PRESSURE: 74 MMHG

## 2022-07-22 DIAGNOSIS — E11.65 TYPE 2 DIABETES MELLITUS WITH HYPERGLYCEMIA, WITHOUT LONG-TERM CURRENT USE OF INSULIN (HCC): Primary | ICD-10-CM

## 2022-07-22 DIAGNOSIS — R73.09 ELEVATED GLUCOSE: ICD-10-CM

## 2022-07-22 PROCEDURE — 99213 OFFICE O/P EST LOW 20 MIN: CPT | Performed by: NURSE PRACTITIONER

## 2022-07-22 PROCEDURE — 3725F SCREEN DEPRESSION PERFORMED: CPT | Performed by: NURSE PRACTITIONER

## 2022-07-22 RX ORDER — PEN NEEDLE, DIABETIC 31 GX5/16"
NEEDLE, DISPOSABLE MISCELLANEOUS
Qty: 30 EACH | Refills: 0 | Status: SHIPPED | OUTPATIENT
Start: 2022-07-22

## 2022-07-22 NOTE — PROGRESS NOTES
Assessment/Plan:           Problem List Items Addressed This Visit        Endocrine    Type 2 diabetes mellitus with hyperglycemia, without long-term current use of insulin ([facility] [address] ) - Primary       Lab Results   Component Value Date    HGBA1C 10 0 (A) 2022   Patient has labs ordered for 2022          Relevant Medications    Insulin Pen Needle (B-D UF III MINI PEN NEEDLES) 31G X 5 MM MISC      Other Visit Diagnoses     Elevated glucose        Relevant Medications    Insulin Pen Needle (B-D UF III MINI PEN NEEDLES) 31G X 5 MM MISC            Subjective:      Patient ID: Kierra Contreras is a 32 y o  female  Patient here today for follow up on her diabetes and did see the dietitian and reports that she has an appointment in 2022 with endocrine  Patient reports that she is not having diarrhea and is feeling improvement in her energy       The following portions of the patient's history were reviewed and updated as appropriate:    She  has a past medical history of Gestational diabetes and Migraine  She   Patient Active Problem List    Diagnosis Date Noted    Type 2 diabetes mellitus with hyperglycemia, without long-term current use of insulin ([facility] [address] ) 2022     She  has a past surgical history that includes pr  delivery only (N/A, 6/3/2016); pr  delivery only (N/A, 2018); Tubal ligation (N/A, 2018);  section (15, 16, 18); Cervical biopsy w/ loop electrode excision (2017?); DILATION AND CURETTAGE OF UTERUS WITH HYSTEROSOCPY (N/A, 2020); and Endometrial ablation (N/A, 2020)  Her family history includes Diabetes in her paternal grandfather; Down syndrome in her cousin; Hypertension in her father; Hyperthyroidism in her father and mother; No Known Problems in her brother, daughter, daughter, paternal grandmother, sister, and son  She  reports that she has never smoked  She has never used smokeless tobacco  She reports previous alcohol use   She reports that she does not use drugs  Current Outpatient Medications   Medication Sig Dispense Refill    Blood Glucose Monitoring Suppl (OneTouch Verio Reflect) w/Device KIT Check blood sugars once daily  Please substitute with appropriate alternative as covered by patient's insurance  Dx: E11 65 1 kit 0    glucose blood (OneTouch Verio) test strip Check blood sugars once daily  Please substitute with appropriate alternative as covered by patient's insurance  Dx: E11 65 100 each 3    Insulin Pen Needle (B-D UF III MINI PEN NEEDLES) 31G X 5 MM MISC Use every 7 days 30 each 0    metFORMIN (GLUCOPHAGE-XR) 500 mg 24 hr tablet Take 1 tablet (500 mg total) by mouth daily with dinner 180 tablet 1    OneTouch Delica Lancets 22J MISC Check blood sugars once daily  Please substitute with appropriate alternative as covered by patient's insurance  Dx: E11 65 100 each 3    Semaglutide,0 25 or 0 5MG/DOS, 2 MG/1 5ML SOPN Inject 0 25 mg under the skin every 7 days for 30 days, THEN 0 5 mg every 7 days  4 5 mL 0     No current facility-administered medications for this visit  She has No Known Allergies       Review of Systems   Constitutional: Negative for activity change, appetite change, chills, diaphoresis, fatigue, fever and unexpected weight change  HENT: Negative for congestion, ear pain, hearing loss, postnasal drip, sinus pressure, sinus pain, sneezing and sore throat  Eyes: Negative for pain, redness and visual disturbance  Respiratory: Negative for cough and shortness of breath  Cardiovascular: Negative for chest pain and leg swelling  Gastrointestinal: Negative for abdominal pain, diarrhea, nausea and vomiting  Genitourinary: Negative  Musculoskeletal: Negative for arthralgias  Allergic/Immunologic: Negative  Neurological: Negative for dizziness and light-headedness  Psychiatric/Behavioral: Negative for behavioral problems and dysphoric mood           Objective:      /74 (BP Location: Left arm, Patient Position: Sitting)   Pulse 79   Temp 97 9 °F (36 6 °C) (Temporal)   Ht 5' 2" (1 575 m)   Wt 61 6 kg (135 lb 12 8 oz)   SpO2 98%   BMI 24 84 kg/m²          Physical Exam  Vitals and nursing note reviewed  Constitutional:       General: She is not in acute distress  Appearance: She is well-developed  HENT:      Head: Normocephalic and atraumatic  Right Ear: Tympanic membrane normal       Left Ear: Tympanic membrane normal       Nose: Nose normal       Mouth/Throat:      Mouth: Mucous membranes are moist    Eyes:      Pupils: Pupils are equal, round, and reactive to light  Neck:      Thyroid: No thyromegaly  Cardiovascular:      Rate and Rhythm: Normal rate and regular rhythm  Heart sounds: Normal heart sounds  No murmur heard  Pulmonary:      Effort: Pulmonary effort is normal  No respiratory distress  Breath sounds: Normal breath sounds  No wheezing  Abdominal:      General: Bowel sounds are normal       Palpations: Abdomen is soft  Musculoskeletal:         General: Normal range of motion  Cervical back: Normal range of motion  Skin:     General: Skin is warm and dry  Neurological:      General: No focal deficit present  Mental Status: She is alert and oriented to person, place, and time     Psychiatric:         Mood and Affect: Mood normal

## 2022-07-22 NOTE — ASSESSMENT & PLAN NOTE
Lab Results   Component Value Date    HGBA1C 10 0 (A) 06/27/2022   Patient has labs ordered for September 2022

## 2022-07-23 NOTE — ED PROVIDER NOTES
History  Chief Complaint   Patient presents with    Shortness of Breath     Shortness of breath since last night  31 yo female pt with sob  Onset last night  Mild  Comes and goes  Worse with exertion  No recent travels, traumas, surgeries  No cardiac history  No h/o pulmonary disease  No sick contacts   used: No    Shortness of Breath  Severity:  Mild  Onset quality:  Gradual  Duration:  1 day  Timing:  Intermittent  Progression:  Waxing and waning  Chronicity:  New  Relieved by:  Nothing  Worsened by:  Exertion  Ineffective treatments:  None tried  Associated symptoms: no abdominal pain, no chest pain, no cough, no ear pain, no fever, no rash, no sore throat and no vomiting        Prior to Admission Medications   Prescriptions Last Dose Informant Patient Reported? Taking? Blood Glucose Monitoring Suppl (OneTouch Verio Reflect) w/Device KIT   No No   Sig: Check blood sugars once daily  Please substitute with appropriate alternative as covered by patient's insurance  Dx: E11 65   Insulin Pen Needle (B-D UF III MINI PEN NEEDLES) 31G X 5 MM MISC   No No   Sig: Use every 7 days   OneTouch Delica Lancets 59Q MISC   No No   Sig: Check blood sugars once daily  Please substitute with appropriate alternative as covered by patient's insurance  Dx: E11 65   Semaglutide,0 25 or 0 5MG/DOS, 2 MG/1 5ML SOPN   No No   Sig: Inject 0 25 mg under the skin every 7 days for 30 days, THEN 0 5 mg every 7 days  glucose blood (OneTouch Verio) test strip   No No   Sig: Check blood sugars once daily  Please substitute with appropriate alternative as covered by patient's insurance  Dx: E11 65      Facility-Administered Medications: None       Past Medical History:   Diagnosis Date    Gestational diabetes     Migraine        Past Surgical History:   Procedure Laterality Date    CERVICAL BIOPSY  W/ LOOP ELECTRODE EXCISION  ?      SECTION  15, 16, 18    DILATION AND CURETTAGE OF UTERUS WITH HYSTEROSOCPY N/A 2020    Procedure: DILATATION AND CURETTAGE (D&C); Surgeon: Belgica Stuart MD;  Location: [facility] MAIN OR;  Service: Gynecology    ENDOMETRIAL ABLATION N/A 2020    Procedure: ABLATION ENDOMETRIAL JOSHUA;  Surgeon: Belgica Stuart MD;  Location: [facility] MAIN OR;  Service: Gynecology    ID  DELIVERY ONLY N/A 6/3/2016    Procedure: Dmitriy Savant () REPEAT;  Surgeon: Belgica Stuart MD;  Location: [facility];  Service: Obstetrics    ID  DELIVERY ONLY N/A 2018    Procedure: Dmitriy Savant () REPEAT;  Surgeon: Belgica Stuart MD;  Location: [facility];  Service: Obstetrics    TUBAL LIGATION N/A 2018    Procedure: LIGATION/COAGULATION TUBAL;  Surgeon: Belgica Stuart MD;  Location: [facility];  Service: Obstetrics       Family History   Problem Relation Age of Onset    Hyperthyroidism Mother     Hyperthyroidism Father     Hypertension Father     No Known Problems Sister     No Known Problems Brother     No Known Problems Son     No Known Problems Daughter     No Known Problems Daughter     No Known Problems Paternal Grandmother     Diabetes Paternal Grandfather     Down syndrome Cousin         paternal cousin     I have reviewed and agree with the history as documented  E-Cigarette/Vaping    E-Cigarette Use Never User      E-Cigarette/Vaping Substances    Nicotine No     THC No     CBD No     Flavoring No     Other No     Unknown No      Social History     Tobacco Use    Smoking status: Never Smoker    Smokeless tobacco: Never Used   Vaping Use    Vaping Use: Never used   Substance Use Topics    Alcohol use: Not Currently    Drug use: No       Review of Systems   Constitutional: Negative for chills and fever  HENT: Negative for ear pain and sore throat  Eyes: Negative for pain and visual disturbance  Respiratory: Positive for shortness of breath  Negative for cough      Cardiovascular: Negative for chest pain and palpitations  Gastrointestinal: Negative for abdominal pain and vomiting  Genitourinary: Negative for dysuria and hematuria  Musculoskeletal: Negative for arthralgias and back pain  Skin: Negative for color change and rash  Neurological: Negative for seizures and syncope  All other systems reviewed and are negative  Physical Exam  Physical Exam  Vitals and nursing note reviewed  Constitutional:       General: She is not in acute distress  Appearance: She is well-developed  HENT:      Head: Normocephalic and atraumatic  Eyes:      Conjunctiva/sclera: Conjunctivae normal    Cardiovascular:      Rate and Rhythm: Normal rate and regular rhythm  Heart sounds: No murmur heard  Pulmonary:      Effort: Pulmonary effort is normal  No respiratory distress  Breath sounds: Normal breath sounds  Abdominal:      Palpations: Abdomen is soft  Tenderness: There is no abdominal tenderness  Musculoskeletal:      Cervical back: Neck supple  Skin:     General: Skin is warm and dry  Neurological:      Mental Status: She is alert           Vital Signs  ED Triage Vitals [07/11/22 1513]   Temperature Pulse Respirations Blood Pressure SpO2   98 7 °F (37 1 °C) 87 18 122/79 99 %      Temp Source Heart Rate Source Patient Position - Orthostatic VS BP Location FiO2 (%)   Oral Monitor Sitting Left arm --      Pain Score       --           Vitals:    07/11/22 1513 07/11/22 1914   BP: 122/79 135/67   Pulse: 87 69   Patient Position - Orthostatic VS: Sitting Sitting         Visual Acuity      ED Medications  Medications   potassium chloride (K-DUR,KLOR-CON) CR tablet 40 mEq (40 mEq Oral Given 7/11/22 1939)       Diagnostic Studies  Results Reviewed     Procedure Component Value Units Date/Time    Comprehensive metabolic panel [[id number]]  (Abnormal) Collected: 07/11/22 1523    Lab Status: Final result Specimen: Blood from Arm, Left Updated: 07/11/22 1644     Sodium 138 mmol/L      Potassium 3 0 mmol/L      Chloride 101 mmol/L      CO2 26 mmol/L      ANION GAP 11 mmol/L      BUN 7 mg/dL      Creatinine 0 67 mg/dL      Glucose 184 mg/dL      Calcium 11 9 mg/dL      AST 22 U/L      ALT 27 U/L      Alkaline Phosphatase 48 U/L      Total Protein 7 8 g/dL      Albumin 4 3 g/dL      Total Bilirubin 0 52 mg/dL      eGFR 119 ml/min/1 73sq m     Narrative:      National Kidney Disease Foundation guidelines for Chronic Kidney Disease (CKD):     Stage 1 with normal or high GFR (GFR > 90 mL/min/1 73 square meters)    Stage 2 Mild CKD (GFR = 60-89 mL/min/1 73 square meters)    Stage 3A Moderate CKD (GFR = 45-59 mL/min/1 73 square meters)    Stage 3B Moderate CKD (GFR = 30-44 mL/min/1 73 square meters)    Stage 4 Severe CKD (GFR = 15-29 mL/min/1 73 square meters)    Stage 5 End Stage CKD (GFR <15 mL/min/1 73 square meters)  Note: GFR calculation is accurate only with a steady state creatinine    HS Troponin 0hr (reflex protocol) [[id number]]  (Normal) Collected: 07/11/22 1523    Lab Status: Final result Specimen: Blood from Arm, Left Updated: 07/11/22 1552     hs TnI 0hr <2 ng/L     CBC and differential [[id number]] Collected: 07/11/22 1523    Lab Status: Final result Specimen: Blood from Arm, Left Updated: 07/11/22 1529     WBC 6 92 Thousand/uL      RBC 4 75 Million/uL      Hemoglobin 15 0 g/dL      Hematocrit 41 7 %      MCV 88 fL      MCH 31 6 pg      MCHC 36 0 g/dL      RDW 12 0 %      MPV 10 3 fL      Platelets 440 Thousands/uL      nRBC 0 /100 WBCs      Neutrophils Relative 59 %      Immat GRANS % 0 %      Lymphocytes Relative 30 %      Monocytes Relative 9 %      Eosinophils Relative 1 %      Basophils Relative 1 %      Neutrophils Absolute 4 06 Thousands/µL      Immature Grans Absolute 0 03 Thousand/uL      Lymphocytes Absolute 2 09 Thousands/µL      Monocytes Absolute 0 60 Thousand/µL      Eosinophils Absolute 0 10 Thousand/µL      Basophils Absolute 0 04 Thousands/µL                  XR chest pa & lateral Final Result by Jennie Barker MD (07/11 9304)      No acute cardiopulmonary disease  Workstation performed: [id number]                    Procedures  ECG 12 Lead Documentation Only    Date/Time: 7/22/2022 8:11 PM  Performed by: Raji Renae PA-C  Authorized by: Raji Renae PA-C     ECG reviewed by me, the ED Provider: yes    Patient location:  ED  Interpretation:     Interpretation: normal    Quality:     Tracing quality:  Limited by artifact  Rate:     ECG rate:  78    ECG rate assessment: normal    Rhythm:     Rhythm: sinus rhythm    Ectopy:     Ectopy: none    QRS:     QRS axis:  Normal    QRS intervals:  Normal  Conduction:     Conduction: normal    ST segments:     ST segments:  Normal  T waves:     T waves: normal               ED Course                                             MDM  Number of Diagnoses or Management Options  Hypokalemia: new and requires workup  SOB (shortness of breath): new and requires workup  Diagnosis management comments: DDX including but not limited to: pneumonia, pleural effusion, CHF, PE, PTX, ACS, MI, asthma exacerbation, COPD exacerbation, COVID 19, EVALI, anemia, metabolic abnormality, renal failure  Plan: Cardiac workup  Labs  dispo pending  Amount and/or Complexity of Data Reviewed  Clinical lab tests: ordered and reviewed  Tests in the radiology section of CPT®: ordered and reviewed  Independent visualization of images, tracings, or specimens: yes    Risk of Complications, Morbidity, and/or Mortality  Presenting problems: moderate  Management options: low  General comments: 31 yo with sob  Labs reveal hypok  Otherwise unremarkable  Repleted here  Discussed dietary repletion as well  In regards to her sob the etiology is unclear  PERC to r/o PE  xr chest unremarkable  Possible related to anxiety/stress  Recommended close PCP f/u  Return parameters provided  Pt understands and agrees with plan        Patient Progress  Patient progress: stable      Disposition  Final diagnoses:   SOB (shortness of breath)   Hypokalemia     Time reflects when diagnosis was documented in both MDM as applicable and the Disposition within this note     Time User Action Codes Description Comment    7/11/2022  7:36 PM Lauren HOLLOWAY Add [R06 02] SOB (shortness of breath)     7/11/2022  7:36 PM Mary Rodriguez Add [E87 6] Hypokalemia       ED Disposition     ED Disposition   Discharge    Condition   Stable    Date/Time   Mon Jul 11, 2022  7:36 PM    Comment   Melly Albarado discharge to home/self care  Follow-up Information     Follow up With Specialties Details Why Contact Info    Janet Judah, [address], Nurse Practitioner Call in 1 day  21 [phone] [address] [facility] [address]  [phone]            Discharge Medication List as of 7/11/2022  7:36 PM      CONTINUE these medications which have NOT CHANGED    Details   Blood Glucose Monitoring Suppl (OneTouch Verio Reflect) w/Device KIT Check blood sugars once daily  Please substitute with appropriate alternative as covered by patient's insurance  Dx: E11 65, Normal      glucose blood (OneTouch Verio) test strip Check blood sugars once daily  Please substitute with appropriate alternative as covered by patient's insurance  Dx: E11 65, Normal      OneTouch Delica Lancets 28F MISC Check blood sugars once daily  Please substitute with appropriate alternative as covered by patient's insurance  Dx: E11 65, Normal      Semaglutide,0 25 or 0 5MG/DOS, 2 MG/1 5ML SOPN Multiple Dosages:Starting Mon 6/27/2022, Until Tue 7/26/2022 at 2359, THEN Starting Wed 7/27/2022, Until Sat 9/24/2022 at 2359Inject 0 25 mg under the skin every 7 days for 30 days, THEN 0 5 mg every 7 days  , Normal      Insulin Pen Needle (B-D UF III MINI PEN NEEDLES) 31G X 5 MM MISC Use every 7 days, Starting Mon 6/27/2022, Normal      metFORMIN (GLUCOPHAGE-XR) 500 mg 24 hr tablet Take 2 tablets (1,000 mg total) by mouth daily with breakfast, Starting Mon 6/27/2022, Until Sat 12/24/2022, Normal             No discharge procedures on file      PDMP Review     None          ED Provider  Electronically Signed by           Alvarez Kimble PA-C  07/22/22 2012

## 2022-09-27 ENCOUNTER — APPOINTMENT (OUTPATIENT)
Dept: LAB | Facility: CLINIC | Age: 27
End: 2022-09-27
Payer: COMMERCIAL

## 2022-09-27 DIAGNOSIS — E11.65 TYPE 2 DIABETES MELLITUS WITH HYPERGLYCEMIA, WITHOUT LONG-TERM CURRENT USE OF INSULIN (HCC): ICD-10-CM

## 2022-09-27 DIAGNOSIS — R73.09 ELEVATED GLUCOSE: ICD-10-CM

## 2022-09-27 DIAGNOSIS — E83.52 HYPERCALCEMIA: ICD-10-CM

## 2022-09-27 LAB
ALBUMIN SERPL BCP-MCNC: 4.2 G/DL (ref 3.5–5)
ALP SERPL-CCNC: 34 U/L (ref 46–116)
ALT SERPL W P-5'-P-CCNC: 18 U/L (ref 12–78)
ANION GAP SERPL CALCULATED.3IONS-SCNC: 6 MMOL/L (ref 4–13)
AST SERPL W P-5'-P-CCNC: 7 U/L (ref 5–45)
BASOPHILS # BLD AUTO: 0.07 THOUSANDS/ΜL (ref 0–0.1)
BASOPHILS NFR BLD AUTO: 1 % (ref 0–1)
BILIRUB SERPL-MCNC: 0.31 MG/DL (ref 0.2–1)
BUN SERPL-MCNC: 6 MG/DL (ref 5–25)
CALCIUM SERPL-MCNC: 11.6 MG/DL (ref 8.3–10.1)
CHLORIDE SERPL-SCNC: 107 MMOL/L (ref 96–108)
CHOLEST SERPL-MCNC: 109 MG/DL
CO2 SERPL-SCNC: 26 MMOL/L (ref 21–32)
CREAT SERPL-MCNC: 0.55 MG/DL (ref 0.6–1.3)
EOSINOPHIL # BLD AUTO: 0.14 THOUSAND/ΜL (ref 0–0.61)
EOSINOPHIL NFR BLD AUTO: 2 % (ref 0–6)
ERYTHROCYTE [DISTWIDTH] IN BLOOD BY AUTOMATED COUNT: 11.9 % (ref 11.6–15.1)
EST. AVERAGE GLUCOSE BLD GHB EST-MCNC: 108 MG/DL
GFR SERPL CREATININE-BSD FRML MDRD: 127 ML/MIN/1.73SQ M
GLUCOSE P FAST SERPL-MCNC: 101 MG/DL (ref 65–99)
HBA1C MFR BLD: 5.4 %
HCT VFR BLD AUTO: 42.5 % (ref 34.8–46.1)
HDLC SERPL-MCNC: 41 MG/DL
HGB BLD-MCNC: 14.6 G/DL (ref 11.5–15.4)
IMM GRANULOCYTES # BLD AUTO: 0.02 THOUSAND/UL (ref 0–0.2)
IMM GRANULOCYTES NFR BLD AUTO: 0 % (ref 0–2)
LDLC SERPL CALC-MCNC: 52 MG/DL (ref 0–100)
LYMPHOCYTES # BLD AUTO: 2.11 THOUSANDS/ΜL (ref 0.6–4.47)
LYMPHOCYTES NFR BLD AUTO: 32 % (ref 14–44)
MCH RBC QN AUTO: 30.4 PG (ref 26.8–34.3)
MCHC RBC AUTO-ENTMCNC: 34.4 G/DL (ref 31.4–37.4)
MCV RBC AUTO: 89 FL (ref 82–98)
MONOCYTES # BLD AUTO: 0.51 THOUSAND/ΜL (ref 0.17–1.22)
MONOCYTES NFR BLD AUTO: 8 % (ref 4–12)
NEUTROPHILS # BLD AUTO: 3.76 THOUSANDS/ΜL (ref 1.85–7.62)
NEUTS SEG NFR BLD AUTO: 57 % (ref 43–75)
NRBC BLD AUTO-RTO: 0 /100 WBCS
PLATELET # BLD AUTO: 247 THOUSANDS/UL (ref 149–390)
PMV BLD AUTO: 11.8 FL (ref 8.9–12.7)
POTASSIUM SERPL-SCNC: 3.6 MMOL/L (ref 3.5–5.3)
PROT SERPL-MCNC: 7.5 G/DL (ref 6.4–8.4)
RBC # BLD AUTO: 4.8 MILLION/UL (ref 3.81–5.12)
SODIUM SERPL-SCNC: 139 MMOL/L (ref 135–147)
TRIGL SERPL-MCNC: 78 MG/DL
WBC # BLD AUTO: 6.61 THOUSAND/UL (ref 4.31–10.16)

## 2022-09-27 PROCEDURE — 80061 LIPID PANEL: CPT

## 2022-09-27 PROCEDURE — 80053 COMPREHEN METABOLIC PANEL: CPT

## 2022-09-27 PROCEDURE — 83036 HEMOGLOBIN GLYCOSYLATED A1C: CPT

## 2022-09-27 PROCEDURE — 83970 ASSAY OF PARATHORMONE: CPT

## 2022-09-27 PROCEDURE — 85025 COMPLETE CBC W/AUTO DIFF WBC: CPT

## 2022-09-27 PROCEDURE — 36415 COLL VENOUS BLD VENIPUNCTURE: CPT

## 2022-09-29 ENCOUNTER — OFFICE VISIT (OUTPATIENT)
Dept: FAMILY MEDICINE CLINIC | Facility: CLINIC | Age: 27
End: 2022-09-29
Payer: COMMERCIAL

## 2022-09-29 VITALS
DIASTOLIC BLOOD PRESSURE: 80 MMHG | SYSTOLIC BLOOD PRESSURE: 122 MMHG | OXYGEN SATURATION: 98 % | HEIGHT: 62 IN | TEMPERATURE: 97.5 F | BODY MASS INDEX: 23.41 KG/M2 | HEART RATE: 80 BPM | WEIGHT: 127.2 LBS

## 2022-09-29 DIAGNOSIS — E83.52 HYPERCALCEMIA: ICD-10-CM

## 2022-09-29 DIAGNOSIS — Z00.00 ANNUAL PHYSICAL EXAM: Primary | ICD-10-CM

## 2022-09-29 DIAGNOSIS — E11.9 TYPE 2 DIABETES MELLITUS WITHOUT COMPLICATION, WITHOUT LONG-TERM CURRENT USE OF INSULIN (HCC): ICD-10-CM

## 2022-09-29 LAB — PTH-INTACT SERPL-MCNC: 80.9 PG/ML (ref 18.4–80.1)

## 2022-09-29 PROCEDURE — 99395 PREV VISIT EST AGE 18-39: CPT | Performed by: NURSE PRACTITIONER

## 2022-09-29 NOTE — PROGRESS NOTES
[address] [facility] [place] [facility]ACT[facility]    NAME: Andrey Castaneda  AGE: 32 y o  SEX: female  : 1995     DATE: 2022     Assessment and Plan:     Problem List Items Addressed This Visit        Endocrine    Type 2 diabetes mellitus without complication, without long-term current use of insulin ([address] )       Lab Results   Component Value Date    HGBA1C 5 4 2022   Patient will continue with Metformin 500 mg daily and stop the ozempic and recheck levels in 4 months          Relevant Orders    Comprehensive metabolic panel    HEMOGLOBIN A1C W/ EAG ESTIMATION    CBC and differential    Lipid Panel with Direct LDL reflex       Other    Annual physical exam - Primary    Hypercalcemia     Suspect hyperparathyroid will check PTH and dw patient f/u with endocrine          Relevant Orders    PTH, intact          Immunizations and preventive care screenings were discussed with patient today  Appropriate education was printed on patient's after visit summary  Counseling:  Injury prevention: discussed safety/seat belts, safety helmets, smoke detectors, carbon dioxide detectors, and smoking near bedding or upholstery  Exercise: the importance of regular exercise/physical activity was discussed  Recommend exercise 3-5 times per week for at least 30 minutes  Return in 1 year (on 2023)  Chief Complaint:     Chief Complaint   Patient presents with    Diabetes     Review labs        History of Present Illness:     Adult Annual Physical   Patient here for a comprehensive physical exam  The patient reports problems - patient here to review her diabetes and reports that her dad had to have his parathyroid removed r/t high calcium and her calcium is elevated on the labs   Diet and Physical Activity  Diet/Nutrition: well balanced diet  Exercise: no formal exercise        Depression Screening  PHQ-2/9 Depression Screening         General Health  Sleep: sleeps well and gets 4-6 hours of sleep on average  Hearing: normal - bilateral   Vision: goes for regular eye exams, most recent eye exam >1 year ago and wears glasses  Dental: no dental visits for >1 year, brushes teeth twice daily and flosses teeth occasionally  /GYN Health  Last menstrual period: 2022  Contraceptive method: none tubal    History of STDs?: no      Review of Systems:     Review of Systems   Constitutional: Negative for activity change, appetite change, chills, diaphoresis, fatigue, fever and unexpected weight change  HENT: Negative for congestion, ear pain, hearing loss, postnasal drip, sinus pressure, sinus pain, sneezing and sore throat  Eyes: Negative for pain, redness and visual disturbance  Respiratory: Negative for cough and shortness of breath  Cardiovascular: Negative for chest pain and leg swelling  Gastrointestinal: Negative for abdominal pain, diarrhea, nausea and vomiting  Endocrine: Negative  Genitourinary: Negative  Musculoskeletal: Negative for arthralgias  Skin: Negative  Allergic/Immunologic: Negative  Neurological: Negative for dizziness and light-headedness  Hematological: Negative  Psychiatric/Behavioral: Negative for behavioral problems and dysphoric mood  Past Medical History:     Past Medical History:   Diagnosis Date    Gestational diabetes     Migraine       Past Surgical History:     Past Surgical History:   Procedure Laterality Date    CERVICAL BIOPSY  W/ LOOP ELECTRODE EXCISION  ?   SECTION  15, 16, 18    DILATION AND CURETTAGE OF UTERUS WITH HYSTEROSOCPY N/A 2020    Procedure: DILATATION AND CURETTAGE (D&C);   Surgeon: Helene Collins MD;  Location: [address] [place] [address];  Service: Gynecology    ENDOMETRIAL ABLATION N/A 2020    Procedure: Coy Polanco;  Surgeon: Helene Collins MD;  Location: [address] [place] [address];  Service: Gynecology    DC  DELIVERY ONLY N/A 6/3/2016    Procedure:  SECTION () REPEAT;  Surgeon: Yemi Car MD;  Location: [facility];  Service: Obstetrics    AK  DELIVERY ONLY N/A 2018    Procedure:  SECTION () REPEAT;  Surgeon: Yemi Car MD;  Location: [facility];  Service: Obstetrics    TUBAL LIGATION N/A 2018    Procedure: LIGATION/COAGULATION TUBAL;  Surgeon: Yemi Car MD;  Location: [facility];  Service: Obstetrics      Social History:     Social History     Socioeconomic History    Marital status: /Civil Union     Spouse name: None    Number of children: None    Years of education: None    Highest education level: None   Occupational History    None   Tobacco Use    Smoking status: Never Smoker    Smokeless tobacco: Never Used   Vaping Use    Vaping Use: Never used   Substance and Sexual Activity    Alcohol use: Not Currently    Drug use: No    Sexual activity: Yes     Partners: Male     Birth control/protection: Female Sterilization   Other Topics Concern    None   Social History Narrative    None     Social Determinants of Health     Financial Resource Strain: Not on file   Food Insecurity: Not on file   Transportation Needs: Not on file   Physical Activity: Not on file   Stress: Not on file   Social Connections: Not on file   Intimate Partner Violence: Not on file   Housing Stability: Not on file      Family History:     Family History   Problem Relation Age of Onset    Hyperthyroidism Mother     Hyperthyroidism Father     Hypertension Father     No Known Problems Sister     No Known Problems Brother     No Known Problems Son     No Known Problems Daughter     No Known Problems Daughter     No Known Problems Paternal Grandmother     Diabetes Paternal Grandfather     Down syndrome Cousin         paternal cousin      Current Medications:     Current Outpatient Medications   Medication Sig Dispense Refill    Blood Glucose Monitoring Suppl (OneTouch Verio Reflect) w/Device KIT Check blood sugars once daily  Please substitute with appropriate alternative as covered by patient's insurance  Dx: E11 65 1 kit 0    glucose blood (OneTouch Verio) test strip Check blood sugars once daily  Please substitute with appropriate alternative as covered by patient's insurance  Dx: E11 65 100 each 3    Insulin Pen Needle (B-D UF III MINI PEN NEEDLES) 31G X 5 MM MISC Use every 7 days 30 each 0    metFORMIN (GLUCOPHAGE-XR) 500 mg 24 hr tablet Take 1 tablet (500 mg total) by mouth daily with dinner 180 tablet 1    OneTouch Delica Lancets 15L MISC Check blood sugars once daily  Please substitute with appropriate alternative as covered by patient's insurance  Dx: E11 65 100 each 3     No current facility-administered medications for this visit  Allergies:     No Known Allergies   Physical Exam:     /80 (BP Location: Left arm, Patient Position: Sitting)   Pulse 80   Temp 97 5 °F (36 4 °C) (Temporal)   Ht 5' 2" (1 575 m)   Wt 57 7 kg (127 lb 3 2 oz)   SpO2 98%   BMI 23 27 kg/m²     Physical Exam  Vitals and nursing note reviewed  Constitutional:       General: She is not in acute distress  Appearance: She is well-developed  HENT:      Head: Normocephalic and atraumatic  Right Ear: Tympanic membrane normal       Left Ear: Tympanic membrane normal       Nose: Nose normal       Mouth/Throat:      Mouth: Mucous membranes are moist    Eyes:      Conjunctiva/sclera: Conjunctivae normal    Cardiovascular:      Rate and Rhythm: Normal rate and regular rhythm  Pulses: no weak pulses          Dorsalis pedis pulses are 2+ on the right side and 2+ on the left side  Posterior tibial pulses are 2+ on the right side and 2+ on the left side  Heart sounds: No murmur heard  Pulmonary:      Effort: Pulmonary effort is normal  No respiratory distress  Breath sounds: Normal breath sounds  Abdominal:      Palpations: Abdomen is soft        Tenderness: There is no abdominal tenderness  Musculoskeletal:         General: Normal range of motion  Cervical back: Normal range of motion and neck supple  Feet:      Right foot:      Skin integrity: No ulcer, skin breakdown, erythema, warmth, callus or dry skin  Left foot:      Skin integrity: No ulcer, skin breakdown, erythema, warmth, callus or dry skin  Skin:     General: Skin is warm and dry  Neurological:      General: No focal deficit present  Mental Status: She is alert  Psychiatric:         Mood and Affect: Mood normal        Patient's shoes and socks removed  Right Foot/Ankle   Right Foot Inspection  Skin Exam: skin normal and skin intact  No dry skin, no warmth, no callus, no erythema, no maceration, no abnormal color, no pre-ulcer, no ulcer and no callus  Toe Exam: ROM and strength within normal limits  Sensory   Vibration: intact  Proprioception: intact  Monofilament testing: intact    Vascular  Capillary refills: < 3 seconds  The right DP pulse is 2+  The right PT pulse is 2+  Left Foot/Ankle  Left Foot Inspection  Skin Exam: skin normal and skin intact  No dry skin, no warmth, no erythema, no maceration, normal color, no pre-ulcer, no ulcer and no callus  Toe Exam: ROM and strength within normal limits  Sensory   Vibration: intact  Proprioception: intact  Monofilament testing: intact    Vascular  Capillary refills: < 3 seconds  The left DP pulse is 2+  The left PT pulse is 2+       Assign Risk Category  No deformity present  No loss of protective sensation  No weak pulses  Risk: 0      Andressa Higgins

## 2022-09-29 NOTE — PATIENT INSTRUCTIONS

## 2022-09-29 NOTE — ASSESSMENT & PLAN NOTE
Lab Results   Component Value Date    HGBA1C 5 4 09/27/2022   Patient will continue with Metformin 500 mg daily and stop the ozempic and recheck levels in 4 months

## 2022-09-29 NOTE — PROGRESS NOTES
Assessment/Plan:           Problem List Items Addressed This Visit        Endocrine    Type 2 diabetes mellitus without complication, without long-term current use of insulin ([facility] [address] ) - Primary    Relevant Medications    SEMAGLUTIDE,0 25 OR 0 5MG/DOS, SC            Subjective:      Patient ID: Sara Suarez is a 32 y o  female  HPI    The following portions of the patient's history were reviewed and updated as appropriate:   She  has a past medical history of Gestational diabetes and Migraine  She   Patient Active Problem List    Diagnosis Date Noted    Type 2 diabetes mellitus without complication, without long-term current use of insulin ([facility] [address] ) 2022     She  has a past surgical history that includes pr  delivery only (N/A, 6/3/2016); pr  delivery only (N/A, 2018); Tubal ligation (N/A, 2018);  section (15, 16, 18); Cervical biopsy w/ loop electrode excision (2017?); DILATION AND CURETTAGE OF UTERUS WITH HYSTEROSOCPY (N/A, 2020); and Endometrial ablation (N/A, 2020)  Her family history includes Diabetes in her paternal grandfather; Down syndrome in her cousin; Hypertension in her father; Hyperthyroidism in her father and mother; No Known Problems in her brother, daughter, daughter, paternal grandmother, sister, and son  She  reports that she has never smoked  She has never used smokeless tobacco  She reports previous alcohol use  She reports that she does not use drugs  Current Outpatient Medications   Medication Sig Dispense Refill    Blood Glucose Monitoring Suppl (OneTouch Verio Reflect) w/Device KIT Check blood sugars once daily  Please substitute with appropriate alternative as covered by patient's insurance  Dx: E11 65 1 kit 0    glucose blood (OneTouch Verio) test strip Check blood sugars once daily  Please substitute with appropriate alternative as covered by patient's insurance   Dx: E11 65 100 each 3    Insulin Pen Needle (B-D UF III MINI PEN NEEDLES) 31G X 5 MM MISC Use every 7 days 30 each 0    metFORMIN (GLUCOPHAGE-XR) 500 mg 24 hr tablet Take 1 tablet (500 mg total) by mouth daily with dinner 180 tablet 1    OneTouch Delica Lancets 79A MISC Check blood sugars once daily  Please substitute with appropriate alternative as covered by patient's insurance  Dx: E11 65 100 each 3    SEMAGLUTIDE,0 25 OR 0 5MG/DOS, SC Inject 0 5 mg under the skin every 7 days       No current facility-administered medications for this visit  She has No Known Allergies       Review of Systems      Objective:      /80 (BP Location: Left arm, Patient Position: Sitting)   Pulse 80   Temp 97 5 °F (36 4 °C) (Temporal)   Ht 5' 2" (1 575 m)   Wt 57 7 kg (127 lb 3 2 oz)   SpO2 98%   BMI 23 27 kg/m²          Physical Exam  Cardiovascular:      Pulses: no weak pulses          Dorsalis pedis pulses are 2+ on the right side and 2+ on the left side  Posterior tibial pulses are 2+ on the right side and 2+ on the left side  Feet:      Right foot:      Skin integrity: No ulcer, skin breakdown, erythema, warmth, callus or dry skin  Left foot:      Skin integrity: No ulcer, skin breakdown, erythema, warmth, callus or dry skin  Patient's shoes and socks removed  Right Foot/Ankle   Right Foot Inspection  Skin Exam: skin normal and skin intact  No dry skin, no warmth, no callus, no erythema, no maceration, no abnormal color, no pre-ulcer, no ulcer and no callus  Toe Exam: ROM and strength within normal limits  Sensory   Vibration: intact  Proprioception: intact  Monofilament testing: intact    Vascular  Capillary refills: < 3 seconds  The right DP pulse is 2+  The right PT pulse is 2+  Left Foot/Ankle  Left Foot Inspection  Skin Exam: skin normal and skin intact  No dry skin, no warmth, no erythema, no maceration, normal color, no pre-ulcer, no ulcer and no callus  Toe Exam: ROM and strength within normal limits       Sensory   Vibration: intact  Proprioception: intact  Monofilament testing: intact    Vascular  Capillary refills: < 3 seconds  The left DP pulse is 2+  The left PT pulse is 2+       Assign Risk Category  No deformity present  No loss of protective sensation  No weak pulses  Risk: 0

## 2022-10-03 ENCOUNTER — CLINICAL SUPPORT (OUTPATIENT)
Dept: FAMILY MEDICINE CLINIC | Facility: CLINIC | Age: 27
End: 2022-10-03
Payer: COMMERCIAL

## 2022-10-03 DIAGNOSIS — Z23 ENCOUNTER FOR IMMUNIZATION: Primary | ICD-10-CM

## 2022-10-03 PROCEDURE — 86580 TB INTRADERMAL TEST: CPT | Performed by: NURSE PRACTITIONER

## 2022-10-05 ENCOUNTER — CLINICAL SUPPORT (OUTPATIENT)
Dept: FAMILY MEDICINE CLINIC | Facility: CLINIC | Age: 27
End: 2022-10-05

## 2022-10-05 DIAGNOSIS — Z11.1 ENCOUNTER FOR PPD SKIN TEST READING: Primary | ICD-10-CM

## 2022-10-05 LAB
INDURATION: 0 MM
TB SKIN TEST: NEGATIVE

## 2022-10-20 NOTE — PROGRESS NOTES
New Patient Progress Note      Chief Complaint   Patient presents with   • Hyperparathyroidism      Referred by PCP ANYI Cardoza  History of Present Illness:   Rossy Case is a 32 y o  female with hypercalcemia with elevated PTH presenting to the office today to for a consultation  PMH significant for GDM/T2DM that is well controlled on Metformin alone  Past family history significant for primary hyperparathyroidism in her father and type 2 diabetes in her paternal grandfather  Patient reports her 3 parathyroid glands when was younger  Patient has a longstanding history of hypercalcemia  Component      Latest Ref Rng & Units 2022           9:52 AM  8:21 AM   PARATHYROID HORMONE      18 4 - 80 1 pg/mL 55 3 80 9 (H)     Component      Latest Ref Rng & Units 2022           9:52 AM  8:21 AM   Calcium      8 3 - 10 1 mg/dL 11 7 (H) 11 6 (H)     Polyuria/polydipsia: Denies  H/o kidney stones: Denies- however, CT renal stone study completed in  demonstrated small bilateral nonobstructing intrarenal calculi  Dysuria/hematuria: Denies  Decreased appetite: +  Constipation: +  Muscle weakness:-  Bone pain: -  H/o falls/fractures: -  Decreased concentration: +  Fatigue: +  Anxiety/depression: +    Patient Active Problem List   Diagnosis   • Type 2 diabetes mellitus without complication, without long-term current use of insulin (HCC)   • Annual physical exam   • Hypercalcemia   • Primary hyperparathyroidism [facility])      Past Medical History:   Diagnosis Date   • Gestational diabetes    • Migraine       Past Surgical History:   Procedure Laterality Date   • CERVICAL BIOPSY  W/ LOOP ELECTRODE EXCISION  ? •  SECTION  15, 16, 18   • DILATION AND CURETTAGE OF UTERUS WITH HYSTEROSOCPY N/A 2020    Procedure: DILATATION AND CURETTAGE (D&C);   Surgeon: Robson Demarco MD;  Location: [facility] MAIN OR;  Service: Gynecology   • ENDOMETRIAL ABLATION N/A 2020    Procedure: ABLATION ENDOMETRIAL JOSHUA;  Surgeon: Helen Cat MD;  Location: [facility] MAIN OR;  Service: Gynecology   • MS  DELIVERY ONLY N/A 6/3/2016    Procedure:  SECTION () REPEAT;  Surgeon: Helen Cat MD;  Location: [facility];  Service: Obstetrics   • MS  DELIVERY ONLY N/A 2018    Procedure: Marlyce Pear () REPEAT;  Surgeon: Helen Cat MD;  Location: [facility];  Service: Obstetrics   • TUBAL LIGATION N/A 2018    Procedure: LIGATION/COAGULATION TUBAL;  Surgeon: Helen Cat MD;  Location: [facility];  Service: Obstetrics      Family History   Problem Relation Age of Onset   • Hyperthyroidism Mother    • Hyperthyroidism Father    • Hypertension Father    • No Known Problems Sister    • No Known Problems Brother    • No Known Problems Son    • No Known Problems Daughter    • No Known Problems Daughter    • No Known Problems Paternal Grandmother    • Diabetes Paternal Grandfather    • Down syndrome Cousin         paternal cousin     Social History     Tobacco Use   • Smoking status: Never Smoker   • Smokeless tobacco: Never Used   Substance Use Topics   • Alcohol use: Not Currently     No Known Allergies      Current Outpatient Medications:   •  Blood Glucose Monitoring Suppl (OneTouch Verio Reflect) w/Device KIT, Check blood sugars once daily  Please substitute with appropriate alternative as covered by patient's insurance  Dx: E11 65, Disp: 1 kit, Rfl: 0  •  glucose blood (OneTouch Verio) test strip, Check blood sugars once daily  Please substitute with appropriate alternative as covered by patient's insurance   Dx: E11 65, Disp: 100 each, Rfl: 3  •  Insulin Pen Needle (B-D UF III MINI PEN NEEDLES) 31G X 5 MM MISC, Use every 7 days, Disp: 30 each, Rfl: 0  •  metFORMIN (GLUCOPHAGE-XR) 500 mg 24 hr tablet, Take 1 tablet (500 mg total) by mouth daily with dinner, Disp: 180 tablet, Rfl: 1  •  OneTouch Delica Lancets 67G MISC, Check blood sugars once daily  Please substitute with appropriate alternative as covered by patient's insurance  Dx: E11 65, Disp: 100 each, Rfl: 3    Review of Systems   Constitutional: Positive for fatigue  Negative for activity change, appetite change and unexpected weight change  HENT: Negative for dental problem, sore throat, trouble swallowing and voice change  Eyes: Negative for visual disturbance  Respiratory: Negative for cough, chest tightness and shortness of breath  Cardiovascular: Negative for chest pain, palpitations and leg swelling  Gastrointestinal: Positive for constipation  Negative for diarrhea, nausea and vomiting  Endocrine: Negative for polydipsia, polyphagia and polyuria  Genitourinary: Negative for frequency  Musculoskeletal: Positive for arthralgias  Negative for back pain, gait problem and myalgias  Allergic/Immunologic: Negative for environmental allergies and food allergies  Neurological: Negative for dizziness, weakness, light-headedness, numbness and headaches  Psychiatric/Behavioral: Positive for decreased concentration  Negative for dysphoric mood and sleep disturbance  The patient is not nervous/anxious  Physical Exam:  Body mass index is 24 51 kg/m²  /62   Pulse 72   Ht 5' 2" (1 575 m)   Wt 60 8 kg (134 lb)   SpO2 99%   BMI 24 51 kg/m²    Wt Readings from Last 3 Encounters:   10/21/22 60 8 kg (134 lb)   09/29/22 57 7 kg (127 lb 3 2 oz)   07/22/22 61 6 kg (135 lb 12 8 oz)       Physical Exam  Vitals reviewed  Constitutional:       General: She is not in acute distress  Appearance: She is well-developed  She is not ill-appearing  HENT:      Head: Normocephalic and atraumatic  Eyes:      Pupils: Pupils are equal, round, and reactive to light  Neck:      Thyroid: No thyromegaly  Cardiovascular:      Rate and Rhythm: Normal rate and regular rhythm  Pulses: Normal pulses  Heart sounds: Normal heart sounds     Pulmonary:      Effort: Pulmonary effort is normal       Breath sounds: Normal breath sounds  Abdominal:      General: Bowel sounds are normal  There is no distension  Palpations: Abdomen is soft  Tenderness: There is no abdominal tenderness  Musculoskeletal:      Cervical back: Normal range of motion and neck supple  Right lower leg: No edema  Left lower leg: No edema  Lymphadenopathy:      Cervical: No cervical adenopathy  Skin:     General: Skin is warm and dry  Capillary Refill: Capillary refill takes less than 2 seconds  Neurological:      Mental Status: She is alert and oriented to person, place, and time  Gait: Gait normal    Psychiatric:         Mood and Affect: Mood normal          Behavior: Behavior normal            Labs:   Lab Results   Component Value Date    HGBA1C 5 4 09/27/2022    HGBA1C 10 0 (A) 06/27/2022    HGBA1C 9 2 (H) 08/13/2021     Lab Results   Component Value Date    CREATININE 0 55 (L) 09/27/2022    CREATININE 0 49 (L) 07/19/2022    CREATININE 0 67 07/11/2022    BUN 6 09/27/2022     04/08/2018    K 3 6 09/27/2022     09/27/2022    CO2 26 09/27/2022     eGFR   Date Value Ref Range Status   09/27/2022 127 ml/min/1 73sq m Final     Lab Results   Component Value Date    HDL 41 (L) 09/27/2022    TRIG 78 09/27/2022     Lab Results   Component Value Date    ALT 18 09/27/2022    AST 7 09/27/2022    ALKPHOS 34 (L) 09/27/2022    BILITOT 0 3 04/08/2018     Lab Results   Component Value Date    VXE5ZQZCAIXZ 2 090 06/29/2022     No results found for: FREET4, TSI    Impression & Plan:    Problem List Items Addressed This Visit        Endocrine    Type 2 diabetes mellitus without complication, without long-term current use of insulin ([facility] [address] )     Patient is well controlled  Continue current regimen and management through PCP    Lab Results   Component Value Date    HGBA1C 5 4 09/27/2022            Primary hyperparathyroidism ([address] ) - Primary     Patient's history and clinical picture strongly suggests primary hyperparathyroidism  Check 24 hour urine to r/o Oliver Cummings 82  Complete Sestimibi scan and lab work  Counseled on basic pathophysiology of primary hyperparathyroidism  After results are reviewed, will refer to surgical oncology if warranted  Given family history of hyperparathyroidism, should primary hyperparathyroidism be confirmed, will need to r/o MEN  Patient will follow up with Dr Joaquin Mckeon in 4 months  For hypercalcemia, encouraged patient to remain very well hydrated  Patient understood and agreed with plan  She knows to contact me with any questions or concerns  Relevant Orders    Comprehensive metabolic panel Lab Collect    TSH, 3rd generation with Free T4 reflex    PTH, intact Lab Collect Lab Collect    Vitamin D 25 hydroxy Lab Collect    NM parathyroid scan w spect    US thyroid       Other    Hypercalcemia     Patient is mildly symptomatic of hypercalcemia  Avoid calcium supplements  Remain well hydrated  Relevant Orders    Calcium, urine, 24 hour Lab Collect    Creatinine, urine, 24 hour Lab Collect          Orders Placed This Encounter   Procedures   • NM parathyroid scan w spect     Primary hyperparathyroidism     Standing Status:   Future     Standing Expiration Date:   10/21/2026     Scheduling Instructions:      Please bring your insurance cards, a form of photo ID and a list of your medications with you  Arrive 15 minutes prior to your appointment time in order to register  To schedule this appointment, please contact Central Scheduling at [phone]  Order Specific Question:   Reason for Exam:     Answer:   parathyroid adenoma     Order Specific Question:   Is the patient pregnant? Answer:   No   • US thyroid     Standing Status:   Future     Standing Expiration Date:   10/21/2023     Scheduling Instructions:      No prep required   Please bring your physician order, insurance cards, a form of photo ID and a list of your medications with you  Arrive 15 minutes prior to your appointment time in order to register  To schedule this appointment, please contact central scheduling at [phone]     Order Specific Question:   Reason for Exam:     Answer:   Thyroid disorder     Order Specific Question:   Is the patient pregnant? Answer:   No   • Comprehensive metabolic panel Lab Collect     This is a patient instruction: Patient fasting for 8 hours or longer recommended  Standing Status:   Future     Standing Expiration Date:   10/20/2023   • TSH, 3rd generation with Free T4 reflex     Standing Status:   Future     Standing Expiration Date:   10/20/2023   • PTH, intact Lab Collect Lab Collect     Standing Status:   Future     Standing Expiration Date:   10/20/2023   • Vitamin D 25 hydroxy Lab Collect     Standing Status:   Future     Standing Expiration Date:   10/20/2023   • Calcium, urine, 24 hour Lab Collect     Standing Status:   Future     Standing Expiration Date:   10/21/2023   • Creatinine, urine, 24 hour Lab Collect     Standing Status:   Future     Standing Expiration Date:   10/21/2023       There are no Patient Instructions on file for this visit  Discussed with the patient and all questioned fully answered  She will call me if any problems arise  Follow-up appointment in 4 months       Counseled patient on diagnostic results, prognosis, risk and benefit of treatment options, instruction for management, importance of treatment compliance, Risk  factor reduction and impressions    ANYI Rios

## 2022-10-21 ENCOUNTER — CONSULT (OUTPATIENT)
Dept: ENDOCRINOLOGY | Facility: CLINIC | Age: 27
End: 2022-10-21
Payer: COMMERCIAL

## 2022-10-21 VITALS
WEIGHT: 134 LBS | BODY MASS INDEX: 24.66 KG/M2 | OXYGEN SATURATION: 99 % | SYSTOLIC BLOOD PRESSURE: 108 MMHG | HEIGHT: 62 IN | DIASTOLIC BLOOD PRESSURE: 62 MMHG | HEART RATE: 72 BPM

## 2022-10-21 DIAGNOSIS — E83.52 HYPERCALCEMIA: ICD-10-CM

## 2022-10-21 DIAGNOSIS — E11.9 TYPE 2 DIABETES MELLITUS WITHOUT COMPLICATION, WITHOUT LONG-TERM CURRENT USE OF INSULIN (HCC): ICD-10-CM

## 2022-10-21 DIAGNOSIS — E21.0 PRIMARY HYPERPARATHYROIDISM (HCC): Primary | ICD-10-CM

## 2022-10-21 PROCEDURE — 99203 OFFICE O/P NEW LOW 30 MIN: CPT | Performed by: NURSE PRACTITIONER

## 2022-10-21 NOTE — ASSESSMENT & PLAN NOTE
Patient's history and clinical picture strongly suggests primary hyperparathyroidism  Check 24 hour urine to r/o Oliver Cummings 84  Complete Sestimibi scan and lab work  Counseled on basic pathophysiology of primary hyperparathyroidism  After results are reviewed, will refer to surgical oncology if warranted  Given family history of hyperparathyroidism, should primary hyperparathyroidism be confirmed, will need to r/o MEN  Patient will follow up with Dr Edy Roman in 4 months  For hypercalcemia, encouraged patient to remain very well hydrated  Patient understood and agreed with plan  She knows to contact me with any questions or concerns

## 2022-10-21 NOTE — ASSESSMENT & PLAN NOTE
Patient is well controlled  Continue current regimen and management through PCP    Lab Results   Component Value Date    HGBA1C 5 4 09/27/2022

## 2022-10-21 NOTE — Clinical Note
Please call the patient and reschedule her for a 4 month f/u with Dr Garrett Herrera  Also, please let her know that I also ordered her a 24 hour urine collection  She can  what she needs here or at the lab- she will receive instructions at either place as well  My apologies for neglecting to provide her with this when she was here  Ideally, she will complete the urine test and deliver it to the lab and then have her blood work drawn that day as I need to compare the two  Thank you!

## 2022-10-31 ENCOUNTER — HOSPITAL ENCOUNTER (OUTPATIENT)
Dept: NUCLEAR MEDICINE | Facility: HOSPITAL | Age: 27
Discharge: HOME | End: 2022-10-31

## 2022-10-31 ENCOUNTER — TELEPHONE (OUTPATIENT)
Dept: PULMONOLOGY | Facility: CLINIC | Age: 27
End: 2022-10-31

## 2022-10-31 ENCOUNTER — APPOINTMENT (OUTPATIENT)
Dept: LAB | Facility: HOSPITAL | Age: 27
End: 2022-10-31

## 2022-10-31 DIAGNOSIS — E21.0 PRIMARY HYPERPARATHYROIDISM (HCC): ICD-10-CM

## 2022-10-31 DIAGNOSIS — E83.52 HYPERCALCEMIA: ICD-10-CM

## 2022-10-31 LAB
25(OH)D3 SERPL-MCNC: 23.6 NG/ML (ref 30–100)
ALBUMIN SERPL BCP-MCNC: 4.4 G/DL (ref 3.5–5)
ALP SERPL-CCNC: 28 U/L (ref 34–104)
ALT SERPL W P-5'-P-CCNC: 18 U/L (ref 7–52)
ANION GAP SERPL CALCULATED.3IONS-SCNC: 6 MMOL/L (ref 4–13)
AST SERPL W P-5'-P-CCNC: 14 U/L (ref 13–39)
BILIRUB SERPL-MCNC: 0.53 MG/DL (ref 0.2–1)
BUN SERPL-MCNC: 9 MG/DL (ref 5–25)
CALCIUM SERPL-MCNC: 11.9 MG/DL (ref 8.4–10.2)
CHLORIDE SERPL-SCNC: 101 MMOL/L (ref 96–108)
CO2 SERPL-SCNC: 29 MMOL/L (ref 21–32)
CREAT SERPL-MCNC: 0.55 MG/DL (ref 0.6–1.3)
GFR SERPL CREATININE-BSD FRML MDRD: 127 ML/MIN/1.73SQ M
GLUCOSE P FAST SERPL-MCNC: 135 MG/DL (ref 65–99)
POTASSIUM SERPL-SCNC: 3.9 MMOL/L (ref 3.5–5.3)
PROT SERPL-MCNC: 6.9 G/DL (ref 6.4–8.4)
PTH-INTACT SERPL-MCNC: 59.5 PG/ML (ref 18.4–80.1)
SODIUM SERPL-SCNC: 136 MMOL/L (ref 135–147)
TSH SERPL DL<=0.05 MIU/L-ACNC: 3.08 UIU/ML (ref 0.45–4.5)

## 2022-10-31 NOTE — TELEPHONE ENCOUNTER
José Varela calling from [facility]  Testing was done with significant findings per José Varela and results are in patients chart

## 2022-11-01 LAB
CALCIUM 24H UR-MCNC: 280 MG/24 HRS (ref 42–353)
CREAT 24H UR-MRATE: 1.1 G/24HR (ref 0.6–1.8)
SPECIMEN VOL UR: 2000 ML
SPECIMEN VOL UR: 2000 ML

## 2022-11-01 NOTE — TELEPHONE ENCOUNTER
Spoke to patient she is scheduled for her US thyroid this coming Friday  She is currently not supplementing her Vit D, please advise      Advised patient to take 1000iu daily of Vit D per Slope Mu

## 2022-11-01 NOTE — TELEPHONE ENCOUNTER
Labs reviewed  Based on blood work and urine study, this continues to point to primary hyperparathyroidism  Serum calcium is elevated with an abnormally normal PTH  Thyroid function is stable  Vit D remains a little bit low  How much is the patient supplementing? NM parathyroid scan does show some increased activity that may be indicative of a parathyroid adenoma or a thyroid nodule  Complete US of the thyroid at your convenience  Thanks

## 2022-11-04 ENCOUNTER — OFFICE VISIT (OUTPATIENT)
Dept: URGENT CARE | Facility: CLINIC | Age: 27
End: 2022-11-04

## 2022-11-04 ENCOUNTER — HOSPITAL ENCOUNTER (OUTPATIENT)
Dept: RADIOLOGY | Facility: MEDICAL CENTER | Age: 27
Discharge: HOME | End: 2022-11-04

## 2022-11-04 VITALS — OXYGEN SATURATION: 99 % | TEMPERATURE: 97.8 F | HEART RATE: 61 BPM | RESPIRATION RATE: 16 BRPM

## 2022-11-04 DIAGNOSIS — J11.1 FLU: Primary | ICD-10-CM

## 2022-11-04 DIAGNOSIS — E21.0 PRIMARY HYPERPARATHYROIDISM (HCC): ICD-10-CM

## 2022-11-04 RX ORDER — OSELTAMIVIR PHOSPHATE 75 MG/1
75 CAPSULE ORAL EVERY 12 HOURS SCHEDULED
Qty: 10 CAPSULE | Refills: 0 | Status: SHIPPED | OUTPATIENT
Start: 2022-11-04 | End: 2022-11-09

## 2022-11-04 NOTE — PROGRESS NOTES
[facility]        NAME: Celia Diaz is a 32 y o  female  : 1995    MRN: [id number]  DATE: 2022  TIME: 9:54 AM    Assessment and Plan   Flu [J11 1]  1  Flu  oseltamivir (TAMIFLU) 75 mg capsule    Covid/Flu-Office Collect     Suspected viral illness  Given education on supportive measures for symptoms in discharge instructions  Follow up with primary care in 3-5 days  Go to ER if symptoms get worse  Patient Instructions     --Rest, drink plenty of fluids     --For nasal/sinus congestion, you can try steam, warm compresses, saline nasal spray or Neti pot  Other medication options include: nasal steroid (Flonase, Nasocort) to be used at bedtime after the saline nasal spray or nasal decongestant (Afrin, Tyrel-synephrine - for 3 days max or you can get rebound symptoms) may be taken  Can also decongestant (such as Sudafed) if > 10years of age and no history of high blood pressure  --For nasal drainage, postnasal drip, sneezing and itching, an OTC antihistamine (Allegra, Benadryl, etc) can be taken  --For cough, you can take an OTC expectorant such as plain Robitussion or Mucinex (active ingredient guaifenesin)  A spoonful of honey at bedtime may also be helpful  Also recommended is the use of a cool mist humidifier (with or without Vicks) in the bedroom at night  --For sore throat, you can take OTC lozenges, use warm gargles (salt water or apple cider vinegar and honey), herbal teas, or an OTC throat spray (Chloraseptic)  --You can take Tylenol or Motrin/Advil as needed for fever, headache, body aches  Motrin/Advil should be avoided, however, if you have a history of heart disease, bleeding ulcers, or if you take blood thinners      -For cold symptoms with hypertension you can try Coricidin cough/cold  --You should contact your primary care provider and/or go to the ER if your symptoms are not improved or get worse over the next 7 days   This includes new onset fever, localized ear pain, sinus pain, as well as worsening cough, chest pain, shortness of breath, or significant weakness/fatigue  Chief Complaint     Chief Complaint   Patient presents with   • Cold Like Symptoms     States H/a, sore throat, and cough that started yesterday  States he kids are at home with flu and rsv  Taking tylenol with little relief          History of Present Illness       Presents with sick symptoms including headache, sore throat and cough  Her three children went to ER yesterday and tow have positive Flu and 1 with RSV  Her symptoms started last night  Denies fevers, chills, shortness of breath or chest pain  Review of Systems   Review of Systems   Constitutional: Negative for chills, fatigue and fever  HENT: Positive for congestion and sore throat  Respiratory: Positive for cough  Negative for shortness of breath and wheezing  Cardiovascular: Negative for chest pain  Gastrointestinal: Negative for abdominal pain  Genitourinary: Negative for dysuria  Musculoskeletal: Negative for myalgias  Neurological: Positive for headaches  Negative for dizziness  Psychiatric/Behavioral: Negative for confusion  Current Medications       Current Outpatient Medications:   •  Blood Glucose Monitoring Suppl (OneTouch Verio Reflect) w/Device KIT, Check blood sugars once daily  Please substitute with appropriate alternative as covered by patient's insurance  Dx: E11 65, Disp: 1 kit, Rfl: 0  •  glucose blood (OneTouch Verio) test strip, Check blood sugars once daily  Please substitute with appropriate alternative as covered by patient's insurance   Dx: E11 65, Disp: 100 each, Rfl: 3  •  Insulin Pen Needle (B-D UF III MINI PEN NEEDLES) 31G X 5 MM MISC, Use every 7 days, Disp: 30 each, Rfl: 0  •  metFORMIN (GLUCOPHAGE-XR) 500 mg 24 hr tablet, Take 1 tablet (500 mg total) by mouth daily with dinner, Disp: 180 tablet, Rfl: 1  •  OneTouch Delica Lancets 13Q MISC, Check blood sugars once daily  Please substitute with appropriate alternative as covered by patient's insurance  Dx: E11 65, Disp: 100 each, Rfl: 3  •  oseltamivir (TAMIFLU) 75 mg capsule, Take 1 capsule (75 mg total) by mouth every 12 (twelve) hours for 5 days, Disp: 10 capsule, Rfl: 0    Current Allergies     Allergies as of 2022   • (No Known Allergies)            The following portions of the patient's history were reviewed and updated as appropriate: allergies, current medications, past family history, past medical history, past social history, past surgical history and problem list      Past Medical History:   Diagnosis Date   • Diabetes mellitus ([facility] [address] )    • Gestational diabetes    • Migraine        Past Surgical History:   Procedure Laterality Date   • CERVICAL BIOPSY  W/ LOOP ELECTRODE EXCISION  ? •  SECTION  15, 16, 18   • DILATION AND CURETTAGE OF UTERUS WITH HYSTEROSOCPY N/A 2020    Procedure: DILATATION AND CURETTAGE (D&C);   Surgeon: Blanca Mccoy MD;  Location: [address] [place], [place] [address] [place] MAIN OR;  Service: Gynecology   • ENDOMETRIAL ABLATION N/A 2020    Procedure: Sherrie Alvarado;  Surgeon: Blanca Mccoy MD;  Location: [address] [place], [place] [address] [facility] MAIN OR;  Service: Gynecology   • [place]  DELIVERY ONLY N/A 6/3/2016    Procedure: Farzaneh Nim () REPEAT;  Surgeon: Blanca Mccoy MD;  Location: [facility];  Service: Obstetrics   • VT  DELIVERY ONLY N/A 2018    Procedure: Farzaneh Nim () REPEAT;  Surgeon: Blanca Mccoy MD;  Location: [facility];  Service: Obstetrics   • TUBAL LIGATION N/A 2018    Procedure: LIGATION/COAGULATION TUBAL;  Surgeon: Blanca Mccoy MD;  Location: [facility];  Service: Obstetrics       Family History   Problem Relation Age of Onset   • Hyperthyroidism Mother    • Hyperthyroidism Father    • Hypertension Father    • No Known Problems Sister    • No Known Problems Brother    • No Known Problems Son    • No Known Problems Daughter    • No Known Problems Daughter    • No Known Problems Paternal Grandmother    • Diabetes Paternal Grandfather    • Down syndrome Cousin         paternal cousin         Medications have been verified  Objective   Pulse 61   Temp 97 8 °F (36 6 °C)   Resp 16   SpO2 99%        Physical Exam     Physical Exam  Vitals reviewed  Constitutional:       General: She is not in acute distress  Appearance: Normal appearance  HENT:      Right Ear: Tympanic membrane, ear canal and external ear normal       Left Ear: Tympanic membrane, ear canal and external ear normal       Nose: Nose normal       Mouth/Throat:      Mouth: Mucous membranes are moist       Pharynx: No posterior oropharyngeal erythema  Eyes:      Conjunctiva/sclera: Conjunctivae normal    Cardiovascular:      Rate and Rhythm: Normal rate and regular rhythm  Pulses: Normal pulses  Heart sounds: Normal heart sounds  No murmur heard  Pulmonary:      Effort: Pulmonary effort is normal  No respiratory distress  Breath sounds: Normal breath sounds  Musculoskeletal:         General: Normal range of motion  Skin:     General: Skin is warm and dry  Neurological:      General: No focal deficit present  Mental Status: She is alert and oriented to person, place, and time     Psychiatric:         Mood and Affect: Mood normal          Behavior: Behavior normal

## 2022-11-05 LAB
FLUAV RNA RESP QL NAA+PROBE: POSITIVE
FLUBV RNA RESP QL NAA+PROBE: NEGATIVE
SARS-COV-2 RNA RESP QL NAA+PROBE: NEGATIVE

## 2022-11-08 NOTE — RESULT ENCOUNTER NOTE
Your Flu results came back positive  Continue supportive care for continued symptoms  I hope you are starting to feel better!

## 2022-11-11 DIAGNOSIS — E21.0 PRIMARY HYPERPARATHYROIDISM (HCC): Primary | ICD-10-CM

## 2022-11-15 ENCOUNTER — DOCUMENTATION (OUTPATIENT)
Dept: HEMATOLOGY ONCOLOGY | Facility: CLINIC | Age: 27
End: 2022-11-15

## 2022-11-15 NOTE — PROGRESS NOTES
Intake received, chart reviewed for need of external records  Pathology completed:  Labs have been ordered, pt is aware they need to be done prior to consult appt   Imaging completed:11/4/2022  All records needed are in patients chart  No further action required of Care Coordination team at this time

## 2022-12-03 ENCOUNTER — APPOINTMENT (OUTPATIENT)
Dept: LAB | Facility: CLINIC | Age: 27
End: 2022-12-03

## 2022-12-03 DIAGNOSIS — E11.9 TYPE 2 DIABETES MELLITUS WITHOUT COMPLICATION, WITHOUT LONG-TERM CURRENT USE OF INSULIN (HCC): ICD-10-CM

## 2022-12-06 DIAGNOSIS — E11.65 TYPE 2 DIABETES MELLITUS WITH HYPERGLYCEMIA, WITHOUT LONG-TERM CURRENT USE OF INSULIN (HCC): ICD-10-CM

## 2022-12-06 DIAGNOSIS — R73.09 ELEVATED GLUCOSE: ICD-10-CM

## 2022-12-07 RX ORDER — METFORMIN HYDROCHLORIDE 500 MG/1
500 TABLET, EXTENDED RELEASE ORAL
Qty: 180 TABLET | Refills: 0 | Status: SHIPPED | OUTPATIENT
Start: 2022-12-07 | End: 2023-06-05

## 2022-12-09 ENCOUNTER — CONSULT (OUTPATIENT)
Dept: SURGICAL ONCOLOGY | Facility: CLINIC | Age: 27
End: 2022-12-09

## 2022-12-09 VITALS
WEIGHT: 134 LBS | TEMPERATURE: 98.2 F | SYSTOLIC BLOOD PRESSURE: 120 MMHG | HEIGHT: 62 IN | DIASTOLIC BLOOD PRESSURE: 60 MMHG | BODY MASS INDEX: 24.66 KG/M2 | HEART RATE: 89 BPM | OXYGEN SATURATION: 98 %

## 2022-12-09 DIAGNOSIS — E21.0 PRIMARY HYPERPARATHYROIDISM (HCC): ICD-10-CM

## 2022-12-09 DIAGNOSIS — E83.52 HYPERCALCEMIA: Primary | ICD-10-CM

## 2022-12-09 NOTE — PROGRESS NOTES
Surgical Oncology Consult       [facility] SURGICAL ONCOLOGY [place]  [facility] [address]    Zelda Pitts  1995  [id number]  [facility] SURGICAL ONCOLOGY [place]  [address] [address]    Chief Complaint   Patient presents with   • Consult     Pt presents for a consult for Primary hyperparathyroidism   Pt last labs where 12/3/2022  Pt also had a NM parathyroid scan w spect done on 10/31/2022 and a US Thyroid done on 11/04/2022  Assessment/Plan:    No problem-specific Assessment & Plan notes found for this encounter  Diagnoses and all orders for this visit:    Hypercalcemia  -     CT parathyroid study w wo contrast; Future    Primary hyperparathyroidism [facility])  -     Ambulatory referral to Surgical Oncology        Advance Care Planning/Advance Directives:  Discussed disease status, cancer treatment plans and/or cancer treatment goals with the patient  Oncology History    No history exists  History of Present Illness: Patient is a 75-year-old woman with hypercalcemia for the last 4 years  She was recently found to have elevated PTH levels prompting work-up  She also found to have kidney stones  Sestamibi scan was performed which was negative  She complains of fatigue and has been quite tired for the last several years  She also has young children  Her father had a 3 gland parathyroidectomy  He has had no issues with memory or concentration, though as per family, she can be hopkins  Review of Systems   Constitutional: Positive for fatigue  HENT: Negative  Eyes: Negative  Respiratory: Negative  Cardiovascular: Negative  Gastrointestinal: Negative  Endocrine: Negative  Genitourinary: Negative  Musculoskeletal: Negative  Skin: Negative  Allergic/Immunologic: Negative  Neurological: Negative  Hematological: Negative  Psychiatric/Behavioral: Negative  All other systems reviewed and are negative  Patient Active Problem List   Diagnosis   • Type 2 diabetes mellitus without complication, without long-term current use of insulin ([facility] [address] )   • Annual physical exam   • Hypercalcemia   • Primary hyperparathyroidism [facility])     Past Medical History:   Diagnosis Date   • Diabetes mellitus ([facility] [address] )    • Gestational diabetes    • Migraine      Past Surgical History:   Procedure Laterality Date   • CERVICAL BIOPSY  W/ LOOP ELECTRODE EXCISION  ? •  SECTION  15, 16, 18   • DILATION AND CURETTAGE OF UTERUS WITH HYSTEROSOCPY N/A 2020    Procedure: DILATATION AND CURETTAGE (D&C);   Surgeon: Ilene Rodriguez MD;  Location: [address] [place] [address] OR;  Service: Gynecology   • ENDOMETRIAL ABLATION N/A 2020    Procedure: ABLATION ENDOMETRIAL JOSHUA;  Surgeon: Ilene Rodriguez MD;  Location: [address] [place] [address] OR;  Service: Gynecology   • [place]  DELIVERY ONLY N/A 6/3/2016    Procedure: Jeffrey Hook () REPEAT;  Surgeon: Ilene Rodriguez MD;  Location: [facility];  Service: Obstetrics   • [place]  DELIVERY ONLY N/A 2018    Procedure: Jeffrey Hook () REPEAT;  Surgeon: Ilene Rodriguez MD;  Location: [facility];  Service: Obstetrics   • TUBAL LIGATION N/A 2018    Procedure: LIGATION/COAGULATION TUBAL;  Surgeon: Ilene Rodriguez MD;  Location: [facility];  Service: Obstetrics     Family History   Problem Relation Age of Onset   • Hyperthyroidism Mother    • Hyperthyroidism Father    • Hypertension Father    • No Known Problems Sister    • No Known Problems Brother    • No Known Problems Son    • No Known Problems Daughter    • No Known Problems Daughter    • No Known Problems Paternal Grandmother    • Diabetes Paternal Grandfather    • Down syndrome Cousin         paternal cousin     Social History     Socioeconomic History   • Marital status: /Civil Union     Spouse name: Not on file   • Number of children: Not on file   • Years of education: Not on file   • Highest education level: Not on file   Occupational History   • Not on file   Tobacco Use   • Smoking status: Never   • Smokeless tobacco: Never   Vaping Use   • Vaping Use: Never used   Substance and Sexual Activity   • Alcohol use: Not Currently   • Drug use: No   • Sexual activity: Yes     Partners: Male     Birth control/protection: Female Sterilization   Other Topics Concern   • Not on file   Social History Narrative   • Not on file     Social Determinants of Health     Financial Resource Strain: Not on file   Food Insecurity: Not on file   Transportation Needs: Not on file   Physical Activity: Not on file   Stress: Not on file   Social Connections: Not on file   Intimate Partner Violence: Not on file   Housing Stability: Not on file       Current Outpatient Medications:   •  Blood Glucose Monitoring Suppl (OneTouch Verio Reflect) w/Device KIT, Check blood sugars once daily  Please substitute with appropriate alternative as covered by patient's insurance  Dx: E11 65, Disp: 1 kit, Rfl: 0  •  glucose blood (OneTouch Verio) test strip, Check blood sugars once daily  Please substitute with appropriate alternative as covered by patient's insurance  Dx: E11 65, Disp: 100 each, Rfl: 3  •  Insulin Pen Needle (B-D UF III MINI PEN NEEDLES) 31G X 5 MM MISC, Use every 7 days, Disp: 30 each, Rfl: 0  •  metFORMIN (GLUCOPHAGE-XR) 500 mg 24 hr tablet, Take 1 tablet (500 mg total) by mouth daily with dinner, Disp: 180 tablet, Rfl: 0  •  OneTouch Delica Lancets 66A MISC, Check blood sugars once daily  Please substitute with appropriate alternative as covered by patient's insurance  Dx: E11 65, Disp: 100 each, Rfl: 3  No Known Allergies  Vitals:    12/09/22 0842   BP: 120/60   Pulse: 89   Temp: 98 2 °F (36 8 °C)   SpO2: 98%       Physical Exam  Vitals reviewed  Constitutional:       Appearance: Normal appearance  HENT:      Head: Normocephalic and atraumatic        Right Ear: External ear normal       Left Ear: External ear normal    Eyes:      Extraocular Movements: Extraocular movements intact  Pupils: Pupils are equal, round, and reactive to light  Cardiovascular:      Rate and Rhythm: Normal rate and regular rhythm  Heart sounds: Normal heart sounds  Pulmonary:      Effort: Pulmonary effort is normal       Breath sounds: Normal breath sounds  Abdominal:      General: Abdomen is flat  Palpations: Abdomen is soft  Musculoskeletal:         General: Normal range of motion  Cervical back: Normal range of motion and neck supple  No rigidity or tenderness  Lymphadenopathy:      Cervical: No cervical adenopathy  Skin:     General: Skin is warm and dry  Neurological:      General: No focal deficit present  Mental Status: She is alert and oriented to person, place, and time  Psychiatric:         Mood and Affect: Mood normal          Behavior: Behavior normal          Pathology:  none    Labs:  Lab Results   Component Value Date    PTH 59 5 10/31/2022    CALCIUM 11 9 (H) 10/31/2022         Imaging    PARATHYROID SCAN     INDICATION:  E21 0: Primary hyperparathyroidism     COMPARISON:  None      TECHNIQUE:   Following the intravenous administration of 26 2 mCi Tc-99m Cardiolite, anterior and bilateral anterior oblique projection images of the neck and mediastinum were obtained at approximately 10 minutes post injection followed at 2 hours post   injection by static anterior and bilateral oblique projections as well as SPECT images in coronal, sagittal and axial projections       FINDINGS:     Delayed images demonstrate radiotracer uptake in the thyroid gland asymmetrically more prominent on the right      Delayed images demonstrate washout of thyroid gland activity  Residual focus of uptake identified on the right      SPECT images demonstrate asymmetrically increased focal radiotracer uptake in the right upper pole region    This does appear to overlap the thyroid region         IMPRESSION:     1  Asymmetrically increased radiotracer uptake in the right upper pole region, does appear to overlap the expected location of the thyroid  This could be related to an underlying  thyroid nodule rather than a parathyroid adenoma  This could be further   evaluated with ultrasound or CT parathyroid study         The study was marked in EPIC for significant notification  No results found  I reviewed the above laboratory and imaging data  THYROID ULTRASOUND     INDICATION:    E21 0: Primary hyperparathyroidism      COMPARISON:  None     TECHNIQUE:   Ultrasound of the thyroid was performed with a high frequency linear transducer in transverse and sagittal planes including volumetric imaging sweeps as well as traditional still imaging technique      FINDINGS:  Normal homogeneous smooth echotexture      Right lobe: 4 3 x 1 2 x 1 9 cm  Volume 4 7 mL  Left lobe:  3 2 x 0 8 x 1 4 cm  Volume 1 7 mL  Isthmus: 0 1  cm      Normal     IMPRESSION:     Normal examination             Reference: ACR Thyroid Imaging, Reporting and Data System (TI-RADS): White Paper of the [employer]  J AM Yoan Radiol 0267;15:564-671  (additional recommendations based on American Thyroid Association 2015 guidelines )        Workstation performed: [id number]     Discussion/Summary: 49-year-old woman with what appears to be primary hyperparathyroidism  Tells me scan was negative as was ultrasound  We will therefore order CAT scan for further work-up  Follow-up here once resulted will review and plan for surgery

## 2022-12-09 NOTE — LETTER
December 9, 2022     Riley Heather [address]en [address] [place] [address]    Patient: France Hernandez   YOB: 1995   Date of Visit: 12/9/2022       Dear Dr Josefa Lloyd: Thank you for referring Ida Gutierrez to me for evaluation  Below are my notes for this consultation  If you have questions, please do not hesitate to call me  I look forward to following your patient along with you  Sincerely,        Tomas Morley MD        CC: Ubaldo Frankel, CRNP Derotha Condon, MD Lurlene Smoke, MD Christyne Erie, MD Jodi Duvall MD Lory Maryland, MD Dimitrios Barker, RD  ANYI Mckeon MD  12/9/2022  9:12 AM  Sign when Signing Visit               Surgical Oncology Consult       [address] [address]    France Hernandez  1995  [id number]  [facility]  [facility] SURGICAL ONCOLOGY [facility]  [address] [address]    Chief Complaint   Patient presents with   • Consult     Pt presents for a consult for Primary hyperparathyroidism   Pt last labs where 12/3/2022  Pt also had a NM parathyroid scan w spect done on 10/31/2022 and a US Thyroid done on 11/04/2022  Assessment/Plan:    No problem-specific Assessment & Plan notes found for this encounter  Diagnoses and all orders for this visit:    Hypercalcemia  -     CT parathyroid study w wo contrast; Future    Primary hyperparathyroidism [facility])  -     Ambulatory referral to Surgical Oncology       Advance Care Planning/Advance Directives:  Discussed disease status, cancer treatment plans and/or cancer treatment goals with the patient  Oncology History    No history exists  History of Present Illness: Patient is a 33-year-old woman with hypercalcemia for the last 4 years    She was recently found to have elevated PTH levels prompting work-up  She also found to have kidney stones  Sestamibi scan was performed which was negative  She complains of fatigue and has been quite tired for the last several years  She also has young children  Her father had a 3 gland parathyroidectomy  He has had no issues with memory or concentration, though as per family, she can be hopkins  Review of Systems   Constitutional: Positive for fatigue  HENT: Negative  Eyes: Negative  Respiratory: Negative  Cardiovascular: Negative  Gastrointestinal: Negative  Endocrine: Negative  Genitourinary: Negative  Musculoskeletal: Negative  Skin: Negative  Allergic/Immunologic: Negative  Neurological: Negative  Hematological: Negative  Psychiatric/Behavioral: Negative  All other systems reviewed and are negative  Patient Active Problem List   Diagnosis   • Type 2 diabetes mellitus without complication, without long-term current use of insulin ([facility] )   • Annual physical exam   • Hypercalcemia   • Primary hyperparathyroidism [facility])     Past Medical History:   Diagnosis Date   • [facility] mellitus [facility] )    • Gestational diabetes    • Migraine      Past Surgical History:   Procedure Laterality Date   • CERVICAL BIOPSY  W/ LOOP ELECTRODE EXCISION  ? •  SECTION  15, 16, 18   • DILATION AND CURETTAGE OF UTERUS WITH HYSTEROSOCPY N/A 2020    Procedure: DILATATION AND CURETTAGE (D&C);   Surgeon: Patience Cristobal MD;  Location: [facility] MAIN OR;  Service: Gynecology   • ENDOMETRIAL ABLATION N/A 2020    Procedure: Samia Love;  Surgeon: Patience Cristobal MD;  Location: [facility] MAIN OR;  Service: Gynecology   • [place]  DELIVERY ONLY N/A 6/3/2016    Procedure: Lavera Self () REPEAT;  Surgeon: Patience Cristobal MD;  Location: [facility];  Service: Obstetrics   • [place]  DELIVERY ONLY N/A 2018    Procedure: Lavera Self () REPEAT;  Surgeon: Abdiel Kohc Konstantin Peters MD;  Location: [facility];  Service: Obstetrics   • TUBAL LIGATION N/A 11/26/2018    Procedure: LIGATION/COAGULATION TUBAL;  Surgeon: Melissa Hatfield MD;  Location: [facility];  Service: Obstetrics     Family History   Problem Relation Age of Onset   • Hyperthyroidism Mother    • Hyperthyroidism Father    • Hypertension Father    • No Known Problems Sister    • No Known Problems Brother    • No Known Problems Son    • No Known Problems Daughter    • No Known Problems Daughter    • No Known Problems Paternal Grandmother    • Diabetes Paternal Grandfather    • Down syndrome Cousin         paternal cousin     Social History     Socioeconomic History   • Marital status: /Civil Union     Spouse name: Not on file   • Number of children: Not on file   • Years of education: Not on file   • Highest education level: Not on file   Occupational History   • Not on file   Tobacco Use   • Smoking status: Never   • Smokeless tobacco: Never   Vaping Use   • Vaping Use: Never used   Substance and Sexual Activity   • Alcohol use: Not Currently   • Drug use: No   • Sexual activity: Yes     Partners: Male     Birth control/protection: Female Sterilization   Other Topics Concern   • Not on file   Social History Narrative   • Not on file     Social Determinants of Health     Financial Resource Strain: Not on file   Food Insecurity: Not on file   Transportation Needs: Not on file   Physical Activity: Not on file   Stress: Not on file   Social Connections: Not on file   Intimate Partner Violence: Not on file   Housing Stability: Not on file       Current Outpatient Medications:   •  Blood Glucose Monitoring Suppl (OneTouch Verio Reflect) w/Device KIT, Check blood sugars once daily  Please substitute with appropriate alternative as covered by patient's insurance  Dx: E11 65, Disp: 1 kit, Rfl: 0  •  glucose blood (OneTouch Verio) test strip, Check blood sugars once daily   Please substitute with appropriate alternative as covered by patient's insurance  Dx: E11 65, Disp: 100 each, Rfl: 3  •  Insulin Pen Needle (B-D UF III MINI PEN NEEDLES) 31G X 5 MM MISC, Use every 7 days, Disp: 30 each, Rfl: 0  •  metFORMIN (GLUCOPHAGE-XR) 500 mg 24 hr tablet, Take 1 tablet (500 mg total) by mouth daily with dinner, Disp: 180 tablet, Rfl: 0  •  OneTouch Delica Lancets 64K MISC, Check blood sugars once daily  Please substitute with appropriate alternative as covered by patient's insurance  Dx: E11 65, Disp: 100 each, Rfl: 3  No Known Allergies  Vitals:    12/09/22 0842   BP: 120/60   Pulse: 89   Temp: 98 2 °F (36 8 °C)   SpO2: 98%       Physical Exam  Vitals reviewed  Constitutional:       Appearance: Normal appearance  HENT:      Head: Normocephalic and atraumatic  Right Ear: External ear normal       Left Ear: External ear normal    Eyes:      Extraocular Movements: Extraocular movements intact  Pupils: Pupils are equal, round, and reactive to light  Cardiovascular:      Rate and Rhythm: Normal rate and regular rhythm  Heart sounds: Normal heart sounds  Pulmonary:      Effort: Pulmonary effort is normal       Breath sounds: Normal breath sounds  Abdominal:      General: Abdomen is flat  Palpations: Abdomen is soft  Musculoskeletal:         General: Normal range of motion  Cervical back: Normal range of motion and neck supple  No rigidity or tenderness  Lymphadenopathy:      Cervical: No cervical adenopathy  Skin:     General: Skin is warm and dry  Neurological:      General: No focal deficit present  Mental Status: She is alert and oriented to person, place, and time     Psychiatric:         Mood and Affect: Mood normal          Behavior: Behavior normal          Pathology:  none    Labs:  Lab Results   Component Value Date    PTH 59 5 10/31/2022    CALCIUM 11 9 (H) 10/31/2022         Imaging    PARATHYROID SCAN     INDICATION:  E21 0: Primary hyperparathyroidism     COMPARISON:  None      TECHNIQUE:   Following the intravenous administration of 26 2 mCi Tc-99m Cardiolite, anterior and bilateral anterior oblique projection images of the neck and mediastinum were obtained at approximately 10 minutes post injection followed at 2 hours post   injection by static anterior and bilateral oblique projections as well as SPECT images in coronal, sagittal and axial projections       FINDINGS:     Delayed images demonstrate radiotracer uptake in the thyroid gland asymmetrically more prominent on the right      Delayed images demonstrate washout of thyroid gland activity  Residual focus of uptake identified on the right      SPECT images demonstrate asymmetrically increased focal radiotracer uptake in the right upper pole region  This does appear to overlap the thyroid region         IMPRESSION:     1  Asymmetrically increased radiotracer uptake in the right upper pole region, does appear to overlap the expected location of the thyroid  This could be related to an underlying  thyroid nodule rather than a parathyroid adenoma  This could be further   evaluated with ultrasound or CT parathyroid study         The study was marked in EPIC for significant notification  No results found  I reviewed the above laboratory and imaging data  THYROID ULTRASOUND     INDICATION:    E21 0: Primary hyperparathyroidism      COMPARISON:  None     TECHNIQUE:   Ultrasound of the thyroid was performed with a high frequency linear transducer in transverse and sagittal planes including volumetric imaging sweeps as well as traditional still imaging technique      FINDINGS:  Normal homogeneous smooth echotexture      Right lobe: 4 3 x 1 2 x 1 9 cm  Volume 4 7 mL  Left lobe:  3 2 x 0 8 x 1 4 cm  Volume 1 7 mL  Isthmus: 0 1  cm      Normal     IMPRESSION:     Normal examination             Reference: ACR Thyroid Imaging, Reporting and Data System (TI-RADS): White Paper of the [employer]   J AM Yoan Radiol 1470;41:788-418  (additional recommendations based on American Thyroid Association 2015 guidelines )        Workstation performed: [id number]     Discussion/Summary: 68-year-old woman with what appears to be primary hyperparathyroidism  Tells me scan was negative as was ultrasound  We will therefore order CAT scan for further work-up  Follow-up here once resulted will review and plan for surgery

## 2022-12-16 ENCOUNTER — HOSPITAL ENCOUNTER (OUTPATIENT)
Dept: CT IMAGING | Facility: CLINIC | Age: 27
Discharge: HOME | End: 2022-12-16

## 2022-12-16 DIAGNOSIS — E83.52 HYPERCALCEMIA: ICD-10-CM

## 2022-12-16 RX ADMIN — IOHEXOL 85 ML: 350 INJECTION, SOLUTION INTRAVENOUS at 12:44

## 2022-12-23 ENCOUNTER — OFFICE VISIT (OUTPATIENT)
Dept: SURGICAL ONCOLOGY | Facility: CLINIC | Age: 27
End: 2022-12-23

## 2022-12-23 VITALS
DIASTOLIC BLOOD PRESSURE: 70 MMHG | WEIGHT: 137 LBS | SYSTOLIC BLOOD PRESSURE: 110 MMHG | BODY MASS INDEX: 25.21 KG/M2 | TEMPERATURE: 98.3 F | HEIGHT: 62 IN

## 2022-12-23 DIAGNOSIS — E21.0 PRIMARY HYPERPARATHYROIDISM (HCC): Primary | ICD-10-CM

## 2022-12-23 DIAGNOSIS — E83.52 HYPERCALCEMIA: ICD-10-CM

## 2022-12-23 RX ORDER — TRAMADOL HYDROCHLORIDE 50 MG/1
50 TABLET ORAL EVERY 6 HOURS PRN
Qty: 10 TABLET | Refills: 0 | Status: SHIPPED | OUTPATIENT
Start: 2022-12-23

## 2022-12-23 NOTE — PROGRESS NOTES
Surgical Oncology Follow Up       [address] [facility] SURGICAL ONCOLOGY [place]  [facility] [address]    Cody Terry  1995  [id number]  [facility] SURGICAL ONCOLOGY [place]  [place] [id number]    Chief Complaint   Patient presents with   • Follow-up       Assessment/Plan:    No problem-specific Assessment & Plan notes found for this encounter  Diagnoses and all orders for this visit:    Primary hyperparathyroidism [facility])    Hypercalcemia        Advance Care Planning/Advance Directives:  Discussed disease status, cancer treatment plans and/or cancer treatment goals with the patient  Oncology History    No history exists  History of Present Illness: Patient is a 64-year-old woman here for follow-up status post parathyroid CAT scan to look for potential target   -Interval History: Complaints to report  Review of Systems:  Review of Systems   Constitutional: Positive for fatigue  HENT: Negative  Eyes: Negative  Respiratory: Negative  Cardiovascular: Negative  Gastrointestinal: Negative  Endocrine: Negative  Genitourinary: Negative  Musculoskeletal: Negative  Allergic/Immunologic: Negative  Neurological: Negative  Hematological: Negative  Psychiatric/Behavioral: Negative  Patient Active Problem List   Diagnosis   • Type 2 diabetes mellitus without complication, without long-term current use of insulin ([address] )   • Annual physical exam   • Hypercalcemia   • Primary hyperparathyroidism [facility])     Past Medical History:   Diagnosis Date   • Diabetes mellitus ([address] )    • Gestational diabetes    • Migraine      Past Surgical History:   Procedure Laterality Date   • CERVICAL BIOPSY  W/ LOOP ELECTRODE EXCISION  ?    •  SECTION  15, 16, 18   • DILATION AND CURETTAGE OF UTERUS WITH HYSTEROSOCPY N/A 2020    Procedure: DILATATION AND CURETTAGE (D&C); Surgeon: Carlene Curry MD;  Location: [facility] MAIN OR;  Service: Gynecology   • ENDOMETRIAL ABLATION N/A 2020    Procedure: ABLATION ENDOMETRIAL JOSHUA;  Surgeon: Carlene Curry MD;  Location: [facility] MAIN OR;  Service: Gynecology   • [place]  DELIVERY ONLY N/A 6/3/2016    Procedure:  SECTION () REPEAT;  Surgeon: Carlene Curry MD;  Location: [facility];  Service: Obstetrics   • [place]  DELIVERY ONLY N/A 2018    Procedure: Mark Riff () REPEAT;  Surgeon: Carlene Curry MD;  Location: [facility];  Service: Obstetrics   • TUBAL LIGATION N/A 2018    Procedure: LIGATION/COAGULATION TUBAL;  Surgeon: Carlene Curry MD;  Location: [facility];  Service: Obstetrics     Family History   Problem Relation Age of Onset   • Hyperthyroidism Mother    • Hyperthyroidism Father    • Hypertension Father    • No Known Problems Sister    • No Known Problems Brother    • No Known Problems Son    • No Known Problems Daughter    • No Known Problems Daughter    • No Known Problems Paternal Grandmother    • Diabetes Paternal Grandfather    • Down syndrome Cousin         paternal cousin     Social History     Socioeconomic History   • Marital status: /Civil Union     Spouse name: Not on file   • Number of children: Not on file   • Years of education: Not on file   • Highest education level: Not on file   Occupational History   • Not on file   Tobacco Use   • Smoking status: Never   • Smokeless tobacco: Never   Vaping Use   • Vaping Use: Never used   Substance and Sexual Activity   • Alcohol use: Not Currently   • Drug use: No   • Sexual activity: Yes     Partners: Male     Birth control/protection: Female Sterilization   Other Topics Concern   • Not on file   Social History Narrative   • Not on file     Social Determinants of Health     Financial Resource Strain: Not on file   Food Insecurity: Not on file   Transportation Needs: Not on file   Physical Activity: Not on file   Stress: Not on file   Social Connections: Not on file   Intimate Partner Violence: Not on file   Housing Stability: Not on file       Current Outpatient Medications:   •  Blood Glucose Monitoring Suppl (OneTouch Verio Reflect) w/Device KIT, Check blood sugars once daily  Please substitute with appropriate alternative as covered by patient's insurance  Dx: E11 65, Disp: 1 kit, Rfl: 0  •  glucose blood (OneTouch Verio) test strip, Check blood sugars once daily  Please substitute with appropriate alternative as covered by patient's insurance  Dx: E11 65, Disp: 100 each, Rfl: 3  •  Insulin Pen Needle (B-D UF III MINI PEN NEEDLES) 31G X 5 MM MISC, Use every 7 days, Disp: 30 each, Rfl: 0  •  metFORMIN (GLUCOPHAGE-XR) 500 mg 24 hr tablet, Take 1 tablet (500 mg total) by mouth daily with dinner, Disp: 180 tablet, Rfl: 0  •  OneTouch Delica Lancets 14D MISC, Check blood sugars once daily  Please substitute with appropriate alternative as covered by patient's insurance  Dx: E11 65, Disp: 100 each, Rfl: 3  No Known Allergies  Vitals:    12/23/22 0915   BP: 110/70   Temp: 98 3 °F (36 8 °C)       Physical Exam  Vitals reviewed  Cardiovascular:      Rate and Rhythm: Regular rhythm  Heart sounds: Normal heart sounds  Pulmonary:      Breath sounds: Normal breath sounds  Musculoskeletal:      Cervical back: Normal range of motion and neck supple  Results:  Labs:  Lab Results   Component Value Date    PTH 59 5 10/31/2022    CALCIUM 11 9 (H) 10/31/2022         Imaging  CT parathyroid study w wo contrast    Result Date: 12/16/2022  Narrative: CT  NECK  WITHOUT AND WITH CONTRAST FROM CAREN TO SKULL BASE INDICATION: Hyperparathyroidism  Unrevealing sestamibi  COMPARISON:  None  TECHNIQUE:This examination, like all CT scans performed in the [facility], was performed utilizing techniques to minimize radiation dose exposure, including the use of iterative reconstruction and automated exposure control   Both noncontrast, contrast, and post contrast delayed images were performed according to standard parathyroid protocol (4D technique) Coronal and sagittal reconstructions were performed  3D reconstructions were performed on an independent workstation, and are supplied for review  85 mL of iohexol (OMNIPAQUE) was injected intravenously without immediate consequence  Radiation dose: 936 mGy-cm FINDINGS: SERIAL NONCONTRAST, POST CONTRAST AND DELAYS: There is a 0 4 x 0 5 x 0 8 cm lesion identified in the left neck sitting inferior to the left thyroid lobe which meets CT enhancement criteria typical for parathyroid adenoma or hyperplasia  Lesion is best seen on series 601 images 113 - 115, and on series 602 image 63  The lesion sits approximately 3 cm above the sternal notch  2 additional lesions are identified, one in the right neck, and one in the left neck  They sit at the same level, approximately 2 cm caudal to the cricoid cartilage just lateral to the esophagus  Both of these lesions meet the CT enhancement criteria typical for parathyroid adenoma, however, could also represent normal or hyperplastic glands  The lesion on the right measures 0 5 x 0 5 x 0 6 cm, and a lesion on the left measures 0 4 x 0 4 x 0 7 cm  They are both seen on series 701 images 56 and 57, and also series 602 image 67 for the right side, and 66 for the left side  VASCULAR STRUCTURES: There is no carotid stenosis by Nascet criteria  VISUALIZED PARANASAL SINUSES: Normal  NASAL CAVITY AND NASOPHARYNX: Normal  SUPRAHYOID NECK: Normal oropharynx, oral cavity, parapharyngeal and retropharyngeal spaces  INFRAHYOID NECK: Normal oropharynx, oral cavity, parapharyngeal and retropharyngeal spaces  THYROID GLAND: Normal   It is noted that the right thyroid lobe is larger than the left, which accounts for the asymmetric appearance on the sestamibi scan  LYMPH NODES: No pathologic or enlarged adenopathy  MEDIASTINUM: Unremarkable   BONY STRUCTURES Normal  LUNG APICES: Unremarkable  Impression: 1  0 4 x 0 5 x 0 8 cm lesion sitting inferior to the left thyroid lobe as described above which meets the CT enhancement criteria typical for parathyroid adenoma or hyperplasia  2  2 additional lesions identified in the right and left neck sitting adjacent to the esophagus at the same level bilaterally, approximately 2 cm caudal to the cricoid cartilage  These lesions may represent normal or hyperplastic glands, or possibly parathyroid adenomata  The lesions do meet the CT enhancement criteria typical for parathyroid tissue  Workstation performed: [id number]     I reviewed the above laboratory and imaging data  Discussion/Summary: Primary hyperparathyroidism, suspected left-sided target on CAT scan  He is amenable to needle invasive parathyroidectomy, possible for exploration, with intraoperative PTH monitoring  Scheduled for operation  Findings discussed with patient  She is amenable to this  Risk benefits of surgery for infection, bleeding, need for possible additional surgery, nerve injury, discussed  All questions answered and consent signed at this visit

## 2023-01-28 ENCOUNTER — APPOINTMENT (OUTPATIENT)
Dept: LAB | Facility: CLINIC | Age: 28
End: 2023-01-28

## 2023-01-28 LAB
ALBUMIN SERPL BCP-MCNC: 3.8 G/DL (ref 3.5–5)
ALP SERPL-CCNC: 31 U/L (ref 46–116)
ALT SERPL W P-5'-P-CCNC: 18 U/L (ref 12–78)
ANION GAP SERPL CALCULATED.3IONS-SCNC: 5 MMOL/L (ref 4–13)
AST SERPL W P-5'-P-CCNC: 12 U/L (ref 5–45)
BASOPHILS # BLD AUTO: 0.07 THOUSANDS/ÂΜL (ref 0–0.1)
BASOPHILS NFR BLD AUTO: 1 % (ref 0–1)
BILIRUB SERPL-MCNC: 0.33 MG/DL (ref 0.2–1)
BUN SERPL-MCNC: 9 MG/DL (ref 5–25)
CALCIUM SERPL-MCNC: 11.8 MG/DL (ref 8.3–10.1)
CHLORIDE SERPL-SCNC: 104 MMOL/L (ref 96–108)
CHOLEST SERPL-MCNC: 140 MG/DL
CO2 SERPL-SCNC: 27 MMOL/L (ref 21–32)
CREAT SERPL-MCNC: 0.5 MG/DL (ref 0.6–1.3)
CREAT UR-MCNC: 134 MG/DL
EOSINOPHIL # BLD AUTO: 0.06 THOUSAND/ÂΜL (ref 0–0.61)
EOSINOPHIL NFR BLD AUTO: 1 % (ref 0–6)
ERYTHROCYTE [DISTWIDTH] IN BLOOD BY AUTOMATED COUNT: 12.1 % (ref 11.6–15.1)
EST. AVERAGE GLUCOSE BLD GHB EST-MCNC: 134 MG/DL
GFR SERPL CREATININE-BSD FRML MDRD: 131 ML/MIN/1.73SQ M
GLUCOSE P FAST SERPL-MCNC: 153 MG/DL (ref 65–99)
HBA1C MFR BLD: 6.3 %
HCT VFR BLD AUTO: 42.2 % (ref 34.8–46.1)
HDLC SERPL-MCNC: 54 MG/DL
HGB BLD-MCNC: 14.4 G/DL (ref 11.5–15.4)
IMM GRANULOCYTES # BLD AUTO: 0.05 THOUSAND/UL (ref 0–0.2)
IMM GRANULOCYTES NFR BLD AUTO: 1 % (ref 0–2)
LDLC SERPL CALC-MCNC: 60 MG/DL (ref 0–100)
LYMPHOCYTES # BLD AUTO: 1.62 THOUSANDS/ÂΜL (ref 0.6–4.47)
LYMPHOCYTES NFR BLD AUTO: 28 % (ref 14–44)
MCH RBC QN AUTO: 31.4 PG (ref 26.8–34.3)
MCHC RBC AUTO-ENTMCNC: 34.1 G/DL (ref 31.4–37.4)
MCV RBC AUTO: 92 FL (ref 82–98)
MICROALBUMIN UR-MCNC: 22 MG/L (ref 0–20)
MICROALBUMIN/CREAT 24H UR: 16 MG/G CREATININE (ref 0–30)
MONOCYTES # BLD AUTO: 0.54 THOUSAND/ÂΜL (ref 0.17–1.22)
MONOCYTES NFR BLD AUTO: 9 % (ref 4–12)
NEUTROPHILS # BLD AUTO: 3.55 THOUSANDS/ÂΜL (ref 1.85–7.62)
NEUTS SEG NFR BLD AUTO: 60 % (ref 43–75)
NRBC BLD AUTO-RTO: 0 /100 WBCS
PLATELET # BLD AUTO: 216 THOUSANDS/UL (ref 149–390)
PMV BLD AUTO: 11.7 FL (ref 8.9–12.7)
POTASSIUM SERPL-SCNC: 4.3 MMOL/L (ref 3.5–5.3)
PROT SERPL-MCNC: 7.2 G/DL (ref 6.4–8.4)
RBC # BLD AUTO: 4.59 MILLION/UL (ref 3.81–5.12)
SODIUM SERPL-SCNC: 136 MMOL/L (ref 135–147)
TRIGL SERPL-MCNC: 128 MG/DL
WBC # BLD AUTO: 5.89 THOUSAND/UL (ref 4.31–10.16)

## 2023-01-30 ENCOUNTER — OFFICE VISIT (OUTPATIENT)
Dept: FAMILY MEDICINE CLINIC | Facility: CLINIC | Age: 28
End: 2023-01-30

## 2023-01-30 VITALS
SYSTOLIC BLOOD PRESSURE: 118 MMHG | WEIGHT: 138 LBS | DIASTOLIC BLOOD PRESSURE: 80 MMHG | HEIGHT: 62 IN | TEMPERATURE: 99.4 F | HEART RATE: 80 BPM | OXYGEN SATURATION: 97 % | BODY MASS INDEX: 25.4 KG/M2

## 2023-01-30 DIAGNOSIS — E21.0 PRIMARY HYPERPARATHYROIDISM (HCC): ICD-10-CM

## 2023-01-30 DIAGNOSIS — E11.9 TYPE 2 DIABETES MELLITUS WITHOUT COMPLICATION, WITHOUT LONG-TERM CURRENT USE OF INSULIN (HCC): Primary | ICD-10-CM

## 2023-01-30 DIAGNOSIS — E83.52 HYPERCALCEMIA: ICD-10-CM

## 2023-01-30 PROBLEM — Z00.00 ANNUAL PHYSICAL EXAM: Status: RESOLVED | Noted: 2022-09-29 | Resolved: 2023-01-30

## 2023-01-30 NOTE — PROGRESS NOTES
Assessment/Plan:         Problem List Items Addressed This Visit        Endocrine    Type 2 diabetes mellitus without complication, without long-term current use of insulin ([address] ) - Primary       Lab Results   Component Value Date    HGBA1C 6 3 (H) 01/28/2023   Patient tolerating Metformin 500 mg once a day          Relevant Orders    Comprehensive metabolic panel    HEMOGLOBIN A1C W/ EAG ESTIMATION    Microalbumin / creatinine urine ratio    Primary hyperparathyroidism ([address] )     Patient is scheduled for surgery in 3/2023 for parathyroid removal             Other    Hypercalcemia    BMI 25 0-25 9,adult     Well controlled               Subjective:      Patient ID: Franci Lucia is a 32 y o  female  Patient here today for her lab review and has no present cvomplaints    BMI Counseling: Body mass index is 25 24 kg/m²  The BMI is above normal  Nutrition recommendations include decreasing portion sizes, encouraging healthy choices of fruits and vegetables, decreasing fast food intake, consuming healthier snacks, limiting drinks that contain sugar, moderation in carbohydrate intake, increasing intake of lean protein, reducing intake of saturated and trans fat and reducing intake of cholesterol  Exercise recommendations include moderate physical activity 150 minutes/week  No pharmacotherapy was ordered  Patient referred to PCP  Rationale for BMI follow-up plan is due to patient being overweight or obese  The following portions of the patient's history were reviewed and updated as appropriate:   She  has a past medical history of Diabetes mellitus ([address] ), Gestational diabetes, and Migraine    She   Patient Active Problem List    Diagnosis Date Noted   • BMI 25 0-25 9,adult 01/30/2023   • Primary hyperparathyroidism ([address] ) 10/21/2022   • Hypercalcemia 09/29/2022   • Type 2 diabetes mellitus without complication, without long-term current use of insulin ([address] ) 06/27/2022     She  has a past surgical history that includes pr  delivery only (N/A, 6/3/2016); pr  delivery only (N/A, 2018); Tubal ligation (N/A, 2018);  section (15, 16, 18); Cervical biopsy w/ loop electrode excision (2017?); DILATION AND CURETTAGE OF UTERUS WITH HYSTEROSOCPY (N/A, 2020); and Endometrial ablation (N/A, 2020)  Her family history includes Diabetes in her paternal grandfather; Down syndrome in her cousin; Hypertension in her father; Hyperthyroidism in her father and mother; No Known Problems in her brother, daughter, daughter, paternal grandmother, sister, and son  She  reports that she has never smoked  She has never used smokeless tobacco  She reports that she does not currently use alcohol  She reports that she does not use drugs  Current Outpatient Medications   Medication Sig Dispense Refill   • Blood Glucose Monitoring Suppl (OneTouch Verio Reflect) w/Device KIT Check blood sugars once daily  Please substitute with appropriate alternative as covered by patient's insurance  Dx: E11 65 1 kit 0   • glucose blood (OneTouch Verio) test strip Check blood sugars once daily  Please substitute with appropriate alternative as covered by patient's insurance  Dx: E11 65 100 each 3   • Insulin Pen Needle (B-D UF III MINI PEN NEEDLES) 31G X 5 MM MISC Use every 7 days 30 each 0   • metFORMIN (GLUCOPHAGE-XR) 500 mg 24 hr tablet Take 1 tablet (500 mg total) by mouth daily with dinner 180 tablet 0   • OneTouch Delica Lancets 29Z MISC Check blood sugars once daily  Please substitute with appropriate alternative as covered by patient's insurance  Dx: E11 65 100 each 3     No current facility-administered medications for this visit  She has No Known Allergies       Review of Systems   Constitutional: Positive for fatigue  Negative for activity change, appetite change, chills, diaphoresis, fever and unexpected weight change     HENT: Negative for congestion, ear pain, hearing loss, postnasal drip, sinus pressure, sinus pain, sneezing and sore throat  Eyes: Negative for pain, redness and visual disturbance  Respiratory: Negative for cough and shortness of breath  Cardiovascular: Negative for chest pain and leg swelling  Gastrointestinal: Negative for abdominal pain, diarrhea, nausea and vomiting  Endocrine: Negative  Genitourinary: Negative  Musculoskeletal: Negative for arthralgias  Allergic/Immunologic: Negative  Neurological: Negative for dizziness and light-headedness  Psychiatric/Behavioral: Negative for behavioral problems and dysphoric mood  Objective:      /80 (BP Location: Left arm, Patient Position: Sitting, Cuff Size: Large)   Pulse 80   Temp 99 4 °F (37 4 °C)   Ht 5' 2" (1 575 m)   Wt 62 6 kg (138 lb)   SpO2 97%   BMI 25 24 kg/m²          Physical Exam  Vitals and nursing note reviewed  Constitutional:       General: She is not in acute distress  Appearance: She is well-developed  HENT:      Head: Normocephalic and atraumatic  Eyes:      Pupils: Pupils are equal, round, and reactive to light  Neck:      Thyroid: No thyromegaly  Cardiovascular:      Rate and Rhythm: Normal rate and regular rhythm  Pulses: no weak pulses          Dorsalis pedis pulses are 2+ on the right side and 2+ on the left side  Posterior tibial pulses are 2+ on the right side and 2+ on the left side  Heart sounds: Normal heart sounds  No murmur heard  Pulmonary:      Effort: Pulmonary effort is normal  No respiratory distress  Breath sounds: Normal breath sounds  No wheezing  Abdominal:      General: Bowel sounds are normal       Palpations: Abdomen is soft  Musculoskeletal:         General: Normal range of motion  Cervical back: Normal range of motion  Feet:      Right foot:      Skin integrity: No ulcer, skin breakdown, erythema, warmth, callus or dry skin        Left foot:      Skin integrity: No ulcer, skin breakdown, erythema, warmth, callus or dry skin    Skin:     General: Skin is warm and dry  Neurological:      Mental Status: She is alert and oriented to person, place, and time  Patient's shoes and socks removed  Right Foot/Ankle   Right Foot Inspection  Skin Exam: skin normal and skin intact  No dry skin, no warmth, no callus, no erythema, no maceration, no abnormal color, no pre-ulcer, no ulcer and no callus  Toe Exam: ROM and strength within normal limits  Sensory   Vibration: intact  Proprioception: intact  Monofilament testing: intact    Vascular  Capillary refills: < 3 seconds  The right DP pulse is 2+  The right PT pulse is 2+  Left Foot/Ankle  Left Foot Inspection  Skin Exam: skin normal and skin intact  No dry skin, no warmth, no erythema, no maceration, normal color, no pre-ulcer, no ulcer and no callus  Toe Exam: ROM and strength within normal limits  Sensory   Vibration: intact  Proprioception: intact  Monofilament testing: intact    Vascular  Capillary refills: < 3 seconds  The left DP pulse is 2+  The left PT pulse is 2+       Assign Risk Category  No deformity present  No loss of protective sensation  No weak pulses  Risk: 0

## 2023-01-30 NOTE — ASSESSMENT & PLAN NOTE
Lab Results   Component Value Date    HGBA1C 6 3 (H) 01/28/2023   Patient tolerating Metformin 500 mg once a day

## 2023-02-01 ENCOUNTER — OFFICE VISIT (OUTPATIENT)
Dept: URGENT CARE | Facility: CLINIC | Age: 28
End: 2023-02-01

## 2023-02-01 VITALS
HEART RATE: 110 BPM | RESPIRATION RATE: 14 BRPM | DIASTOLIC BLOOD PRESSURE: 82 MMHG | SYSTOLIC BLOOD PRESSURE: 124 MMHG | BODY MASS INDEX: 25.83 KG/M2 | OXYGEN SATURATION: 99 % | TEMPERATURE: 97.6 F | WEIGHT: 141.25 LBS

## 2023-02-01 DIAGNOSIS — J06.9 UPPER RESPIRATORY TRACT INFECTION, UNSPECIFIED TYPE: Primary | ICD-10-CM

## 2023-02-01 LAB — S PYO AG THROAT QL: NEGATIVE

## 2023-02-01 NOTE — PROGRESS NOTES
[address] [facility]        NAME: Sarah Garcia is a 32 y o  female  : 1995    MRN: [id number]  DATE: 2023  TIME: 7:00 PM      Assessment and Plan     Upper respiratory tract infection, unspecified type [J06 9]  1  Upper respiratory tract infection, unspecified type  POCT rapid strepA    Throat culture            Patient Instructions   Patient Instructions   1  Please take plain Robitussin or plain Mucinex for mucus relief as directed on the packaging  2  Please take either Sudafed (for those without history of high blood pressure) or Coricidin HBP (for those with history of high blood pressure) for nasal/sinus congestion  3  Please take over the counter ibuprofen or acetaminophen as needed for pain/sore throat  4  Please follow up with primary care provider in 7-10 days if symptoms persist         Follow up with PCP in 3-5 days  Go to ER if symptoms worsen  Chief Complaint     Chief Complaint   Patient presents with   • Sore Throat     2 days  Exposed to child with strep  Low grade temp 99  Mild cough  History of Present Illness     Patient presents with sore throat, low-grade temp (99 9F), and mild cough x 2 days  Review of Systems     Review of Systems   Constitutional: Positive for fever  Negative for chills and fatigue  HENT: Positive for sore throat  Negative for congestion, ear pain, postnasal drip, rhinorrhea, sinus pressure and sinus pain  Eyes: Negative for pain and visual disturbance  Respiratory: Positive for cough  Negative for chest tightness and shortness of breath  Cardiovascular: Negative for chest pain and palpitations  Gastrointestinal: Negative for abdominal pain, diarrhea, nausea and vomiting  Genitourinary: Negative for dysuria and hematuria  Musculoskeletal: Negative for arthralgias and back pain  Skin: Negative for color change and rash  Neurological: Negative for dizziness, seizures, syncope, numbness and headaches     All other systems reviewed and are negative  Current Medications       Current Outpatient Medications:   •  Blood Glucose Monitoring Suppl (OneTouch Verio Reflect) w/Device KIT, Check blood sugars once daily  Please substitute with appropriate alternative as covered by patient's insurance  Dx: E11 65, Disp: 1 kit, Rfl: 0  •  glucose blood (OneTouch Verio) test strip, Check blood sugars once daily  Please substitute with appropriate alternative as covered by patient's insurance  Dx: E11 65, Disp: 100 each, Rfl: 3  •  Insulin Pen Needle (B-D UF III MINI PEN NEEDLES) 31G X 5 MM MISC, Use every 7 days, Disp: 30 each, Rfl: 0  •  metFORMIN (GLUCOPHAGE-XR) 500 mg 24 hr tablet, Take 1 tablet (500 mg total) by mouth daily with dinner, Disp: 180 tablet, Rfl: 0  •  OneTouch Delica Lancets 86J MISC, Check blood sugars once daily  Please substitute with appropriate alternative as covered by patient's insurance  Dx: E11 65, Disp: 100 each, Rfl: 3    Current Allergies     Allergies as of 2023   • (No Known Allergies)              The following portions of the patient's history were reviewed and updated as appropriate: allergies, current medications, past family history, past medical history, past social history, past surgical history, and problem list      Past Medical History:   Diagnosis Date   • Diabetes mellitus ([facility] [address] )    • Gestational diabetes    • Migraine        Past Surgical History:   Procedure Laterality Date   • CERVICAL BIOPSY  W/ LOOP ELECTRODE EXCISION  ? •  SECTION  15, 16, 18   • DILATION AND CURETTAGE OF UTERUS WITH HYSTEROSOCPY N/A 2020    Procedure: DILATATION AND CURETTAGE (D&C);   Surgeon: Jerry Nieto MD;  Location: [address] [place] [address] MAIN OR;  Service: Gynecology   • ENDOMETRIAL ABLATION N/A 2020    Procedure: Emanuel Lowery;  Surgeon: Jerry Nieto MD;  Location: [address] [place] [address] MAIN OR;  Service: Gynecology   • [place]  DELIVERY ONLY N/A 6/3/2016    Procedure: Juni Sanches () REPEAT;  Surgeon: Dante Villarreal MD;  Location: [facility];  Service: Obstetrics   • [place]  DELIVERY ONLY N/A 2018    Procedure: Matt Barnett () REPEAT;  Surgeon: Dante Villarreal MD;  Location: [facility];  Service: Obstetrics   • TUBAL LIGATION N/A 2018    Procedure: LIGATION/COAGULATION TUBAL;  Surgeon: Dante Villarreal MD;  Location: [facility];  Service: Obstetrics       Family History   Problem Relation Age of Onset   • Hyperthyroidism Mother    • Hyperthyroidism Father    • Hypertension Father    • No Known Problems Sister    • No Known Problems Brother    • No Known Problems Son    • No Known Problems Daughter    • No Known Problems Daughter    • No Known Problems Paternal Grandmother    • Diabetes Paternal Grandfather    • Down syndrome Cousin         paternal cousin         Medications have been verified  Objective     /82   Pulse (!) 110   Temp 97 6 °F (36 4 °C)   Resp 14   Wt 64 1 kg (141 lb 4 oz)   SpO2 99%   BMI 25 83 kg/m²   No LMP recorded  Physical Exam     Physical Exam  Vitals and nursing note reviewed  Constitutional:       Appearance: She is well-developed and normal weight  She is not ill-appearing  HENT:      Head: Normocephalic and atraumatic  Nose: No congestion or rhinorrhea  Mouth/Throat:      Mouth: Mucous membranes are moist       Pharynx: No pharyngeal swelling or posterior oropharyngeal erythema  Tonsils: No tonsillar exudate or tonsillar abscesses  1+ on the right  1+ on the left  Cardiovascular:      Rate and Rhythm: Normal rate and regular rhythm  Heart sounds: Normal heart sounds  Pulmonary:      Effort: Pulmonary effort is normal       Breath sounds: Normal breath sounds  Musculoskeletal:      Cervical back: Normal range of motion  Skin:     General: Skin is dry  Neurological:      General: No focal deficit present        Mental Status: She is alert and oriented to person, place, and time    Psychiatric:         Mood and Affect: Mood normal          Behavior: Behavior normal

## 2023-02-01 NOTE — PATIENT INSTRUCTIONS
Please take plain Robitussin or plain Mucinex for mucus relief as directed on the packaging  Please take either Sudafed (for those without history of high blood pressure) or Coricidin HBP (for those with history of high blood pressure) for nasal/sinus congestion  Please take over the counter ibuprofen or acetaminophen as needed for pain/sore throat    Please follow up with primary care provider in 7-10 days if symptoms persist

## 2023-02-01 NOTE — LETTER
February 1, 2023     Patient: Estela Mackenzie   YOB: 1995   Date of Visit: 2/1/2023       To Whom it May Concern:    Lluvia Palmer was seen in my clinic on 2/1/2023  She may return to work on 2/2/2023  If you have any questions or concerns, please don't hesitate to call           Sincerely,          Kasandra Brittle, PA-C        CC: No Recipients
- - -

## 2023-02-04 LAB — BACTERIA THROAT CULT: ABNORMAL

## 2023-02-07 ENCOUNTER — TELEPHONE (OUTPATIENT)
Dept: URGENT CARE | Facility: CLINIC | Age: 28
End: 2023-02-07

## 2023-02-07 DIAGNOSIS — J02.0 STREP PHARYNGITIS: Primary | ICD-10-CM

## 2023-02-07 RX ORDER — AMOXICILLIN 500 MG/1
500 CAPSULE ORAL EVERY 12 HOURS SCHEDULED
Qty: 20 CAPSULE | Refills: 0 | Status: SHIPPED | OUTPATIENT
Start: 2023-02-07 | End: 2023-02-17

## 2023-02-07 NOTE — TELEPHONE ENCOUNTER
Pt called regarding strep throat culture results showing grown of strep C  She reports that symptoms have not resolved  She now has a worse cough symptoms as well  Educated to take the entire course of antibiotics to treat the strep but that an acute cough is not often associated with a strep throat and that it could be another virus also  She reports understanding

## 2023-02-13 ENCOUNTER — OFFICE VISIT (OUTPATIENT)
Dept: ENDOCRINOLOGY | Facility: CLINIC | Age: 28
End: 2023-02-13

## 2023-02-13 VITALS
HEIGHT: 60 IN | WEIGHT: 139 LBS | BODY MASS INDEX: 27.29 KG/M2 | HEART RATE: 69 BPM | DIASTOLIC BLOOD PRESSURE: 64 MMHG | SYSTOLIC BLOOD PRESSURE: 110 MMHG | OXYGEN SATURATION: 98 %

## 2023-02-13 DIAGNOSIS — E21.0 PRIMARY HYPERPARATHYROIDISM (HCC): Primary | ICD-10-CM

## 2023-02-13 DIAGNOSIS — E83.52 HYPERCALCEMIA: ICD-10-CM

## 2023-02-13 DIAGNOSIS — R74.8 LOW SERUM ALKALINE PHOSPHATASE: ICD-10-CM

## 2023-02-13 DIAGNOSIS — E11.9 TYPE 2 DIABETES MELLITUS WITHOUT COMPLICATION, WITHOUT LONG-TERM CURRENT USE OF INSULIN (HCC): ICD-10-CM

## 2023-02-13 NOTE — PROGRESS NOTES
Piotr Conner is a 32 y o  female who is here for follow up for evaluation of hypercalcemia  She has seen CRNP in October 2022  Patient was evaluated for hypercalcemia, which is documented in her chart since 2018 while calcium has been ranging from 11 2-11 9  With PTH of 55 3, 80 9, 59 5 pg/mL  A 24 hours urine calcium and creatinine showed calcium urine clearance ratio of 0 11  Patient reports that she has high calcium since was born  She did not require ICU admission she was born,  She reports history of hypercalcemia in her father, father had 3 parathyroid adenoma removed  She has 2 siblings, their calcium status is unknown  Reports kidney is on her mother  She reports feeling fatigued, all the time, no sleep disturbances, feeling anxious but no mood changes/depression, she reports forgetting things, occasional constipation, she had kidney stone was reported in CAT scan which was done in 2017, she denies increased thirst, increased urination, bone pain, fragility fractures, poor appetite, nausea,  She denies galacturia, he has history of D&c in 2019 for heavy mensuration, (she has regular menstruation  She denies any visual disturbances  In CAT scan which was done in 2017 pancreas was unremarkable  She has type 2 diabetes, on metformin 500 mg once a day, most recent A1c of 6 3%  She was evaluated for pheochromocytoma, metanephrine and catecholamines fractionated plasma were within normal range  She had a thyroid ultrasound which showed no thyroid nodules  He denies any family history of thyroid cancer  A sestamibi scan was negative for parathyroid adenoma however CT parathyroid study showed 0 4 x 0 5 x 0 8 cm lesion sitting inferior to the left thyroid lobe 2 additional lesions identified in the right and left neck sitting adjacent to the esophagus at the same level bilaterally, approximately 2 cm caudal to the cricoid cartilage   These lesions may represent normal or hyperplastic glands, or possibly   parathyroid adenomata  She has seen surgical oncology, and the plan is to do parathyroidectomy in 2023  Review of Systems   Constitutional: Positive for fatigue  Negative for activity change, appetite change, chills, diaphoresis, fever and unexpected weight change  HENT: Negative for congestion, drooling, ear discharge, ear pain, trouble swallowing and voice change  Eyes: Negative for photophobia, pain, discharge, redness, itching and visual disturbance  Respiratory: Negative for cough, chest tightness, shortness of breath and wheezing  Cardiovascular: Negative for chest pain, palpitations and leg swelling  Gastrointestinal: Negative for abdominal distention, abdominal pain, blood in stool, diarrhea, nausea and vomiting  Endocrine: Negative for cold intolerance, heat intolerance, polydipsia, polyphagia and polyuria  Genitourinary: Negative for dysuria, flank pain, frequency and hematuria  Musculoskeletal: Negative for back pain, gait problem, joint swelling, myalgias, neck pain and neck stiffness  Skin: Negative for color change, pallor, rash and wound  Neurological: Negative for dizziness, tremors, seizures, syncope, speech difficulty, weakness, numbness and headaches  Psychiatric/Behavioral: Negative for agitation  All other systems reviewed and are negative             Past medical/surgical history  Past Medical History:   Diagnosis Date   • Diabetes mellitus ([facility] [address] )    • Gestational diabetes    • Migraine         Past Surgical History:   Procedure Laterality Date   • CERVICAL BIOPSY  W/ LOOP ELECTRODE EXCISION  ? •  SECTION  15, 16, 18   • DILATION AND CURETTAGE OF UTERUS WITH HYSTEROSOCPY N/A 2020    Procedure: DILATATION AND CURETTAGE (D&C);   Surgeon: Lisa Priest MD;  Location: [facility] MAIN OR;  Service: Gynecology   • ENDOMETRIAL ABLATION N/A 2020    Procedure: Lee Tirado;  Surgeon: Lisa Priest MD;  Location: [facility] MAIN OR;  Service: Gynecology   • [place]  DELIVERY ONLY N/A 6/3/2016    Procedure:  SECTION () REPEAT;  Surgeon: Carlene Curry MD;  Location: [facility];  Service: Obstetrics   • [place]  DELIVERY ONLY N/A 2018    Procedure: Mark Jansen () REPEAT;  Surgeon: Carlene Curry MD;  Location: [place] ;  Service: Obstetrics   • TUBAL LIGATION N/A 2018    Procedure: LIGATION/COAGULATION TUBAL;  Surgeon: Carlene Curry MD;  Location: [facility];  Service: Obstetrics        Family History:  Family History   Problem Relation Age of Onset   • Hyperthyroidism Mother    • Hyperthyroidism Father    • Hypertension Father    • No Known Problems Sister    • No Known Problems Brother    • No Known Problems Son    • No Known Problems Daughter    • No Known Problems Daughter    • No Known Problems Paternal Grandmother    • Diabetes Paternal Grandfather    • Down syndrome Cousin         paternal cousin        Social History:  Social History     Tobacco Use   Smoking Status Never   • Passive exposure: Never   Smokeless Tobacco Never        Social History     Substance and Sexual Activity   Alcohol Use Not Currently           Physical Examination:   Vitals:    23 0937   BP: 110/64   Pulse: 69   SpO2: 98%       General appearance - alert, well appearing, and in no distress  Mental status - normal mood, behavior, speech, dress, motor activity, and thought processes  Eyes - pupils equal and reactive, extraocular eye movements intact, sclera anicteric  Mouth - mucous membranes moist  Neck - supple, no significant adenopathy, thyroid exam: normal size, no nodule, not tender     Chest - clear to auscultation, no wheezes, rales or rhonchi, symmetric air entry  Heart - normal rate, regular rhythm, normal S1, S2, no murmurs  Abdomen - soft, nontender, nondistended  Neurological - motor and sensory grossly normal bilaterally, strength 5/5  Musculoskeletal - no joint tenderness, deformity or swelling  Extremities - peripheral pulses normal, no pedal edema, no clubbing or cyanosis  Skin - normal coloration and turgor, no rashes     Labs:   Lab Results   Component Value Date    CALCIUM 11 8 (H) 01/28/2023     Component      Latest Ref Rng & Units 10/31/2022 12/3/2022 1/28/2023   WBC      4 31 - 10 16 Thousand/uL   5 89   Red Blood Cell Count      3 81 - 5 12 Million/uL   4 59   Hemoglobin      11 5 - 15 4 g/dL   14 4   HCT      34 8 - 46 1 %   42 2   MCV      82 - 98 fL   92   MCH      26 8 - 34 3 pg   31 4   MCHC      31 4 - 37 4 g/dL   34 1   RDW      11 6 - 15 1 %   12 1   MPV      8 9 - 12 7 fL   11 7   Platelet Count      003 - 390 Thousands/uL   216   nRBC      /100 WBCs   0   Neutrophils %      43 - 75 %   60   Immat GRANS %      0 - 2 %   1   Lymphocytes Relative      14 - 44 %   28   Monocytes Relative      4 - 12 %   9   Eosinophils      0 - 6 %   1   Basophils Relative      0 - 1 %   1   Absolute Neutrophils      1 85 - 7 62 Thousands/µL   3 55   Immature Grans Absolute      0 00 - 0 20 Thousand/uL   0 05   Lymphocytes Absolute      0 60 - 4 47 Thousands/µL   1 62   Absolute Monocytes      0 17 - 1 22 Thousand/µL   0 54   Absolute Eosinophils      0 00 - 0 61 Thousand/µL   0 06   Basophils Absolute      0 00 - 0 10 Thousands/µL   0 07   Sodium      135 - 147 mmol/L   136   Potassium      3 5 - 5 3 mmol/L   4 3   Chloride      96 - 108 mmol/L   104   CO2      21 - 32 mmol/L   27   Anion Gap      4 - 13 mmol/L   5   BUN      5 - 25 mg/dL   9   Creatinine      0 60 - 1 30 mg/dL   0 50 (L)   GLUCOSE FASTING      65 - 99 mg/dL   153 (H)   Calcium      8 3 - 10 1 mg/dL   11 8 (H)   AST      5 - 45 U/L   12   ALT      12 - 78 U/L   18   Alkaline Phosphatase      46 - 116 U/L   31 (L)   Total Protein      6 4 - 8 4 g/dL   7 2   Albumin      3 5 - 5 0 g/dL   3 8   TOTAL BILIRUBIN      0 20 - 1 00 mg/dL   0 33   eGFR      ml/min/1 73sq m   131   Cholesterol      See Comment mg/dL   140   Triglycerides See Comment mg/dL   128   HDL      >=50 mg/dL   54   LDL Calculated      0 - 100 mg/dL   60   EXT Creatinine Urine      mg/dL   134 0   MICROALBUM ,U,RANDOM      0 0 - 20 0 mg/L   22 0 (H)   MICROALBUMIN/CREATININE RATIO      0 - 30 mg/g creatinine   16   NOREPINEPHRINE PLASMA      0 - 874 pg/mL  371    EPINEPHRINE PLASMA      0 - 62 pg/mL  28    DOPAMINE PLASMA      0 - 48 pg/mL  <30    24 HR URINE VOLUME      mL 2,000     CALCIUM 24 HOUR URINE      42 - 353 mg/24 hrs 280     CREATININE 24 HOUR UR      0 6 - 1 8 g/24Hr 1 1     TOTAL URINE VOLUME      ml 2,000     NORMETANEPHRINE FREE PLASMA      0 0 - 210 1 pg/mL  37 6    METANEPHRINE FREE PLASMA      0 0 - 88 0 pg/mL  <10 0    Hemoglobin A1C      Normal 3 8-5 6%; PreDiabetic 5 7-6 4%; Diabetic >=6 5%; Glycemic control for adults with diabetes <7 0% %   6 3 (H)   eAG, EST AVG Glucose      mg/dl   134   TSH 3RD GENERATON      0 450 - 4 500 uIU/mL 3 080     PARATHYROID HORMONE      18 4 - 80 1 pg/mL 59 5     Vit D, 25-Hydroxy      30 0 - 100 0 ng/mL 23 6 (L)          No results for input(s): CALCIUM in the last 72 hours  Invalid input(s): [employer]     Imaging:       THYROID ULTRASOUND     INDICATION:    E21 0: Primary hyperparathyroidism      COMPARISON:  None     TECHNIQUE:   Ultrasound of the thyroid was performed with a high frequency linear transducer in transverse and sagittal planes including volumetric imaging sweeps as well as traditional still imaging technique      FINDINGS:  Normal homogeneous smooth echotexture      Right lobe: 4 3 x 1 2 x 1 9 cm  Volume 4 7 mL  Left lobe:  3 2 x 0 8 x 1 4 cm   Volume 1 7 mL  Isthmus: 0 1  cm      Normal     IMPRESSION:     Normal examination        PARATHYROID SCAN     INDICATION:  E21 0: Primary hyperparathyroidism     COMPARISON:  None      TECHNIQUE:   Following the intravenous administration of 26 2 mCi Tc-99m Cardiolite, anterior and bilateral anterior oblique projection images of the neck and mediastinum were obtained at approximately 10 minutes post injection followed at 2 hours post   injection by static anterior and bilateral oblique projections as well as SPECT images in coronal, sagittal and axial projections       FINDINGS:     Delayed images demonstrate radiotracer uptake in the thyroid gland asymmetrically more prominent on the right      Delayed images demonstrate washout of thyroid gland activity  Residual focus of uptake identified on the right      SPECT images demonstrate asymmetrically increased focal radiotracer uptake in the right upper pole region  This does appear to overlap the thyroid region         IMPRESSION:     1  Asymmetrically increased radiotracer uptake in the right upper pole region, does appear to overlap the expected location of the thyroid  This could be related to an underlying  thyroid nodule rather than a parathyroid adenoma  This could be further   evaluated with ultrasound or CT parathyroid study         The study was marked in EPIC for significant notification       CT  NECK  WITHOUT AND WITH CONTRAST FROM CAREN TO SKULL BASE     INDICATION: Hyperparathyroidism  Unrevealing sestamibi      COMPARISON:  None      TECHNIQUE:This examination, like all CT scans performed in the [facility], was performed utilizing techniques to minimize radiation dose exposure, including the use of iterative reconstruction and automated exposure control      Both noncontrast, contrast, and post contrast delayed images were performed according to standard parathyroid protocol (4D technique) Coronal and sagittal reconstructions were performed   3D reconstructions were performed on an independent workstation,   and are supplied for review      85 mL of iohexol (OMNIPAQUE) was injected intravenously without immediate consequence      Radiation dose: 936 mGy-cm      FINDINGS:     SERIAL NONCONTRAST, POST CONTRAST AND DELAYS: There is a 0 4 x 0 5 x 0 8 cm lesion identified in the left neck sitting inferior to the left thyroid lobe which meets CT enhancement criteria typical for parathyroid adenoma or hyperplasia  Lesion is best   seen on series 601 images 113 - 115, and on series 602 image 63  The lesion sits approximately 3 cm above the sternal notch      2 additional lesions are identified, one in the right neck, and one in the left neck  They sit at the same level, approximately 2 cm caudal to the cricoid cartilage just lateral to the esophagus  Both of these lesions meet the CT enhancement criteria   typical for parathyroid adenoma, however, could also represent normal or hyperplastic glands  The lesion on the right measures 0 5 x 0 5 x 0 6 cm, and a lesion on the left measures 0 4 x 0 4 x 0 7 cm  They are both seen on series 701 images 56 and 57,   and also series 602 image 67 for the right side, and 66 for the left side      VASCULAR STRUCTURES:  There is no carotid stenosis by Nascet criteria      VISUALIZED PARANASAL SINUSES:  Normal      NASAL CAVITY AND NASOPHARYNX:  Normal      SUPRAHYOID NECK:  Normal oropharynx, oral cavity, parapharyngeal and retropharyngeal spaces      INFRAHYOID NECK:  Normal oropharynx, oral cavity, parapharyngeal and retropharyngeal spaces      THYROID GLAND:  Normal   It is noted that the right thyroid lobe is larger than the left, which accounts for the asymmetric appearance on the sestamibi scan      LYMPH NODES:  No pathologic or enlarged adenopathy      MEDIASTINUM:  Unremarkable      BONY STRUCTURES  Normal      LUNG APICES:  Unremarkable      IMPRESSION:     1  0 4 x 0 5 x 0 8 cm lesion sitting inferior to the left thyroid lobe as described above which meets the CT enhancement criteria typical for parathyroid adenoma or hyperplasia  2  2 additional lesions identified in the right and left neck sitting adjacent to the esophagus at the same level bilaterally, approximately 2 cm caudal to the cricoid cartilage   These lesions may represent normal or hyperplastic glands, or possibly   parathyroid adenomata  The lesions do meet the CT enhancement criteria typical for parathyroid tissue      1  Primary hyperparathyroidism ([address] )   2  Hypercalcemia  3  Low serum alkaline phosphatase  4  Type 2 diabetes mellitus without complication, without long-term current use of insulin ([facility])    Patient has PTH medicated hypercalcemia due to primary hyperparathyroidism, and she is candidate for surgery due to kidney stone and level of hypercalcemia,   Patient height is 5 feet, no bony deformity  CT of parathyroid showed possible 3 parathyroid adenomas or hyperplasia,  Given patient's age and family history of hypercalcemia and hyperparathyroidism familial forms of PHP should be ruled out  24 hours urine showed urine calcium/creatinine clearance ration of 0 1 ruling out Familial hypocalciuria hypercalcemia  Pheochromocytoma was ruled out, thyroid ultrasound should negative result for thyroid nodule  making MEN2-A Less likely, she has no family history of pituitary adenoma, pancreatic cancer, and her CT abdomen and pelvic in 2017 , pancreas was unremarkable, which is not in favor of MEN-1  She has no history of hereditary hyperparathyroidism and jaw tumors  Other forms of hereditary PHP like familial isolated PHP is in differentials  She has low alkaline phosphatase which is uncommon in PHP,   She has hypercalcemia since birth  I referred her to genetic counseling  I ordered, alkaline phosphatase isoenzyme, phosphorus, calcitonin  Further management pending pathology post parathyroidectomy  - Ambulatory Referral to Genetics; Future  - CT abdomen w wo contrast; Future  - PTH, intact Lab Collect Lab Collect; Future  - Vitamin D 25 hydroxy Lab Collect; Future  - Comprehensive metabolic panel Lab Collect; Future  - Phosphorus Lab Collect;  Future  Reviewed signs/symptoms of hypercalcemia   Reviewed differential diagnosis  Reviewed calcium goals, minimum 800-1000 mg daily to avoid excess stimulation of normally functioning parathyroid glands  Reviewed need for adequate hydration, preventing dehydration and prolonged immobilzation  Discussed the implications of primary hyperparathyroidism at length with patient, including implications on bone health/kidney function/neurocognitive symptoms  Discussed the indications for surgery at length, in addition to possible complications/risks/benefits         I have spent a total time of 50 minutes on 02/16/23 in caring for this patient including Diagnostic results, Prognosis, Risks and benefits of tx options, Instructions for management, Patient and family education, Risk factor reductions, Impressions, Counseling / Coordination of care, Documenting in the medical record, Reviewing / ordering tests, medicine, procedures   and Obtaining or reviewing history

## 2023-02-15 ENCOUNTER — TELEPHONE (OUTPATIENT)
Dept: GENETICS | Facility: CLINIC | Age: 28
End: 2023-02-15

## 2023-02-15 NOTE — TELEPHONE ENCOUNTER
I called [facility] to schedule a new patient appointment with the Cancer [facility] Genetics Program       Outcome:   I left a voice message encouraging the patient to call the genetics team at [phone] to schedule this appointment  Follow-up:   At this time the referral will be closed and we will wait to hear back from the patient regarding scheduling this appointment

## 2023-02-16 NOTE — TELEPHONE ENCOUNTER
Arun Estrada returned called, she explained that there is no concern for family hx of cancer rather  Hyperparathyroidism  She explained that her dad had it and now she has to remove her thyroid, and her doctor recommended genetic testing to determine if her children are at risk  I advised to patient that I will review with our genetic counselors to determine if our office specializes in this condition, I explained that our genetic counselors test for hereditary cancer and she might have to be referred to [facility] or [facility]

## 2023-03-04 ENCOUNTER — CLINICAL SUPPORT (OUTPATIENT)
Dept: URGENT CARE | Facility: CLINIC | Age: 28
End: 2023-03-04
Payer: COMMERCIAL

## 2023-03-04 ENCOUNTER — APPOINTMENT (OUTPATIENT)
Dept: RADIOLOGY | Facility: CLINIC | Age: 28
End: 2023-03-04

## 2023-03-04 DIAGNOSIS — E21.0 PRIMARY HYPERPARATHYROIDISM (HCC): ICD-10-CM

## 2023-03-04 DIAGNOSIS — E21.0 PRIMARY HYPERPARATHYROIDISM (HCC): Primary | ICD-10-CM

## 2023-03-04 LAB
ATRIAL RATE: 62 BPM
ATRIAL RATE: 62 BPM
P AXIS: 57 DEGREES
P AXIS: 57 DEGREES
PR INTERVAL: 142 MS
PR INTERVAL: 142 MS
QRS AXIS: 62 DEGREES
QRS AXIS: 62 DEGREES
QRSD INTERVAL: 82 MS
QRSD INTERVAL: 82 MS
QT INTERVAL: 350 MS
QT INTERVAL: 350 MS
QTC INTERVAL: 355 MS
QTC INTERVAL: 355 MS
T WAVE AXIS: 49 DEGREES
T WAVE AXIS: 49 DEGREES
VENTRICULAR RATE: 62 BPM
VENTRICULAR RATE: 62 BPM

## 2023-03-04 PROCEDURE — 93005 ELECTROCARDIOGRAM TRACING: CPT

## 2023-03-04 NOTE — PROGRESS NOTES
Yuridia Barr had walk-in EKG completed on 03/04/23 at 12:47 PM by Koko Hernandez RN.     for preprocedure

## 2023-03-10 ENCOUNTER — OFFICE VISIT (OUTPATIENT)
Dept: SURGICAL ONCOLOGY | Facility: CLINIC | Age: 28
End: 2023-03-10

## 2023-03-10 VITALS
TEMPERATURE: 98.6 F | WEIGHT: 141 LBS | SYSTOLIC BLOOD PRESSURE: 118 MMHG | DIASTOLIC BLOOD PRESSURE: 76 MMHG | HEIGHT: 60 IN | BODY MASS INDEX: 27.68 KG/M2

## 2023-03-10 DIAGNOSIS — Z01.818 PRE-OP EVALUATION: Primary | ICD-10-CM

## 2023-03-10 DIAGNOSIS — E21.0 PRIMARY HYPERPARATHYROIDISM (HCC): ICD-10-CM

## 2023-03-10 NOTE — PROGRESS NOTES
Surgical Oncology Follow Up       [facility] SURGICAL ONCOLOGY [place]  [facility] [address]    Ty Jeramiener  1995  [id number]  [facility] SURGICAL ONCOLOGY [place]  [place] [address]    No chief complaint on file  Assessment/Plan:  1  Pre-op evaluation  - cleared    2  Primary hyperparathyroidism [facility])       Discussion/Summary: Patient is a 13-year-old female presenting today for preoperative visit for parathyroidectomy scheduled with Dr Rody Mars on 3/28/2023  She initially presented with hyperparathyroidism and hypercalcemia  She had a CAT scan for further work-up which revealed a parathyroid adenoma on the left  She had a chest x-ray and an EKG which both were within normal limits  She had a CBC and CMP on 1/28/2023 which showed hypercalcemia which is expected  There were no concerns on exam  She is cleared  She was instructed to call with questions prior to surgery  History of Present Illness:     Oncology History    No history exists         -Interval History:  Patient is a 13-year-old female presenting today for preoperative visit for parathyroidectomy scheduled with Dr Rody Mars on 3/28/2023  She appreciates fatigue and mood changes  Review of Systems:  Review of Systems   Constitutional: Positive for fatigue  Negative for activity change, appetite change and unexpected weight change  Respiratory: Negative for cough and shortness of breath  Cardiovascular: Negative for chest pain  Gastrointestinal: Negative for abdominal pain, diarrhea, nausea and vomiting  Endocrine: Negative for heat intolerance  Musculoskeletal: Negative for arthralgias, back pain and myalgias  Skin: Negative for rash  Neurological: Negative for weakness and headaches  Hematological: Negative for adenopathy  Psychiatric/Behavioral: Positive for dysphoric mood         Patient Active Problem List   Diagnosis   • Type 2 diabetes mellitus without complication, without long-term current use of insulin (HCC)   • Hypercalcemia   • Primary hyperparathyroidism (HCC)   • BMI 25 0-25 9,adult   • Low serum alkaline phosphatase   • Pre-op evaluation     Past Medical History:   Diagnosis Date   • Diabetes mellitus ([facility] [address] )    • Gestational diabetes    • Migraine      Past Surgical History:   Procedure Laterality Date   • CERVICAL BIOPSY  W/ LOOP ELECTRODE EXCISION  ? •  SECTION  15, 16, 18   • DILATION AND CURETTAGE OF UTERUS WITH HYSTEROSOCPY N/A 2020    Procedure: DILATATION AND CURETTAGE (D&C);   Surgeon: Cassie Arzola MD;  Location: [facility] MAIN OR;  Service: Gynecology   • ENDOMETRIAL ABLATION N/A 2020    Procedure: ABLATION ENDOMETRIAL JOSHUA;  Surgeon: Cassie Arzola MD;  Location: [facility] MAIN OR;  Service: Gynecology   • [place]  DELIVERY ONLY N/A 6/3/2016    Procedure: Edu Alcides () REPEAT;  Surgeon: Cassie Arzola MD;  Location: [facility];  Service: Obstetrics   • [place]  DELIVERY ONLY N/A 2018    Procedure: Edu Alcides () REPEAT;  Surgeon: Cassie Arzola MD;  Location: [facility];  Service: Obstetrics   • TUBAL LIGATION N/A 2018    Procedure: LIGATION/COAGULATION TUBAL;  Surgeon: Cassie Arzola MD;  Location: [facility];  Service: Obstetrics     Family History   Problem Relation Age of Onset   • Hyperthyroidism Mother    • Hyperthyroidism Father    • Hypertension Father    • No Known Problems Sister    • No Known Problems Brother    • No Known Problems Son    • No Known Problems Daughter    • No Known Problems Daughter    • No Known Problems Paternal Grandmother    • Diabetes Paternal Grandfather    • Down syndrome Cousin         paternal cousin     Social History     Socioeconomic History   • Marital status: /Civil Union     Spouse name: Not on file   • Number of children: Not on file   • Years of education: Not on file   • Highest education level: Not on file   Occupational History   • Not on file   Tobacco Use   • Smoking status: Never     Passive exposure: Never   • Smokeless tobacco: Never   Vaping Use   • Vaping Use: Never used   Substance and Sexual Activity   • Alcohol use: Not Currently   • Drug use: No   • Sexual activity: Yes     Partners: Male     Birth control/protection: Female Sterilization   Other Topics Concern   • Not on file   Social History Narrative   • Not on file     Social Determinants of Health     Financial Resource Strain: Not on file   Food Insecurity: Not on file   Transportation Needs: Not on file   Physical Activity: Not on file   Stress: Not on file   Social Connections: Not on file   Intimate Partner Violence: Not on file   Housing Stability: Not on file       Current Outpatient Medications:   •  metFORMIN (GLUCOPHAGE-XR) 500 mg 24 hr tablet, Take 1 tablet (500 mg total) by mouth daily with dinner, Disp: 180 tablet, Rfl: 0  •  Blood Glucose Monitoring Suppl (OneTouch Verio Reflect) w/Device KIT, Check blood sugars once daily  Please substitute with appropriate alternative as covered by patient's insurance  Dx: E11 65, Disp: 1 kit, Rfl: 0  •  glucose blood (OneTouch Verio) test strip, Check blood sugars once daily  Please substitute with appropriate alternative as covered by patient's insurance  Dx: E11 65, Disp: 100 each, Rfl: 3  •  Insulin Pen Needle (B-D UF III MINI PEN NEEDLES) 31G X 5 MM MISC, Use every 7 days, Disp: 30 each, Rfl: 0  •  OneTouch Delica Lancets 63Q MISC, Check blood sugars once daily  Please substitute with appropriate alternative as covered by patient's insurance  Dx: E11 65, Disp: 100 each, Rfl: 3  No Known Allergies  Vitals:    03/10/23 1023   BP: 118/76   Temp: 98 6 °F (37 °C)       Physical Exam  Constitutional:       General: She is not in acute distress  Appearance: Normal appearance  Cardiovascular:      Rate and Rhythm: Normal rate and regular rhythm        Pulses: Normal pulses  Heart sounds: Normal heart sounds  Pulmonary:      Effort: Pulmonary effort is normal       Breath sounds: Normal breath sounds  Chest:      Chest wall: No mass  Breasts:     Right: No swelling, bleeding, inverted nipple, mass, nipple discharge, skin change or tenderness  Left: No swelling, bleeding, inverted nipple, mass, nipple discharge, skin change or tenderness  Abdominal:      General: Abdomen is flat  Palpations: Abdomen is soft  Lymphadenopathy:      Upper Body:      Right upper body: No supraclavicular, axillary or pectoral adenopathy  Left upper body: No supraclavicular, axillary or pectoral adenopathy  Skin:     General: Skin is warm  Neurological:      General: No focal deficit present  Mental Status: She is alert and oriented to person, place, and time  Psychiatric:         Mood and Affect: Mood normal          Behavior: Behavior normal            Results:    Imaging  XR chest pa & lateral    Result Date: 3/6/2023  Narrative: CHEST INDICATION:   E21 0: Primary hyperparathyroidism  Preop  COMPARISON:  CXR 7/11/2022 and parathyroid CT 12/16/2022  EXAM PERFORMED/VIEWS:  XR CHEST PA & LATERAL  FINDINGS: Cardiomediastinal silhouette normal  Lungs clear  No effusion or pneumothorax  Bones normal for age  Upper abdomen normal      Impression: No acute cardiopulmonary disease  Workstation performed: [id number]       I reviewed the above imaging data  Advance Care Planning/Advance Directives:  Discussed disease status, cancer treatment plans and/or cancer treatment goals with the patient

## 2023-03-10 NOTE — H&P (VIEW-ONLY)
Surgical Oncology Follow Up       [facility] SURGICAL ONCOLOGY [place]  [facility] [address]    Yanni Acosta  1995  [id number]  [facility] SURGICAL ONCOLOGY [facility]  [place] [address]    No chief complaint on file  Assessment/Plan:  1  Pre-op evaluation  - cleared    2  Primary hyperparathyroidism [facility])       Discussion/Summary: Patient is a 64-year-old female presenting today for preoperative visit for parathyroidectomy scheduled with Dr Owen Porter on 3/28/2023  She initially presented with hyperparathyroidism and hypercalcemia  She had a CAT scan for further work-up which revealed a parathyroid adenoma on the left  She had a chest x-ray and an EKG which both were within normal limits  She had a CBC and CMP on 1/28/2023 which showed hypercalcemia which is expected  There were no concerns on exam  She is cleared  She was instructed to call with questions prior to surgery  History of Present Illness:     Oncology History    No history exists         -Interval History:  Patient is a 64-year-old female presenting today for preoperative visit for parathyroidectomy scheduled with Dr Owen Porter on 3/28/2023  She appreciates fatigue and mood changes  Review of Systems:  Review of Systems   Constitutional: Positive for fatigue  Negative for activity change, appetite change and unexpected weight change  Respiratory: Negative for cough and shortness of breath  Cardiovascular: Negative for chest pain  Gastrointestinal: Negative for abdominal pain, diarrhea, nausea and vomiting  Endocrine: Negative for heat intolerance  Musculoskeletal: Negative for arthralgias, back pain and myalgias  Skin: Negative for rash  Neurological: Negative for weakness and headaches  Hematological: Negative for adenopathy  Psychiatric/Behavioral: Positive for dysphoric mood         Patient Active Problem List   Diagnosis   • Type 2 diabetes mellitus without complication, without long-term current use of insulin (HCC)   • Hypercalcemia   • Primary hyperparathyroidism (HCC)   • BMI 25 0-25 9,adult   • Low serum alkaline phosphatase   • Pre-op evaluation     Past Medical History:   Diagnosis Date   • Diabetes mellitus ([facility])    • Gestational diabetes    • Migraine      Past Surgical History:   Procedure Laterality Date   • CERVICAL BIOPSY  W/ LOOP ELECTRODE EXCISION  ? •  SECTION  15, 16, 18   • DILATION AND CURETTAGE OF UTERUS WITH HYSTEROSOCPY N/A 2020    Procedure: DILATATION AND CURETTAGE (D&C);   Surgeon: Bhumi Julian MD;  Location: [facility] MAIN OR;  Service: Gynecology   • ENDOMETRIAL ABLATION N/A 2020    Procedure: ABLATION ENDOMETRIAL JOSHUA;  Surgeon: Bhumi Julian MD;  Location: [facility] MAIN OR;  Service: Gynecology   • [place]  DELIVERY ONLY N/A 6/3/2016    Procedure: Turner Mince () REPEAT;  Surgeon: Bhumi Julian MD;  Location: [facility];  Service: Obstetrics   • IN  DELIVERY ONLY N/A 2018    Procedure: Turner Mince () REPEAT;  Surgeon: Bhumi Julian MD;  Location: [facility];  Service: Obstetrics   • TUBAL LIGATION N/A 2018    Procedure: LIGATION/COAGULATION TUBAL;  Surgeon: Bhumi Julian MD;  Location: [facility];  Service: Obstetrics     Family History   Problem Relation Age of Onset   • Hyperthyroidism Mother    • Hyperthyroidism Father    • Hypertension Father    • No Known Problems Sister    • No Known Problems Brother    • No Known Problems Son    • No Known Problems Daughter    • No Known Problems Daughter    • No Known Problems Paternal Grandmother    • Diabetes Paternal Grandfather    • Down syndrome Cousin         paternal cousin     Social History     Socioeconomic History   • Marital status: /Civil Union     Spouse name: Not on file   • Number of children: Not on file   • Years of education: Not on file   • Highest education level: Not on file   Occupational History   • Not on file   Tobacco Use   • Smoking status: Never     Passive exposure: Never   • Smokeless tobacco: Never   Vaping Use   • Vaping Use: Never used   Substance and Sexual Activity   • Alcohol use: Not Currently   • Drug use: No   • Sexual activity: Yes     Partners: Male     Birth control/protection: Female Sterilization   Other Topics Concern   • Not on file   Social History Narrative   • Not on file     Social Determinants of Health     Financial Resource Strain: Not on file   Food Insecurity: Not on file   Transportation Needs: Not on file   Physical Activity: Not on file   Stress: Not on file   Social Connections: Not on file   Intimate Partner Violence: Not on file   Housing Stability: Not on file       Current Outpatient Medications:   •  metFORMIN (GLUCOPHAGE-XR) 500 mg 24 hr tablet, Take 1 tablet (500 mg total) by mouth daily with dinner, Disp: 180 tablet, Rfl: 0  •  Blood Glucose Monitoring Suppl (OneTouch Verio Reflect) w/Device KIT, Check blood sugars once daily  Please substitute with appropriate alternative as covered by patient's insurance  Dx: E11 65, Disp: 1 kit, Rfl: 0  •  glucose blood (OneTouch Verio) test strip, Check blood sugars once daily  Please substitute with appropriate alternative as covered by patient's insurance  Dx: E11 65, Disp: 100 each, Rfl: 3  •  Insulin Pen Needle (B-D UF III MINI PEN NEEDLES) 31G X 5 MM MISC, Use every 7 days, Disp: 30 each, Rfl: 0  •  OneTouch Delica Lancets 14Z MISC, Check blood sugars once daily  Please substitute with appropriate alternative as covered by patient's insurance  Dx: E11 65, Disp: 100 each, Rfl: 3  No Known Allergies  Vitals:    03/10/23 1023   BP: 118/76   Temp: 98 6 °F (37 °C)       Physical Exam  Constitutional:       General: She is not in acute distress  Appearance: Normal appearance  Cardiovascular:      Rate and Rhythm: Normal rate and regular rhythm        Pulses: Normal pulses  Heart sounds: Normal heart sounds  Pulmonary:      Effort: Pulmonary effort is normal       Breath sounds: Normal breath sounds  Chest:      Chest wall: No mass  Breasts:     Right: No swelling, bleeding, inverted nipple, mass, nipple discharge, skin change or tenderness  Left: No swelling, bleeding, inverted nipple, mass, nipple discharge, skin change or tenderness  Abdominal:      General: Abdomen is flat  Palpations: Abdomen is soft  Lymphadenopathy:      Upper Body:      Right upper body: No supraclavicular, axillary or pectoral adenopathy  Left upper body: No supraclavicular, axillary or pectoral adenopathy  Skin:     General: Skin is warm  Neurological:      General: No focal deficit present  Mental Status: She is alert and oriented to person, place, and time  Psychiatric:         Mood and Affect: Mood normal          Behavior: Behavior normal            Results:    Imaging  XR chest pa & lateral    Result Date: 3/6/2023  Narrative: CHEST INDICATION:   E21 0: Primary hyperparathyroidism  Preop  COMPARISON:  CXR 7/11/2022 and parathyroid CT 12/16/2022  EXAM PERFORMED/VIEWS:  XR CHEST PA & LATERAL  FINDINGS: Cardiomediastinal silhouette normal  Lungs clear  No effusion or pneumothorax  Bones normal for age  Upper abdomen normal      Impression: No acute cardiopulmonary disease  Workstation performed: [id number]       I reviewed the above imaging data  Advance Care Planning/Advance Directives:  Discussed disease status, cancer treatment plans and/or cancer treatment goals with the patient

## 2023-03-14 ENCOUNTER — APPOINTMENT (OUTPATIENT)
Dept: LAB | Facility: CLINIC | Age: 28
End: 2023-03-14

## 2023-03-14 DIAGNOSIS — Z11.1 SCREENING FOR TUBERCULOSIS: Primary | ICD-10-CM

## 2023-03-14 DIAGNOSIS — R74.8 LOW SERUM ALKALINE PHOSPHATASE: ICD-10-CM

## 2023-03-14 DIAGNOSIS — E21.0 PRIMARY HYPERPARATHYROIDISM (HCC): ICD-10-CM

## 2023-03-14 DIAGNOSIS — E83.52 HYPERCALCEMIA: ICD-10-CM

## 2023-03-14 DIAGNOSIS — E11.9 TYPE 2 DIABETES MELLITUS WITHOUT COMPLICATION, WITHOUT LONG-TERM CURRENT USE OF INSULIN (HCC): ICD-10-CM

## 2023-03-14 DIAGNOSIS — Z11.1 SCREENING FOR TUBERCULOSIS: ICD-10-CM

## 2023-03-16 LAB
GAMMA INTERFERON BACKGROUND BLD IA-ACNC: 0.03 IU/ML
M TB IFN-G BLD-IMP: NEGATIVE
M TB IFN-G CD4+ BCKGRND COR BLD-ACNC: 0 IU/ML
M TB IFN-G CD4+ BCKGRND COR BLD-ACNC: 0.02 IU/ML
MITOGEN IGNF BCKGRD COR BLD-ACNC: >10 IU/ML

## 2023-03-20 ENCOUNTER — OFFICE VISIT (OUTPATIENT)
Dept: URGENT CARE | Facility: CLINIC | Age: 28
End: 2023-03-20

## 2023-03-20 ENCOUNTER — TELEPHONE (OUTPATIENT)
Dept: SURGICAL ONCOLOGY | Facility: CLINIC | Age: 28
End: 2023-03-20

## 2023-03-20 VITALS
HEART RATE: 84 BPM | HEIGHT: 60 IN | SYSTOLIC BLOOD PRESSURE: 145 MMHG | DIASTOLIC BLOOD PRESSURE: 84 MMHG | OXYGEN SATURATION: 98 % | TEMPERATURE: 98.4 F | BODY MASS INDEX: 27.68 KG/M2 | WEIGHT: 141 LBS

## 2023-03-20 DIAGNOSIS — R05.1 ACUTE COUGH: Primary | ICD-10-CM

## 2023-03-20 RX ORDER — MULTIVITAMIN
1 TABLET ORAL DAILY
COMMUNITY

## 2023-03-20 NOTE — PRE-PROCEDURE INSTRUCTIONS
Pre-Surgery Instructions:   Medication Instructions   • metFORMIN (GLUCOPHAGE-XR) 500 mg 24 hr tablet Take night before surgery   • Multiple Vitamin (multivitamin) tablet Stop taking 7 days prior to surgery  • VITAMIN D PO Stop taking 7 days prior to surgery  Covid screening negative as per patient  Reviewed with patient via phone all medication instructions  Advised not to take any NSAID's, Vitamins or Herbal products for 7 days prior to the DOS  Acetaminophen products are ok to take  Reviewed showering instructions as given by surgical office  Instructed to call office with any questions or concerns  Instructed about NPO after midnight the night before DOS, except sips of water with allowed medications in AM on DOS  Informed about call from [facility] with the time to arrive for the scheduled surgery  Patient verbalized understanding

## 2023-03-20 NOTE — TELEPHONE ENCOUNTER
Patient called in today with cold symptoms, congestion and sore throat  Patient was advised to go to PCP or urgent care to be tested for strep and covid, and let us know  Let her know that if symptoms resolve and she is feeling better, she may still be able to have her surgery  She agreed to let me know the results of her testing

## 2023-03-20 NOTE — PROGRESS NOTES
[facility]        NAME: Kimmy Melchor is a 32 y o  female  : 1995    MRN: [id number]  DATE: 2023  TIME: 6:45 PM    Assessment and Plan   Acute cough [R05 1]  1  Acute cough  Covid/Flu-Office Collect            Patient Instructions     Cough  covid test sent  Follow up with PCP in 3-5 days  Proceed to  ER if symptoms worsen  Chief Complaint     Chief Complaint   Patient presents with   • Cough     Next week patient is about to go have surgery her primary care would like her to get swabbed for PCR, since she has been having a cough for a few days now  Cough sounds deep into the chest and sounds dry  History of Present Illness       31 y/o female presents c/o cough that is non productive x 4 days  States her surgeon requested a PCP covid test  Denies chest pain, SOB      Review of Systems   Review of Systems   Constitutional: Negative for activity change, appetite change, chills, diaphoresis, fatigue and fever  HENT: Positive for congestion  Negative for ear discharge, ear pain, facial swelling, hearing loss, mouth sores, nosebleeds, postnasal drip, rhinorrhea, sinus pressure, sinus pain, sneezing, sore throat and voice change  Respiratory: Positive for cough  Negative for apnea, choking, chest tightness, shortness of breath, wheezing and stridor  Cardiovascular: Negative  Current Medications       Current Outpatient Medications:   •  Blood Glucose Monitoring Suppl (OneTouch Verio Reflect) w/Device KIT, Check blood sugars once daily  Please substitute with appropriate alternative as covered by patient's insurance  Dx: E11 65, Disp: 1 kit, Rfl: 0  •  glucose blood (OneTouch Verio) test strip, Check blood sugars once daily  Please substitute with appropriate alternative as covered by patient's insurance   Dx: E11 65, Disp: 100 each, Rfl: 3  •  Insulin Pen Needle (B-D UF III MINI PEN NEEDLES) 31G X 5 MM MISC, Use every 7 days, Disp: 30 each, Rfl: 0  • metFORMIN (GLUCOPHAGE-XR) 500 mg 24 hr tablet, Take 1 tablet (500 mg total) by mouth daily with dinner, Disp: 180 tablet, Rfl: 0  •  Multiple Vitamin (multivitamin) tablet, Take 1 tablet by mouth daily, Disp: , Rfl:   •  OneTouch Delica Lancets 11R MISC, Check blood sugars once daily  Please substitute with appropriate alternative as covered by patient's insurance  Dx: E11 65, Disp: 100 each, Rfl: 3  •  VITAMIN D PO, Take 1 tablet by mouth in the morning, Disp: , Rfl:     Current Allergies     Allergies as of 2023   • (No Known Allergies)            The following portions of the patient's history were reviewed and updated as appropriate: allergies, current medications, past family history, past medical history, past social history, past surgical history and problem list      Past Medical History:   Diagnosis Date   • Diabetes mellitus ([facility] [address] )    • Gestational diabetes    • Kidney stone    • Migraine        Past Surgical History:   Procedure Laterality Date   • CERVICAL BIOPSY  W/ LOOP ELECTRODE EXCISION  ? •  SECTION  15, 16, 18   • DILATION AND CURETTAGE OF UTERUS WITH HYSTEROSOCPY N/A 2020    Procedure: DILATATION AND CURETTAGE (D&C);   Surgeon: Helen Cat MD;  Location: [facility] MAIN OR;  Service: Gynecology   • ENDOMETRIAL ABLATION N/A 2020    Procedure: Graciela Spaulding;  Surgeon: Helen Cat MD;  Location: [facility] MAIN OR;  Service: Gynecology   • [place]  DELIVERY ONLY N/A 6/3/2016    Procedure: Marlyce Pear () REPEAT;  Surgeon: Helen Cat MD;  Location: [facility];  Service: Obstetrics   • [place]  DELIVERY ONLY N/A 2018    Procedure: Marlyce Pear () REPEAT;  Surgeon: Helen Cat MD;  Location: [facility];  Service: Obstetrics   • TUBAL LIGATION N/A 2018    Procedure: LIGATION/COAGULATION TUBAL;  Surgeon: Helen Cat MD;  Location: [facility];  Service: Obstetrics       Family History   Problem Relation Age of Onset • Hyperthyroidism Mother    • Hyperthyroidism Father    • Hypertension Father    • No Known Problems Sister    • No Known Problems Brother    • No Known Problems Son    • No Known Problems Daughter    • No Known Problems Daughter    • No Known Problems Paternal Grandmother    • Diabetes Paternal Grandfather    • Down syndrome Cousin         paternal cousin         Medications have been verified  Objective   /84   Pulse 84   Temp 98 4 °F (36 9 °C)   Ht 5' 0 25" (1 53 m)   Wt 64 kg (141 lb)   SpO2 98%   BMI 27 31 kg/m²        Physical Exam     Physical Exam  Constitutional:       General: She is not in acute distress  Appearance: She is well-developed  She is not diaphoretic  HENT:      Head: Normocephalic and atraumatic  Right Ear: Hearing, tympanic membrane, ear canal and external ear normal       Left Ear: Hearing, tympanic membrane, ear canal and external ear normal       Nose: Rhinorrhea present  Mouth/Throat:      Pharynx: Uvula midline  Cardiovascular:      Rate and Rhythm: Normal rate and regular rhythm  Heart sounds: Normal heart sounds  Pulmonary:      Effort: Pulmonary effort is normal       Breath sounds: Normal breath sounds  Musculoskeletal:      Cervical back: Normal range of motion and neck supple  Lymphadenopathy:      Cervical: No cervical adenopathy

## 2023-03-21 ENCOUNTER — TELEPHONE (OUTPATIENT)
Dept: OTHER | Facility: OTHER | Age: 28
End: 2023-03-21

## 2023-03-22 ENCOUNTER — TELEPHONE (OUTPATIENT)
Dept: SURGICAL ONCOLOGY | Facility: CLINIC | Age: 28
End: 2023-03-22

## 2023-03-22 LAB
FLUAV RNA RESP QL NAA+PROBE: NEGATIVE
FLUBV RNA RESP QL NAA+PROBE: NEGATIVE
SARS-COV-2 RNA RESP QL NAA+PROBE: NEGATIVE

## 2023-03-22 NOTE — TELEPHONE ENCOUNTER
Spoke to patient, she is feeling better, throat is better, voice is back and she is not coughing as much  She tested negative for covid and flu at her PCP office visit  Patient will keep surgery date on 3/28 unless her conditions change  Surgery scheduler made aware

## 2023-03-27 ENCOUNTER — ANESTHESIA EVENT (OUTPATIENT)
Dept: PERIOP | Facility: HOSPITAL | Age: 28
End: 2023-03-27

## 2023-03-27 NOTE — ANESTHESIA PREPROCEDURE EVALUATION
Procedure:  MINIMALLY INVASIVE LEFT PARATHYROIDECTOMY, POSSIBLE 4 GLAND EXPLORATION, INTRAOPERATIVE PTH MONITORING (Left: Neck)  MONITORING INTRAOPERATIVE PTH (PARATHYROID HORMONE) (Neck)    Relevant Problems   ENDO   (+) Primary hyperparathyroidism (HCC)   (+) Type 2 diabetes mellitus without complication, without long-term current use of insulin (HCC)        Physical Exam    Airway    Mallampati score: II  TM Distance: >3 FB  Neck ROM: full     Dental   No notable dental hx     Cardiovascular  Cardiovascular exam normal    Pulmonary  Pulmonary exam normal     Other Findings        Anesthesia Plan  ASA Score- 3     Anesthesia Type- general with ASA Monitors  Additional Monitors:   Airway Plan: ETT  Plan Factors-Exercise tolerance (METS): >4 METS  Chart reviewed  EKG reviewed  Imaging results reviewed  Existing labs reviewed  Patient summary reviewed  Patient is not a current smoker  Patient not instructed to abstain from smoking on day of procedure  Patient did not smoke on day of surgery  Induction- intravenous  Postoperative Plan-     Informed Consent- Anesthetic plan and risks discussed with patient  I personally reviewed this patient with the CRNA  Discussed and agreed on the Anesthesia Plan with the CRNA  Venancio Robles

## 2023-03-28 ENCOUNTER — HOSPITAL ENCOUNTER (OUTPATIENT)
Facility: HOSPITAL | Age: 28
Setting detail: OUTPATIENT SURGERY
Discharge: HOME | End: 2023-03-28
Attending: SURGERY | Admitting: SURGERY

## 2023-03-28 ENCOUNTER — ANESTHESIA (OUTPATIENT)
Dept: PERIOP | Facility: HOSPITAL | Age: 28
End: 2023-03-28

## 2023-03-28 VITALS
RESPIRATION RATE: 16 BRPM | HEART RATE: 109 BPM | WEIGHT: 141 LBS | SYSTOLIC BLOOD PRESSURE: 136 MMHG | OXYGEN SATURATION: 94 % | TEMPERATURE: 97.6 F | DIASTOLIC BLOOD PRESSURE: 74 MMHG | HEIGHT: 60 IN | BODY MASS INDEX: 27.68 KG/M2

## 2023-03-28 DIAGNOSIS — E21.0 PRIMARY HYPERPARATHYROIDISM (HCC): ICD-10-CM

## 2023-03-28 DIAGNOSIS — E83.52 HYPERCALCEMIA: ICD-10-CM

## 2023-03-28 LAB
CALCIUM SERPL-MCNC: 10.9 MG/DL (ref 8.3–10.1)
CALCIUM SERPL-MCNC: 11.1 MG/DL (ref 8.3–10.1)
CALCIUM SERPL-MCNC: 11.1 MG/DL (ref 8.3–10.1)
EXT PREGNANCY TEST URINE: NEGATIVE
EXT. CONTROL: NORMAL
GLUCOSE SERPL-MCNC: 170 MG/DL (ref 65–140)
GLUCOSE SERPL-MCNC: 186 MG/DL (ref 65–140)
PTH-INTACT SERPL-MCNC: 26.4 PG/ML (ref 18.4–80.1)
PTH-INTACT SERPL-MCNC: 27.7 PG/ML (ref 18.4–80.1)
PTH-INTACT SERPL-MCNC: 35 PG/ML (ref 18.4–80.1)
PTH-INTACT SERPL-MCNC: 35.4 PG/ML (ref 18.4–80.1)
PTH-INTACT SERPL-MCNC: 64.4 PG/ML (ref 18.4–80.1)
PTH-INTACT SERPL-MCNC: 71.6 PG/ML (ref 18.4–80.1)
PTH-INTACT SERPL-MCNC: 82.8 PG/ML (ref 18.4–80.1)

## 2023-03-28 RX ORDER — PROMETHAZINE HYDROCHLORIDE 25 MG/ML
12.5 INJECTION, SOLUTION INTRAMUSCULAR; INTRAVENOUS ONCE AS NEEDED
Status: DISCONTINUED | OUTPATIENT
Start: 2023-03-28 | End: 2023-03-28 | Stop reason: HOSPADM

## 2023-03-28 RX ORDER — BUPIVACAINE HYDROCHLORIDE 2.5 MG/ML
INJECTION, SOLUTION EPIDURAL; INFILTRATION; INTRACAUDAL AS NEEDED
Status: DISCONTINUED | OUTPATIENT
Start: 2023-03-28 | End: 2023-03-28 | Stop reason: HOSPADM

## 2023-03-28 RX ORDER — SODIUM CHLORIDE, SODIUM LACTATE, POTASSIUM CHLORIDE, CALCIUM CHLORIDE 600; 310; 30; 20 MG/100ML; MG/100ML; MG/100ML; MG/100ML
100 INJECTION, SOLUTION INTRAVENOUS CONTINUOUS
Status: DISCONTINUED | OUTPATIENT
Start: 2023-03-28 | End: 2023-03-28

## 2023-03-28 RX ORDER — SCOLOPAMINE TRANSDERMAL SYSTEM 1 MG/1
PATCH, EXTENDED RELEASE TRANSDERMAL AS NEEDED
Status: DISCONTINUED | OUTPATIENT
Start: 2023-03-28 | End: 2023-03-28

## 2023-03-28 RX ORDER — HYDROMORPHONE HCL IN WATER/PF 6 MG/30 ML
0.2 PATIENT CONTROLLED ANALGESIA SYRINGE INTRAVENOUS
Status: DISCONTINUED | OUTPATIENT
Start: 2023-03-28 | End: 2023-03-28 | Stop reason: HOSPADM

## 2023-03-28 RX ORDER — ONDANSETRON 2 MG/ML
4 INJECTION INTRAMUSCULAR; INTRAVENOUS EVERY 6 HOURS PRN
Status: DISCONTINUED | OUTPATIENT
Start: 2023-03-28 | End: 2023-03-28 | Stop reason: HOSPADM

## 2023-03-28 RX ORDER — OXYCODONE HYDROCHLORIDE AND ACETAMINOPHEN 5; 325 MG/1; MG/1
1 TABLET ORAL EVERY 4 HOURS PRN
Status: DISCONTINUED | OUTPATIENT
Start: 2023-03-28 | End: 2023-03-28 | Stop reason: HOSPADM

## 2023-03-28 RX ORDER — PROPOFOL 10 MG/ML
INJECTION, EMULSION INTRAVENOUS AS NEEDED
Status: DISCONTINUED | OUTPATIENT
Start: 2023-03-28 | End: 2023-03-28

## 2023-03-28 RX ORDER — ACETAMINOPHEN 325 MG/1
975 TABLET ORAL ONCE
Status: COMPLETED | OUTPATIENT
Start: 2023-03-28 | End: 2023-03-28

## 2023-03-28 RX ORDER — FENTANYL CITRATE/PF 50 MCG/ML
50 SYRINGE (ML) INJECTION
Status: DISCONTINUED | OUTPATIENT
Start: 2023-03-28 | End: 2023-03-28 | Stop reason: HOSPADM

## 2023-03-28 RX ORDER — FENTANYL CITRATE 50 UG/ML
INJECTION, SOLUTION INTRAMUSCULAR; INTRAVENOUS AS NEEDED
Status: DISCONTINUED | OUTPATIENT
Start: 2023-03-28 | End: 2023-03-28

## 2023-03-28 RX ORDER — LIDOCAINE HYDROCHLORIDE 10 MG/ML
INJECTION, SOLUTION EPIDURAL; INFILTRATION; INTRACAUDAL; PERINEURAL AS NEEDED
Status: DISCONTINUED | OUTPATIENT
Start: 2023-03-28 | End: 2023-03-28

## 2023-03-28 RX ORDER — DIPHENHYDRAMINE HYDROCHLORIDE 50 MG/ML
12.5 INJECTION INTRAMUSCULAR; INTRAVENOUS ONCE AS NEEDED
Status: DISCONTINUED | OUTPATIENT
Start: 2023-03-28 | End: 2023-03-28 | Stop reason: HOSPADM

## 2023-03-28 RX ORDER — GLYCOPYRROLATE 0.2 MG/ML
INJECTION INTRAMUSCULAR; INTRAVENOUS AS NEEDED
Status: DISCONTINUED | OUTPATIENT
Start: 2023-03-28 | End: 2023-03-28

## 2023-03-28 RX ORDER — ONDANSETRON 2 MG/ML
INJECTION INTRAMUSCULAR; INTRAVENOUS AS NEEDED
Status: DISCONTINUED | OUTPATIENT
Start: 2023-03-28 | End: 2023-03-28

## 2023-03-28 RX ORDER — PROPOFOL 10 MG/ML
INJECTION, EMULSION INTRAVENOUS CONTINUOUS PRN
Status: DISCONTINUED | OUTPATIENT
Start: 2023-03-28 | End: 2023-03-28

## 2023-03-28 RX ORDER — FENTANYL CITRATE/PF 50 MCG/ML
25 SYRINGE (ML) INJECTION
Status: DISCONTINUED | OUTPATIENT
Start: 2023-03-28 | End: 2023-03-28 | Stop reason: HOSPADM

## 2023-03-28 RX ORDER — HYDROMORPHONE HCL/PF 1 MG/ML
SYRINGE (ML) INJECTION AS NEEDED
Status: DISCONTINUED | OUTPATIENT
Start: 2023-03-28 | End: 2023-03-28

## 2023-03-28 RX ORDER — ROCURONIUM BROMIDE 10 MG/ML
INJECTION, SOLUTION INTRAVENOUS AS NEEDED
Status: DISCONTINUED | OUTPATIENT
Start: 2023-03-28 | End: 2023-03-28

## 2023-03-28 RX ORDER — MIDAZOLAM HYDROCHLORIDE 2 MG/2ML
INJECTION, SOLUTION INTRAMUSCULAR; INTRAVENOUS AS NEEDED
Status: DISCONTINUED | OUTPATIENT
Start: 2023-03-28 | End: 2023-03-28

## 2023-03-28 RX ORDER — SCOLOPAMINE TRANSDERMAL SYSTEM 1 MG/1
PATCH, EXTENDED RELEASE TRANSDERMAL
Status: COMPLETED
Start: 2023-03-28 | End: 2023-03-28

## 2023-03-28 RX ORDER — ONDANSETRON 2 MG/ML
4 INJECTION INTRAMUSCULAR; INTRAVENOUS ONCE AS NEEDED
Status: DISCONTINUED | OUTPATIENT
Start: 2023-03-28 | End: 2023-03-28 | Stop reason: HOSPADM

## 2023-03-28 RX ORDER — DEXAMETHASONE SODIUM PHOSPHATE 10 MG/ML
INJECTION, SOLUTION INTRAMUSCULAR; INTRAVENOUS AS NEEDED
Status: DISCONTINUED | OUTPATIENT
Start: 2023-03-28 | End: 2023-03-28

## 2023-03-28 RX ORDER — NEOSTIGMINE METHYLSULFATE 1 MG/ML
INJECTION INTRAVENOUS AS NEEDED
Status: DISCONTINUED | OUTPATIENT
Start: 2023-03-28 | End: 2023-03-28

## 2023-03-28 RX ADMIN — SCOPOLAMINE 1 PATCH: 1 SYSTEM TRANSDERMAL at 10:33

## 2023-03-28 RX ADMIN — FENTANYL CITRATE 25 MCG: 50 INJECTION INTRAMUSCULAR; INTRAVENOUS at 13:16

## 2023-03-28 RX ADMIN — ROCURONIUM BROMIDE 10 MG: 10 INJECTION INTRAVENOUS at 11:57

## 2023-03-28 RX ADMIN — ROCURONIUM BROMIDE 50 MG: 10 INJECTION INTRAVENOUS at 10:37

## 2023-03-28 RX ADMIN — FENTANYL CITRATE 25 MCG: 50 INJECTION INTRAMUSCULAR; INTRAVENOUS at 13:06

## 2023-03-28 RX ADMIN — NEOSTIGMINE METHYLSULFATE 3 MG: 1 INJECTION INTRAVENOUS at 12:22

## 2023-03-28 RX ADMIN — LIDOCAINE HYDROCHLORIDE 50 MG: 10 INJECTION, SOLUTION EPIDURAL; INFILTRATION; INTRACAUDAL; PERINEURAL at 10:37

## 2023-03-28 RX ADMIN — GLYCOPYRROLATE 0.2 MG: 0.2 INJECTION, SOLUTION INTRAMUSCULAR; INTRAVENOUS at 10:40

## 2023-03-28 RX ADMIN — DEXAMETHASONE SODIUM PHOSPHATE 10 MG: 10 INJECTION, SOLUTION INTRAMUSCULAR; INTRAVENOUS at 10:40

## 2023-03-28 RX ADMIN — PROPOFOL 30 MCG/KG/MIN: 10 INJECTION, EMULSION INTRAVENOUS at 10:40

## 2023-03-28 RX ADMIN — SODIUM CHLORIDE, SODIUM LACTATE, POTASSIUM CHLORIDE, AND CALCIUM CHLORIDE: .6; .31; .03; .02 INJECTION, SOLUTION INTRAVENOUS at 10:33

## 2023-03-28 RX ADMIN — ROCURONIUM BROMIDE 10 MG: 10 INJECTION INTRAVENOUS at 11:10

## 2023-03-28 RX ADMIN — OXYCODONE HYDROCHLORIDE AND ACETAMINOPHEN 1 TABLET: 5; 325 TABLET ORAL at 17:58

## 2023-03-28 RX ADMIN — SODIUM CHLORIDE, SODIUM LACTATE, POTASSIUM CHLORIDE, AND CALCIUM CHLORIDE 100 ML/HR: .6; .31; .03; .02 INJECTION, SOLUTION INTRAVENOUS at 09:53

## 2023-03-28 RX ADMIN — ONDANSETRON 4 MG: 2 INJECTION INTRAMUSCULAR; INTRAVENOUS at 10:33

## 2023-03-28 RX ADMIN — ACETAMINOPHEN 975 MG: 325 TABLET ORAL at 09:53

## 2023-03-28 RX ADMIN — OXYCODONE HYDROCHLORIDE AND ACETAMINOPHEN 1 TABLET: 5; 325 TABLET ORAL at 13:48

## 2023-03-28 RX ADMIN — GLYCOPYRROLATE 0.6 MG: 0.2 INJECTION, SOLUTION INTRAMUSCULAR; INTRAVENOUS at 12:22

## 2023-03-28 RX ADMIN — FENTANYL CITRATE 100 MCG: 50 INJECTION INTRAMUSCULAR; INTRAVENOUS at 10:37

## 2023-03-28 RX ADMIN — HYDROMORPHONE HYDROCHLORIDE 0.5 MG: 1 INJECTION, SOLUTION INTRAMUSCULAR; INTRAVENOUS; SUBCUTANEOUS at 11:11

## 2023-03-28 RX ADMIN — MIDAZOLAM 2 MG: 1 INJECTION INTRAMUSCULAR; INTRAVENOUS at 10:33

## 2023-03-28 RX ADMIN — PROPOFOL 200 MG: 10 INJECTION, EMULSION INTRAVENOUS at 10:37

## 2023-03-28 NOTE — QUICK NOTE
Evaluated patient in [place] [address] at bedside S/P left parathyroidectomy  Patient is alert and coherent, in no acute distress  Currently reports 6/10 pain at her incision site which is tolerable  She will receive a second Percocet prior to discharge  Patient denies numbness or tingling around her mouth, fingertips, and toes  Denies shortness of breath and difficulty swallowing  She reports that she is eating and drinking without concern as well as tolerating diet without N/V  Incision is covered in Steri-Strips that are not saturated in blood  No evidence of hematoma  Site is clean, dry, and intact  Calcium values remain elevated but intra-operative PTH dropped appropriately after surgical resection  Calcium values will take time to normalize  Patient ready for discharge and understands to go to the ED if she develops dysphagia, difficulty breathing, or hematoma under her incision

## 2023-03-28 NOTE — OP NOTE
OPERATIVE REPORT  PATIENT NAME: Marnie Ko    :  1995  MRN: [id number]  Pt Location:  OR ROOM 05    SURGERY DATE: 3/28/2023    Surgeon(s) and Role:     * Michelle Pinedo MD - Primary     * Rei Kennedy MD - Assisting    Preop Diagnosis:  Primary hyperparathyroidism ([facility] [address] ) [E21 0]  Hypercalcemia [E83 52]    Post-Op Diagnosis Codes:     * Primary hyperparathyroidism ([facility] [address] ) [E21 0]     * Hypercalcemia [E83 52]    Procedure(s):  Left - MINIMALLY INVASIVE LEFT PARATHYROIDECTOMY  POSSIBLE 4 GLAND EXPLORATION  INTRAOPERATIVE PTH MONITORING  MONITORING INTRAOPERATIVE PTH (PARATHYROID HORMONE)    Specimen(s):  ID Type Source Tests Collected by Time Destination   1 : Right Superior Tissue Parathyroid TISSUE EXAM Michelle Pinedo MD 3/28/2023 1129    2 : Right Inferior Tissue Parathyroid TISSUE EXAM Michelle Pinedo MD 3/28/2023 1148    3 : Left Inferior Tissue Parathyroid TISSUE EXAM Michelle Pinedo MD 3/28/2023 1156    4 : Left Superior (fragment for bx) Tissue Parathyroid TISSUE EXAM Michelle Pinedo MD 3/28/2023 1201        Estimated Blood Loss:   Minimal    Drains:  * No LDAs found *    Anesthesia Type:   General    Operative Indications:  Primary hyperparathyroidism ([facility] [address] ) [E21 0]  Hypercalcemia [E83 52]      Operative Findings:  4-gland hyperlasia; right superior and inferior, along with left inferior glands removed  Left superior gland identified, biopsied, and left in situ  Component      Latest Ref Rng & Units 3/28/2023 3/28/2023 3/28/2023 3/28/2023           9:49 AM 11:43 AM 11:50 AM 11:54 AM   PARATHYROID HORMONE      18 4 - 80 1 pg/mL 71 6 82 8 (H) 64 4 35 0     Component      Latest Ref Rng & Units 3/28/2023 3/28/2023 3/28/2023          11:58 AM 12:06 PM 12:09 PM   PARATHYROID HORMONE      18 4 - 80 1 pg/mL 35 4 09 3 40 9     Complications:   None    Procedure and Technique:    The patient was brought to the OR and identified by proper time-out   Following this she was intubated by the anesthesia team, then was prepped and draped with the neck exposed in the extended position  Local anesthesia was given, then a 3 cm incision was made sharply 2 finger breaths above the sternal notch  Cautery was used to dissect through the dermis subcutaneous tissue  [employer] retractor was placed  The platysma was then transected with cautery  Strap muscles were then divided the midline  This exposed the surface of the thyroid  We dissected the strap muscles off the anterolateral aspect of the left thyroid lobe  This enabled us to dissect between the thyroid and the strap muscles  The thyroid gland was then retracted medially and anteriorly which exposed what appeared to be large parathyroid glands along the inferior pole of the thyroid gland and behind the midpole of the gland, consistend with enlarged inferior and superior glands  These were both enlarged, and given preop imaging, there was suspicion for 4-gland hyperplasia  Before resecting glands, we opted to explore the contralateral side  We dissected the strap muscles off the anterolateral aspect of the right thyroid lobe  This enabled us to dissect between the thyroid and the strap muscles  The thyroid gland was then retracted medially and anteriorly which exposed what appeared to be large parathyroid glands along the inferior pole of the thyroid gland and behind the midpole of the gland, consistend with enlarged inferior and superior glands  These were both enlarged; we therefore opted to d a 3 gland resection, with biopsy of the 4th gland to confirm tissue to be left in situ  We focused on the right superior gland first  This was dissected out from surrounding tissue in hemostatic fashion with cautery  Vascular pedicle was visualized  The pedicle was clipped  PTH levels were drawn at the start of the case, 0 min, 5 min, and 10 min after the gland was resected  We next focused on the right inferior gland    This was also dissected out from surrounding tissue, with vascular pedicles was visualized and clipped  We then went on the left side and dissected out the left inferior gland  Vascular pedicles were visualized and clipped  We then focused on the left superior gland  This was dissected out, but the vascular pedicle was not disturbed  We did sharply dissect a small portion of the gland, and cut a fragment off for frozen section confirming parathyroid tissue  After this, 3 additional PTH levels were drawn at 0, 5, and 10-minute intervals post biopsy of the remaining left superior gland  We then irrigated the field and closed using 3-0 Vicryl to close the strap muscles the midline followed by 3-0 Vicryl to close the platysma, followed by 4-0 Vicryl close the dermis, followed by 5-0 Monocryl to close skin in subcuticular fashion  Benzoin and Steri-Strips were then applied in the usual fashion  The patient was awakened transferred to the recovery room in stable condition      I was present for the entire procedure    Patient Disposition:  PACU         SIGNATURE: Janes Guzman MD  DATE: March 28, 2023  TIME: 12:26 PM

## 2023-03-28 NOTE — ANESTHESIA POSTPROCEDURE EVALUATION
Post-Op Assessment Note    CV Status:  Stable  Pain Score: 0    Pain management: adequate  Multimodal analgesia used between 6 hours prior to anesthesia start to PACU discharge    Mental Status:  Alert and awake   Hydration Status:  Euvolemic and stable   PONV Controlled:  Controlled   Airway Patency:  Patent   Two or more mitigation strategies used for obstructive sleep apnea   Post Op Vitals Reviewed: Yes      Staff: CRNA         No notable events documented      BP   127/72   Temp   97 5   Pulse  88   Resp   12   SpO2   97

## 2023-03-28 NOTE — INTERVAL H&P NOTE
H&P reviewed  After examining the patient I find no changes in the patients condition since the H&P had been written      Vitals:    03/28/23 0938   BP: 122/77   Pulse: 73   Resp: 18   Temp: 98 5 °F (36 9 °C)   SpO2: 98%     Ext: no C/C/E

## 2023-03-29 ENCOUNTER — HOSPITAL ENCOUNTER (EMERGENCY)
Facility: HOSPITAL | Age: 28
Discharge: HOME | End: 2023-03-29
Attending: EMERGENCY MEDICINE

## 2023-03-29 ENCOUNTER — APPOINTMENT (EMERGENCY)
Dept: CT IMAGING | Facility: HOSPITAL | Age: 28
End: 2023-03-29

## 2023-03-29 VITALS
RESPIRATION RATE: 20 BRPM | HEART RATE: 98 BPM | DIASTOLIC BLOOD PRESSURE: 78 MMHG | TEMPERATURE: 98.8 F | SYSTOLIC BLOOD PRESSURE: 132 MMHG | OXYGEN SATURATION: 96 %

## 2023-03-29 DIAGNOSIS — G89.18 POSTOPERATIVE PAIN: Primary | ICD-10-CM

## 2023-03-29 LAB
ALBUMIN SERPL BCP-MCNC: 4.5 G/DL (ref 3.5–5)
ALP SERPL-CCNC: 30 U/L (ref 34–104)
ALT SERPL W P-5'-P-CCNC: 13 U/L (ref 7–52)
ANION GAP SERPL CALCULATED.3IONS-SCNC: 9 MMOL/L (ref 4–13)
AST SERPL W P-5'-P-CCNC: 12 U/L (ref 13–39)
BASOPHILS # BLD AUTO: 0.04 THOUSANDS/ÂΜL (ref 0–0.1)
BASOPHILS NFR BLD AUTO: 0 % (ref 0–1)
BILIRUB SERPL-MCNC: 0.48 MG/DL (ref 0.2–1)
BUN SERPL-MCNC: 11 MG/DL (ref 5–25)
CALCIUM SERPL-MCNC: 11 MG/DL (ref 8.4–10.2)
CHLORIDE SERPL-SCNC: 100 MMOL/L (ref 96–108)
CO2 SERPL-SCNC: 26 MMOL/L (ref 21–32)
CREAT SERPL-MCNC: 0.52 MG/DL (ref 0.6–1.3)
EOSINOPHIL # BLD AUTO: 0.02 THOUSAND/ÂΜL (ref 0–0.61)
EOSINOPHIL NFR BLD AUTO: 0 % (ref 0–6)
ERYTHROCYTE [DISTWIDTH] IN BLOOD BY AUTOMATED COUNT: 12.1 % (ref 11.6–15.1)
EXT PREGNANCY TEST URINE: NEGATIVE
EXT. CONTROL: NORMAL
GFR SERPL CREATININE-BSD FRML MDRD: 129 ML/MIN/1.73SQ M
GLUCOSE SERPL-MCNC: 192 MG/DL (ref 65–140)
HCT VFR BLD AUTO: 37.8 % (ref 34.8–46.1)
HGB BLD-MCNC: 13.5 G/DL (ref 11.5–15.4)
IMM GRANULOCYTES # BLD AUTO: 0.12 THOUSAND/UL (ref 0–0.2)
IMM GRANULOCYTES NFR BLD AUTO: 1 % (ref 0–2)
LYMPHOCYTES # BLD AUTO: 2.79 THOUSANDS/ÂΜL (ref 0.6–4.47)
LYMPHOCYTES NFR BLD AUTO: 30 % (ref 14–44)
MCH RBC QN AUTO: 31.6 PG (ref 26.8–34.3)
MCHC RBC AUTO-ENTMCNC: 35.7 G/DL (ref 31.4–37.4)
MCV RBC AUTO: 89 FL (ref 82–98)
MONOCYTES # BLD AUTO: 0.69 THOUSAND/ÂΜL (ref 0.17–1.22)
MONOCYTES NFR BLD AUTO: 8 % (ref 4–12)
NEUTROPHILS # BLD AUTO: 5.55 THOUSANDS/ÂΜL (ref 1.85–7.62)
NEUTS SEG NFR BLD AUTO: 61 % (ref 43–75)
NRBC BLD AUTO-RTO: 0 /100 WBCS
PLATELET # BLD AUTO: 233 THOUSANDS/UL (ref 149–390)
PMV BLD AUTO: 9.9 FL (ref 8.9–12.7)
POTASSIUM SERPL-SCNC: 3.8 MMOL/L (ref 3.5–5.3)
PROT SERPL-MCNC: 7.2 G/DL (ref 6.4–8.4)
RBC # BLD AUTO: 4.27 MILLION/UL (ref 3.81–5.12)
SODIUM SERPL-SCNC: 135 MMOL/L (ref 135–147)
WBC # BLD AUTO: 9.21 THOUSAND/UL (ref 4.31–10.16)

## 2023-03-29 RX ADMIN — IOHEXOL 100 ML: 350 INJECTION, SOLUTION INTRAVENOUS at 21:48

## 2023-03-30 NOTE — DISCHARGE INSTRUCTIONS
You were seen here today for evaluation of discomfort at your surgical site  There is no evidence of infection or complication at this time  This is common in the post operative Please follow up with your surgeon as soon as possible   Please return to the ER if you feel you cannot catch your breath at rest

## 2023-04-04 NOTE — ED PROVIDER NOTES
"History  Chief Complaint   Patient presents with   • Post-Op Infection     Patient reports parathyroidectomy yesterday and \"it feels like I have mucous stuck in my throat and I can't get it out  \" Denies fevers/chills  23-year-old female presents emergency part for evaluation of postop pain  Patient reports increased neck pain and tenderness after recent parathyroid surgery yesterday  Patient was feeling well when she left the hospital but has pain, also feels like she has some phlegm in her throat  She denies any stridor or voice change or difficulty breathing  She denies any fevers chills or discharge from the surgical site  Prior to Admission Medications   Prescriptions Last Dose Informant Patient Reported? Taking? Blood Glucose Monitoring Suppl (OneTouch Verio Reflect) w/Device KIT   No No   Sig: Check blood sugars once daily  Please substitute with appropriate alternative as covered by patient's insurance  Dx: E11 65   Insulin Pen Needle (B-D UF III MINI PEN NEEDLES) 31G X 5 MM MISC   No No   Sig: Use every 7 days   Multiple Vitamin (multivitamin) tablet   Yes No   Sig: Take 1 tablet by mouth daily   OneTouch Delica Lancets 55P MISC   No No   Sig: Check blood sugars once daily  Please substitute with appropriate alternative as covered by patient's insurance  Dx: E11 65   VITAMIN D PO   Yes No   Sig: Take 1 tablet by mouth in the morning   glucose blood (OneTouch Verio) test strip   No No   Sig: Check blood sugars once daily  Please substitute with appropriate alternative as covered by patient's insurance   Dx: E11 65   metFORMIN (GLUCOPHAGE-XR) 500 mg 24 hr tablet   No No   Sig: Take 1 tablet (500 mg total) by mouth daily with dinner      Facility-Administered Medications: None       Past Medical History:   Diagnosis Date   • Diabetes mellitus ([facility] [address] )    • Gestational diabetes    • Kidney stone    • Migraine        Past Surgical History:   Procedure Laterality Date   • CERVICAL BIOPSY  W/ LOOP " ELECTRODE EXCISION  ? •  SECTION  15, 16, 18   • DILATION AND CURETTAGE OF UTERUS WITH HYSTEROSOCPY N/A 2020    Procedure: DILATATION AND CURETTAGE (D&C); Surgeon: Tala Dixon MD;  Location: [facility] MAIN OR;  Service: Gynecology   • ENDOMETRIAL ABLATION N/A 2020    Procedure: Caryle Em;  Surgeon: Tala Dixon MD;  Location: [facility] MAIN OR;  Service: Gynecology   • PA  DELIVERY ONLY N/A 6/3/2016    Procedure: [place] () REPEAT;  Surgeon: Tala Dixon MD;  Location: [facility] LD;  Service: Obstetrics   • PA  DELIVERY ONLY N/A 2018    Procedure: [place] () REPEAT;  Surgeon: Tala Dixon MD;  Location: [facility] LD;  Service: Obstetrics   • PA PARATHYROIDECTOMY/EXPLORATION PARATHYROIDS Left 3/28/2023    Procedure: MINIMALLY INVASIVE LEFT PARATHYROIDECTOMY,  4 GLAND EXPLORATION, INTRAOPERATIVE PTH MONITORING;  Surgeon: Sue Amin MD;  Location:  MAIN OR;  Service: Surgical Oncology   • TUBAL LIGATION N/A 2018    Procedure: LIGATION/COAGULATION TUBAL;  Surgeon: Tala Dixon MD;  Location: [facility] ;  Service: Obstetrics       Family History   Problem Relation Age of Onset   • Hyperthyroidism Mother    • Hyperthyroidism Father    • Hypertension Father    • No Known Problems Sister    • No Known Problems Brother    • No Known Problems Son    • No Known Problems Daughter    • No Known Problems Daughter    • No Known Problems Paternal Grandmother    • Diabetes Paternal Grandfather    • Down syndrome Cousin         paternal cousin     I have reviewed and agree with the history as documented      E-Cigarette/Vaping   • E-Cigarette Use Never User      E-Cigarette/Vaping Substances   • Nicotine No    • THC No    • CBD No    • Flavoring No    • Other No    • Unknown No      Social History     Tobacco Use   • Smoking status: Never     Passive exposure: Never   • Smokeless tobacco: Never   Vaping Use   • Vaping Use: Never used   Substance Use Topics   • Alcohol use: Not Currently   • Drug use: No       Review of Systems   Constitutional: Negative for appetite change, chills, fatigue and fever  HENT: Negative for sneezing and sore throat  Eyes: Negative for visual disturbance  Respiratory: Negative for cough, choking, chest tightness, shortness of breath and wheezing  Cardiovascular: Negative for chest pain and palpitations  Gastrointestinal: Negative for abdominal pain, constipation, diarrhea, nausea and vomiting  Genitourinary: Negative for difficulty urinating and dysuria  Musculoskeletal: Positive for neck pain  Neurological: Negative for dizziness, weakness, light-headedness, numbness and headaches  All other systems reviewed and are negative  Physical Exam  Physical Exam  Vitals and nursing note reviewed  Constitutional:       General: She is not in acute distress  Appearance: She is well-developed  She is not diaphoretic  HENT:      Head: Normocephalic and atraumatic  Eyes:      Pupils: Pupils are equal, round, and reactive to light  Neck:      Vascular: No JVD  Trachea: No tracheal deviation  Cardiovascular:      Rate and Rhythm: Normal rate and regular rhythm  Heart sounds: Normal heart sounds  No murmur heard  No friction rub  No gallop  Pulmonary:      Effort: Pulmonary effort is normal  No respiratory distress  Breath sounds: Normal breath sounds  No wheezing or rales  Abdominal:      General: Bowel sounds are normal  There is no distension  Palpations: Abdomen is soft  Tenderness: There is no abdominal tenderness  There is no guarding or rebound  Skin:     General: Skin is warm and dry  Coloration: Skin is not pale  Neurological:      Mental Status: She is alert and oriented to person, place, and time  Cranial Nerves: No cranial nerve deficit  Motor: No abnormal muscle tone     Psychiatric:         Behavior: Behavior normal  Vital Signs  ED Triage Vitals   Temperature Pulse Respirations Blood Pressure SpO2   03/29/23 1956 03/29/23 1956 03/29/23 1956 03/29/23 1956 03/29/23 1956   98 8 °F (37 1 °C) 90 18 156/83 98 %      Temp src Heart Rate Source Patient Position - Orthostatic VS BP Location FiO2 (%)   -- 03/29/23 2000 03/29/23 2000 03/29/23 2000 --    Monitor Lying Right arm       Pain Score       03/29/23 2230       No Pain           Vitals:    03/29/23 2130 03/29/23 2200 03/29/23 2230 03/29/23 2300   BP: 127/82 156/95 128/78 132/78   Pulse: 88 104 98 98   Patient Position - Orthostatic VS: Sitting Lying Lying          Visual Acuity      ED Medications  Medications   iohexol (OMNIPAQUE) 350 MG/ML injection (SINGLE-DOSE) 100 mL (100 mL Intravenous Given 3/29/23 2148)       Diagnostic Studies  Results Reviewed     Procedure Component Value Units Date/Time    POCT pregnancy, urine [[id number]]  (Normal) Resulted: 03/29/23 2125    Lab Status: Final result Updated: 03/29/23 2125     EXT Preg Test, Ur Negative     Control Valid    Comprehensive metabolic panel [[id number]]  (Abnormal) Collected: 03/29/23 2049    Lab Status: Final result Specimen: Blood from Arm, Right Updated: 03/29/23 2111     Sodium 135 mmol/L      Potassium 3 8 mmol/L      Chloride 100 mmol/L      CO2 26 mmol/L      ANION GAP 9 mmol/L      BUN 11 mg/dL      Creatinine 0 52 mg/dL      Glucose 192 mg/dL      Calcium 11 0 mg/dL      AST 12 U/L      ALT 13 U/L      Alkaline Phosphatase 30 U/L      Total Protein 7 2 g/dL      Albumin 4 5 g/dL      Total Bilirubin 0 48 mg/dL      eGFR 129 ml/min/1 73sq m     Narrative:      [place] guidelines for Chronic Kidney Disease (CKD):   •  Stage 1 with normal or high GFR (GFR > 90 mL/min/1 73 square meters)  •  Stage 2 Mild CKD (GFR = 60-89 mL/min/1 73 square meters)  •  Stage 3A Moderate CKD (GFR = 45-59 mL/min/1 73 square meters)  •  Stage 3B Moderate CKD (GFR = 30-44 mL/min/1 73 square meters)  • Stage 4 Severe CKD (GFR = 15-29 mL/min/1 73 square meters)  •  Stage 5 End Stage CKD (GFR <15 mL/min/1 73 square meters)  Note: GFR calculation is accurate only with a steady state creatinine    CBC and differential [[id number]] Collected: 03/29/23 2049    Lab Status: Final result Specimen: Blood from Arm, Right Updated: 03/29/23 2055     WBC 9 21 Thousand/uL      RBC 4 27 Million/uL      Hemoglobin 13 5 g/dL      Hematocrit 37 8 %      MCV 89 fL      MCH 31 6 pg      MCHC 35 7 g/dL      RDW 12 1 %      MPV 9 9 fL      Platelets 889 Thousands/uL      nRBC 0 /100 WBCs      Neutrophils Relative 61 %      Immat GRANS % 1 %      Lymphocytes Relative 30 %      Monocytes Relative 8 %      Eosinophils Relative 0 %      Basophils Relative 0 %      Neutrophils Absolute 5 55 Thousands/µL      Immature Grans Absolute 0 12 Thousand/uL      Lymphocytes Absolute 2 79 Thousands/µL      Monocytes Absolute 0 69 Thousand/µL      Eosinophils Absolute 0 02 Thousand/µL      Basophils Absolute 0 04 Thousands/µL                  CT soft tissue neck with contrast   Final Result by Leta Chiu MD (03/29 2244)      Gas and inflammation in the neck, centered in the thyroid  ,  Consistent with expected postsurgical change  No hematoma or fluid collection  Workstation performed: [id number]                    Procedures  Procedures         ED Course                                             Medical Decision Making  59-year-old female with postoperative pain, will check labs, CT, pain meds, discussed case with her surgeon, reassess  Amount and/or Complexity of Data Reviewed  Labs: ordered  Radiology: ordered  Risk  Prescription drug management            Disposition  Final diagnoses:   Postoperative pain     Time reflects when diagnosis was documented in both MDM as applicable and the Disposition within this note     Time User Action Codes Description Comment    3/29/2023 11:10 PM Cuba Cristobal Add [G89 18] Postoperative pain       ED Disposition     ED Disposition   Discharge    Condition   Stable    Date/Time   Wed Mar 29, 2023 11:10 PM    Comment   Asya Marley discharge to home/self care  Follow-up Information    None         Discharge Medication List as of 3/29/2023 11:13 PM      CONTINUE these medications which have NOT CHANGED    Details   Blood Glucose Monitoring Suppl (OneTouch Verio Reflect) w/Device KIT Check blood sugars once daily  Please substitute with appropriate alternative as covered by patient's insurance  Dx: E11 65, Normal      glucose blood (OneTouch Verio) test strip Check blood sugars once daily  Please substitute with appropriate alternative as covered by patient's insurance  Dx: E11 65, Normal      Insulin Pen Needle (B-D UF III MINI PEN NEEDLES) 31G X 5 MM MISC Use every 7 days, Starting Fri 7/22/2022, Normal      metFORMIN (GLUCOPHAGE-XR) 500 mg 24 hr tablet Take 1 tablet (500 mg total) by mouth daily with dinner, Starting Wed 12/7/2022, Until Mon 6/5/2023, Normal      Multiple Vitamin (multivitamin) tablet Take 1 tablet by mouth daily, Historical Med      OneTouch Delica Lancets 97O MISC Check blood sugars once daily  Please substitute with appropriate alternative as covered by patient's insurance  Dx: E11 65, Normal      VITAMIN D PO Take 1 tablet by mouth in the morning, Historical Med             No discharge procedures on file      PDMP Review       Value Time User    PDMP Reviewed  Yes 12/23/2022  9:53 AM Nuris Bernard MD          ED Provider  Electronically Signed by           Maryam Mueller MD  04/04/23 9802

## 2023-04-13 PROBLEM — Z98.890 STATUS POST PARATHYROIDECTOMY: Status: ACTIVE | Noted: 2023-04-13

## 2023-04-13 PROBLEM — Z90.89 STATUS POST PARATHYROIDECTOMY: Status: ACTIVE | Noted: 2023-04-13

## 2023-04-13 PROBLEM — E89.2 STATUS POST PARATHYROIDECTOMY (HCC): Status: ACTIVE | Noted: 2023-04-13

## 2023-04-25 ENCOUNTER — TELEPHONE (OUTPATIENT)
Dept: ENDOCRINOLOGY | Facility: CLINIC | Age: 28
End: 2023-04-25

## 2023-04-25 NOTE — TELEPHONE ENCOUNTER
----- Message from Celine Sotelo MD sent at 4/24/2023 10:57 AM EDT -----  Please ask the patient as her vitamin D level is below range, she will need to take vitamin -D 2000 IU daily ( over the counter)  Voicemail left for patient to start Vit D supplement daily

## 2023-05-30 ENCOUNTER — TELEPHONE (OUTPATIENT)
Dept: FAMILY MEDICINE CLINIC | Facility: CLINIC | Age: 28
End: 2023-05-30

## 2023-06-23 DIAGNOSIS — R73.09 ELEVATED GLUCOSE: ICD-10-CM

## 2023-06-23 DIAGNOSIS — E11.65 TYPE 2 DIABETES MELLITUS WITH HYPERGLYCEMIA, WITHOUT LONG-TERM CURRENT USE OF INSULIN (HCC): ICD-10-CM

## 2023-06-23 RX ORDER — METFORMIN HYDROCHLORIDE 500 MG/1
TABLET, EXTENDED RELEASE ORAL
Qty: 180 TABLET | Refills: 0 | Status: SHIPPED | OUTPATIENT
Start: 2023-06-23

## 2023-06-26 ENCOUNTER — TELEPHONE (OUTPATIENT)
Dept: GENETICS | Facility: CLINIC | Age: 28
End: 2023-06-26

## 2023-06-26 NOTE — TELEPHONE ENCOUNTER
Left message for Clayton Maldonado to confirm her upcoming appointment  She was encouraged to call our office to confirm

## 2023-06-30 ENCOUNTER — TELEPHONE (OUTPATIENT)
Dept: GENETICS | Facility: CLINIC | Age: 28
End: 2023-06-30

## 2023-06-30 NOTE — TELEPHONE ENCOUNTER
I called William Steiner to re-schedule her appointment with the [facility]       Outcome:  Genetics appointment re-scheduled for 12/15/2023 at 9:30AM with GALLO

## 2023-07-29 ENCOUNTER — APPOINTMENT (OUTPATIENT)
Dept: LAB | Facility: CLINIC | Age: 28
End: 2023-07-29
Payer: COMMERCIAL

## 2023-07-29 LAB
ALBUMIN SERPL BCP-MCNC: 3.6 G/DL (ref 3.5–5)
ALP SERPL-CCNC: 33 U/L (ref 46–116)
ALT SERPL W P-5'-P-CCNC: 21 U/L (ref 12–78)
ANION GAP SERPL CALCULATED.3IONS-SCNC: 5 MMOL/L
AST SERPL W P-5'-P-CCNC: 12 U/L (ref 5–45)
BILIRUB SERPL-MCNC: 0.59 MG/DL (ref 0.2–1)
BUN SERPL-MCNC: 10 MG/DL (ref 5–25)
CALCIUM SERPL-MCNC: 11 MG/DL (ref 8.3–10.1)
CHLORIDE SERPL-SCNC: 106 MMOL/L (ref 96–108)
CO2 SERPL-SCNC: 24 MMOL/L (ref 21–32)
CREAT SERPL-MCNC: 0.61 MG/DL (ref 0.6–1.3)
CREAT UR-MCNC: 114 MG/DL
EST. AVERAGE GLUCOSE BLD GHB EST-MCNC: 174 MG/DL
GFR SERPL CREATININE-BSD FRML MDRD: 122 ML/MIN/1.73SQ M
GLUCOSE P FAST SERPL-MCNC: 216 MG/DL (ref 65–99)
HBA1C MFR BLD: 7.7 %
MICROALBUMIN UR-MCNC: 24 MG/L (ref 0–20)
MICROALBUMIN/CREAT 24H UR: 21 MG/G CREATININE (ref 0–30)
POTASSIUM SERPL-SCNC: 3.6 MMOL/L (ref 3.5–5.3)
PROT SERPL-MCNC: 6.9 G/DL (ref 6.4–8.4)
SODIUM SERPL-SCNC: 135 MMOL/L (ref 135–147)

## 2023-07-29 PROCEDURE — 83036 HEMOGLOBIN GLYCOSYLATED A1C: CPT

## 2023-07-29 PROCEDURE — 80053 COMPREHEN METABOLIC PANEL: CPT

## 2023-07-29 PROCEDURE — 3061F NEG MICROALBUMINURIA REV: CPT | Performed by: NURSE PRACTITIONER

## 2023-07-31 ENCOUNTER — OFFICE VISIT (OUTPATIENT)
Dept: FAMILY MEDICINE CLINIC | Facility: CLINIC | Age: 28
End: 2023-07-31
Payer: COMMERCIAL

## 2023-07-31 VITALS
HEIGHT: 60 IN | OXYGEN SATURATION: 97 % | HEART RATE: 100 BPM | TEMPERATURE: 98 F | DIASTOLIC BLOOD PRESSURE: 80 MMHG | WEIGHT: 149 LBS | BODY MASS INDEX: 29.25 KG/M2 | SYSTOLIC BLOOD PRESSURE: 135 MMHG

## 2023-07-31 DIAGNOSIS — E11.9 TYPE 2 DIABETES MELLITUS WITHOUT COMPLICATION, WITHOUT LONG-TERM CURRENT USE OF INSULIN (HCC): Primary | ICD-10-CM

## 2023-07-31 DIAGNOSIS — R73.09 ELEVATED GLUCOSE: ICD-10-CM

## 2023-07-31 DIAGNOSIS — E21.0 PRIMARY HYPERPARATHYROIDISM (HCC): ICD-10-CM

## 2023-07-31 DIAGNOSIS — E89.2 STATUS POST PARATHYROIDECTOMY (HCC): ICD-10-CM

## 2023-07-31 DIAGNOSIS — E11.65 TYPE 2 DIABETES MELLITUS WITH HYPERGLYCEMIA, WITHOUT LONG-TERM CURRENT USE OF INSULIN (HCC): ICD-10-CM

## 2023-07-31 PROBLEM — E83.52 HYPERCALCEMIA: Status: RESOLVED | Noted: 2022-09-29 | Resolved: 2023-07-31

## 2023-07-31 PROBLEM — Z01.818 PRE-OP EVALUATION: Status: RESOLVED | Noted: 2023-03-10 | Resolved: 2023-07-31

## 2023-07-31 PROBLEM — R74.8 LOW SERUM ALKALINE PHOSPHATASE: Status: RESOLVED | Noted: 2023-02-13 | Resolved: 2023-07-31

## 2023-07-31 PROCEDURE — 3725F SCREEN DEPRESSION PERFORMED: CPT | Performed by: NURSE PRACTITIONER

## 2023-07-31 PROCEDURE — 99214 OFFICE O/P EST MOD 30 MIN: CPT | Performed by: NURSE PRACTITIONER

## 2023-07-31 RX ORDER — PENICILLIN V POTASSIUM 500 MG/1
500 TABLET ORAL EVERY 6 HOURS
COMMUNITY
Start: 2023-07-26

## 2023-07-31 RX ORDER — METFORMIN HYDROCHLORIDE 500 MG/1
500 TABLET, EXTENDED RELEASE ORAL
Qty: 90 TABLET | Refills: 1 | Status: SHIPPED | OUTPATIENT
Start: 2023-07-31 | End: 2024-01-27

## 2023-07-31 NOTE — PROGRESS NOTES
Name: Mariela Elias      : 1995      MRN: [id number]  Encounter Provider: ANYI Min  Encounter Date: 2023   Encounter department: [address] [place] [address]     1. Type 2 diabetes mellitus without complication, without long-term current use of insulin (HCC)  Assessment & Plan:    Lab Results   Component Value Date    HGBA1C 7.7 (H) 2023   will continue with Metformin 500 mg and add the ozempic weekly     Orders:  -     semaglutide, 0.25 or 0.5 mg/dose, (Ozempic, 0.25 or 0.5 MG/DOSE,) 2 mg/3 mL injection pen; Inject 0.38 mL (0.2533 mg total) under the skin every 7 days for 30 days, THEN 0.75 mL (0.5 mg total) every 7 days. -     Comprehensive metabolic panel; Future  -     Lipid Panel with Direct LDL reflex; Future  -     Albumin / creatinine urine ratio  -     HEMOGLOBIN A1C W/ EAG ESTIMATION; Future    2. Primary hyperparathyroidism ([address])    3. Status post parathyroidectomy ([address])    4. BMI 25.0-25.9,adult    5. Elevated glucose  -     metFORMIN (GLUCOPHAGE-XR) 500 mg 24 hr tablet; Take 1 tablet (500 mg total) by mouth daily with dinner    6. Type 2 diabetes mellitus with hyperglycemia, without long-term current use of insulin (HCC)  -     metFORMIN (GLUCOPHAGE-XR) 500 mg 24 hr tablet; Take 1 tablet (500 mg total) by mouth daily with dinner        Depression Screening and Follow-up Plan: Patient was screened for depression during today's encounter. They screened negative with a PHQ-2 score of 0. Subjective      Patient here today to review her recent labs and also to have her check up and that she is currently on the antibiotics for her dental issue     Review of Systems   Constitutional: Negative for activity change, appetite change, chills, diaphoresis, fatigue, fever and unexpected weight change. HENT: Negative for congestion, ear pain, hearing loss, postnasal drip, sinus pressure, sinus pain, sneezing and sore throat.     Eyes: Negative for pain, redness and visual disturbance. Respiratory: Negative for cough and shortness of breath. Cardiovascular: Negative for chest pain and leg swelling. Gastrointestinal: Negative for abdominal pain, diarrhea, nausea and vomiting. Endocrine: Negative. Genitourinary: Negative. Musculoskeletal: Negative for arthralgias. Skin: Negative. Allergic/Immunologic: Negative. Neurological: Negative for dizziness and light-headedness. Hematological: Negative. Psychiatric/Behavioral: Negative for behavioral problems and dysphoric mood. Current Outpatient Medications on File Prior to Visit   Medication Sig   • Blood Glucose Monitoring Suppl (OneTouch Verio Reflect) w/Device KIT Check blood sugars once daily. Please substitute with appropriate alternative as covered by patient's insurance. Dx: E11.65   • glucose blood (OneTouch Verio) test strip Check blood sugars once daily. Please substitute with appropriate alternative as covered by patient's insurance. Dx: E11.65   • Insulin Pen Needle (B-D UF III MINI PEN NEEDLES) 31G X 5 MM MISC Use every 7 days   • Multiple Vitamin (multivitamin) tablet Take 1 tablet by mouth daily   • OneTouch Delica Lancets 89H MISC Check blood sugars once daily. Please substitute with appropriate alternative as covered by patient's insurance. Dx: E11.65   • penicillin V potassium (VEETID) 500 mg tablet Take 500 mg by mouth every 6 (six) hours   • VITAMIN D PO Take 1 tablet by mouth in the morning   • [DISCONTINUED] metFORMIN (GLUCOPHAGE-XR) 500 mg 24 hr tablet take 1 tablet by mouth EVERY EVENING WITH DINNER       Objective     /80   Pulse 100   Temp 98 °F (36.7 °C)   Ht 5' 0.25" (1.53 m)   Wt 67.6 kg (149 lb)   SpO2 97%   BMI 28.86 kg/m²     Physical Exam  Vitals and nursing note reviewed. Constitutional:       General: She is not in acute distress. Appearance: She is well-developed. HENT:      Head: Normocephalic and atraumatic.       Right Ear: Tympanic membrane normal.      Left Ear: Tympanic membrane normal.      Nose: Nose normal.      Mouth/Throat:      Mouth: Mucous membranes are moist.   Eyes:      Pupils: Pupils are equal, round, and reactive to light. Neck:      Thyroid: No thyromegaly. Cardiovascular:      Rate and Rhythm: Normal rate and regular rhythm. Pulses: no weak pulses          Dorsalis pedis pulses are 2+ on the right side and 2+ on the left side. Posterior tibial pulses are 2+ on the right side and 2+ on the left side. Heart sounds: Normal heart sounds. No murmur heard. Pulmonary:      Effort: Pulmonary effort is normal. No respiratory distress. Breath sounds: Normal breath sounds. No wheezing. Abdominal:      General: Bowel sounds are normal.      Palpations: Abdomen is soft. Musculoskeletal:         General: Normal range of motion. Cervical back: Normal range of motion. Feet:      Right foot:      Skin integrity: No ulcer, skin breakdown, erythema, warmth, callus or dry skin. Left foot:      Skin integrity: No ulcer, skin breakdown, erythema, warmth, callus or dry skin. Skin:     General: Skin is warm and dry. Neurological:      General: No focal deficit present. Mental Status: She is alert and oriented to person, place, and time. Psychiatric:         Mood and Affect: Mood normal.        Patient's shoes and socks removed. Right Foot/Ankle   Right Foot Inspection  Skin Exam: skin normal and skin intact. No dry skin, no warmth, no callus, no erythema, no maceration, no abnormal color, no pre-ulcer, no ulcer and no callus. Toe Exam: ROM and strength within normal limits. Sensory   Vibration: intact  Proprioception: intact  Monofilament testing: intact    Vascular  Capillary refills: < 3 seconds  The right DP pulse is 2+. The right PT pulse is 2+. Left Foot/Ankle  Left Foot Inspection  Skin Exam: skin normal and skin intact.  No dry skin, no warmth, no erythema, no maceration, normal color, no pre-ulcer, no ulcer and no callus. Toe Exam: ROM and strength within normal limits. Sensory   Vibration: intact  Proprioception: intact  Monofilament testing: intact    Vascular  Capillary refills: < 3 seconds  The left DP pulse is 2+. The left PT pulse is 2+.      Assign Risk Category  No deformity present  No loss of protective sensation  No weak pulses  Risk:   Fahad Kennedy, ANYI

## 2023-07-31 NOTE — ASSESSMENT & PLAN NOTE
Lab Results   Component Value Date    HGBA1C 7.7 (H) 07/29/2023   will continue with Metformin 500 mg and add the ozempic weekly

## 2023-08-19 LAB
LEFT EYE DIABETIC RETINOPATHY: NORMAL
RIGHT EYE DIABETIC RETINOPATHY: NORMAL

## 2023-08-19 PROCEDURE — 2023F DILAT RTA XM W/O RTNOPTHY: CPT | Performed by: NURSE PRACTITIONER

## 2023-09-03 DIAGNOSIS — E11.65 TYPE 2 DIABETES MELLITUS WITH HYPERGLYCEMIA, WITHOUT LONG-TERM CURRENT USE OF INSULIN (HCC): ICD-10-CM

## 2023-09-03 DIAGNOSIS — R73.09 ELEVATED GLUCOSE: ICD-10-CM

## 2023-09-04 ENCOUNTER — HOSPITAL ENCOUNTER (EMERGENCY)
Facility: HOSPITAL | Age: 28
Discharge: HOME | End: 2023-09-04
Attending: EMERGENCY MEDICINE | Admitting: EMERGENCY MEDICINE
Payer: COMMERCIAL

## 2023-09-04 ENCOUNTER — APPOINTMENT (EMERGENCY)
Dept: CT IMAGING | Facility: HOSPITAL | Age: 28
End: 2023-09-04
Payer: COMMERCIAL

## 2023-09-04 VITALS
DIASTOLIC BLOOD PRESSURE: 64 MMHG | WEIGHT: 145 LBS | HEIGHT: 61 IN | RESPIRATION RATE: 18 BRPM | OXYGEN SATURATION: 99 % | SYSTOLIC BLOOD PRESSURE: 125 MMHG | HEART RATE: 89 BPM | BODY MASS INDEX: 27.38 KG/M2 | TEMPERATURE: 96.9 F

## 2023-09-04 DIAGNOSIS — R20.2 PARESTHESIA: Primary | ICD-10-CM

## 2023-09-04 LAB
ALBUMIN SERPL BCP-MCNC: 4.5 G/DL (ref 3.5–5)
ALP SERPL-CCNC: 30 U/L (ref 34–104)
ALT SERPL W P-5'-P-CCNC: 21 U/L (ref 7–52)
ANION GAP SERPL CALCULATED.3IONS-SCNC: 8 MMOL/L
APTT PPP: 26 SECONDS (ref 23–37)
AST SERPL W P-5'-P-CCNC: 17 U/L (ref 13–39)
BASOPHILS # BLD AUTO: 0.05 THOUSANDS/ÂΜL (ref 0–0.1)
BASOPHILS NFR BLD AUTO: 1 % (ref 0–1)
BILIRUB SERPL-MCNC: 0.62 MG/DL (ref 0.2–1)
BUN SERPL-MCNC: 8 MG/DL (ref 5–25)
CA-I BLD-SCNC: 1.35 MMOL/L (ref 1.12–1.32)
CALCIUM SERPL-MCNC: 11.8 MG/DL (ref 8.4–10.2)
CARDIAC TROPONIN I PNL SERPL HS: <2 NG/L
CHLORIDE SERPL-SCNC: 102 MMOL/L (ref 96–108)
CO2 SERPL-SCNC: 26 MMOL/L (ref 21–32)
CREAT SERPL-MCNC: 0.46 MG/DL (ref 0.6–1.3)
EOSINOPHIL # BLD AUTO: 0.07 THOUSAND/ÂΜL (ref 0–0.61)
EOSINOPHIL NFR BLD AUTO: 1 % (ref 0–6)
ERYTHROCYTE [DISTWIDTH] IN BLOOD BY AUTOMATED COUNT: 12 % (ref 11.6–15.1)
GFR SERPL CREATININE-BSD FRML MDRD: 133 ML/MIN/1.73SQ M
GLUCOSE SERPL-MCNC: 145 MG/DL (ref 65–140)
HCG SERPL QL: NEGATIVE
HCT VFR BLD AUTO: 39.3 % (ref 34.8–46.1)
HGB BLD-MCNC: 14.2 G/DL (ref 11.5–15.4)
IMM GRANULOCYTES # BLD AUTO: 0.04 THOUSAND/UL (ref 0–0.2)
IMM GRANULOCYTES NFR BLD AUTO: 1 % (ref 0–2)
INR PPP: 1 (ref 0.84–1.19)
LYMPHOCYTES # BLD AUTO: 1.3 THOUSANDS/ÂΜL (ref 0.6–4.47)
LYMPHOCYTES NFR BLD AUTO: 17 % (ref 14–44)
MCH RBC QN AUTO: 30.9 PG (ref 26.8–34.3)
MCHC RBC AUTO-ENTMCNC: 36.1 G/DL (ref 31.4–37.4)
MCV RBC AUTO: 85 FL (ref 82–98)
MONOCYTES # BLD AUTO: 0.82 THOUSAND/ÂΜL (ref 0.17–1.22)
MONOCYTES NFR BLD AUTO: 11 % (ref 4–12)
NEUTROPHILS # BLD AUTO: 5.54 THOUSANDS/ÂΜL (ref 1.85–7.62)
NEUTS SEG NFR BLD AUTO: 69 % (ref 43–75)
NRBC BLD AUTO-RTO: 0 /100 WBCS
PLATELET # BLD AUTO: 206 THOUSANDS/UL (ref 149–390)
PMV BLD AUTO: 9.8 FL (ref 8.9–12.7)
POTASSIUM SERPL-SCNC: 3.1 MMOL/L (ref 3.5–5.3)
PROT SERPL-MCNC: 7.5 G/DL (ref 6.4–8.4)
PROTHROMBIN TIME: 13.8 SECONDS (ref 11.6–14.5)
RBC # BLD AUTO: 4.6 MILLION/UL (ref 3.81–5.12)
SODIUM SERPL-SCNC: 136 MMOL/L (ref 135–147)
WBC # BLD AUTO: 7.82 THOUSAND/UL (ref 4.31–10.16)

## 2023-09-04 PROCEDURE — 82330 ASSAY OF CALCIUM: CPT | Performed by: EMERGENCY MEDICINE

## 2023-09-04 PROCEDURE — 99284 EMERGENCY DEPT VISIT MOD MDM: CPT

## 2023-09-04 PROCEDURE — 85025 COMPLETE CBC W/AUTO DIFF WBC: CPT | Performed by: EMERGENCY MEDICINE

## 2023-09-04 PROCEDURE — 85610 PROTHROMBIN TIME: CPT | Performed by: EMERGENCY MEDICINE

## 2023-09-04 PROCEDURE — 96361 HYDRATE IV INFUSION ADD-ON: CPT

## 2023-09-04 PROCEDURE — 99285 EMERGENCY DEPT VISIT HI MDM: CPT | Performed by: EMERGENCY MEDICINE

## 2023-09-04 PROCEDURE — G1004 CDSM NDSC: HCPCS

## 2023-09-04 PROCEDURE — 85730 THROMBOPLASTIN TIME PARTIAL: CPT | Performed by: EMERGENCY MEDICINE

## 2023-09-04 PROCEDURE — 80053 COMPREHEN METABOLIC PANEL: CPT | Performed by: EMERGENCY MEDICINE

## 2023-09-04 PROCEDURE — 70496 CT ANGIOGRAPHY HEAD: CPT

## 2023-09-04 PROCEDURE — 70498 CT ANGIOGRAPHY NECK: CPT

## 2023-09-04 PROCEDURE — 84703 CHORIONIC GONADOTROPIN ASSAY: CPT | Performed by: EMERGENCY MEDICINE

## 2023-09-04 PROCEDURE — 84484 ASSAY OF TROPONIN QUANT: CPT | Performed by: EMERGENCY MEDICINE

## 2023-09-04 PROCEDURE — 96374 THER/PROPH/DIAG INJ IV PUSH: CPT

## 2023-09-04 PROCEDURE — 36415 COLL VENOUS BLD VENIPUNCTURE: CPT | Performed by: EMERGENCY MEDICINE

## 2023-09-04 RX ORDER — POTASSIUM CHLORIDE 20 MEQ/1
40 TABLET, EXTENDED RELEASE ORAL ONCE
Status: COMPLETED | OUTPATIENT
Start: 2023-09-04 | End: 2023-09-04

## 2023-09-04 RX ORDER — OXYCODONE HYDROCHLORIDE AND ACETAMINOPHEN 5; 325 MG/1; MG/1
1 TABLET ORAL ONCE
Status: COMPLETED | OUTPATIENT
Start: 2023-09-04 | End: 2023-09-04

## 2023-09-04 RX ORDER — KETOROLAC TROMETHAMINE 30 MG/ML
15 INJECTION, SOLUTION INTRAMUSCULAR; INTRAVENOUS ONCE
Status: COMPLETED | OUTPATIENT
Start: 2023-09-04 | End: 2023-09-04

## 2023-09-04 RX ORDER — OXYCODONE HYDROCHLORIDE AND ACETAMINOPHEN 5; 325 MG/1; MG/1
1 TABLET ORAL ONCE
Status: DISCONTINUED | OUTPATIENT
Start: 2023-09-04 | End: 2023-09-04

## 2023-09-04 RX ORDER — OXYCODONE HYDROCHLORIDE AND ACETAMINOPHEN 5; 325 MG/1; MG/1
1 TABLET ORAL ONCE
Status: DISCONTINUED | OUTPATIENT
Start: 2023-09-04 | End: 2023-09-04 | Stop reason: HOSPADM

## 2023-09-04 RX ADMIN — IOHEXOL 85 ML: 350 INJECTION, SOLUTION INTRAVENOUS at 12:18

## 2023-09-04 RX ADMIN — OXYCODONE HYDROCHLORIDE AND ACETAMINOPHEN 1 TABLET: 5; 325 TABLET ORAL at 14:40

## 2023-09-04 RX ADMIN — POTASSIUM CHLORIDE 40 MEQ: 1500 TABLET, EXTENDED RELEASE ORAL at 12:32

## 2023-09-04 RX ADMIN — SODIUM CHLORIDE 1000 ML: 0.9 INJECTION, SOLUTION INTRAVENOUS at 13:14

## 2023-09-04 RX ADMIN — SODIUM CHLORIDE 1000 ML: 0.9 INJECTION, SOLUTION INTRAVENOUS at 11:47

## 2023-09-04 RX ADMIN — KETOROLAC TROMETHAMINE 15 MG: 30 INJECTION, SOLUTION INTRAMUSCULAR; INTRAVENOUS at 13:46

## 2023-09-04 NOTE — ED PROVIDER NOTES
History  Chief Complaint   Patient presents with   • Facial Numbness     Patient reports the "bottom half" of her face is numb since yesterday morning. From lips radiating down her chin and neck. +fatigue     Patient is a 78-year-old female past medical history diabetes, migraine, kidney stones, hypothyroid status post thyroidectomy presenting for facial numbness. Patient states that 4 days ago she had wisdom teeth removed on the right side and has been having mild pain since then however states that beginning yesterday at 11 AM roughly 24-1/2 hours ago she began having right-sided facial numbness to the lips chin and neck and now states it is moving up to her forehead and has been constant since yesterday. Notes fatigue but denies any headache, neck pain, vision changes, rashes, vomiting or diarrhea, dysuria, chest pain, shortness of breath, dizziness, fevers. Denies any head or neck injuries recently. Has been taking Tylenol with codeine, ibuprofen and antibiotics following wisdom tooth removal.  Notes nausea after surgery but states that has since resolved. Feels that she is having swelling to her face. Prior to Admission Medications   Prescriptions Last Dose Informant Patient Reported? Taking? Blood Glucose Monitoring Suppl (OneTouch Verio Reflect) w/Device KIT   No No   Sig: Check blood sugars once daily. Please substitute with appropriate alternative as covered by patient's insurance. Dx: E11.65   Insulin Pen Needle (B-D UF III MINI PEN NEEDLES) 31G X 5 MM MISC   No No   Sig: Use every 7 days   Multiple Vitamin (multivitamin) tablet   Yes No   Sig: Take 1 tablet by mouth daily   OneTouch Delica Lancets 57X MISC   No No   Sig: Check blood sugars once daily. Please substitute with appropriate alternative as covered by patient's insurance.  Dx: E11.65   VITAMIN D PO   Yes No   Sig: Take 1 tablet by mouth in the morning   glucose blood (OneTouch Verio) test strip   No No   Sig: Check blood sugars once daily. Please substitute with appropriate alternative as covered by patient's insurance. Dx: E11.65   metFORMIN (GLUCOPHAGE-XR) 500 mg 24 hr tablet   No No   Sig: Take 1 tablet (500 mg total) by mouth daily with dinner   penicillin V potassium (VEETID) 500 mg tablet   Yes No   Sig: Take 500 mg by mouth every 6 (six) hours   semaglutide, 0.25 or 0.5 mg/dose, (Ozempic, 0.25 or 0.5 MG/DOSE,) 2 mg/3 mL injection pen   No No   Sig: Inject 0.38 mL (0.2533 mg total) under the skin every 7 days for 30 days, THEN 0.75 mL (0.5 mg total) every 7 days. Facility-Administered Medications: None       Past Medical History:   Diagnosis Date   • Diabetes mellitus ([address])    • Gestational diabetes    • Kidney stone    • Migraine        Past Surgical History:   Procedure Laterality Date   • CERVICAL BIOPSY  W/ LOOP ELECTRODE EXCISION  ? •  SECTION  15, 16, 18   • DILATION AND CURETTAGE OF UTERUS WITH HYSTEROSOCPY N/A 2020    Procedure: DILATATION AND CURETTAGE (D&C);   Surgeon: Beth Rodrigues MD;  Location: [address] [place] [address] OR;  Service: Gynecology   • ENDOMETRIAL ABLATION N/A 2020    Procedure: Real Valery;  Surgeon: Beth Rodrigues MD;  Location: [address] [place] [address] OR;  Service: Gynecology   • MI  DELIVERY ONLY N/A 6/3/2016    Procedure: Jarrett Phan () REPEAT;  Surgeon: Beth Rodrigues MD;  Location: [facility];  Service: Obstetrics   • MI  DELIVERY ONLY N/A 2018    Procedure: Jarrett Phan () REPEAT;  Surgeon: Beth Rodrigues MD;  Location: [facility];  Service: Obstetrics   • MI PARATHYROIDECTOMY/EXPLORATION PARATHYROIDS Left 3/28/2023    Procedure: MINIMALLY INVASIVE LEFT PARATHYROIDECTOMY,  4 GLAND EXPLORATION, INTRAOPERATIVE PTH MONITORING;  Surgeon: Charan Munoz MD;  Location:  MAIN OR;  Service: Surgical Oncology   • TUBAL LIGATION N/A 2018    Procedure: LIGATION/COAGULATION TUBAL;  Surgeon: Beth Rodrigues MD;  Location: [facility]; Service: Obstetrics       Family History   Problem Relation Age of Onset   • Hyperthyroidism Mother    • Hyperthyroidism Father    • Hypertension Father    • No Known Problems Sister    • No Known Problems Brother    • No Known Problems Son    • No Known Problems Daughter    • No Known Problems Daughter    • No Known Problems Paternal Grandmother    • Diabetes Paternal Grandfather    • Down syndrome Cousin         paternal cousin     I have reviewed and agree with the history as documented. E-Cigarette/Vaping   • E-Cigarette Use Never User      E-Cigarette/Vaping Substances   • Nicotine No    • THC No    • CBD No    • Flavoring No    • Other No    • Unknown No      Social History     Tobacco Use   • Smoking status: Never     Passive exposure: Never   • Smokeless tobacco: Never   Vaping Use   • Vaping Use: Never used   Substance Use Topics   • Alcohol use: Not Currently   • Drug use: No       Review of Systems   All other systems reviewed and are negative. Physical Exam  Physical Exam  Vitals reviewed. Constitutional:       General: She is not in acute distress. Appearance: Normal appearance. She is not ill-appearing. HENT:      Mouth/Throat:      Mouth: Mucous membranes are moist.      Comments: No significant bleeding, purulent drainage, erythema or edema in the oropharynx  Eyes:      Extraocular Movements: Extraocular movements intact. Conjunctiva/sclera: Conjunctivae normal.   Cardiovascular:      Rate and Rhythm: Normal rate and regular rhythm. Pulses: Normal pulses. Heart sounds: Normal heart sounds. Pulmonary:      Effort: Pulmonary effort is normal.      Breath sounds: Normal breath sounds. Abdominal:      General: Abdomen is flat. Palpations: Abdomen is soft. Tenderness: There is no abdominal tenderness. Musculoskeletal:         General: No swelling. Normal range of motion. Cervical back: Neck supple. Right lower leg: No edema.       Left lower leg: No edema.   Skin:     General: Skin is warm and dry. Neurological:      General: No focal deficit present. Mental Status: She is alert. Cranial Nerves: No cranial nerve deficit. Sensory: Sensory deficit present. Motor: No weakness. Coordination: Coordination normal.   Psychiatric:         Mood and Affect: Mood normal.         Vital Signs  ED Triage Vitals [09/04/23 1127]   Temperature Pulse Respirations Blood Pressure SpO2   (!) 96.9 °F (36.1 °C) 100 18 143/76 100 %      Temp Source Heart Rate Source Patient Position - Orthostatic VS BP Location FiO2 (%)   Tympanic Monitor Sitting Left arm --      Pain Score       --           Vitals:    09/04/23 1127   BP: 143/76   Pulse: 100   Patient Position - Orthostatic VS: Sitting         Visual Acuity  Visual Acuity    Flowsheet Row Most Recent Value   L Pupil Size (mm) 4   R Pupil Size (mm) 4          ED Medications  Medications   sodium chloride 0.9 % bolus 1,000 mL (has no administration in time range)       Diagnostic Studies  Results Reviewed     None                 CTA head and neck with and without contrast    (Results Pending)              Procedures  Procedures         ED Course  ED Course as of 09/04/23 1455   Mon Sep 04, 2023   1306 Work-up remarkable only for mildly elevated calcium, will give fluids and have advised outpatient follow-up and have discussed with neurology who states may be trigeminal neuralgia, have discussed outpatient follow-up for this as well. 1310 Have discussed return precautions and patient states she understands. Medical Decision Making  Patient is a 18-year-old female past medical history diabetes, migraine, kidney stone, hypothyroid status post thyroidectomy presenting with facial numbness. Patient is well-appearing at bedside with stable vitals and in no acute distress.   She has decree sensation to the right side of her face including the forehead as compared to the left but with no motor deficits, no other gross abnormalities on neurologic exam.  Have low suspicion for central pathology given inclusion of forehead however will obtain CT CTA of the head and neck to rule out masses, hemorrhage, other intracranial pathology though have low suspicion, will obtain labs to rule out electrolyte abnormalities, anemia and continue to monitor    Amount and/or Complexity of Data Reviewed  Labs: ordered. Radiology: ordered. Disposition  Final diagnoses:   None     ED Disposition     None      Follow-up Information    None         Patient's Medications   Discharge Prescriptions    No medications on file       No discharge procedures on file.     PDMP Review       Value Time User    PDMP Reviewed  Yes 12/23/2022  9:53 AM Eugenio Son MD          ED Provider  Electronically Signed by           Carito Hernandez DO  09/04/23 [id number]

## 2023-09-05 RX ORDER — METFORMIN HYDROCHLORIDE 500 MG/1
500 TABLET, EXTENDED RELEASE ORAL
Qty: 90 TABLET | Refills: 0 | Status: SHIPPED | OUTPATIENT
Start: 2023-09-05 | End: 2024-03-03

## 2023-09-07 ENCOUNTER — HOSPITAL ENCOUNTER (EMERGENCY)
Facility: HOSPITAL | Age: 28
Discharge: HOME | End: 2023-09-07
Attending: EMERGENCY MEDICINE
Payer: COMMERCIAL

## 2023-09-07 ENCOUNTER — APPOINTMENT (EMERGENCY)
Dept: RADIOLOGY | Facility: HOSPITAL | Age: 28
End: 2023-09-07
Payer: COMMERCIAL

## 2023-09-07 VITALS
SYSTOLIC BLOOD PRESSURE: 127 MMHG | DIASTOLIC BLOOD PRESSURE: 66 MMHG | OXYGEN SATURATION: 98 % | TEMPERATURE: 98.4 F | RESPIRATION RATE: 18 BRPM | HEART RATE: 74 BPM

## 2023-09-07 DIAGNOSIS — R22.0 FACIAL SWELLING: Primary | ICD-10-CM

## 2023-09-07 DIAGNOSIS — K08.89 PAIN, DENTAL: ICD-10-CM

## 2023-09-07 DIAGNOSIS — Z98.890 HISTORY OF ROOT CANAL PROCEDURE: ICD-10-CM

## 2023-09-07 LAB
ANION GAP SERPL CALCULATED.3IONS-SCNC: 8 MMOL/L
BASOPHILS # BLD AUTO: 0.03 THOUSANDS/ÂΜL (ref 0–0.1)
BASOPHILS NFR BLD AUTO: 0 % (ref 0–1)
BUN SERPL-MCNC: 6 MG/DL (ref 5–25)
CALCIUM SERPL-MCNC: 11.9 MG/DL (ref 8.4–10.2)
CHLORIDE SERPL-SCNC: 97 MMOL/L (ref 96–108)
CO2 SERPL-SCNC: 29 MMOL/L (ref 21–32)
CREAT SERPL-MCNC: 0.39 MG/DL (ref 0.6–1.3)
EOSINOPHIL # BLD AUTO: 0.01 THOUSAND/ÂΜL (ref 0–0.61)
EOSINOPHIL NFR BLD AUTO: 0 % (ref 0–6)
ERYTHROCYTE [DISTWIDTH] IN BLOOD BY AUTOMATED COUNT: 12 % (ref 11.6–15.1)
GFR SERPL CREATININE-BSD FRML MDRD: 141 ML/MIN/1.73SQ M
GLUCOSE SERPL-MCNC: 136 MG/DL (ref 65–140)
HCT VFR BLD AUTO: 37.6 % (ref 34.8–46.1)
HGB BLD-MCNC: 13.7 G/DL (ref 11.5–15.4)
IMM GRANULOCYTES # BLD AUTO: 0.06 THOUSAND/UL (ref 0–0.2)
IMM GRANULOCYTES NFR BLD AUTO: 1 % (ref 0–2)
LYMPHOCYTES # BLD AUTO: 1.58 THOUSANDS/ÂΜL (ref 0.6–4.47)
LYMPHOCYTES NFR BLD AUTO: 19 % (ref 14–44)
MCH RBC QN AUTO: 31.2 PG (ref 26.8–34.3)
MCHC RBC AUTO-ENTMCNC: 36.4 G/DL (ref 31.4–37.4)
MCV RBC AUTO: 86 FL (ref 82–98)
MONOCYTES # BLD AUTO: 0.59 THOUSAND/ÂΜL (ref 0.17–1.22)
MONOCYTES NFR BLD AUTO: 7 % (ref 4–12)
NEUTROPHILS # BLD AUTO: 5.91 THOUSANDS/ÂΜL (ref 1.85–7.62)
NEUTS SEG NFR BLD AUTO: 73 % (ref 43–75)
NRBC BLD AUTO-RTO: 0 /100 WBCS
PLATELET # BLD AUTO: 245 THOUSANDS/UL (ref 149–390)
PMV BLD AUTO: 9.9 FL (ref 8.9–12.7)
POTASSIUM SERPL-SCNC: 4 MMOL/L (ref 3.5–5.3)
RBC # BLD AUTO: 4.39 MILLION/UL (ref 3.81–5.12)
SODIUM SERPL-SCNC: 134 MMOL/L (ref 135–147)
WBC # BLD AUTO: 8.18 THOUSAND/UL (ref 4.31–10.16)

## 2023-09-07 PROCEDURE — 36415 COLL VENOUS BLD VENIPUNCTURE: CPT | Performed by: PHYSICIAN ASSISTANT

## 2023-09-07 PROCEDURE — 70360 X-RAY EXAM OF NECK: CPT

## 2023-09-07 PROCEDURE — 96372 THER/PROPH/DIAG INJ SC/IM: CPT

## 2023-09-07 PROCEDURE — 80048 BASIC METABOLIC PNL TOTAL CA: CPT | Performed by: PHYSICIAN ASSISTANT

## 2023-09-07 PROCEDURE — 99283 EMERGENCY DEPT VISIT LOW MDM: CPT

## 2023-09-07 PROCEDURE — 99285 EMERGENCY DEPT VISIT HI MDM: CPT | Performed by: PHYSICIAN ASSISTANT

## 2023-09-07 PROCEDURE — 85025 COMPLETE CBC W/AUTO DIFF WBC: CPT | Performed by: PHYSICIAN ASSISTANT

## 2023-09-07 RX ORDER — CLINDAMYCIN HYDROCHLORIDE 150 MG/1
150 CAPSULE ORAL EVERY 6 HOURS SCHEDULED
Qty: 28 CAPSULE | Refills: 0 | Status: SHIPPED | OUTPATIENT
Start: 2023-09-07 | End: 2023-09-14

## 2023-09-07 RX ORDER — KETOROLAC TROMETHAMINE 10 MG/1
10 TABLET, FILM COATED ORAL EVERY 6 HOURS PRN
Qty: 12 TABLET | Refills: 0 | Status: SHIPPED | OUTPATIENT
Start: 2023-09-07 | End: 2023-09-10

## 2023-09-07 RX ORDER — KETOROLAC TROMETHAMINE 30 MG/ML
30 INJECTION, SOLUTION INTRAMUSCULAR; INTRAVENOUS ONCE
Status: COMPLETED | OUTPATIENT
Start: 2023-09-07 | End: 2023-09-07

## 2023-09-07 RX ORDER — CLINDAMYCIN HYDROCHLORIDE 150 MG/1
300 CAPSULE ORAL ONCE
Status: COMPLETED | OUTPATIENT
Start: 2023-09-07 | End: 2023-09-07

## 2023-09-07 RX ADMIN — KETOROLAC TROMETHAMINE 30 MG: 30 INJECTION, SOLUTION INTRAMUSCULAR; INTRAVENOUS at 11:56

## 2023-09-07 RX ADMIN — CLINDAMYCIN HYDROCHLORIDE 300 MG: 150 CAPSULE ORAL at 11:55

## 2023-09-07 NOTE — Clinical Note
Jersey Davalos was seen and treated in our emergency department on 9/7/2023. Diagnosis:     Kalia Peter  is off the rest of the shift today. She may return on this date: 09/11/2023         If you have any questions or concerns, please don't hesitate to call.       Amy Gilmore PA-C    ______________________________           _______________          _______________  Hospital Representative                              Date                                Time

## 2023-09-07 NOTE — ED PROVIDER NOTES
History  Chief Complaint   Patient presents with   • Facial Swelling     Pt c/o R sided facial swelling that worsened after she got a root canal yesterday. Pt also states she had a tooth pulled on Friday     HPI     78-year-old female presents with right-sided facial pain started this past Friday and subsequently had wisdom teeth removed. She has been complaining of right-sided facial numbness since this past Sunday, 5 days ago. Records reviewed from the emergency department and did have CTA head and neck with no significant visual abnormalities. She has been on amoxicillin since extraction of her previous wisdom tooth. She had root canal performed yesterday on right lower molar which is not yet complete. Having subsequent pain and swelling since that time. Remains on amoxicillin antibiotics    Past medical history of hyperparathyroidism with parathyroidectomy, type 2 diabetes, migraines and history of kidney stones    Prior to Admission Medications   Prescriptions Last Dose Informant Patient Reported? Taking? Blood Glucose Monitoring Suppl (OneTouch Verio Reflect) w/Device KIT   No No   Sig: Check blood sugars once daily. Please substitute with appropriate alternative as covered by patient's insurance. Dx: E11.65   Insulin Pen Needle (B-D UF III MINI PEN NEEDLES) 31G X 5 MM MISC   No No   Sig: Use every 7 days   Multiple Vitamin (multivitamin) tablet   Yes No   Sig: Take 1 tablet by mouth daily   OneTouch Delica Lancets 05U MISC   No No   Sig: Check blood sugars once daily. Please substitute with appropriate alternative as covered by patient's insurance. Dx: E11.65   VITAMIN D PO   Yes No   Sig: Take 1 tablet by mouth in the morning   glucose blood (OneTouch Verio) test strip   No No   Sig: Check blood sugars once daily. Please substitute with appropriate alternative as covered by patient's insurance.  Dx: E11.65   metFORMIN (GLUCOPHAGE-XR) 500 mg 24 hr tablet   No No   Sig: Take 1 tablet (500 mg total) by mouth daily with dinner   penicillin V potassium (VEETID) 500 mg tablet   Yes No   Sig: Take 500 mg by mouth every 6 (six) hours   semaglutide, 0.25 or 0.5 mg/dose, (Ozempic, 0.25 or 0.5 MG/DOSE,) 2 mg/3 mL injection pen   No No   Sig: Inject 0.38 mL (0.2533 mg total) under the skin every 7 days for 30 days, THEN 0.75 mL (0.5 mg total) every 7 days. Facility-Administered Medications: None       Past Medical History:   Diagnosis Date   • Diabetes mellitus ([address])    • Gestational diabetes    • Kidney stone    • Migraine        Past Surgical History:   Procedure Laterality Date   • CERVICAL BIOPSY  W/ LOOP ELECTRODE EXCISION  ? •  SECTION  15, 16, 18   • DILATION AND CURETTAGE OF UTERUS WITH HYSTEROSOCPY N/A 2020    Procedure: DILATATION AND CURETTAGE (D&C);   Surgeon: Fredrick Reyes MD;  Location: [facility] MAIN OR;  Service: Gynecology   • ENDOMETRIAL ABLATION N/A 2020    Procedure: Evgeny Beny;  Surgeon: Fredrick Reyes MD;  Location: [facility] MAIN OR;  Service: Gynecology   • TX  DELIVERY ONLY N/A 6/3/2016    Procedure: Dell Walt () REPEAT;  Surgeon: Fredrick Reyes MD;  Location: [facility];  Service: Obstetrics   • TX  DELIVERY ONLY N/A 2018    Procedure: Dell [place] () REPEAT;  Surgeon: Fredrick Reyes MD;  Location: [facility];  Service: Obstetrics   • TX PARATHYROIDECTOMY/EXPLORATION PARATHYROIDS Left 3/28/2023    Procedure: MINIMALLY INVASIVE LEFT PARATHYROIDECTOMY,  4 GLAND EXPLORATION, INTRAOPERATIVE PTH MONITORING;  Surgeon: Toro Scott MD;  Location:  MAIN OR;  Service: Surgical Oncology   • TUBAL LIGATION N/A 2018    Procedure: LIGATION/COAGULATION TUBAL;  Surgeon: Fredrick Reyes MD;  Location: [facility];  Service: Obstetrics       Family History   Problem Relation Age of Onset   • Hyperthyroidism Mother    • Hyperthyroidism Father    • Hypertension Father    • No Known Problems Sister    • No Known Problems Brother    • No Known Problems Son    • No Known Problems Daughter    • No Known Problems Daughter    • No Known Problems Paternal Grandmother    • Diabetes Paternal Grandfather    • Down syndrome Cousin         paternal cousin     I have reviewed and agree with the history as documented. E-Cigarette/Vaping   • E-Cigarette Use Never User      E-Cigarette/Vaping Substances   • Nicotine No    • THC No    • CBD No    • Flavoring No    • Other No    • Unknown No      Social History     Tobacco Use   • Smoking status: Never     Passive exposure: Never   • Smokeless tobacco: Never   Vaping Use   • Vaping Use: Never used   Substance Use Topics   • Alcohol use: Not Currently   • Drug use: No       Review of Systems   Constitutional: Negative for chills and fever. HENT: Negative for ear pain and sore throat. Facial swelling: R sided facial pain and swelling. Eyes: Negative for pain and visual disturbance. Respiratory: Negative for cough and shortness of breath. Cardiovascular: Negative for chest pain and palpitations. Gastrointestinal: Negative for abdominal pain and vomiting. Genitourinary: Negative for dysuria and hematuria. Musculoskeletal: Negative for arthralgias and back pain. Skin: Negative for color change and rash. Neurological: Negative for seizures and syncope. All other systems reviewed and are negative. Physical Exam  Physical Exam  Vitals and nursing note reviewed. Constitutional:       General: She is not in acute distress. Appearance: She is well-developed. HENT:      Head: Normocephalic and atraumatic. Comments: Mild right-sided mandibular swelling without erythema, fluctuance or crepitance. Additionally no neck fluctuance or crepitance and no limitation range of motion. No difficulty swallowing or managing secretions. No dysphonia or stridor.   Eyes:      Conjunctiva/sclera: Conjunctivae normal.   Cardiovascular:      Rate and Rhythm: Normal rate and regular rhythm. Heart sounds: No murmur heard. Pulmonary:      Effort: Pulmonary effort is normal. No respiratory distress. Breath sounds: Normal breath sounds. No decreased breath sounds, wheezing, rhonchi or rales. Comments: Regular rate rhythm no murmurs rubs gallops  Abdominal:      Palpations: Abdomen is soft. Tenderness: There is no abdominal tenderness. Musculoskeletal:         General: No swelling. Cervical back: Neck supple. Skin:     General: Skin is warm and dry. Capillary Refill: Capillary refill takes less than 2 seconds. Neurological:      Mental Status: She is alert.    Psychiatric:         Mood and Affect: Mood normal.         Vital Signs  ED Triage Vitals [09/07/23 1050]   Temperature Pulse Respirations Blood Pressure SpO2   98.4 °F (36.9 °C) 90 16 (!) 170/108 99 %      Temp Source Heart Rate Source Patient Position - Orthostatic VS BP Location FiO2 (%)   Oral Monitor Sitting Right arm --      Pain Score       --           Vitals:    09/07/23 1100 09/07/23 1130 09/07/23 1245 09/07/23 1400   BP: 156/90 153/78 147/94 127/66   Pulse: 100 97 87 74   Patient Position - Orthostatic VS: Sitting Sitting Sitting Sitting         Visual Acuity      ED Medications  Medications   ketorolac (TORADOL) injection 30 mg (30 mg Intramuscular Given 9/7/23 1156)   clindamycin (CLEOCIN) capsule 300 mg (300 mg Oral Given 9/7/23 1155)       Diagnostic Studies  Results Reviewed     Procedure Component Value Units Date/Time    Basic metabolic panel [[id number]]  (Abnormal) Collected: 09/07/23 1155    Lab Status: Final result Specimen: Blood from Arm, Left Updated: 09/07/23 1232     Sodium 134 mmol/L      Potassium 4.0 mmol/L      Chloride 97 mmol/L      CO2 29 mmol/L      ANION GAP 8 mmol/L      BUN 6 mg/dL      Creatinine 0.39 mg/dL      Glucose 136 mg/dL      Calcium 11.9 mg/dL      eGFR 141 ml/min/1.73sq m     Narrative:      [place] guidelines for Chronic Kidney Disease (CKD):   •  Stage 1 with normal or high GFR (GFR > 90 mL/min/1.73 square meters)  •  Stage 2 Mild CKD (GFR = 60-89 mL/min/1.73 square meters)  •  Stage 3A Moderate CKD (GFR = 45-59 mL/min/1.73 square meters)  •  Stage 3B Moderate CKD (GFR = 30-44 mL/min/1.73 square meters)  •  Stage 4 Severe CKD (GFR = 15-29 mL/min/1.73 square meters)  •  Stage 5 End Stage CKD (GFR <15 mL/min/1.73 square meters)  Note: GFR calculation is accurate only with a steady state creatinine    CBC and differential [[id number]] Collected: 09/07/23 1155    Lab Status: Final result Specimen: Blood from Arm, Left Updated: 09/07/23 1205     WBC 8.18 Thousand/uL      RBC 4.39 Million/uL      Hemoglobin 13.7 g/dL      Hematocrit 37.6 %      MCV 86 fL      MCH 31.2 pg      MCHC 36.4 g/dL      RDW 12.0 %      MPV 9.9 fL      Platelets 615 Thousands/uL      nRBC 0 /100 WBCs      Neutrophils Relative 73 %      Immat GRANS % 1 %      Lymphocytes Relative 19 %      Monocytes Relative 7 %      Eosinophils Relative 0 %      Basophils Relative 0 %      Neutrophils Absolute 5.91 Thousands/µL      Immature Grans Absolute 0.06 Thousand/uL      Lymphocytes Absolute 1.58 Thousands/µL      Monocytes Absolute 0.59 Thousand/µL      Eosinophils Absolute 0.01 Thousand/µL      Basophils Absolute 0.03 Thousands/µL                  XR neck soft tissue   Final Result by Nicol Jackson MD (09/07 1413)      Unremarkable neck soft tissue radiographs.       Workstation performed: [id number]                    Procedures  Procedures         ED Course  ED Course as of 09/07/23 1702   Thu Sep 07, 2023   1254 NL xray by my interpretation / pending rads read > flagged for priority read to expedite d/c.   2738 Dr. France Armas reading                                              Medical Decision Making  Mild right-sided facial swelling likely due to recent root canal.  Transition to clindamycin events for possibility of possible antibiotic resistance and development of early infection. No abscess or deep space infection noted. No subcutaneous crepitance. Patient with plan follow-up with dentist in the outpatient setting. Pain completely resolved with Toradol. Gave outpatient prescription for Toradol in the outpatient setting. Discussed return emergency department instructions for any worsening signs symptoms. Facial swelling: self-limited or minor problem  History of root canal procedure: self-limited or minor problem  Pain, dental: self-limited or minor problem  Amount and/or Complexity of Data Reviewed  Labs: ordered. Radiology: ordered. Risk  Prescription drug management. Disposition  Final diagnoses:   Facial swelling   History of root canal procedure   Pain, dental     Time reflects when diagnosis was documented in both MDM as applicable and the Disposition within this note     Time User Action Codes Description Comment    9/7/2023  2:07 PM Renzo Joe [R22.0] Facial swelling     9/7/2023  2:07 PM Renzo Joe [D54.232] History of root canal procedure     9/7/2023  2:07 PM Renzo Joe [K08.89] Pain, dental       ED Disposition     ED Disposition   Discharge    Condition   Stable    Date/Time   Thu Sep 7, 2023  2:07 PM    [address] discharge to home/self care.                Follow-up Information     Follow up With Specialties Details Why Contact Info Additional Information    Anita Sarabia, [address], Nurse Practitioner Call today Call today for follow up 21 [phone]  [address] [place] [address] Road  [address] Emergency Department Emergency Medicine Go to  If symptoms worsen [address]  Emergency Department, [place], [place], [address]          Discharge Medication List as of 9/7/2023  2:09 PM      START taking these medications    Details clindamycin (CLEOCIN) 150 mg capsule Take 1 capsule (150 mg total) by mouth every 6 (six) hours for 7 days, Starting Thu 9/7/2023, Until Thu 9/14/2023, Normal      ketorolac (TORADOL) 10 mg tablet Take 1 tablet (10 mg total) by mouth every 6 (six) hours as needed for moderate pain for up to 3 days, Starting Thu 9/7/2023, Until Sun 9/10/2023 at 2359, Normal         CONTINUE these medications which have NOT CHANGED    Details   Blood Glucose Monitoring Suppl (OneTouch Verio Reflect) w/Device KIT Check blood sugars once daily. Please substitute with appropriate alternative as covered by patient's insurance. Dx: E11.65, Normal      glucose blood (OneTouch Verio) test strip Check blood sugars once daily. Please substitute with appropriate alternative as covered by patient's insurance. Dx: E11.65, Normal      Insulin Pen Needle (B-D UF III MINI PEN NEEDLES) 31G X 5 MM MISC Use every 7 days, Starting Fri 7/22/2022, Normal      metFORMIN (GLUCOPHAGE-XR) 500 mg 24 hr tablet Take 1 tablet (500 mg total) by mouth daily with dinner, Starting Tue 9/5/2023, Until Sun 3/3/2024, Normal      Multiple Vitamin (multivitamin) tablet Take 1 tablet by mouth daily, Historical Med      OneTouch Delica Lancets 79S MISC Check blood sugars once daily. Please substitute with appropriate alternative as covered by patient's insurance. Dx: E11.65, Normal      penicillin V potassium (VEETID) 500 mg tablet Take 500 mg by mouth every 6 (six) hours, Starting Wed 7/26/2023, Historical Med      semaglutide, 0.25 or 0.5 mg/dose, (Ozempic, 0.25 or 0.5 MG/DOSE,) 2 mg/3 mL injection pen Multiple Dosages:Starting Mon 7/31/2023, Until Tue 8/29/2023 at 2359, THEN Starting Wed 8/30/2023, Until Sat 10/28/2023 at 2359Inject 0.38 mL (0.2533 mg total) under the skin every 7 days for 30 days, THEN 0.75 mL (0.5 mg total) every 7 days. , Normal      VITAMIN D PO Take 1 tablet by mouth in the morning, Historical Med             No discharge procedures on file.    PDMP Review       Value Time User    PDMP Reviewed  Yes 12/23/2022  9:53 AM Enedelia Odonnell MD          ED Provider  Electronically Signed by           Eunice Monteiro PA-C  09/07/23 4025

## 2023-09-07 NOTE — ED NOTES
Patient transported to xray.       Josefa Patel RN  09/07/23 [facility]  09/07/23 2938

## 2023-09-19 ENCOUNTER — TELEPHONE (OUTPATIENT)
Dept: OTHER | Facility: HOSPITAL | Age: 28
End: 2023-09-19

## 2023-10-04 ENCOUNTER — TELEPHONE (OUTPATIENT)
Dept: NEUROLOGY | Facility: CLINIC | Age: 28
End: 2023-10-04

## 2023-10-07 ENCOUNTER — APPOINTMENT (OUTPATIENT)
Dept: LAB | Facility: CLINIC | Age: 28
End: 2023-10-07
Payer: COMMERCIAL

## 2023-10-07 DIAGNOSIS — E89.2 STATUS POST PARATHYROIDECTOMY (HCC): ICD-10-CM

## 2023-10-07 DIAGNOSIS — E21.0 PRIMARY HYPERPARATHYROIDISM (HCC): ICD-10-CM

## 2023-10-07 DIAGNOSIS — E11.9 TYPE 2 DIABETES MELLITUS WITHOUT COMPLICATION, WITHOUT LONG-TERM CURRENT USE OF INSULIN (HCC): ICD-10-CM

## 2023-10-07 LAB
ALBUMIN SERPL BCP-MCNC: 4.2 G/DL (ref 3.5–5)
ALP SERPL-CCNC: 28 U/L (ref 34–104)
ALT SERPL W P-5'-P-CCNC: 24 U/L (ref 7–52)
ANION GAP SERPL CALCULATED.3IONS-SCNC: 12 MMOL/L
AST SERPL W P-5'-P-CCNC: 17 U/L (ref 13–39)
BASOPHILS # BLD AUTO: 0.04 THOUSANDS/ÂΜL (ref 0–0.1)
BASOPHILS NFR BLD AUTO: 1 % (ref 0–1)
BILIRUB SERPL-MCNC: 0.64 MG/DL (ref 0.2–1)
BUN SERPL-MCNC: 7 MG/DL (ref 5–25)
CALCIUM SERPL-MCNC: 10.7 MG/DL (ref 8.4–10.2)
CHLORIDE SERPL-SCNC: 102 MMOL/L (ref 96–108)
CO2 SERPL-SCNC: 26 MMOL/L (ref 21–32)
CREAT SERPL-MCNC: 0.45 MG/DL (ref 0.6–1.3)
EOSINOPHIL # BLD AUTO: 0.05 THOUSAND/ÂΜL (ref 0–0.61)
EOSINOPHIL NFR BLD AUTO: 1 % (ref 0–6)
ERYTHROCYTE [DISTWIDTH] IN BLOOD BY AUTOMATED COUNT: 12.6 % (ref 11.6–15.1)
GFR SERPL CREATININE-BSD FRML MDRD: 134 ML/MIN/1.73SQ M
GLUCOSE P FAST SERPL-MCNC: 124 MG/DL (ref 65–99)
HCT VFR BLD AUTO: 38.9 % (ref 34.8–46.1)
HGB BLD-MCNC: 13.8 G/DL (ref 11.5–15.4)
IMM GRANULOCYTES # BLD AUTO: 0.03 THOUSAND/UL (ref 0–0.2)
IMM GRANULOCYTES NFR BLD AUTO: 1 % (ref 0–2)
LYMPHOCYTES # BLD AUTO: 1.49 THOUSANDS/ÂΜL (ref 0.6–4.47)
LYMPHOCYTES NFR BLD AUTO: 23 % (ref 14–44)
MCH RBC QN AUTO: 30.9 PG (ref 26.8–34.3)
MCHC RBC AUTO-ENTMCNC: 35.5 G/DL (ref 31.4–37.4)
MCV RBC AUTO: 87 FL (ref 82–98)
MONOCYTES # BLD AUTO: 0.53 THOUSAND/ÂΜL (ref 0.17–1.22)
MONOCYTES NFR BLD AUTO: 8 % (ref 4–12)
NEUTROPHILS # BLD AUTO: 4.39 THOUSANDS/ÂΜL (ref 1.85–7.62)
NEUTS SEG NFR BLD AUTO: 66 % (ref 43–75)
NRBC BLD AUTO-RTO: 0 /100 WBCS
PHOSPHATE SERPL-MCNC: 2.7 MG/DL (ref 2.7–4.5)
PLATELET # BLD AUTO: 225 THOUSANDS/UL (ref 149–390)
PMV BLD AUTO: 11.6 FL (ref 8.9–12.7)
POTASSIUM SERPL-SCNC: 2.9 MMOL/L (ref 3.5–5.3)
PROT SERPL-MCNC: 6.7 G/DL (ref 6.4–8.4)
PTH-INTACT SERPL-MCNC: 60.4 PG/ML (ref 12–88)
RBC # BLD AUTO: 4.46 MILLION/UL (ref 3.81–5.12)
SODIUM SERPL-SCNC: 140 MMOL/L (ref 135–147)
WBC # BLD AUTO: 6.53 THOUSAND/UL (ref 4.31–10.16)

## 2023-10-07 PROCEDURE — 85025 COMPLETE CBC W/AUTO DIFF WBC: CPT

## 2023-10-07 PROCEDURE — 82306 VITAMIN D 25 HYDROXY: CPT

## 2023-10-07 PROCEDURE — 82308 ASSAY OF CALCITONIN: CPT

## 2023-10-07 PROCEDURE — 84075 ASSAY ALKALINE PHOSPHATASE: CPT

## 2023-10-07 PROCEDURE — 83970 ASSAY OF PARATHORMONE: CPT

## 2023-10-07 PROCEDURE — 84100 ASSAY OF PHOSPHORUS: CPT

## 2023-10-07 PROCEDURE — 80053 COMPREHEN METABOLIC PANEL: CPT

## 2023-10-07 PROCEDURE — 84080 ASSAY ALKALINE PHOSPHATASES: CPT

## 2023-10-10 LAB — CALCIT SERPL-MCNC: <2 PG/ML (ref 0–5)

## 2023-10-12 LAB
ALP BONE CFR SERPL: 51 % (ref 14–68)
ALP INTEST CFR SERPL: 0 % (ref 0–18)
ALP LIVER CFR SERPL: 49 % (ref 18–85)
ALP SERPL-CCNC: 34 IU/L (ref 44–121)

## 2023-10-13 ENCOUNTER — OFFICE VISIT (OUTPATIENT)
Dept: NEUROLOGY | Facility: CLINIC | Age: 28
End: 2023-10-13
Payer: COMMERCIAL

## 2023-10-13 VITALS
DIASTOLIC BLOOD PRESSURE: 80 MMHG | WEIGHT: 138.4 LBS | HEIGHT: 61 IN | TEMPERATURE: 98.3 F | SYSTOLIC BLOOD PRESSURE: 120 MMHG | HEART RATE: 80 BPM | OXYGEN SATURATION: 98 % | RESPIRATION RATE: 20 BRPM | BODY MASS INDEX: 26.13 KG/M2

## 2023-10-13 DIAGNOSIS — S04.30XA: Primary | ICD-10-CM

## 2023-10-13 LAB
25(OH)D2 SERPL-MCNC: <1 NG/ML
25(OH)D3 SERPL-MCNC: 38 NG/ML
25(OH)D3+25(OH)D2 SERPL-MCNC: 38 NG/ML

## 2023-10-13 PROCEDURE — 99205 OFFICE O/P NEW HI 60 MIN: CPT | Performed by: PSYCHIATRY & NEUROLOGY

## 2023-10-13 NOTE — ASSESSMENT & PLAN NOTE
Patient is a very pleasant 33-year-old female with past medical history of hyperparathyroidism s/p  surgical removal 6 months ago, type 2 DM and dental infection who presents for evaluation of facial numbness. She reports numbness of right V3 distribution with transient involvement of V1 and V2. She now has residual right medial chin numbness which has caused difficulty eating, lip biting and discomfort. Physical exam with decreased sensation to temperature, pinprick and light touch around this area. At this time, I believe that her symptoms are due to injury to the inferior alveolar nerve. Unfortunately, time and conservative management will allow for healing. I will order a B12 level. Recommended patient to start B12 supplementation to potentially aid in healing. She still needs to get her root canal due to pain in her tooth. I advised her to consider new provider.

## 2023-10-13 NOTE — PROGRESS NOTES
Patient ID: Maggie John is a 25 y.o. female. 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 She reports numbness of right V3 distribution with transient involvement of V1 and V2. She now has residual right medial chin numbness which has caused difficulty eating, lip biting and discomfort. Physical exam with decreased sensation to temperature, pinprick and light touch around this area. At this time, I believe that her symptoms are due to injury to the inferior alveolar nerve. Unfortunately, time and conservative management will allow for healing. I will order a B12 level. Recommended patient to start B12 supplementation to potentially aid in healing. She still needs to get her root canal due to pain in her tooth. I advised her to consider new provider. 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 A month ago had L lower wisdom tooth extraction, given amoxicillin and codeine for pain. 2 days after numbness on R V3, which started after taking tylenol with codeine. The day after went to the ER cause numbness was gong up the face to V2 and V1 and felt a submandibular mass on the right. Went back to the dentist because she wanted to do a root canal on tooth # 30. Patient was unable to tolerate the root canal secondary to pain. . A few day laters went to the ED at [facility], got more pain meds and Clindamycin. Lump swelling went down. Today still feels residual numbness along the half right lower lip and R medial chin. When she brushes her teeth in her morning she feels a cooling sensation. Difficulty eating, keeps biting her lip. No changes in taste or biting tongue. No other cranial nerve deficits. Hadnt taken codeine in the past. No adverse reaction to amoxicillin in the past     Endodontist [facility] center in [address] [place] [address] extraction tooth # 17 and root canal on tooth # 30     Vitamin D supplementation unclear dose, stopped taking multivitamin       The following portions of the patient's history were reviewed and updated as appropriate: allergies, current medications, past family history, past medical history, past social history, past surgical history, and problem list.        Objective:    Blood pressure 120/80, pulse 80, temperature 98.3 °F (36.8 °C), temperature source Temporal, resp. rate 20, height 5' 1" (1.549 m), weight 62.8 kg (138 lb 6.4 oz), SpO2 98 %, not currently breastfeeding. Physical Exam  Eyes:      General: Lids are normal.      Extraocular Movements: Extraocular movements intact. Pupils: Pupils are equal, round, and reactive to light. Neurological:      Motor: Motor strength is normal.     Coordination: Romberg sign negative. Deep Tendon Reflexes:      Reflex Scores:       Bicep reflexes are 2+ on the right side and 2+ on the left side. Brachioradialis reflexes are 2+ on the right side and 2+ on the left side. Patellar reflexes are 2+ on the right side and 2+ on the left side. Achilles reflexes are 2+ on the right side and 2+ on the left side. Neurological Exam  Mental Status  Awake, alert and oriented to person, place and time. Cranial Nerves  CN II: Visual fields full to confrontation. CN III, IV, VI: Extraocular movements intact bilaterally. Normal lids and orbits bilaterally. Pupils equal round and reactive to light bilaterally.   CN V: Facial sensation is normal.  CN VII: Full and symmetric facial movement. CN VIII: Hearing is normal.  CN IX, X: Palate elevates symmetrically  CN XI: Shoulder shrug strength is normal.  CN XII: Tongue midline without atrophy or fasciculations. Motor   Strength is 5/5 throughout all four extremities. Sensory  Light touch abnormality: Pinprick abnormality: Temperature abnormality: Proprioception is normal in upper and lower extremities. Decreased light touch, pinprick and temperature sensation along the right medial aspect of the chin. Normal sensation elsewhere, including the lip. Reflexes                                            Right                      Left  Brachioradialis                    2+                         2+  Biceps                                 2+                         2+  Patellar                                2+                         2+  Achilles                                2+                         2+    Coordination  Right: Finger-to-nose normal.Left: Finger-to-nose normal.    Gait  Casual gait is normal including stance, stride, and arm swing. Romberg is absent. Able to rise from chair without using arms. ROS:    Review of Systems  Review of Systems   Constitutional:  Negative for appetite change, fatigue and fever. HENT: Negative. Negative for hearing loss, tinnitus, trouble swallowing and voice change. Eyes: Negative. Negative for photophobia, pain and visual disturbance. Respiratory: Negative. Negative for shortness of breath. Cardiovascular: Negative. Negative for palpitations. Gastrointestinal: Negative. Negative for nausea and vomiting. Endocrine: Negative. Negative for cold intolerance. Genitourinary: Negative. Negative for dysuria, frequency and urgency. Musculoskeletal:  Negative for back pain, gait problem, myalgias and neck pain. Skin: Negative. Negative for rash. Allergic/Immunologic: Negative.     Neurological:  Positive for numbness (right side of face). Negative for dizziness, tremors, seizures, syncope, facial asymmetry, speech difficulty, weakness, light-headedness and headaches. Hematological: Negative. Does not bruise/bleed easily. Psychiatric/Behavioral: Negative. Negative for confusion, hallucinations and sleep disturbance. All other systems reviewed and are negative.

## 2023-10-14 ENCOUNTER — APPOINTMENT (OUTPATIENT)
Dept: LAB | Facility: CLINIC | Age: 28
End: 2023-10-14
Payer: COMMERCIAL

## 2023-10-14 DIAGNOSIS — E89.2 STATUS POST PARATHYROIDECTOMY (HCC): ICD-10-CM

## 2023-10-14 DIAGNOSIS — S04.30XA: ICD-10-CM

## 2023-10-14 LAB
CALCIUM SERPL-MCNC: 12 MG/DL (ref 8.4–10.2)
VIT B12 SERPL-MCNC: 253 PG/ML (ref 180–914)

## 2023-10-14 PROCEDURE — 82310 ASSAY OF CALCIUM: CPT

## 2023-10-14 PROCEDURE — 82607 VITAMIN B-12: CPT

## 2023-10-14 PROCEDURE — 36415 COLL VENOUS BLD VENIPUNCTURE: CPT

## 2023-10-14 PROCEDURE — 86038 ANTINUCLEAR ANTIBODIES: CPT

## 2023-10-16 LAB — ANA SER QL IA: NEGATIVE

## 2023-10-17 ENCOUNTER — PATIENT MESSAGE (OUTPATIENT)
Dept: NEUROLOGY | Facility: CLINIC | Age: 28
End: 2023-10-17

## 2023-10-17 NOTE — PATIENT COMMUNICATION
I faced the record release form to fax number provider by patient. Form and fax confirmation have been scanned into patient chart.

## 2023-10-18 ENCOUNTER — PATIENT MESSAGE (OUTPATIENT)
Dept: NEUROLOGY | Facility: CLINIC | Age: 28
End: 2023-10-18

## 2023-10-20 ENCOUNTER — OFFICE VISIT (OUTPATIENT)
Dept: SURGICAL ONCOLOGY | Facility: CLINIC | Age: 28
End: 2023-10-20

## 2023-10-20 VITALS
DIASTOLIC BLOOD PRESSURE: 78 MMHG | SYSTOLIC BLOOD PRESSURE: 132 MMHG | BODY MASS INDEX: 25.83 KG/M2 | HEIGHT: 61 IN | WEIGHT: 136.8 LBS | TEMPERATURE: 97.5 F | HEART RATE: 82 BPM | OXYGEN SATURATION: 99 % | RESPIRATION RATE: 18 BRPM

## 2023-10-20 DIAGNOSIS — E89.2 STATUS POST PARATHYROIDECTOMY (HCC): Primary | ICD-10-CM

## 2023-10-20 DIAGNOSIS — E21.0 PRIMARY HYPERPARATHYROIDISM (HCC): ICD-10-CM

## 2023-10-20 NOTE — PROGRESS NOTES
Surgical Oncology Follow Up       [address] [facility] [place] [address]    Abdullahi Graff  1995  [id number]  [facility] SURGICAL ONCOLOGY [address] [address]    Chief Complaint   Patient presents with    Follow-up       Assessment/Plan:  1. Status post parathyroidectomy ([address])  - 6 month follow up  - Calcium; Future  - Vitamin D Panel; Future  - PTH, intact; Future    2. Primary hyperparathyroidism [facility])      Discussion/Summary: Patient is a 25year old female presenting for a 6 mo follow up for parathyroidectomy with Dr. Brooks Smith in March of this year. She had her PTH, vit d, and calcium evaluated on 10/7/23. PTH is within normal limits, calcium is elevated at 12 and vit D is WNL. She notes muscle cramping. I will see the patient back in 6 months or sooner should the need arise. She was instructed to call with any questions or concerns prior to this time. All questions were answered today. History of Present Illness:     Oncology History    No history exists.        -Interval History: Patient is a 25year old female presenting for a 6 mo follow up for parathyroidectomy with Dr. Brooks Smith in March of this year. Calcium is elevated at 12. She notes muscle cramping in her legs. Review of Systems:  Review of Systems   Constitutional:  Negative for activity change, appetite change, fatigue and unexpected weight change. Respiratory:  Negative for cough and shortness of breath. Cardiovascular:  Negative for chest pain. Gastrointestinal:  Negative for abdominal pain, diarrhea, nausea and vomiting. Endocrine: Negative for heat intolerance. Musculoskeletal:  Positive for myalgias. Negative for arthralgias and back pain. Skin:  Negative for rash. Neurological:  Negative for weakness and headaches. Hematological:  Negative for adenopathy.        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  SECTION  15, 16, 18    DILATION AND CURETTAGE OF UTERUS WITH HYSTEROSOCPY N/A 2020    Procedure: DILATATION AND CURETTAGE (D&C);   Surgeon: Rocio Lazo MD;  Location: [address] [place] [address] MAIN OR;  Service: Gynecology    ENDOMETRIAL ABLATION N/A 2020    Procedure: Sarah Vincent;  Surgeon: Rocio Lazo MD;  Location: [address] [place] [address] MAIN OR;  Service: Gynecology    [facility]  DELIVERY ONLY N/A 6/3/2016    Procedure: Peace Willard () REPEAT;  Surgeon: Rocio Lazo MD;  [facility];  Service: Obstetrics    [facility]  DELIVERY ONLY N/A 2018    Procedure: Peace Willard () REPEAT;  Surgeon: Rocio Lazo MD;  [facility];  Service: Obstetrics    [place] PARATHYROIDECTOMY/EXPLORATION PARATHYROIDS Left 3/28/2023    Procedure: MINIMALLY INVASIVE LEFT PARATHYROIDECTOMY,  4 GLAND EXPLORATION, INTRAOPERATIVE PTH MONITORING;  Surgeon: Cristela Mares MD;  Location:  MAIN OR;  Service: Surgical Oncology    TUBAL LIGATION N/A 2018    Procedure: LIGATION/COAGULATION TUBAL;  Surgeon: Rocio Lazo MD;  [place]: [facility];  Service: Obstetrics     Family History   Problem Relation Age of Onset    Hyperthyroidism Mother     Hyperthyroidism Father     Hypertension Father     No Known Problems Sister     No Known Problems Brother     No Known Problems Son     No Known Problems Daughter     No Known Problems Daughter     No Known Problems Paternal Grandmother     Diabetes Paternal Grandfather     Down syndrome Cousin         paternal cousin     Social History     Socioeconomic History    Marital status: /Civil Union     Spouse name: Not on file    Number of children: Not on file    Years of education: Not on file    Highest education level: Not on file   Occupational History    Not on file   Tobacco Use    Smoking status: Never     Passive exposure: Never    Smokeless tobacco: Never   Vaping Use    Vaping Use: Never used   Substance and Sexual Activity    Alcohol use: Not Currently    Drug use: No    Sexual activity: Yes     Partners: Male     Birth control/protection: Female Sterilization   Other Topics Concern    Not on file   Social History Narrative    Not on file     Social Determinants of Health     Financial Resource Strain: Not on file   Food Insecurity: Not on file   Transportation Needs: Not on file   Physical Activity: Not on file   Stress: Not on file   Social Connections: Not on file   Intimate Partner Violence: Not on file   Housing Stability: Not on file       Current Outpatient Medications:     Blood Glucose Monitoring Suppl (OneTouch Verio Reflect) w/Device KIT, Check blood sugars once daily. Please substitute with appropriate alternative as covered by patient's insurance. Dx: E11.65, Disp: 1 kit, Rfl: 0    glucose blood (OneTouch Verio) test strip, Check blood sugars once daily. Please substitute with appropriate alternative as covered by patient's insurance.  Dx: E11.65, Disp: 100 each, Rfl: 3    Insulin Pen Needle (B-D UF III MINI PEN NEEDLES) 31G X 5 MM MISC, Use every 7 days, Disp: 30 each, Rfl: 0    ketorolac (TORADOL) 10 mg tablet, Take 1 tablet (10 mg total) by mouth every 6 (six) hours as needed for moderate pain for up to 3 days (Patient not taking: Reported on 10/13/2023), Disp: 12 tablet, Rfl: 0    metFORMIN (GLUCOPHAGE-XR) 500 mg 24 hr tablet, Take 1 tablet (500 mg total) by mouth daily with dinner, Disp: 90 tablet, Rfl: 0    Multiple Vitamin (multivitamin) tablet, Take 1 tablet by mouth daily (Patient not taking: Reported on 10/13/2023), Disp: , Rfl:     OneTouch Delica Lancets 96B MISC, Check blood sugars once daily. Please substitute with appropriate alternative as covered by patient's insurance. Dx: E11.65, Disp: 100 each, Rfl: 3    penicillin V potassium (VEETID) 500 mg tablet, Take 500 mg by mouth every 6 (six) hours (Patient not taking: Reported on 10/13/2023), Disp: , Rfl:     semaglutide, 0.25 or 0.5 mg/dose, (Ozempic, 0.25 or 0.5 MG/DOSE,) 2 mg/3 mL injection pen, Inject 0.38 mL (0.2533 mg total) under the skin every 7 days for 30 days, THEN 0.75 mL (0.5 mg total) every 7 days. , Disp: 9 mL, Rfl: 0    VITAMIN D PO, Take 1 tablet by mouth in the morning, Disp: , Rfl:   No Known Allergies  Vitals:    10/20/23 1003   Resp: 18       Physical Exam  Constitutional:       General: She is not in acute distress. Appearance: Normal appearance. Neck:      Thyroid: No thyroid mass, thyromegaly or thyroid tenderness. Cardiovascular:      Rate and Rhythm: Normal rate and regular rhythm. Pulses: Normal pulses. Heart sounds: Normal heart sounds. Pulmonary:      Effort: Pulmonary effort is normal.      Breath sounds: Normal breath sounds. Chest:      Chest wall: No mass. Breasts:     Right: No swelling, bleeding, inverted nipple, mass, nipple discharge, skin change or tenderness. Left: No swelling, bleeding, inverted nipple, mass, nipple discharge, skin change or tenderness. Abdominal:      General: Abdomen is flat. Palpations: Abdomen is soft. Lymphadenopathy:      Upper Body:      Right upper body: No supraclavicular, axillary or pectoral adenopathy. Left upper body: No supraclavicular, axillary or pectoral adenopathy. Skin:     General: Skin is warm. Neurological:      General: No focal deficit present. Mental Status: She is alert and oriented to person, place, and time.    Psychiatric:         Mood and Affect: Mood normal.         Behavior: Behavior normal.           Results:    Imaging  No results found. I reviewed the above imaging data. Advance Care Planning/Advance Directives:  Discussed disease status, cancer treatment plans and/or cancer treatment goals with the patient.

## 2023-11-01 DIAGNOSIS — S04.30XA: Primary | ICD-10-CM

## 2023-11-01 RX ORDER — CYANOCOBALAMIN 1000 UG/ML
1000 INJECTION, SOLUTION INTRAMUSCULAR; SUBCUTANEOUS
Status: DISCONTINUED | OUTPATIENT
Start: 2023-11-01 | End: 2023-11-01

## 2023-11-01 RX ORDER — CYANOCOBALAMIN 1000 UG/ML
1000 INJECTION, SOLUTION INTRAMUSCULAR; SUBCUTANEOUS
Status: DISCONTINUED | OUTPATIENT
Start: 2023-11-08 | End: 2023-11-03

## 2023-11-03 DIAGNOSIS — E53.8 VITAMIN B 12 DEFICIENCY: Primary | ICD-10-CM

## 2023-11-03 RX ORDER — CYANOCOBALAMIN 1000 UG/ML
INJECTION, SOLUTION INTRAMUSCULAR; SUBCUTANEOUS
Qty: 6 ML | Refills: 0 | Status: SHIPPED | OUTPATIENT
Start: 2023-11-03

## 2023-11-03 NOTE — PATIENT COMMUNICATION
B12 - once weekly for 5 weeks. Message left for patient regarding b12 injections. Requested call back or [employer] message with availability.

## 2023-11-05 DIAGNOSIS — E11.9 TYPE 2 DIABETES MELLITUS WITHOUT COMPLICATION, WITHOUT LONG-TERM CURRENT USE OF INSULIN (HCC): ICD-10-CM

## 2023-11-06 RX ORDER — SEMAGLUTIDE 0.68 MG/ML
INJECTION, SOLUTION SUBCUTANEOUS
Qty: 9 ML | Refills: 0 | Status: SHIPPED | OUTPATIENT
Start: 2023-11-06

## 2023-11-07 ENCOUNTER — TELEPHONE (OUTPATIENT)
Dept: NEUROLOGY | Facility: CLINIC | Age: 28
End: 2023-11-07

## 2023-11-07 NOTE — TELEPHONE ENCOUNTER
Placed call to [facility], I gave a verbal order - ok per provider - for 25g x 1 1/2 syringe for her B12 injections. Order was successfully placed for 9 syringes.

## 2023-11-26 DIAGNOSIS — E11.65 TYPE 2 DIABETES MELLITUS WITH HYPERGLYCEMIA, WITHOUT LONG-TERM CURRENT USE OF INSULIN (HCC): ICD-10-CM

## 2023-11-26 DIAGNOSIS — R73.09 ELEVATED GLUCOSE: ICD-10-CM

## 2023-11-27 RX ORDER — METFORMIN HYDROCHLORIDE 500 MG/1
TABLET, EXTENDED RELEASE ORAL
Qty: 90 TABLET | Refills: 0 | Status: SHIPPED | OUTPATIENT
Start: 2023-11-27

## 2024-01-08 ENCOUNTER — TELEPHONE (OUTPATIENT)
Dept: NEUROLOGY | Facility: CLINIC | Age: 29
End: 2024-01-08

## 2024-01-08 DIAGNOSIS — E53.8 VITAMIN B 12 DEFICIENCY: ICD-10-CM

## 2024-01-08 RX ORDER — CYANOCOBALAMIN 1000 UG/ML
INJECTION, SOLUTION INTRAMUSCULAR; SUBCUTANEOUS
Qty: 6 ML | Refills: 0 | Status: SHIPPED | OUTPATIENT
Start: 2024-01-08

## 2024-01-08 RX ORDER — SYRINGE WITH NEEDLE, 1 ML 25GX5/8"
SYRINGE, EMPTY DISPOSABLE MISCELLANEOUS WEEKLY
Qty: 9 EACH | Refills: 0 | Status: SHIPPED | OUTPATIENT
Start: 2024-01-08

## 2024-01-08 RX ORDER — SYRINGE WITH NEEDLE, 1 ML 25GX5/8"
SYRINGE, EMPTY DISPOSABLE MISCELLANEOUS
COMMUNITY
Start: 2023-11-20 | End: 2024-01-08 | Stop reason: SDUPTHER

## 2024-01-15 ENCOUNTER — OFFICE VISIT (OUTPATIENT)
Dept: NEUROLOGY | Facility: CLINIC | Age: 29
End: 2024-01-15
Payer: COMMERCIAL

## 2024-01-15 VITALS
DIASTOLIC BLOOD PRESSURE: 78 MMHG | BODY MASS INDEX: 25.45 KG/M2 | RESPIRATION RATE: 18 BRPM | TEMPERATURE: 98.4 F | OXYGEN SATURATION: 99 % | HEART RATE: 79 BPM | SYSTOLIC BLOOD PRESSURE: 110 MMHG | WEIGHT: 134.8 LBS | HEIGHT: 61 IN

## 2024-01-15 DIAGNOSIS — S04.30XA: Primary | ICD-10-CM

## 2024-01-15 PROCEDURE — 99213 OFFICE O/P EST LOW 20 MIN: CPT | Performed by: PSYCHIATRY & NEUROLOGY

## 2024-01-15 NOTE — PROGRESS NOTES
Patient ID: Yuridia Barr is a 28 y.o. female.    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    To review, patient with symptom onset after a complicated dental procedure.  She underwent an attempt for a root canal of tooth #30 however during the procedure she felt an immense amount of pain and the procedure had to be aborted.  She initially had numbness of the right V3 distribution with transient involvement of V1 and V2 however this then continued with residual right medial chin numbness which caused difficulty eating, lip biting and discomfort.  Physical exam with decreased sensation to temperature, pinprick and light touch around this area.Today the numbness persists and she has learned to compensate by not chewing on her right.  Her vitamin B12 was 253.  I ordered B12 injections to help the nerve healing process.  So far she has completed weekly B12 injections without any movement.    Today I reviewed documentation provided from dental office alongside patient. I believe that symptoms are due to permanent nerve damage from prior root canal attempt vs, less likely, ongoing damage (compression) by inferior alveolar nerve compression from tooth #30. I advised patient to seek out a new dental provider to attempt a root canal on this tooth safely.  If symptoms do not improve after that we can confirm that the nerve was damaged from prior attempt.  Patient is to complete 4 months of monthly injections of vitamin B12 at 1000 mcg.  I will see her in 6 months again to evaluate progress.        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  I last saw her on 10/13/23.    To review initial visit, patient presented after a complicated dental procedure.  She had tooth #17 removed given tooth decay.  She underwent a procedure to get a root canal of tooth #30 however during the procedure she felt an immense amount of pain and the procedure had to be aborted. At that time she reported numbness of right V3 distribution with transient involvement of V1 and V2.  She then continued with residual right medial chin numbness which has caused difficulty eating, lip biting and discomfort.  Physical exam with decreased sensation to temperature, pinprick and light touch around this area.     Symptoms thought to be due to injury to the inferior alveolar nerve.  I ordered a vitamin B12 to optimize levels and promote nerve healing.  This was 253.  She tried p.o. supplementation however this caused GI upset.  She was started on IM B12 injections 1000 mcg.  Weekly for 5 weeks then monthly for 4 months.  I requested documents from dental office. I reviewed the available information. I did not see any documented complications of the procedure. Their handwriting is also slightly hard to understand.  Case discussed with neuromuscular specialist and no additional workup was recommended.    Interval history:  Medial chin numbness persist  Has gotten used to the sx   Continues to feel pain in tooth #30  Completed 5 weeks of weekly  B12, now on monthly injections         The following portions of the patient's history were reviewed and updated as appropriate: allergies, current medications, past family history, past medical history, past social history, past surgical history, and problem list and ros.         Objective:    Blood pressure 110/78, pulse 79, temperature 98.4 °F (36.9 °C), temperature source Temporal, resp. rate 18, height 5' 1\" (1.549 m), weight 61.1 kg (134 lb " 12.8 oz), SpO2 99%, not currently breastfeeding.    Physical Exam    Neurological Exam      ROS:    Review of Systems    Review of Systems   Constitutional:  Negative for appetite change, fatigue and fever.   HENT: Negative.  Negative for hearing loss, tinnitus, trouble swallowing and voice change.    Eyes: Negative.  Negative for photophobia, pain and visual disturbance.   Respiratory: Negative.  Negative for shortness of breath.    Cardiovascular: Negative.  Negative for palpitations.   Gastrointestinal: Negative.  Negative for nausea and vomiting.   Endocrine: Negative.  Negative for cold intolerance.   Genitourinary: Negative.  Negative for dysuria, frequency and urgency.   Musculoskeletal:  Negative for back pain, gait problem, myalgias, neck pain and neck stiffness.   Skin: Negative.  Negative for rash.   Allergic/Immunologic: Negative.    Neurological:  Positive for numbness (cont the same). Negative for dizziness, tremors, seizures, syncope, facial asymmetry, speech difficulty, weakness, light-headedness and headaches.   Hematological: Negative.  Does not bruise/bleed easily.   Psychiatric/Behavioral: Negative.  Negative for confusion, hallucinations and sleep disturbance.    All other systems reviewed and are negative.

## 2024-01-15 NOTE — PROGRESS NOTES
Patient ID: Yuridia Barr is a 28 y.o. female.    Assessment/Plan:    No problem-specific Assessment & Plan notes found for this encounter.       {Assess/PlanSmartLinks:58416}       Subjective:    HPI    {[facility] Neurology HPI texts:03822}    {Common ambulatory SmartLinks:61204}         Objective:    Temperature 98.4 °F (36.9 °C), temperature source Temporal, weight 61.1 kg (134 lb 12.8 oz), not currently breastfeeding.    Physical Exam    Neurological Exam      ROS:    Review of Systems   Constitutional:  Negative for appetite change, fatigue and fever.   HENT: Negative.  Negative for hearing loss, tinnitus, trouble swallowing and voice change.    Eyes: Negative.  Negative for photophobia, pain and visual disturbance.   Respiratory: Negative.  Negative for shortness of breath.    Cardiovascular: Negative.  Negative for palpitations.   Gastrointestinal: Negative.  Negative for nausea and vomiting.   Endocrine: Negative.  Negative for cold intolerance.   Genitourinary: Negative.  Negative for dysuria, frequency and urgency.   Musculoskeletal:  Negative for back pain, gait problem, myalgias, neck pain and neck stiffness.   Skin: Negative.  Negative for rash.   Allergic/Immunologic: Negative.    Neurological:  Positive for numbness (cont the same). Negative for dizziness, tremors, seizures, syncope, facial asymmetry, speech difficulty, weakness, light-headedness and headaches.   Hematological: Negative.  Does not bruise/bleed easily.   Psychiatric/Behavioral: Negative.  Negative for confusion, hallucinations and sleep disturbance.    All other systems reviewed and are negative.

## 2024-01-18 NOTE — PROGRESS NOTES
Diagnoses and all orders for this visit:    Dysmenorrhea    Menorrhagia with regular cycle  -     norethindrone-ethinyl estradiol (JUNEL FE 1/20) 1-20 MG-MCG per tablet; Take 1 tablet by mouth daily    She would like to start on OCP, Medical and family hx reviewed for risk of VTE. Discussed benefits, side effects and risks of OCP use. Reviewed ACHES. Printed information given & reviewed instructions for how to take the pill .  RTO in 3 months for pill check      Subjective    CC: Problem visit     Yuridia Barr is a 28 y.o. female   Reports cramping with menstrual cycles  for 1-2 days, then cramping resolves, menstrual cycles coming every month. Bleeding starts out light, becomes heavier for 1-2 days then tapers off   Hx 3 C sections, tubal, D&C in  for heavy bleeding   SA with 1 partner     Patient's last menstrual period was 2024 (exact date).    Past Medical History:   Diagnosis Date    Diabetes mellitus (HCC)     Gestational diabetes     Kidney stone     Migraine      Past Surgical History:   Procedure Laterality Date    CERVICAL BIOPSY  W/ LOOP ELECTRODE EXCISION  ?     SECTION  15, 16, 18    DILATION AND CURETTAGE OF UTERUS WITH HYSTEROSOCPY N/A 2020    Procedure: DILATATION AND CURETTAGE (D&C);  Surgeon: Kyler Tristan MD;  Location:  MAIN OR;  Service: Gynecology    ENDOMETRIAL ABLATION N/A 2020    Procedure: ABLATION ENDOMETRIAL JOSHUA;  Surgeon: Kyler Tristan MD;  Location:  MAIN OR;  Service: Gynecology    VT  DELIVERY ONLY N/A 6/3/2016    Procedure:  SECTION () REPEAT;  Surgeon: Kyler Tristan MD;  Location: [facility];  Service: Obstetrics    [place]  DELIVERY ONLY N/A 2018    Procedure:  SECTION () REPEAT;  Surgeon: Kyler Tristan MD;  Location: [facility];  Service: Obstetrics    VT PARATHYROIDECTOMY/EXPLORATION PARATHYROIDS Left 3/28/2023    Procedure: MINIMALLY INVASIVE LEFT PARATHYROIDECTOMY,  " 4 GLAND EXPLORATION, INTRAOPERATIVE PTH MONITORING;  Surgeon: Vik Leger MD;  Location: [facility] MAIN OR;  Service: Surgical Oncology    TUBAL LIGATION N/A 11/26/2018    Procedure: LIGATION/COAGULATION TUBAL;  Surgeon: Kyler Tristan MD;  Location: [facility];  Service: Obstetrics       Immunization History   Administered Date(s) Administered    DTP 1993, 01/04/1994, 03/24/1994, 03/05/1995, 04/15/1998    HPV Quadrivalent 08/24/2009, 10/26/2009, 03/01/2010    Hep B, Adolescent or Pediatric 1995, 1995, 05/13/1994    INFLUENZA 10/19/2018    IPV 1993, 1993, 09/05/1995, 04/25/1998    MMR 10/17/1994, 05/23/1998    Meningococcal MCV4P 08/24/2009    Rho (D) Immune Globulin 06/04/2016, 11/27/2018    Tdap 08/20/2009, 06/08/2020    Tuberculin Skin Test-PPD Intradermal 08/09/2021, 08/25/2021, 10/03/2022, 10/03/2022    Varicella 08/20/2009, 08/18/2010       Family History   Problem Relation Age of Onset    Hyperthyroidism Mother     Hyperthyroidism Father     Hypertension Father     No Known Problems Sister     No Known Problems Brother     No Known Problems Son     No Known Problems Daughter     No Known Problems Daughter     No Known Problems Paternal Grandmother     Diabetes Paternal Grandfather     Down syndrome Cousin         paternal cousin     Social History     Tobacco Use    Smoking status: Never     Passive exposure: Never    Smokeless tobacco: Never   Vaping Use    Vaping status: Never Used   Substance Use Topics    Alcohol use: Not Currently    Drug use: No       Current Outpatient Medications:     B-D 3CC LUER-SHAE SYR 25GX1/2\" 25G X 1-1/2\" 3 ML MISC, Inject into a muscle once a week, Disp: 9 each, Rfl: 0    Blood Glucose Monitoring Suppl (OneTouch Verio Reflect) w/Device KIT, Check blood sugars once daily. Please substitute with appropriate alternative as covered by patient's insurance. Dx: E11.65, Disp: 1 kit, Rfl: 0    cyanocobalamin 1,000 mcg/mL, Weekly for 5 weeks, monthly for 4 " months, Disp: 6 mL, Rfl: 0    glucose blood (OneTouch Verio) test strip, Check blood sugars once daily. Please substitute with appropriate alternative as covered by patient's insurance. Dx: E11.65, Disp: 100 each, Rfl: 3    Insulin Pen Needle (B-D UF III MINI PEN NEEDLES) 31G X 5 MM MISC, Use every 7 days, Disp: 30 each, Rfl: 0    metFORMIN (GLUCOPHAGE-XR) 500 mg 24 hr tablet, take 1 tablet by mouth with dinner, Disp: 90 tablet, Rfl: 0    Multiple Vitamin (multivitamin) tablet, Take 1 tablet by mouth daily, Disp: , Rfl:     norethindrone-ethinyl estradiol (JUNEL FE 1/20) 1-20 MG-MCG per tablet, Take 1 tablet by mouth daily, Disp: 28 tablet, Rfl: 3    OneTouch Delica Lancets 33G MISC, Check blood sugars once daily. Please substitute with appropriate alternative as covered by patient's insurance. 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 10/13/2023    Status post parathyroidectomy (HCC) 2023    BMI 25.0-25.9,adult 2023    Primary hyperparathyroidism (HCC) 10/21/2022    Type 2 diabetes mellitus without complication, without long-term current use of insulin ([facility]) 2022       No Known Allergies    OB History    Para Term  AB Living   3 3 3     3   SAB IAB Ectopic Multiple Live Births         0 3      # Outcome Date GA Lbr Richard/2nd Weight Sex Delivery Anes PTL Lv   3 Term 18 39w0d  3204 g (7 lb 1 oz) F CS-LTranv Spinal N BERNADETTE   2 Term 16 39w2d  3572 g (7 lb 14 oz) F CS-LTranv Spinal N BERNADETTE   1 Term 04/04/15 40w0d  3941 g (8 lb 11 oz) M CS-LTranv Spinal N BERNADETTE       Vitals:    24 0653   BP: 120/72   BP Location: Left arm   Patient Position: Sitting  "  Cuff Size: Large   Weight: 59.9 kg (132 lb)   Height: 5' 1\" (1.549 m)     Body mass index is 24.94 kg/m².    Review of Systems     Constitutional: Negative for chills, fatigue, fever, headaches, visual disturbances, and unexpected weight change.   Respiratory: Negative for cough, & shortness of breath.  Cardiovascular: Negative for chest pain. .    Gastrointestinal: Negative for Abd pain, nausea & vomiting, constipation and diarrhea.   Genitourinary: Negative for difficulty urinating, dysuria, hematuria, dyspareunia, unusual vaginal bleeding or discharge  Skin: Negative skin changes    Physical Exam     Constitutional: Alert & Oriented x3, well-developed and well-nourished. No distress.   HENT: Atraumatic, Normocephalic,   Neck: Normal range of motion.   Pulmonary: Effort normal.   Abdominal: Soft. No tenderness or masses  Musculoskeletal: Normal ROM  Skin: Warm & Dry  Psychological: Normal mood, thought content, behavior & judgement          "

## 2024-01-18 NOTE — PATIENT INSTRUCTIONS
Prophylactic NSAID therapy for Painful or Heavy menses     Ibuprofen or Naproxen (chose 1 or the other, do not take both), Dose as noted on the box. Typically Ibuprofen dose is 600 mg, (3 tablets) every 6-8 hours. Typically Naproxen dose is 500 mg every 12 hours.  Start taking medication 2 days prior to onset of menses and continue taking through the first 3 days of menses. Make sure you take consistently this is important  You need to take with food to decrease any gastrointestinal upset effects    This is proven therapy to reduce you flow and cramping by 50 %    Life style changes that have a positive effect on painful and heavy periods are as follows   Daily physical exercise    Increase fiber, fresh fruits and vegetables in your diet    Increase daily water intake    Heating pads(do not apply directly to skin, apply over clothing or towel)   Warm Baths   Relaxation techniques, meditation, massage, yoga and mindfulness     These are all suggestion for improving your sense of frustrations with your menstrual cycle and improving your overall wellness and lifestyle       Birth Control Pills     Birth control pills  are also called oral contraceptives, or the pill. It is medicine that helps prevent pregnancy by stopping ovulation. Ovulation is when the ovaries make and release an egg cell each month. If this egg gets fertilized by sperm, pregnancy occurs. You will need to take the pill at the same time every day. Your healthcare provider will tell you when to start taking the pill. You will also be told what to do if you miss a dose. Instructions will depend on the kind of birth control pills you are taking.   Different kinds of birth control pills:  Some kinds are taken for 21 days in a row, followed by 7 days of placebo (no hormones) pills. Other kinds are taken for 24 days followed by 4 days of placebos. Each kind has a certain amount of female hormones. Your provider will decide on the kind that is best for you  based on your age and other health conditions.  Call your local emergency number (911 in the [place]) for any of the following:   You have any of the following signs of a stroke:      Numbness or drooping on one side of your face     Weakness in an arm or leg    Confusion or difficulty speaking    Dizziness, a severe headache, or vision loss    You feel lightheaded, short of breath, and have chest pain.    You cough up blood.    Seek care immediately if:   Your arm or leg feels warm, tender, and painful. It may look swollen and red.    You have severe pain, numbness, or swelling in your arms or legs.    Contact your healthcare provider if:   You have forgotten to take a birth control pill.    You have mood changes, such as depression, since starting birth control pills.    You have nausea or are vomiting.    You have severe abdominal pain.    You missed a period and have questions or concerns about being pregnant.    You still have bleeding 4 months after taking birth control pills correctly.    You have questions or concerns about your condition or care.    What may be done before you can start taking birth control pills:  You need to see your healthcare provider to get a prescription. Any of the following may be done before your healthcare provider gives you a prescription:  Your healthcare provider will ask about diseases and illnesses you have had in the past. Your provider will check your risk for blood clots, heart conditions, or stroke. Tell your provider if you had gastric bypass surgery. This surgery can affect the way your body absorbs medicines such as birth control pills.    Your provider will also check your blood pressure, and may do a breast and pelvic exam. A Pap smear may also be done during the pelvic exam. This is a test to make sure you do not have abnormal changes on your cervix. You may need other tests, such as a urine test to make sure you are not pregnant.    Your provider will ask if you take  any medicines and if you smoke. Smoking increases your risk for stroke, heart attack, or a blood clot in your lungs. If you smoke, you should not take certain kinds of birth control pills.    Advantages of birth control pills:  When birth control pills are used correctly, the chances of getting pregnant are very low. Birth control pills may help decrease bleeding and pain during your monthly period. They may also help prevent cancer of the uterus and ovaries.  Disadvantages of birth control pills:  You may have sudden changes in your mood or feelings while you take birth control pills. You may have nausea and a decreased sex drive. You may have an increased appetite and rapid weight gain. You may also have bleeding in between periods, less frequent periods, vaginal dryness, and breast pain. Birth control pills will not protect you from sexually transmitted infections. Rarely, some birth control pills can increase your risk for a blood clot. This may become life-threatening.  If you decide you want to get pregnant:  If you are planning to have a baby, ask your healthcare provider when you may stop taking your birth control pills. It may take some time for you to start ovulating again. Ask your healthcare provider for more information about pregnancy after birth control pills.  When to start taking birth control pills after you have a baby:  If you are not breastfeeding, you may start taking birth control pills 3 weeks after you give birth. You may be able to take certain types of birth control pills if you are breastfeeding. These pills can be started from 6 weeks to 6 months after you give birth. Ask your healthcare provider for more information about when to start taking birth control pills after you give birth.  What you need to know about birth control pills and menopause:   Talk with your healthcare provider if you want to take birth control pills around menopause.     Around age 45, you will enter into  perimenopause. This means your hormone levels are dropping and you are ovulating less often. You can still become pregnant during this time. The risk for problems, such as miscarriage, are higher if you become pregnant after age 45. Birth control pills will prevent pregnancy, and may also help prevent or relieve some signs and symptoms of menopause. Examples are hot flashes and mood swings.     Your provider will do tests when you are around age 50. The tests may show that you are in menopause. If the tests do not show menopause for sure, you may be able to continue taking the pill up to age 55. The decision will depend on your health and if you have any medical conditions, such as a blood clot.    Do not smoke:  Nicotine and other chemicals in cigarettes and cigars increase your risk for a blood clot while you use birth control pills. The risk is higher if you are also 35 or older. Ask your healthcare provider for information if you currently smoke and need help to quit. E-cigarettes or smokeless tobacco still contain nicotine. Talk to your healthcare provider before you use these products.  Follow up with your healthcare provider as directed:  Write down your questions so you remember to ask them during your visits.  © Copyright [employer] 2020 Information is for End User's use only and may not be sold, redistributed or otherwise used for commercial purposes. All illustrations and images included in CareNotes® are the copyrighted property of [employer] or [employer]  The above information is an  only. It is not intended as medical advice for individual conditions or treatments. Talk to your doctor, nurse or pharmacist before following any medical regimen to see if it is safe and effective for you.       Dysmenorrhea   WHAT YOU NEED TO KNOW:   What is dysmenorrhea?  Dysmenorrhea is painful menstrual cramps at or around the time of your monthly period.       What causes dysmenorrhea?   Your body normally produces chemicals each month to help your uterus contract. When too many of these chemicals are made, your uterus contracts too much and causes pain. Dysmenorrhea may also be caused by any of the following:  Abnormal structure of your uterus or vagina    A narrow cervix    Growth in or on your uterus or ovaries    Medical conditions, such as pelvic inflammatory disease, endometriosis, or uterine fibroids    A copper intrauterine device (IUD)    What increases my risk for dysmenorrhea?   Never been pregnant    Obesity    Smoking    Family history of painful menstrual cramps    Pelvic infection    Longer monthly period cycle    Medical conditions, such as a sexually transmitted infection or endometriosis    What are the signs and symptoms of dysmenorrhea?   Mild to severe pain    Cramping pain in lower abdomen or low back    Bloating    Headache    Diarrhea    How is dysmenorrhea diagnosed?  Your healthcare provider can usually diagnose dysmenorrhea by your signs and symptoms. Tell him or her when your symptoms started and if you have pain between your monthly periods. He or she may ask if anything relieves your pain, such as heat or medicine. Tell your provider if you are sexually active or have ever been pregnant. You may need any of the following:  A blood test  will check for pregnancy.    A pelvic exam  may be needed to check the size and shape of your uterus and ovaries. Your healthcare provider gently inserts a warmed speculum into your vagina. A speculum is a tool that opens your vagina to show your cervix.    A cervical culture  may be needed to check for infection. Your healthcare provider will use a swab to collect a sample of cells from your cervix. This will be sent to a lab for tests.    An ultrasound  will show abnormal structure of your reproductive organs. Sound waves are used to show pictures on a monitor.    How is dysmenorrhea treated?  Dysmenorrhea can be controlled with  lifestyle changes and medicines. It usually improves with age and pregnancy.  Medicines:      NSAIDs  help decrease swelling and pain or fever. This medicine is available with or without a doctor's order. NSAIDs can cause stomach bleeding or kidney problems in certain people. If you take blood thinner medicine, always ask your healthcare provider if NSAIDs are safe for you. Always read the medicine label and follow directions.    Birth control medicine  may help decrease your pain. This medicine may be birth control pills or an IUD that does not contain copper.    Transcutaneous electric nerve stimulation  (TENS), is a device used to stimulate your nerves and decrease pain. Ask your healthcare provider for more information about TENS.    How can I manage my symptoms?   Eat low-fat foods.  Increase the amount of vegetables and raw seeds you eat. Ask your healthcare provider if you should take vitamin B or magnesium supplements. These will help decrease your pain. Do not eat dairy products or eggs.    Apply heat  on your lower abdomen for 20 to 30 minutes every 2 hours for as many days as directed. Heat helps decrease pain and muscle spasms.    Manage your stress.  Stress can make your symptoms worse. Try relaxation exercises, such as deep breathing.    Exercise regularly.  Ask your healthcare provider about the best exercise plan for you. Exercise can help decrease pain.         Keep a record of your pain.  Write down when your pain and periods start and stop. Bring the record with you to your follow-up visits.    Do not smoke.  Avoid others who smoke. If you smoke, it is never too late to quit. Smoking can increase your risk for dysmenorrhea. Ask your healthcare provider for information if you need help quitting.    When should I contact my healthcare provider?   You have anxiety or feel depressed.    Your periods are early, late, or more painful than usual.    You have questions or concerns about your condition or  care.    When should I seek immediate care or call 911?   You have severe pain.    You have heavy vaginal bleeding and you feel faint.    You have sudden chest pain and trouble breathing.    CARE AGREEMENT:   You have the right to help plan your care. Learn about your health condition and how it may be treated. Discuss treatment options with your healthcare providers to decide what care you want to receive. You always have the right to refuse treatment. The above information is an  only. It is not intended as medical advice for individual conditions or treatments. Talk to your doctor, nurse or pharmacist before following any medical regimen to see if it is safe and effective for you.  © Copyright [employer] 2023 Information is for End User's use only and may not be sold, redistributed or otherwise used for commercial purposes.

## 2024-01-22 ENCOUNTER — OFFICE VISIT (OUTPATIENT)
Dept: OBGYN CLINIC | Facility: CLINIC | Age: 29
End: 2024-01-22
Payer: COMMERCIAL

## 2024-01-22 VITALS
HEIGHT: 61 IN | DIASTOLIC BLOOD PRESSURE: 72 MMHG | SYSTOLIC BLOOD PRESSURE: 120 MMHG | WEIGHT: 132 LBS | BODY MASS INDEX: 24.92 KG/M2

## 2024-01-22 DIAGNOSIS — N94.6 DYSMENORRHEA: Primary | ICD-10-CM

## 2024-01-22 DIAGNOSIS — N92.0 MENORRHAGIA WITH REGULAR CYCLE: ICD-10-CM

## 2024-01-22 PROCEDURE — 99203 OFFICE O/P NEW LOW 30 MIN: CPT | Performed by: OBSTETRICS & GYNECOLOGY

## 2024-01-22 RX ORDER — NORETHINDRONE ACETATE AND ETHINYL ESTRADIOL 1MG-20(21)
1 KIT ORAL DAILY
Qty: 28 TABLET | Refills: 3 | Status: SHIPPED | OUTPATIENT
Start: 2024-01-22

## 2024-01-24 DIAGNOSIS — Z11.1 SCREENING FOR TUBERCULOSIS: Primary | ICD-10-CM

## 2024-02-09 DIAGNOSIS — E11.9 TYPE 2 DIABETES MELLITUS WITHOUT COMPLICATION, WITHOUT LONG-TERM CURRENT USE OF INSULIN (HCC): ICD-10-CM

## 2024-02-12 ENCOUNTER — APPOINTMENT (OUTPATIENT)
Dept: LAB | Facility: CLINIC | Age: 29
End: 2024-02-12
Payer: COMMERCIAL

## 2024-02-12 DIAGNOSIS — E89.2 STATUS POST PARATHYROIDECTOMY (HCC): ICD-10-CM

## 2024-02-12 DIAGNOSIS — Z11.1 SCREENING FOR TUBERCULOSIS: ICD-10-CM

## 2024-02-12 LAB
ALBUMIN SERPL BCP-MCNC: 4 G/DL (ref 3.5–5)
ALP SERPL-CCNC: 27 U/L (ref 34–104)
ALT SERPL W P-5'-P-CCNC: 12 U/L (ref 7–52)
ANION GAP SERPL CALCULATED.3IONS-SCNC: 6 MMOL/L
AST SERPL W P-5'-P-CCNC: 13 U/L (ref 13–39)
BILIRUB SERPL-MCNC: 0.27 MG/DL (ref 0.2–1)
BUN SERPL-MCNC: 10 MG/DL (ref 5–25)
CALCIUM SERPL-MCNC: 10.4 MG/DL (ref 8.4–10.2)
CHLORIDE SERPL-SCNC: 103 MMOL/L (ref 96–108)
CHOLEST SERPL-MCNC: 135 MG/DL
CO2 SERPL-SCNC: 31 MMOL/L (ref 21–32)
CREAT SERPL-MCNC: 0.46 MG/DL (ref 0.6–1.3)
CREAT UR-MCNC: 172.9 MG/DL
GFR SERPL CREATININE-BSD FRML MDRD: 133 ML/MIN/1.73SQ M
GLUCOSE P FAST SERPL-MCNC: 129 MG/DL (ref 65–99)
HDLC SERPL-MCNC: 39 MG/DL
LDLC SERPL CALC-MCNC: 72 MG/DL (ref 0–100)
MICROALBUMIN UR-MCNC: 15.5 MG/L
MICROALBUMIN/CREAT 24H UR: 9 MG/G CREATININE (ref 0–30)
POTASSIUM SERPL-SCNC: 3.3 MMOL/L (ref 3.5–5.3)
PROT SERPL-MCNC: 7 G/DL (ref 6.4–8.4)
PTH-INTACT SERPL-MCNC: 84.2 PG/ML (ref 12–88)
SODIUM SERPL-SCNC: 140 MMOL/L (ref 135–147)
TRIGL SERPL-MCNC: 119 MG/DL

## 2024-02-12 PROCEDURE — 86480 TB TEST CELL IMMUN MEASURE: CPT

## 2024-02-12 PROCEDURE — 82306 VITAMIN D 25 HYDROXY: CPT

## 2024-02-12 PROCEDURE — 83970 ASSAY OF PARATHORMONE: CPT

## 2024-02-12 RX ORDER — SEMAGLUTIDE 0.68 MG/ML
INJECTION, SOLUTION SUBCUTANEOUS
Qty: 9 ML | Refills: 0 | Status: SHIPPED | OUTPATIENT
Start: 2024-02-12

## 2024-02-13 LAB
EST. AVERAGE GLUCOSE BLD GHB EST-MCNC: 137 MG/DL
HBA1C MFR BLD: 6.4 %

## 2024-02-14 LAB
GAMMA INTERFERON BACKGROUND BLD IA-ACNC: <0 IU/ML
M TB IFN-G BLD-IMP: NEGATIVE
M TB IFN-G CD4+ BCKGRND COR BLD-ACNC: 0 IU/ML
M TB IFN-G CD4+ BCKGRND COR BLD-ACNC: 0.01 IU/ML
MITOGEN IGNF BCKGRD COR BLD-ACNC: 10 IU/ML

## 2024-02-16 ENCOUNTER — OFFICE VISIT (OUTPATIENT)
Dept: FAMILY MEDICINE CLINIC | Facility: CLINIC | Age: 29
End: 2024-02-16
Payer: COMMERCIAL

## 2024-02-16 VITALS
BODY MASS INDEX: 25.49 KG/M2 | WEIGHT: 135 LBS | OXYGEN SATURATION: 99 % | HEIGHT: 61 IN | DIASTOLIC BLOOD PRESSURE: 74 MMHG | SYSTOLIC BLOOD PRESSURE: 118 MMHG | HEART RATE: 79 BPM | TEMPERATURE: 97.9 F

## 2024-02-16 DIAGNOSIS — E11.9 TYPE 2 DIABETES MELLITUS WITHOUT COMPLICATION, WITHOUT LONG-TERM CURRENT USE OF INSULIN (HCC): Primary | ICD-10-CM

## 2024-02-16 DIAGNOSIS — E89.2 STATUS POST PARATHYROIDECTOMY (HCC): ICD-10-CM

## 2024-02-16 DIAGNOSIS — E21.0 PRIMARY HYPERPARATHYROIDISM (HCC): ICD-10-CM

## 2024-02-16 DIAGNOSIS — S04.30XD: ICD-10-CM

## 2024-02-16 PROCEDURE — 99214 OFFICE O/P EST MOD 30 MIN: CPT | Performed by: NURSE PRACTITIONER

## 2024-02-16 NOTE — PROGRESS NOTES
Name: Yuridia Barr      : 1995      MRN: [id number]  Encounter Provider: ANYI Noel  Encounter Date: 2024   Encounter department: [facility]    Assessment & Plan     1. Type 2 diabetes mellitus without complication, without long-term current use of insulin (HCC)  Assessment & Plan:    Lab Results   Component Value Date    HGBA1C 6.4 (H) 2024   Patient is on the Ozempic and Metfromin but has been out of the Ozempic r/t availability at the pharmacy     Orders:  -     Comprehensive metabolic panel; Future; Expected date: 2024  -     Hemoglobin A1C; Future; Expected date: 2024  -     CBC and differential; Future; Expected date: 2024  -     Albumin / creatinine urine ratio; Future; Expected date: 2024  -     Lipid Panel with Direct LDL reflex; Future; Expected date: 2024    2. Primary hyperparathyroidism (HCC)  Assessment & Plan:  Removed 3 of 4 of the parathyroid glands       3. Status post parathyroidectomy (HCC)  Assessment & Plan:  PTH levels are normal and calcium was slightly elevated 10.4      4. BMI 25.0-25.9,adult  Assessment & Plan:  Patient at her target weight       5. Injury of trigeminal nerve, unspecified laterality, subsequent encounter  Assessment & Plan:  Has numbness in right lower jaw              Subjective      Patien there today for her check up and to review her recent labs and has a cough for the two weeks and reports that her children has the flu and thinks she also had the flu    Diabetes  She has type 2 diabetes mellitus. No MedicAlert identification noted. The initial diagnosis of diabetes was made 1 years ago. Pertinent negatives for hypoglycemia include no confusion, dizziness, headaches, hunger, mood changes, nervousness/anxiousness, pallor, seizures, sleepiness, speech difficulty, sweats or tremors. Associated symptoms include fatigue and weight loss. Pertinent negatives for diabetes include no  blurred vision, no chest pain, no foot paresthesias, no foot ulcerations, no polydipsia, no polyphagia, no polyuria, no visual change and no weakness. Pertinent negatives for hypoglycemia complications include no blackouts, no hospitalization, no nocturnal hypoglycemia, no required assistance and no required glucagon injection. Symptoms are stable. Pertinent negatives for diabetic complications include no CVA, heart disease, impotence, nephropathy, peripheral neuropathy, PVD or retinopathy. She is compliant with treatment most of the time. She is currently taking insulin at bedtime. Insulin injections are given by patient. Rotation sites for injection include the abdominal wall. Her weight is stable. When asked about meal planning, she reported none. She has not had a previous visit with a dietitian. She monitors blood glucose at home 1-2 x per week. Blood glucose monitoring compliance is adequate. There is no change in her home blood glucose trend. She does not see a podiatrist.Eye exam is current.   Cough  Associated symptoms include weight loss. Pertinent negatives include no chest pain, chills, ear pain, eye redness, fever, headaches, postnasal drip, sore throat, shortness of breath or sweats.     Review of Systems   Constitutional:  Positive for fatigue and weight loss. Negative for activity change, appetite change, chills, diaphoresis, fever and unexpected weight change.   HENT:  Negative for congestion, ear pain, hearing loss, postnasal drip, sinus pressure, sinus pain, sneezing and sore throat.    Eyes:  Negative for blurred vision, pain, redness and visual disturbance.   Respiratory:  Positive for cough. Negative for shortness of breath.    Cardiovascular:  Negative for chest pain and leg swelling.   Gastrointestinal:  Negative for abdominal pain, diarrhea, nausea and vomiting.   Endocrine: Negative for polydipsia, polyphagia and polyuria.   Genitourinary:  Negative for impotence.   Musculoskeletal:   "Negative for arthralgias.   Skin:  Negative for pallor.   Neurological:  Negative for dizziness, tremors, seizures, speech difficulty, weakness, light-headedness and headaches.   Psychiatric/Behavioral:  Negative for behavioral problems, confusion and dysphoric mood. The patient is not nervous/anxious.        Current Outpatient Medications on File Prior to Visit   Medication Sig   • B-D 3CC LUER-SHAE SYR 25GX1/2\" 25G X 1-1/2\" 3 ML MISC Inject into a muscle once a week   • Blood Glucose Monitoring Suppl (OneTouch Verio Reflect) w/Device KIT Check blood sugars once daily. Please substitute with appropriate alternative as covered by patient's insurance. Dx: E11.65   • cyanocobalamin 1,000 mcg/mL Weekly for 5 weeks, monthly for 4 months   • glucose blood (OneTouch Verio) test strip Check blood sugars once daily. Please substitute with appropriate alternative as covered by patient's insurance. Dx: E11.65   • Insulin Pen Needle (B-D UF III MINI PEN NEEDLES) 31G X 5 MM MISC Use every 7 days   • metFORMIN (GLUCOPHAGE-XR) 500 mg 24 hr tablet take 1 tablet by mouth with dinner   • norethindrone-ethinyl estradiol (JUNEL FE 1/20) 1-20 MG-MCG per tablet Take 1 tablet by mouth daily   • OneTouch Delica Lancets 33G MISC Check blood sugars once daily. Please substitute with appropriate alternative as covered by patient's insurance. Dx: E11.65   • Ozempic, 0.25 or 0.5 MG/DOSE, 2 MG/3ML injection pen inject 0.25 milligrams subcutaneously every 7 days for 28 DAYS then INCREASE to 0.5 milligrams every 7 days   • VITAMIN D PO Take 1 tablet by mouth in the morning   • [DISCONTINUED] Multiple Vitamin (multivitamin) tablet Take 1 tablet by mouth daily (Patient not taking: Reported on 2/16/2024)       Objective     /74 (BP Location: Right arm, Patient Position: Sitting)   Pulse 79   Temp 97.9 °F (36.6 °C) (Temporal)   Ht 5' 1\" (1.549 m)   Wt 61.2 kg (135 lb)   LMP 01/17/2024 (Exact Date)   SpO2 99%   BMI 25.51 kg/m²     Physical " Exam  Constitutional:       General: She is not in acute distress.     Appearance: She is well-developed.   HENT:      Head: Normocephalic and atraumatic.   Eyes:      Pupils: Pupils are equal, round, and reactive to light.   Neck:      Thyroid: No thyromegaly.   Cardiovascular:      Rate and Rhythm: Normal rate and regular rhythm.      Pulses: no weak pulses.           Dorsalis pedis pulses are 2+ on the right side and 2+ on the left side.        Posterior tibial pulses are 2+ on the right side and 2+ on the left side.      Heart sounds: Normal heart sounds. No murmur heard.  Pulmonary:      Effort: Pulmonary effort is normal. No respiratory distress.      Breath sounds: Normal breath sounds. No wheezing.   Abdominal:      General: Bowel sounds are normal.      Palpations: Abdomen is soft.   Musculoskeletal:         General: Normal range of motion.      Cervical back: Normal range of motion.   Feet:      Right foot:      Skin integrity: No ulcer, skin breakdown, erythema, warmth, callus or dry skin.      Left foot:      Skin integrity: No ulcer, skin breakdown, erythema, warmth, callus or dry skin.   Skin:     General: Skin is warm and dry.   Neurological:      Mental Status: She is alert and oriented to person, place, and time.       Patient's shoes and socks removed.    Right Foot/Ankle   Right Foot Inspection  Skin Exam: skin normal and skin intact. No dry skin, no warmth, no callus, no erythema, no maceration, no abnormal color, no pre-ulcer, no ulcer and no callus.     Toe Exam: ROM and strength within normal limits.     Sensory   Vibration: intact  Proprioception: intact  Monofilament testing: intact    Vascular  Capillary refills: < 3 seconds  The right DP pulse is 2+. The right PT pulse is 2+.     Left Foot/Ankle  Left Foot Inspection  Skin Exam: skin normal and skin intact. No dry skin, no warmth, no erythema, no maceration, normal color, no pre-ulcer, no ulcer and no callus.     Toe Exam: ROM and strength  within normal limits.     Sensory   Vibration: intact  Proprioception: intact  Monofilament testing: intact    Vascular  Capillary refills: < 3 seconds  The left DP pulse is 2+. The left PT pulse is 2+.     Assign Risk Category  No deformity present  No loss of protective sensation  No weak pulses  Risk: 0     ANYI Noel

## 2024-02-16 NOTE — ASSESSMENT & PLAN NOTE
Lab Results   Component Value Date    HGBA1C 6.4 (H) 02/12/2024   Patient is on the Ozempic and Metfromin but has been out of the Ozempic r/t availability at the pharmacy

## 2024-02-20 LAB
25(OH)D2 SERPL-MCNC: <1 NG/ML
25(OH)D3 SERPL-MCNC: 39 NG/ML
25(OH)D3+25(OH)D2 SERPL-MCNC: 39 NG/ML

## 2024-03-02 DIAGNOSIS — R73.09 ELEVATED GLUCOSE: ICD-10-CM

## 2024-03-02 DIAGNOSIS — E11.65 TYPE 2 DIABETES MELLITUS WITH HYPERGLYCEMIA, WITHOUT LONG-TERM CURRENT USE OF INSULIN (HCC): ICD-10-CM

## 2024-03-04 RX ORDER — METFORMIN HYDROCHLORIDE 500 MG/1
TABLET, EXTENDED RELEASE ORAL
Qty: 90 TABLET | Refills: 0 | Status: SHIPPED | OUTPATIENT
Start: 2024-03-04

## 2024-04-08 DIAGNOSIS — E11.9 TYPE 2 DIABETES MELLITUS WITHOUT COMPLICATION, WITHOUT LONG-TERM CURRENT USE OF INSULIN (HCC): ICD-10-CM

## 2024-04-19 ENCOUNTER — OFFICE VISIT (OUTPATIENT)
Dept: SURGICAL ONCOLOGY | Facility: CLINIC | Age: 29
End: 2024-04-19
Payer: COMMERCIAL

## 2024-04-19 VITALS
HEART RATE: 60 BPM | HEIGHT: 60 IN | BODY MASS INDEX: 26.5 KG/M2 | TEMPERATURE: 98.8 F | OXYGEN SATURATION: 98 % | WEIGHT: 135 LBS | DIASTOLIC BLOOD PRESSURE: 78 MMHG | SYSTOLIC BLOOD PRESSURE: 112 MMHG

## 2024-04-19 DIAGNOSIS — Z90.89 STATUS POST PARATHYROIDECTOMY: Primary | ICD-10-CM

## 2024-04-19 DIAGNOSIS — Z98.890 STATUS POST PARATHYROIDECTOMY: Primary | ICD-10-CM

## 2024-04-19 DIAGNOSIS — E83.52 HYPERCALCEMIA: ICD-10-CM

## 2024-04-19 PROCEDURE — 99213 OFFICE O/P EST LOW 20 MIN: CPT

## 2024-04-19 NOTE — PROGRESS NOTES
Surgical Oncology Follow Up       [address]  [facility] SURGICAL ONCOLOGY [place]  [address]  [facility] [id number]    Yuridia Blum Barr  1995  [id number]  [address]  [facility] SURGICAL ONCOLOGY [place]  [address] [place] [id number]    Chief Complaint   Patient presents with   • Follow-up     fu 6m labs parathyroidectomy       Assessment/Plan:  1. Status post parathyroidectomy  - 6 mo f/u   - CT parathyroid study w wo contrast; Future  - Vitamin D 1,25 dihydroxy; Future  - Calcium; Future  - PTH, intact; Future    2. Hypercalcemia  - CT parathyroid study w wo contrast; Future  - Vitamin D 1,25 dihydroxy; Future  - Calcium; Future  - PTH, intact; Future      Discussion/Summary:        History of Present Illness:     Oncology History    No history exists.        -Interval History: Patient is a 28 year old female presenting for a 6 mo follow up for 3 gland parathyroidectomy with Dr. Leger in March 2023. PTH was 82, calcium is elevated at 10.5 and vit D was 39. She notes muscle cramping and fatigue.     Review of Systems:  Review of Systems   Constitutional:  Positive for fatigue. Negative for activity change, appetite change and unexpected weight change.   Respiratory:  Negative for cough and shortness of breath.    Cardiovascular:  Negative for chest pain.   Gastrointestinal:  Negative for abdominal pain, diarrhea, nausea and vomiting.   Endocrine: Negative for heat intolerance.   Musculoskeletal:  Positive for myalgias. Negative for arthralgias and back pain.   Skin:  Negative for rash.   Neurological:  Negative for weakness and headaches.   Hematological:  Negative for adenopathy.       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   •  SECTION  15, 16, 18   • DILATION AND CURETTAGE OF UTERUS WITH HYSTEROSOCPY N/A 2020    Procedure: DILATATION AND CURETTAGE (D&C);  Surgeon: Kyler Tristan MD;  Location:  MAIN OR;  Service: Gynecology   • ENDOMETRIAL ABLATION N/A 2020    Procedure: ABLATION ENDOMETRIAL JOSHUA;  Surgeon: Kyler Tristan MD;  Location:  MAIN OR;  Service: Gynecology   • LA  DELIVERY ONLY N/A 6/3/2016    Procedure:  SECTION () REPEAT;  Surgeon: Kyler Tristan MD;  Location: [facility];  Service: Obstetrics   • LA  DELIVERY ONLY N/A 2018    Procedure:  SECTION () REPEAT;  Surgeon: Kyler Tristan MD;  Location: [facility];  Service: Obstetrics   • LA PARATHYROIDECTOMY/EXPLORATION PARATHYROIDS Left 3/28/2023    Procedure: MINIMALLY INVASIVE LEFT PARATHYROIDECTOMY,  4 GLAND EXPLORATION, INTRAOPERATIVE PTH MONITORING;  Surgeon: Vik Leger MD;  Location: [facility] OR;  Service: Surgical Oncology   • TUBAL LIGATION N/A 2018    Procedure: LIGATION/COAGULATION TUBAL;  Surgeon: Kyler Tristan MD;  Location: [facility];  Service: Obstetrics     Family History   Problem Relation Age of Onset   • Hyperthyroidism Mother    • Hyperthyroidism Father    • Hypertension Father    • No Known Problems Sister    • No Known Problems Brother    • No Known Problems Son    • No Known Problems Daughter    • No Known Problems Daughter    • No Known Problems Paternal Grandmother    • Diabetes Paternal Grandfather    • Down syndrome Cousin         paternal cousin     Social History     Socioeconomic History   • Marital status: /Civil Union     Spouse name: Not on file   • Number of children: Not on file   • Years of  "education: Not on file   • Highest education level: Not on file   Occupational History   • Not on file   Tobacco Use   • Smoking status: Never     Passive exposure: Never   • Smokeless tobacco: Never   Vaping Use   • Vaping status: Never Used   Substance and Sexual Activity   • Alcohol use: Not Currently   • Drug use: No   • Sexual activity: Yes     Partners: Male     Birth control/protection: Female Sterilization   Other Topics Concern   • Not on file   Social History Narrative   • Not on file     Social Determinants of Health     Financial Resource Strain: Not on file   Food Insecurity: Not on file   Transportation Needs: Not on file   Physical Activity: Not on file   Stress: Not on file   Social Connections: Not on file   Intimate Partner Violence: Not on file   Housing Stability: Not on file       Current Outpatient Medications:   •  B-D 3CC LUER-SHAE SYR 25GX1/2\" 25G X 1-1/2\" 3 ML MISC, Inject into a muscle once a week, Disp: 9 each, Rfl: 0  •  Blood Glucose Monitoring Suppl (OneTouch Verio Reflect) w/Device KIT, Check blood sugars once daily. Please substitute with appropriate alternative as covered by patient's insurance. Dx: E11.65, Disp: 1 kit, Rfl: 0  •  cyanocobalamin 1,000 mcg/mL, Weekly for 5 weeks, monthly for 4 months, Disp: 6 mL, Rfl: 0  •  glucose blood (OneTouch Verio) test strip, Check blood sugars once daily. Please substitute with appropriate alternative as covered by patient's insurance. Dx: E11.65, Disp: 100 each, Rfl: 3  •  Insulin Pen Needle (B-D UF III MINI PEN NEEDLES) 31G X 5 MM MISC, Use every 7 days, Disp: 30 each, Rfl: 0  •  metFORMIN (GLUCOPHAGE-XR) 500 mg 24 hr tablet, take 1 tablet by mouth with dinner, Disp: 90 tablet, Rfl: 0  •  norethindrone-ethinyl estradiol (JUNEL FE 1/20) 1-20 MG-MCG per tablet, Take 1 tablet by mouth daily, Disp: 28 tablet, Rfl: 3  •  OneTouch Delica Lancets 33G MISC, Check blood sugars once daily. Please substitute with appropriate alternative as covered by " patient's insurance. Dx: E11.65, Disp: 100 each, Rfl: 3  •  semaglutide, 0.25 or 0.5 mg/dose, (Ozempic, 0.25 or 0.5 MG/DOSE,) 2 mg/3 mL injection pen, Inject 0.75 mL (0.5 mg total) under the skin every 7 days, Disp: 9 mL, Rfl: 0  •  VITAMIN D PO, Take 1 tablet by mouth in the morning, Disp: , Rfl:   No Known Allergies  Vitals:    04/19/24 1000   BP: 112/78   Pulse: 60   Temp: 98.8 °F (37.1 °C)   SpO2: 98%       Physical Exam  Constitutional:       General: She is not in acute distress.     Appearance: Normal appearance.   Neck:      Thyroid: No thyroid mass, thyromegaly or thyroid tenderness.   Cardiovascular:      Rate and Rhythm: Normal rate and regular rhythm.      Pulses: Normal pulses.      Heart sounds: Normal heart sounds.   Pulmonary:      Effort: Pulmonary effort is normal.      Breath sounds: Normal breath sounds.   Chest:      Chest wall: No mass.   Breasts:     Right: No swelling, bleeding, inverted nipple, mass, nipple discharge, skin change or tenderness.      Left: No swelling, bleeding, inverted nipple, mass, nipple discharge, skin change or tenderness.   Abdominal:      General: Abdomen is flat.      Palpations: Abdomen is soft.   Lymphadenopathy:      Upper Body:      Right upper body: No supraclavicular, axillary or pectoral adenopathy.      Left upper body: No supraclavicular, axillary or pectoral adenopathy.   Skin:     General: Skin is warm.   Neurological:      General: No focal deficit present.      Mental Status: She is alert and oriented to person, place, and time.   Psychiatric:         Mood and Affect: Mood normal.         Behavior: Behavior normal.           Results:    Imaging  No results found.    I reviewed the above imaging data.      Advance Care Planning/Advance Directives:  Discussed disease status, cancer treatment plans and/or cancer treatment goals with the patient.

## 2024-04-26 ENCOUNTER — HOSPITAL ENCOUNTER (OUTPATIENT)
Dept: CT IMAGING | Facility: HOSPITAL | Age: 29
End: 2024-04-26
Payer: COMMERCIAL

## 2024-04-26 DIAGNOSIS — Z98.890 STATUS POST PARATHYROIDECTOMY: ICD-10-CM

## 2024-04-26 DIAGNOSIS — E83.52 HYPERCALCEMIA: ICD-10-CM

## 2024-04-26 DIAGNOSIS — Z90.89 STATUS POST PARATHYROIDECTOMY: ICD-10-CM

## 2024-04-26 PROCEDURE — 70492 CT SFT TSUE NCK W/O & W/DYE: CPT

## 2024-04-26 RX ADMIN — IOHEXOL 85 ML: 350 INJECTION, SOLUTION INTRAVENOUS at 11:17

## 2024-04-29 NOTE — LETTER
2020     Margy Reid MD  [address] [place] [address]    Patient: Ad De Leon   YOB: 1995   Date of Visit: 2020       Dear Dr Iraida York:    Thank you for referring Cristina Boykin to me for evaluation  Below are my notes for this consultation  If you have questions, please do not hesitate to call me  I look forward to following your patient along with you  Sincerely,        Yousif Mathur MD        CC: No Recipients  Yousif Mathur MD  2020  2:32 PM  Incomplete  Assessment/Plan:    Left groin pain  Samara is a 30year old female with PMH of three prior  sections presenting for a 2 week history of bulge in left groin region that began when she bent to lift something at home  She was seen by her gynecologist and was given 1 week of antibiotic therapy with the suspicion that the mass was lymphadenopathy  She returned to GYN 1 week later with the mass still present  She was then referred here for evaluation for left inguinal hernia  The pain associated with the mass worsened Monday 2/10 after she caught a falling patient at work  She describes a constant tearing pain since this time and has not tried any treatments for it  She admits to mild discomfort when bearing down for a bowel movement but denies bloody stools, constipation, dysuria or hematuria  She takes no daily medications and has NKDA  Family history: Father- HTN and hyperthyroidism, mother-hyperthyroidism  Surgical history: 2 uncomplicated  sections and 1 uncomplicated  section with tubal ligation  Social history: Never smoker, very infrequent alcohol use, no illicit drug use    Physical Exam:  Well appearing female in no acute distress  Obese body habitus  Heart: RRR no MRG  Lungs: CTA b/l no WRR  Abdomen: Soft NTND abdomen, Slight tenderness to palpation in LLQ  No asymmetry noted  No obvious bulges noted in the left groin   No fascial defects noted in umbilicus, right or left inguinal area  Assessment:  30year old female with history of three prior  sections presenting for a 2 week history of bulge in left groin region  Left groin pain  At this time there are no obvious defects in the fascial layer  Plan:  Please follow up in one month for re-evaluation of symptoms  Surgery not recommended at this time  Please call the office if symptoms worsen  Her symptom complex may be secondary to an acute folliculitis related to shaving in her left groin  I advised avoidance of shaving in this area and I look for to seeing her back in a month's time to reassess her symptom complex  Diagnoses and all orders for this visit:    Left groin pain          Subjective:      Patient ID: Murtaza Galarza is a 30 y o  female  Patient is here today for [facility]  Patient stated that she noticed the inguinal hernia two weeks ago when she went to bend over to pick something up she felt a ball at the groin area  Patient made an appointment with Dr Leobardo De La Rosa thinking it had to do with her  scar and he did put her on antibiotics  Patient is still having pain with some nausea last night  [place] Back      The following portions of the patient's history were reviewed and updated as appropriate: allergies, current medications, past family history, past medical history, past social history, past surgical history and problem list     Review of Systems   Constitutional: Negative  HENT: Negative  Eyes: Negative  Respiratory: Negative  Cardiovascular: Negative  Gastrointestinal: Negative  Endocrine: Negative  Genitourinary: Negative  Musculoskeletal: Negative  Skin: Negative  Allergic/Immunologic: Negative  Neurological: Negative  Hematological: Negative  Psychiatric/Behavioral: Negative            Objective:      /62 (BP Location: Left arm, Patient Position: Sitting, Cuff Size: Standard)   Pulse 92   Temp 99 5 °F (37 5 °C) (Tympanic)   Resp 18   Ht 5' 1" (1 549 m)   Wt 81 2 kg (179 lb)   LMP 2020   BMI 33 82 kg/m²           Physical Exam   Constitutional: She is oriented to person, place, and time  She appears well-developed and well-nourished  HENT:   Head: Normocephalic and atraumatic  Eyes: Pupils are equal, round, and reactive to light  Conjunctivae are normal    Neck: Normal range of motion  Neck supple  Cardiovascular: Normal rate and regular rhythm  Pulmonary/Chest: Effort normal and breath sounds normal    Abdominal: Soft  Bowel sounds are normal    Abdomen soft, obese  No evidence of active soft tissue infection in the groin either on the right or left side  No evidence of ventral incisional hernias no evidence of inguinal or femoral hernias no evidence of umbilical hernias  Musculoskeletal: Normal range of motion  Neurological: She is alert and oriented to person, place, and time  Skin: Skin is warm and dry  Psychiatric: Her behavior is normal  Judgment and thought content normal          Diya Gordon  2020 12:14 PM  Sign at close encounter  Assessment/Plan:    Left groin pain  González Ramsey is a 30year old female with PMH of three prior  sections presenting for a 2 week history of bulge in left groin region that began when she bent to lift something at home  She was seen by her gynecologist and was given 1 week of antibiotic therapy with the suspicion that the mass was lymphadenopathy  She returned to GYN 1 week later with the mass still present  She was then referred here for evaluation for left inguinal hernia  The pain associated with the mass worsened Monday 2/10 after she caught a falling patient at work  She describes a constant tearing pain since this time and has not tried any treatments for it  She admits to mild discomfort when bearing down for a bowel movement but denies bloody stools, constipation, dysuria or hematuria   She takes no daily medications and has NKDA   Family history: Father- HTN and hyperthyroidism, mother-hyperthyroidism  Surgical history: 2 uncomplicated  sections and 1 uncomplicated  section with tubal ligation  Social history: Never smoker, very infrequent alcohol use, no illicit drug use    Physical Exam:  Well appearing female in no acute distress  Obese body habitus  Heart: RRR no MRG  Lungs: CTA b/l no WRR  Abdomen: Soft NTND abdomen, Slight tenderness to palpation in LLQ  No asymmetry noted  No obvious bulges noted in the left groin  No fascial defects noted in umbilicus, right or left inguinal area  Assessment:  30year old female with history of three prior  sections presenting for a 2 week history of bulge in left groin region  Left groin pain  At this time there are no obvious defects in the fascial layer  Plan:  Please follow up in one month for re-evaluation of symptoms  Surgery not recommended at this time  Please call the office if symptoms worsen  Diagnoses and all orders for this visit:    Left groin pain          Subjective:      Patient ID: Romy Stephens is a 30 y o  female  Attending Physician Statement  I discussed with the student  I agree with the student's documented findings and plan of care  The following portions of the patient's history were reviewed and updated as appropriate: allergies, current medications, past family history, past medical history, past social history, past surgical history and problem list     Review of Systems   Constitutional: Negative  HENT: Negative  Eyes: Negative  Respiratory: Negative  Gastrointestinal: Negative for abdominal distention, abdominal pain, blood in stool, constipation and diarrhea  Endocrine: Negative  Genitourinary: Negative  Musculoskeletal: Negative  Skin: Negative            Objective:      /62 (BP Location: Left arm, Patient Position: Sitting, Cuff Size: Standard)   Pulse 92   Temp 99 5 °F (37 5 °C) (Tympanic)   Resp 18   Ht 5' 1" (1 549 m)   Wt 81 2 kg (179 lb)   LMP 01/23/2020   BMI 33 82 kg/m²           Physical Exam   Constitutional: She appears well-developed and well-nourished  No distress  Cardiovascular: Normal rate, regular rhythm and normal heart sounds  Abdominal: Soft  Bowel sounds are normal  She exhibits no distension and no mass  There is no tenderness  There is no guarding  Soft NTND abdomen, Slight tenderness to palpation in LLQ  No asymmetry noted  No obvious bulges noted in the left groin  No fascial defects noted in umbilicus, right or left inguinal area  Skin: No rash noted  She is not diaphoretic  No erythema  yes Quality 400a: One-Time Screening For Hepatitis C Virus (Hcv) And Treatment Initiation: Patient received one-time screening for HCV infection Quality 47: Advance Care Plan: Advance care planning not documented, reason not otherwise specified. Quality 130: Documentation Of Current Medications In The Medical Record: Current Medications Documented Detail Level: Detailed

## 2024-05-26 ENCOUNTER — OFFICE VISIT (OUTPATIENT)
Dept: URGENT CARE | Facility: CLINIC | Age: 29
End: 2024-05-26
Payer: COMMERCIAL

## 2024-05-26 VITALS
RESPIRATION RATE: 18 BRPM | HEART RATE: 105 BPM | OXYGEN SATURATION: 99 % | TEMPERATURE: 98.5 F | SYSTOLIC BLOOD PRESSURE: 133 MMHG | DIASTOLIC BLOOD PRESSURE: 75 MMHG

## 2024-05-26 DIAGNOSIS — J02.9 SORE THROAT: ICD-10-CM

## 2024-05-26 DIAGNOSIS — J02.0 STREP THROAT: Primary | ICD-10-CM

## 2024-05-26 LAB — S PYO AG THROAT QL: POSITIVE

## 2024-05-26 PROCEDURE — 87880 STREP A ASSAY W/OPTIC: CPT | Performed by: PHYSICAL MEDICINE & REHABILITATION

## 2024-05-26 PROCEDURE — G0382 LEV 3 HOSP TYPE B ED VISIT: HCPCS | Performed by: PHYSICAL MEDICINE & REHABILITATION

## 2024-05-26 RX ORDER — AMOXICILLIN 875 MG/1
875 TABLET, COATED ORAL 2 TIMES DAILY
Qty: 14 TABLET | Refills: 0 | Status: SHIPPED | OUTPATIENT
Start: 2024-05-26 | End: 2024-06-02

## 2024-05-26 NOTE — PROGRESS NOTES
[facility]        NAME: Yuridia Barr is a 28 y.o. female  : 1995    MRN: [id number]  DATE: May 26, 2024  TIME: 12:08 PM    Assessment and Plan   Strep throat [J02.0]  1. Strep throat  amoxicillin (AMOXIL) 875 mg tablet      2. Sore throat  POCT rapid ANTIGEN strepA        Rapid strep positive    Patient Instructions       Follow up with PCP in 3-5 days.  Proceed to  ER if symptoms worsen.    If tests are performed, our office will contact you with results only if changes need to made to the care plan discussed with you at the visit. You can review your full results on [facility].    Chief Complaint     Chief Complaint   Patient presents with    Sore Throat     X 3 days. Exposure to strep          History of Present Illness       Patient presenting with sore throat for three days, no exposure to strep.    Sore Throat   The current episode started in the past 7 days. The problem has been gradually worsening. The pain is worse on the left side. The pain is at a severity of 3/10. The pain is moderate. Associated symptoms include ear pain. She has had exposure to strep.       Review of Systems   Review of Systems   Constitutional: Negative.    HENT:  Positive for ear pain and sore throat.    Respiratory: Negative.     Cardiovascular: Negative.    Gastrointestinal: Negative.          Current Medications       Current Outpatient Medications:     amoxicillin (AMOXIL) 875 mg tablet, Take 1 tablet (875 mg total) by mouth 2 (two) times a day for 7 days, Disp: 14 tablet, Rfl: 0    cyanocobalamin 1,000 mcg/mL, Weekly for 5 weeks, monthly for 4 months, Disp: 6 mL, Rfl: 0    metFORMIN (GLUCOPHAGE-XR) 500 mg 24 hr tablet, take 1 tablet by mouth with dinner, Disp: 90 tablet, Rfl: 0    norethindrone-ethinyl estradiol (JUNEL FE 1/20) 1-20 MG-MCG per tablet, Take 1 tablet by mouth daily, Disp: 28 tablet, Rfl: 3    semaglutide, 0.25 or 0.5 mg/dose, (Ozempic, 0.25 or 0.5 MG/DOSE,) 2 mg/3 mL injection pen,  "Inject 0.75 mL (0.5 mg total) under the skin every 7 days, Disp: 9 mL, Rfl: 0    VITAMIN D PO, Take 1 tablet by mouth in the morning, Disp: , Rfl:     B-D 3CC LUER-SHAE SYR 25GX1/2\" 25G X 1-1/2\" 3 ML MISC, Inject into a muscle once a week, Disp: 9 each, Rfl: 0    Blood Glucose Monitoring Suppl (OneTouch Verio Reflect) w/Device KIT, Check blood sugars once daily. Please substitute with appropriate alternative as covered by patient's insurance. Dx: E11.65, Disp: 1 kit, Rfl: 0    glucose blood (OneTouch Verio) test strip, Check blood sugars once daily. Please substitute with appropriate alternative as covered by patient's insurance. Dx: E11.65, Disp: 100 each, Rfl: 3    Insulin Pen Needle (B-D UF III MINI PEN NEEDLES) 31G X 5 MM MISC, Use every 7 days, Disp: 30 each, Rfl: 0    OneTouch Delica Lancets 33G MISC, Check blood sugars once daily. Please substitute with appropriate alternative as covered by patient's insurance. Dx: E11.65, Disp: 100 each, Rfl: 3    Current Allergies     Allergies as of 2024    (No Known Allergies)            The following portions of the patient's history were reviewed and updated as appropriate: allergies, current medications, past family history, past medical history, past social history, past surgical history and problem list.     Past Medical History:   Diagnosis Date    Diabetes mellitus (HCC)     Gestational diabetes     Kidney stone     Migraine        Past Surgical History:   Procedure Laterality Date    CERVICAL BIOPSY  W/ LOOP ELECTRODE EXCISION  ?     SECTION  15, 16, 18    DILATION AND CURETTAGE OF UTERUS WITH HYSTEROSOCPY N/A 2020    Procedure: DILATATION AND CURETTAGE (D&C);  Surgeon: Kyler Tristan MD;  Location:  MAIN OR;  Service: Gynecology    ENDOMETRIAL ABLATION N/A 2020    Procedure: ABLATION ENDOMETRIAL JOSHUA;  Surgeon: Kyler Tristan MD;  Location:  MAIN OR;  Service: Gynecology    MO  DELIVERY ONLY N/A 6/3/2016    " Procedure:  SECTION () REPEAT;  Surgeon: Kyler Tristan MD;  [facility]: [facility] ;  Service: Obstetrics    MI  DELIVERY ONLY N/A 2018    Procedure:  SECTION () REPEAT;  Surgeon: Kyler Tristan MD;  [facility]: [facility] ;  Service: Obstetrics    MI PARATHYROIDECTOMY/EXPLORATION PARATHYROIDS Left 3/28/2023    Procedure: MINIMALLY INVASIVE LEFT PARATHYROIDECTOMY,  4 GLAND EXPLORATION, INTRAOPERATIVE PTH MONITORING;  Surgeon: Vik Leger MD;  [facility];  Service: Surgical Oncology    TUBAL LIGATION N/A 2018    Procedure: LIGATION/COAGULATION TUBAL;  Surgeon: Kyler Tristan MD;  Location: [facility];  Service: Obstetrics       Family History   Problem Relation Age of Onset    Hyperthyroidism Mother     Hyperthyroidism Father     Hypertension Father     No Known Problems Sister     No Known Problems Brother     No Known Problems Son     No Known Problems Daughter     No Known Problems Daughter     No Known Problems Paternal Grandmother     Diabetes Paternal Grandfather     Down syndrome Cousin         paternal cousin         Medications have been verified.        Objective   /75   Pulse 105   Temp 98.5 °F (36.9 °C)   Resp 18   SpO2 99%        Physical Exam     Physical Exam  Vitals reviewed.   Constitutional:       General: She is not in acute distress.     Appearance: She is ill-appearing.   HENT:      Right Ear: Tympanic membrane normal.      Left Ear: Tympanic membrane normal.      Nose: No congestion or rhinorrhea.      Mouth/Throat:      Mouth: Mucous membranes are moist.      Pharynx: Oropharynx is clear. Posterior oropharyngeal erythema present. No pharyngeal swelling or oropharyngeal exudate.   Cardiovascular:      Rate and Rhythm: Normal rate and regular rhythm.      Heart sounds: Normal heart sounds. No murmur heard.  Pulmonary:      Effort: Pulmonary effort is normal. No respiratory distress.      Breath sounds: Normal breath sounds. No  wheezing, rhonchi or rales.   Neurological:      Mental Status: She is alert.

## 2024-06-04 ENCOUNTER — NURSE TRIAGE (OUTPATIENT)
Age: 29
End: 2024-06-04

## 2024-06-04 NOTE — TELEPHONE ENCOUNTER
"Reason for Disposition  • Numbness in a leg or foot (i.e., loss of sensation)    Answer Assessment - Initial Assessment Questions  1. ONSET: \"When did the pain start?\"       2 WEEKS- NOW  2. LOCATION: \"Where is the pain located?\"       Right leg knee down  3. PAIN: \"How bad is the pain?\"    (Scale 1-10; or mild, moderate, severe)    -  MILD (1-3): doesn't interfere with normal activities     -  MODERATE (4-7): interferes with normal activities (e.g., work or school) or awakens from sleep, limping     -  SEVERE (8-10): excruciating pain, unable to do any normal activities, unable to walk      When pt is bending an  4. WORK OR EXERCISE: \"Has there been any recent work or exercise that involved this part of the body?\"       unsure  5. CAUSE: \"What do you think is causing the leg pain?\"      Unsure pt denies falling  6. OTHER SYMPTOMS: \"Do you have any other symptoms?\" (e.g., chest pain, back pain, breathing difficulty, swelling, rash, fever, numbness, weakness)      Denies, but does report if she squats long her legs go numb  7. PREGNANCY: \"Is there any chance you are pregnant?\" \"When was your last menstrual period?\"      N/a    Protocols used: Leg Pain-ADULT-[place]    "

## 2024-06-04 NOTE — TELEPHONE ENCOUNTER
Reason for Disposition  • Message left on identified voicemail    Protocols used: No Contact or Duplicate Contact Call-ADULT-[place]

## 2024-06-04 NOTE — TELEPHONE ENCOUNTER
Regarding: Right Leg Issues  ----- Message from Dior GARIBAY sent at 6/4/2024 12:45 PM EDT -----  Yuridia Barr    6/4/24 10:55 AM    Good morning. I'm just looking to get advice on what I should do weather set and appointment with you or go to the er or urgent care. For the past almost two weeks my right leg has been feeling like i've been sitting on it and it falling asleep. When i bend down it feels like my leg is going to give out as well.      Thank you,     Yuridia Barr

## 2024-06-05 ENCOUNTER — OFFICE VISIT (OUTPATIENT)
Dept: FAMILY MEDICINE CLINIC | Facility: CLINIC | Age: 29
End: 2024-06-05
Payer: COMMERCIAL

## 2024-06-05 VITALS
HEIGHT: 60 IN | BODY MASS INDEX: 26.31 KG/M2 | HEART RATE: 75 BPM | OXYGEN SATURATION: 100 % | TEMPERATURE: 97.5 F | DIASTOLIC BLOOD PRESSURE: 78 MMHG | SYSTOLIC BLOOD PRESSURE: 116 MMHG | WEIGHT: 134 LBS

## 2024-06-05 DIAGNOSIS — M54.50 CHRONIC BILATERAL LOW BACK PAIN WITHOUT SCIATICA: ICD-10-CM

## 2024-06-05 DIAGNOSIS — M79.604 RIGHT LEG PAIN: Primary | ICD-10-CM

## 2024-06-05 DIAGNOSIS — G89.29 CHRONIC BILATERAL LOW BACK PAIN WITHOUT SCIATICA: ICD-10-CM

## 2024-06-05 PROCEDURE — 99213 OFFICE O/P EST LOW 20 MIN: CPT | Performed by: PHYSICIAN ASSISTANT

## 2024-06-05 RX ORDER — NAPROXEN 500 MG/1
500 TABLET ORAL 2 TIMES DAILY PRN
Qty: 30 TABLET | Refills: 0 | Status: SHIPPED | OUTPATIENT
Start: 2024-06-05

## 2024-06-05 NOTE — PROGRESS NOTES
"Ambulatory Visit  Name: Yuridia Barr      : 1995      MRN: [id number]  Encounter Provider: Berny Mojica PA-C  Encounter Date: 2024   Encounter department: [facility]    Assessment & Plan   1. Right leg pain  -     naproxen (Naprosyn) 500 mg tablet; Take 1 tablet (500 mg total) by mouth 2 (two) times a day as needed for mild pain  2. Chronic bilateral low back pain without sciatica     Suspect muscle strain vs lumbar radicular pain. Recommend naproxen, low back exercises, ice/heat to the area in pain, rest. If no improvement in 2 weeks recommend PT. No evidence of DVT. Follow up prn    History of Present Illness   {Disappearing Hyperlinks I Encounters * My Last Note * Since Last Visit * History :94878}  Pt presents with 1 week of R leg pain, lateral calf. Notes it hurts mostly when she is squatting, such as when she is putting her kids shoes on. She works in the . No injury. When she is squatting for too long it feels like her leg will \"give out\". Also notes some more chronic low back pain. No leg swelling, redness, color change, numbness or VTE hx.       Review of Systems   Constitutional: Negative.    Musculoskeletal:  Positive for back pain and myalgias. Negative for arthralgias and gait problem.   Skin: Negative.    Neurological:  Positive for weakness. Negative for numbness.     Past Medical History:   Diagnosis Date   • Diabetes mellitus (HCC)    • Gestational diabetes    • Kidney stone    • Migraine      Past Surgical History:   Procedure Laterality Date   • CERVICAL BIOPSY  W/ LOOP ELECTRODE EXCISION  ?   •  SECTION  15, 16, 18   • DILATION AND CURETTAGE OF UTERUS WITH HYSTEROSOCPY N/A 2020    Procedure: DILATATION AND CURETTAGE (D&C);  Surgeon: Kyler Tristan MD;  Location:  MAIN OR;  Service: Gynecology   • ENDOMETRIAL ABLATION N/A 2020    Procedure: ABLATION ENDOMETRIAL JOSHUA;  Surgeon: Kyler Tristan MD;  Location:  " "MAIN OR;  Service: Gynecology   • [place]  DELIVERY ONLY N/A 6/3/2016    Procedure:  SECTION () REPEAT;  Surgeon: Kyler Tristan MD;  Location: [facility];  Service: Obstetrics   • NJ  DELIVERY ONLY N/A 2018    Procedure:  SECTION () REPEAT;  Surgeon: Kyler Tristan MD;  Location: [facility];  Service: Obstetrics   • NJ PARATHYROIDECTOMY/EXPLORATION PARATHYROIDS Left 3/28/2023    Procedure: MINIMALLY INVASIVE LEFT PARATHYROIDECTOMY,  4 GLAND EXPLORATION, INTRAOPERATIVE PTH MONITORING;  Surgeon: Vik Leger MD;  Location: [facility];  Service: Surgical Oncology   • TUBAL LIGATION N/A 2018    Procedure: LIGATION/COAGULATION TUBAL;  Surgeon: Kyler Tristan MD;  Location: [facility];  Service: Obstetrics     Family History   Problem Relation Age of Onset   • Hyperthyroidism Mother    • Hyperthyroidism Father    • Hypertension Father    • No Known Problems Sister    • No Known Problems Brother    • No Known Problems Son    • No Known Problems Daughter    • No Known Problems Daughter    • No Known Problems Paternal Grandmother    • Diabetes Paternal Grandfather    • Down syndrome Cousin         paternal cousin     Social History     Tobacco Use   • Smoking status: Never     Passive exposure: Never   • Smokeless tobacco: Never   Vaping Use   • Vaping status: Never Used   Substance and Sexual Activity   • Alcohol use: Not Currently   • Drug use: No   • Sexual activity: Yes     Partners: Male     Birth control/protection: Female Sterilization     Current Outpatient Medications on File Prior to Visit   Medication Sig   • B-D 3CC LUER-SHAE SYR 25GX1/2\" 25G X 1-1/2\" 3 ML MISC Inject into a muscle once a week   • Blood Glucose Monitoring Suppl (OneTouch Verio Reflect) w/Device KIT Check blood sugars once daily. Please substitute with appropriate alternative as covered by patient's insurance. Dx: E11.65   • cyanocobalamin 1,000 mcg/mL Weekly for 5 weeks, monthly for 4 " months   • glucose blood (OneTouch Verio) test strip Check blood sugars once daily. Please substitute with appropriate alternative as covered by patient's insurance. Dx: E11.65   • Insulin Pen Needle (B-D UF III MINI PEN NEEDLES) 31G X 5 MM MISC Use every 7 days   • metFORMIN (GLUCOPHAGE-XR) 500 mg 24 hr tablet take 1 tablet by mouth with dinner   • norethindrone-ethinyl estradiol (JUNEL FE 1/20) 1-20 MG-MCG per tablet Take 1 tablet by mouth daily   • OneTouch Delica Lancets 33G MISC Check blood sugars once daily. Please substitute with appropriate alternative as covered by patient's insurance. Dx: E11.65   • semaglutide, 0.25 or 0.5 mg/dose, (Ozempic, 0.25 or 0.5 MG/DOSE,) 2 mg/3 mL injection pen Inject 0.75 mL (0.5 mg total) under the skin every 7 days   • VITAMIN D PO Take 1 tablet by mouth in the morning     No Known Allergies  Immunization History   Administered Date(s) Administered   • DTP 1995, 01/04/1994, 03/24/1994, 03/05/1995, 04/15/1998   • HPV Quadrivalent 08/24/2009, 10/26/2009, 03/01/2010   • Hep B, Adolescent or Pediatric 1995, 1995, 05/13/1994   • INFLUENZA 10/19/2018   • IPV 1995, 1995, 09/05/1995, 04/25/1998   • MMR 10/17/1994, 05/23/1998   • Meningococcal MCV4P 08/24/2009   • Rho (D) Immune Globulin 06/04/2016, 11/27/2018   • Tdap 08/20/2009, 06/08/2020   • Tuberculin Skin Test-PPD Intradermal 08/09/2021, 08/25/2021, 10/03/2022, 10/03/2022   • Varicella 08/20/2009, 08/18/2010     Objective   {Disappearing Hyperlinks   Review Vitals * Enter New Vitals * Results Review * Labs * Imaging * Cardiology * Procedures * Lung Cancer Screening :87549}  /78 (BP Location: Left arm, Patient Position: Sitting, Cuff Size: Standard)   Pulse 75   Temp 97.5 °F (36.4 °C)   Ht 5' (1.524 m)   Wt 60.8 kg (134 lb)   SpO2 100%   BMI 26.17 kg/m²     Physical Exam  Vitals and nursing note reviewed.   Constitutional:       Appearance: Normal appearance.   Pulmonary:      Effort:  Pulmonary effort is normal. No respiratory distress.   Musculoskeletal:         General: No tenderness.      Cervical back: Normal range of motion.      Lumbar back: No tenderness or bony tenderness. Normal range of motion. Negative right straight leg raise test and negative left straight leg raise test.      Right lower leg: No swelling, tenderness or bony tenderness. No edema.      Left lower leg: No edema.      Comments: Some LBP with bending/twisting  R calf is nontender, pain only reproducible with squatting   Skin:     General: Skin is warm and dry.      Findings: No erythema.   Neurological:      Mental Status: She is alert.       Administrative Statements {Disappearing Hyperlinks I  Level of Service * [facility]/PCSP:84635}

## 2024-06-14 DIAGNOSIS — G89.29 CHRONIC BILATERAL LOW BACK PAIN WITHOUT SCIATICA: ICD-10-CM

## 2024-06-14 DIAGNOSIS — M54.50 CHRONIC BILATERAL LOW BACK PAIN WITHOUT SCIATICA: ICD-10-CM

## 2024-06-14 DIAGNOSIS — M79.604 RIGHT LEG PAIN: Primary | ICD-10-CM

## 2024-06-16 ENCOUNTER — APPOINTMENT (OUTPATIENT)
Dept: RADIOLOGY | Facility: CLINIC | Age: 29
End: 2024-06-16
Payer: COMMERCIAL

## 2024-06-16 DIAGNOSIS — M79.604 RIGHT LEG PAIN: ICD-10-CM

## 2024-06-16 DIAGNOSIS — M54.50 CHRONIC BILATERAL LOW BACK PAIN WITHOUT SCIATICA: ICD-10-CM

## 2024-06-16 DIAGNOSIS — G89.29 CHRONIC BILATERAL LOW BACK PAIN WITHOUT SCIATICA: ICD-10-CM

## 2024-06-16 PROCEDURE — 72100 X-RAY EXAM L-S SPINE 2/3 VWS: CPT

## 2024-07-23 DIAGNOSIS — E11.65 TYPE 2 DIABETES MELLITUS WITH HYPERGLYCEMIA, WITHOUT LONG-TERM CURRENT USE OF INSULIN (HCC): ICD-10-CM

## 2024-07-23 DIAGNOSIS — R73.09 ELEVATED GLUCOSE: ICD-10-CM

## 2024-07-23 RX ORDER — METFORMIN HYDROCHLORIDE 500 MG/1
TABLET, EXTENDED RELEASE ORAL
Qty: 100 TABLET | Refills: 1 | Status: SHIPPED | OUTPATIENT
Start: 2024-07-23

## 2024-07-26 ENCOUNTER — OCCMED (OUTPATIENT)
Dept: URGENT CARE | Facility: CLINIC | Age: 29
End: 2024-07-26

## 2024-07-26 ENCOUNTER — APPOINTMENT (OUTPATIENT)
Dept: LAB | Facility: CLINIC | Age: 29
End: 2024-07-26

## 2024-07-26 DIAGNOSIS — Z02.1 ENCOUNTER FOR PRE-EMPLOYMENT EXAMINATION: Primary | ICD-10-CM

## 2024-07-26 DIAGNOSIS — Z02.1 ENCOUNTER FOR PRE-EMPLOYMENT EXAMINATION: ICD-10-CM

## 2024-07-26 LAB
MEV IGG SER QL IA: NORMAL
MUV IGG SER QL IA: NORMAL
RUBV IGG SERPL IA-ACNC: 29.6 IU/ML
VZV IGG SER QL IA: NORMAL

## 2024-07-26 PROCEDURE — 86762 RUBELLA ANTIBODY: CPT

## 2024-07-26 PROCEDURE — 36415 COLL VENOUS BLD VENIPUNCTURE: CPT

## 2024-07-26 PROCEDURE — 86765 RUBEOLA ANTIBODY: CPT

## 2024-07-26 PROCEDURE — 86480 TB TEST CELL IMMUN MEASURE: CPT

## 2024-07-26 PROCEDURE — 86787 VARICELLA-ZOSTER ANTIBODY: CPT

## 2024-07-26 PROCEDURE — 86735 MUMPS ANTIBODY: CPT

## 2024-07-28 LAB
GAMMA INTERFERON BACKGROUND BLD IA-ACNC: 0.02 IU/ML
M TB IFN-G BLD-IMP: NEGATIVE
M TB IFN-G CD4+ BCKGRND COR BLD-ACNC: 0 IU/ML
M TB IFN-G CD4+ BCKGRND COR BLD-ACNC: 0.02 IU/ML
MITOGEN IGNF BCKGRD COR BLD-ACNC: 9.98 IU/ML

## 2024-07-31 ENCOUNTER — OFFICE VISIT (OUTPATIENT)
Dept: URGENT CARE | Facility: CLINIC | Age: 29
End: 2024-07-31
Payer: COMMERCIAL

## 2024-07-31 VITALS
DIASTOLIC BLOOD PRESSURE: 78 MMHG | RESPIRATION RATE: 18 BRPM | TEMPERATURE: 97.7 F | OXYGEN SATURATION: 98 % | SYSTOLIC BLOOD PRESSURE: 137 MMHG | HEART RATE: 85 BPM

## 2024-07-31 DIAGNOSIS — J02.9 ACUTE PHARYNGITIS, UNSPECIFIED ETIOLOGY: Primary | ICD-10-CM

## 2024-07-31 LAB — S PYO AG THROAT QL: NEGATIVE

## 2024-07-31 PROCEDURE — 87880 STREP A ASSAY W/OPTIC: CPT

## 2024-07-31 PROCEDURE — G0382 LEV 3 HOSP TYPE B ED VISIT: HCPCS

## 2024-07-31 NOTE — PATIENT INSTRUCTIONS
Your rapid strep was negative.  Continue supportive care with salt water gargles, cough drops, over the counter throat sprays, and warm fluids.  Follow up with PCP in 3-5 days.  Proceed to  ER if symptoms worsen.

## 2024-07-31 NOTE — PROGRESS NOTES
[facility]    NAME: Yuridia Barr is a 29 y.o. female  : 1995    MRN: [id number]  DATE: 2024  TIME: 5:35 PM    Assessment and Plan   Acute pharyngitis, unspecified etiology [J02.9]  1. Acute pharyngitis, unspecified etiology  POCT rapid ANTIGEN strepA        Tested for strep in office today, results were negative.   Continue supportive care, and educated pt on.   Can try Cepacol throat spray from the pharmacy for symptoms.       Patient Instructions     Your rapid strep was negative.  Continue supportive care with salt water gargles, cough drops, over the counter throat sprays, and warm fluids.  Follow up with PCP in 3-5 days.  Proceed to  ER if symptoms worsen.    Chief Complaint     Chief Complaint   Patient presents with    Sore Throat     Pt with sore throat and left ear pain x3 days. No fever. Nausea          History of Present Illness       Presents with sore throat, nausea and left ear pain that began 3 days ago. Denies fever or chills. Denies known sick contacts.         Review of Systems   Review of Systems   Constitutional:  Negative for chills and fever.   HENT:  Positive for ear pain and sore throat. Negative for congestion.    Respiratory:  Negative for cough and shortness of breath.    Cardiovascular:  Negative for chest pain.   Gastrointestinal:  Positive for nausea. Negative for abdominal pain.   Musculoskeletal:  Negative for myalgias.   Psychiatric/Behavioral:  Negative for confusion.          Current Medications       Current Outpatient Medications:     Blood Glucose Monitoring Suppl (OneTouch Verio Reflect) w/Device KIT, Check blood sugars once daily. Please substitute with appropriate alternative as covered by patient's insurance. Dx: E11.65, Disp: 1 kit, Rfl: 0    glucose blood (OneTouch Verio) test strip, Check blood sugars once daily. Please substitute with appropriate alternative as covered by patient's insurance. Dx: E11.65, Disp: 100 each, Rfl: 3     "metFORMIN (GLUCOPHAGE-XR) 500 mg 24 hr tablet, take 1 tablet by mouth with dinner, Disp: 100 tablet, Rfl: 1    semaglutide, 0.25 or 0.5 mg/dose, (Ozempic, 0.25 or 0.5 MG/DOSE,) 2 mg/3 mL injection pen, Inject 0.75 mL (0.5 mg total) under the skin every 7 days, Disp: 9 mL, Rfl: 0    VITAMIN D PO, Take 1 tablet by mouth in the morning, Disp: , Rfl:     B-D 3CC LUER-SHAE SYR 25GX1/2\" 25G X 1-1/2\" 3 ML MISC, Inject into a muscle once a week, Disp: 9 each, Rfl: 0    cyanocobalamin 1,000 mcg/mL, Weekly for 5 weeks, monthly for 4 months (Patient not taking: Reported on 2024), Disp: 6 mL, Rfl: 0    Insulin Pen Needle (B-D UF III MINI PEN NEEDLES) 31G X 5 MM MISC, Use every 7 days, Disp: 30 each, Rfl: 0    naproxen (Naprosyn) 500 mg tablet, Take 1 tablet (500 mg total) by mouth 2 (two) times a day as needed for mild pain (Patient not taking: Reported on 2024), Disp: 30 tablet, Rfl: 0    norethindrone-ethinyl estradiol (JUNEL FE 1/20) 1-20 MG-MCG per tablet, Take 1 tablet by mouth daily (Patient not taking: Reported on 2024), Disp: 28 tablet, Rfl: 3    OneTouch Delica Lancets 33G MISC, Check blood sugars once daily. Please substitute with appropriate alternative as covered by patient's insurance. Dx: E11.65 (Patient not taking: Reported on 2024), Disp: 100 each, Rfl: 3    Current Allergies     Allergies as of 2024    (No Known Allergies)            The following portions of the patient's history were reviewed and updated as appropriate: allergies, current medications, past family history, past medical history, past social history, past surgical history and problem list.     Past Medical History:   Diagnosis Date    Diabetes mellitus (HCC)     Gestational diabetes     Kidney stone     Migraine        Past Surgical History:   Procedure Laterality Date    CERVICAL BIOPSY  W/ LOOP ELECTRODE EXCISION  2017?     SECTION  15, 16, 18    DILATION AND CURETTAGE OF UTERUS WITH HYSTEROSOCPY N/A 2020 "    Procedure: DILATATION AND CURETTAGE (D&C);  Surgeon: Kyler Tristan MD;  Location:  MAIN OR;  Service: Gynecology    ENDOMETRIAL ABLATION N/A 2020    Procedure: ABLATION ENDOMETRIAL JOSHUA;  Surgeon: Kyler Tristan MD;  Location:  MAIN OR;  Service: Gynecology    [place]  DELIVERY ONLY N/A 6/3/2016    Procedure:  SECTION () REPEAT;  Surgeon: Kyler Tristan MD;  Location: [facility];  Service: Obstetrics    IA  DELIVERY ONLY N/A 2018    Procedure:  SECTION () REPEAT;  Surgeon: Kyler Tristan MD;  Location: [facility];  Service: Obstetrics    IA PARATHYROIDECTOMY/EXPLORATION PARATHYROIDS Left 3/28/2023    Procedure: MINIMALLY INVASIVE LEFT PARATHYROIDECTOMY,  4 GLAND EXPLORATION, INTRAOPERATIVE PTH MONITORING;  Surgeon: Vik Leger MD;  Location:  MAIN OR;  Service: Surgical Oncology    TUBAL LIGATION N/A 2018    Procedure: LIGATION/COAGULATION TUBAL;  Surgeon: Kyler Tristan MD;  Location: [facility];  Service: Obstetrics       Family History   Problem Relation Age of Onset    Hyperthyroidism Mother     Hyperthyroidism Father     Hypertension Father     No Known Problems Sister     No Known Problems Brother     No Known Problems Son     No Known Problems Daughter     No Known Problems Daughter     No Known Problems Paternal Grandmother     Diabetes Paternal Grandfather     Down syndrome Cousin         paternal cousin         Medications have been verified.        Objective   /78   Pulse 85   Temp 97.7 °F (36.5 °C)   Resp 18   SpO2 98%        Physical Exam     Physical Exam  Vitals reviewed.   Constitutional:       General: She is not in acute distress.     Appearance: Normal appearance.   HENT:      Right Ear: Tympanic membrane, ear canal and external ear normal.      Left Ear: Tympanic membrane, ear canal and external ear normal.      Nose: Nose normal.      Mouth/Throat:      Mouth: Mucous membranes are moist.      Pharynx:  Posterior oropharyngeal erythema present.      Tonsils: No tonsillar exudate or tonsillar abscesses. 0 on the right. 0 on the left.   Eyes:      Conjunctiva/sclera: Conjunctivae normal.   Cardiovascular:      Rate and Rhythm: Normal rate and regular rhythm.      Pulses: Normal pulses.      Heart sounds: Normal heart sounds. No murmur heard.  Pulmonary:      Effort: Pulmonary effort is normal. No respiratory distress.      Breath sounds: Normal breath sounds.   Skin:     General: Skin is warm and dry.   Neurological:      General: No focal deficit present.      Mental Status: She is alert and oriented to person, place, and time.   Psychiatric:         Mood and Affect: Mood normal.         Behavior: Behavior normal.

## 2024-08-01 DIAGNOSIS — E11.9 TYPE 2 DIABETES MELLITUS WITHOUT COMPLICATION, WITHOUT LONG-TERM CURRENT USE OF INSULIN (HCC): ICD-10-CM

## 2024-08-01 RX ORDER — SEMAGLUTIDE 0.68 MG/ML
INJECTION, SOLUTION SUBCUTANEOUS
Qty: 9 ML | Refills: 2 | Status: SHIPPED | OUTPATIENT
Start: 2024-08-01

## 2024-08-20 ENCOUNTER — APPOINTMENT (OUTPATIENT)
Dept: LAB | Facility: CLINIC | Age: 29
End: 2024-08-20
Payer: COMMERCIAL

## 2024-08-20 DIAGNOSIS — E11.9 TYPE 2 DIABETES MELLITUS WITHOUT COMPLICATION, WITHOUT LONG-TERM CURRENT USE OF INSULIN (HCC): ICD-10-CM

## 2024-08-20 LAB
ALBUMIN SERPL BCG-MCNC: 4.3 G/DL (ref 3.5–5)
ALP SERPL-CCNC: 27 U/L (ref 34–104)
ALT SERPL W P-5'-P-CCNC: 9 U/L (ref 7–52)
ANION GAP SERPL CALCULATED.3IONS-SCNC: 9 MMOL/L (ref 4–13)
AST SERPL W P-5'-P-CCNC: 11 U/L (ref 13–39)
BASOPHILS # BLD AUTO: 0.05 THOUSANDS/ÂΜL (ref 0–0.1)
BASOPHILS NFR BLD AUTO: 1 % (ref 0–1)
BILIRUB SERPL-MCNC: 0.28 MG/DL (ref 0.2–1)
BUN SERPL-MCNC: 10 MG/DL (ref 5–25)
CALCIUM SERPL-MCNC: 10.8 MG/DL (ref 8.4–10.2)
CHLORIDE SERPL-SCNC: 104 MMOL/L (ref 96–108)
CHOLEST SERPL-MCNC: 139 MG/DL
CO2 SERPL-SCNC: 24 MMOL/L (ref 21–32)
CREAT SERPL-MCNC: 0.44 MG/DL (ref 0.6–1.3)
EOSINOPHIL # BLD AUTO: 0.07 THOUSAND/ÂΜL (ref 0–0.61)
EOSINOPHIL NFR BLD AUTO: 1 % (ref 0–6)
ERYTHROCYTE [DISTWIDTH] IN BLOOD BY AUTOMATED COUNT: 12.1 % (ref 11.6–15.1)
EST. AVERAGE GLUCOSE BLD GHB EST-MCNC: 126 MG/DL
GFR SERPL CREATININE-BSD FRML MDRD: 134 ML/MIN/1.73SQ M
GLUCOSE P FAST SERPL-MCNC: 150 MG/DL (ref 65–99)
HBA1C MFR BLD: 6 %
HCT VFR BLD AUTO: 38.5 % (ref 34.8–46.1)
HDLC SERPL-MCNC: 41 MG/DL
HGB BLD-MCNC: 13.6 G/DL (ref 11.5–15.4)
IMM GRANULOCYTES # BLD AUTO: 0.05 THOUSAND/UL (ref 0–0.2)
IMM GRANULOCYTES NFR BLD AUTO: 1 % (ref 0–2)
LDLC SERPL CALC-MCNC: 82 MG/DL (ref 0–100)
LYMPHOCYTES # BLD AUTO: 1.45 THOUSANDS/ÂΜL (ref 0.6–4.47)
LYMPHOCYTES NFR BLD AUTO: 25 % (ref 14–44)
MCH RBC QN AUTO: 31.3 PG (ref 26.8–34.3)
MCHC RBC AUTO-ENTMCNC: 35.3 G/DL (ref 31.4–37.4)
MCV RBC AUTO: 89 FL (ref 82–98)
MONOCYTES # BLD AUTO: 0.49 THOUSAND/ÂΜL (ref 0.17–1.22)
MONOCYTES NFR BLD AUTO: 9 % (ref 4–12)
NEUTROPHILS # BLD AUTO: 3.61 THOUSANDS/ÂΜL (ref 1.85–7.62)
NEUTS SEG NFR BLD AUTO: 63 % (ref 43–75)
NRBC BLD AUTO-RTO: 0 /100 WBCS
PLATELET # BLD AUTO: 218 THOUSANDS/UL (ref 149–390)
PMV BLD AUTO: 12.1 FL (ref 8.9–12.7)
POTASSIUM SERPL-SCNC: 3.9 MMOL/L (ref 3.5–5.3)
PROT SERPL-MCNC: 6.8 G/DL (ref 6.4–8.4)
RBC # BLD AUTO: 4.34 MILLION/UL (ref 3.81–5.12)
SODIUM SERPL-SCNC: 137 MMOL/L (ref 135–147)
TRIGL SERPL-MCNC: 79 MG/DL
WBC # BLD AUTO: 5.72 THOUSAND/UL (ref 4.31–10.16)

## 2024-08-20 PROCEDURE — 85025 COMPLETE CBC W/AUTO DIFF WBC: CPT

## 2024-08-20 PROCEDURE — 36415 COLL VENOUS BLD VENIPUNCTURE: CPT

## 2024-08-20 PROCEDURE — 83036 HEMOGLOBIN GLYCOSYLATED A1C: CPT

## 2024-08-20 PROCEDURE — 80053 COMPREHEN METABOLIC PANEL: CPT

## 2024-08-20 PROCEDURE — 80061 LIPID PANEL: CPT

## 2024-08-22 ENCOUNTER — OFFICE VISIT (OUTPATIENT)
Dept: FAMILY MEDICINE CLINIC | Facility: CLINIC | Age: 29
End: 2024-08-22
Payer: COMMERCIAL

## 2024-08-22 VITALS
BODY MASS INDEX: 26.74 KG/M2 | HEART RATE: 96 BPM | WEIGHT: 136.2 LBS | SYSTOLIC BLOOD PRESSURE: 124 MMHG | OXYGEN SATURATION: 99 % | HEIGHT: 60 IN | TEMPERATURE: 97.9 F | DIASTOLIC BLOOD PRESSURE: 80 MMHG

## 2024-08-22 DIAGNOSIS — Z90.89 STATUS POST PARATHYROIDECTOMY: ICD-10-CM

## 2024-08-22 DIAGNOSIS — E21.0 PRIMARY HYPERPARATHYROIDISM (HCC): ICD-10-CM

## 2024-08-22 DIAGNOSIS — Z98.890 STATUS POST PARATHYROIDECTOMY: ICD-10-CM

## 2024-08-22 DIAGNOSIS — E11.9 TYPE 2 DIABETES MELLITUS WITHOUT COMPLICATION, WITHOUT LONG-TERM CURRENT USE OF INSULIN (HCC): Primary | ICD-10-CM

## 2024-08-22 PROBLEM — S04.30XA TRIGEMINAL (5TH) NERVE INJURY: Status: RESOLVED | Noted: 2023-10-13 | Resolved: 2024-08-22

## 2024-08-22 PROCEDURE — 99214 OFFICE O/P EST MOD 30 MIN: CPT | Performed by: NURSE PRACTITIONER

## 2024-08-22 NOTE — ASSESSMENT & PLAN NOTE
Lab Results   Component Value Date    HGBA1C 6.0 (H) 08/20/2024   Will stop the Ozempic at this this point and continue with Metformin once a day and was having some constipation and thinking secondary tot he Ozempic

## 2024-08-22 NOTE — PROGRESS NOTES
Ambulatory Visit  Name: Yuridia Barr      : 1995      MRN: [id number]  Encounter Provider: NAYI Noel  Encounter Date: 2024   Encounter department: [facility]    Assessment & Plan   1. Type 2 diabetes mellitus without complication, without long-term current use of insulin (HCC)  Assessment & Plan:    Lab Results   Component Value Date    HGBA1C 6.0 (H) 2024   Will stop the Ozempic at this this point and continue with Metformin once a day and was having some constipation and thinking secondary tot he Ozempic   Orders:  -     Albumin / creatinine urine ratio; Future; Expected date: 2024  -     Comprehensive metabolic panel; Future; Expected date: 2024  -     Hemoglobin A1C; Future; Expected date: 2024  -     Lipid Panel with Direct LDL reflex; Future; Expected date: 2024  2. Primary hyperparathyroidism (HCC)  Assessment & Plan:  S/p 3 parathyroid removed is still elevated calcium levels mildly elevated will update her PTH   Orders:  -     PTH, intact; Future  3. Status post parathyroidectomy  4. BMI 25.0-25.9,adult       History of Present Illness     Patient here today for here check up and lab review and has no present compalints she is having some constipation and thinking may be related to her medication. Has been off the Ozempic for the past 2 weeks         Review of Systems   Constitutional:  Negative for activity change, appetite change, chills, diaphoresis, fatigue, fever and unexpected weight change.   HENT:  Negative for congestion, ear pain, hearing loss, postnasal drip, sinus pressure, sinus pain, sneezing and sore throat.    Eyes:  Negative for pain, redness and visual disturbance.   Respiratory:  Negative for cough and shortness of breath.    Cardiovascular:  Negative for chest pain and leg swelling.   Gastrointestinal:  Negative for abdominal pain, diarrhea, nausea and vomiting.   Endocrine: Negative.     Genitourinary: Negative.    Musculoskeletal:  Negative for arthralgias.   Skin: Negative.    Allergic/Immunologic: Negative.    Neurological:  Negative for dizziness and light-headedness.   Hematological: Negative.    Psychiatric/Behavioral:  Negative for behavioral problems and dysphoric mood.      Pertinent Medical History         Medical History Reviewed by provider this encounter:  Meds  Problems       Past Medical History   Past Medical History:   Diagnosis Date   • Diabetes mellitus (HCC)    • Gestational diabetes    • Kidney stone    • Migraine      Past Surgical History:   Procedure Laterality Date   • CERVICAL BIOPSY  W/ LOOP ELECTRODE EXCISION  ?   •  SECTION  15, 16, 18   • DILATION AND CURETTAGE OF UTERUS WITH HYSTEROSOCPY N/A 2020    Procedure: DILATATION AND CURETTAGE (D&C);  Surgeon: Kyler Tristan MD;  Location:  MAIN OR;  Service: Gynecology   • ENDOMETRIAL ABLATION N/A 2020    Procedure: ABLATION ENDOMETRIAL JOSHUA;  Surgeon: Kyler Tristan MD;  Location: [facility] OR;  Service: Gynecology   • [place]  DELIVERY ONLY N/A 6/3/2016    Procedure:  SECTION () REPEAT;  Surgeon: Kyler Tristan MD;  Location: [facility];  Service: Obstetrics   • [place]  DELIVERY ONLY N/A 2018    Procedure:  SECTION () REPEAT;  Surgeon: Kyler Tristan MD;  Location: [facility];  Service: Obstetrics   • [place] PARATHYROIDECTOMY/EXPLORATION PARATHYROIDS Left 3/28/2023    Procedure: MINIMALLY INVASIVE LEFT PARATHYROIDECTOMY,  4 GLAND EXPLORATION, INTRAOPERATIVE PTH MONITORING;  Surgeon: Vik Leger MD;  Location:  MAIN OR;  Service: Surgical Oncology   • TUBAL LIGATION N/A 2018    Procedure: LIGATION/COAGULATION TUBAL;  Surgeon: Kyler Tristan MD;  Location: [facility];  Service: Obstetrics     Family History   Problem Relation Age of Onset   • Hyperthyroidism Mother    • Hyperthyroidism Father    • Hypertension Father    • No Known  "Problems Sister    • No Known Problems Brother    • No Known Problems Son    • No Known Problems Daughter    • No Known Problems Daughter    • No Known Problems Paternal Grandmother    • Diabetes Paternal Grandfather    • Down syndrome Cousin         paternal cousin     Current Outpatient Medications on File Prior to Visit   Medication Sig Dispense Refill   • B-D 3CC LUER-SHAE SYR 25GX1/2\" 25G X 1-1/2\" 3 ML MISC Inject into a muscle once a week 9 each 0   • Blood Glucose Monitoring Suppl (OneTouch Verio Reflect) w/Device KIT Check blood sugars once daily. Please substitute with appropriate alternative as covered by patient's insurance. Dx: E11.65 1 kit 0   • glucose blood (OneTouch Verio) test strip Check blood sugars once daily. Please substitute with appropriate alternative as covered by patient's insurance. Dx: E11.65 100 each 3   • Insulin Pen Needle (B-D UF III MINI PEN NEEDLES) 31G X 5 MM MISC Use every 7 days 30 each 0   • metFORMIN (GLUCOPHAGE-XR) 500 mg 24 hr tablet take 1 tablet by mouth with dinner 100 tablet 1   • VITAMIN D PO Take 1 tablet by mouth in the morning     • [DISCONTINUED] semaglutide, 0.25 or 0.5 mg/dose, (Ozempic, 0.25 or 0.5 MG/DOSE,) 2 mg/3 mL injection pen INJECT 0.75 ML (0.5 MG TOTAL) UNDER THE SKIN EVERY 7 DAYS 9 mL 2   • [DISCONTINUED] cyanocobalamin 1,000 mcg/mL Weekly for 5 weeks, monthly for 4 months (Patient not taking: Reported on 7/31/2024) 6 mL 0   • [DISCONTINUED] naproxen (Naprosyn) 500 mg tablet Take 1 tablet (500 mg total) by mouth 2 (two) times a day as needed for mild pain (Patient not taking: Reported on 7/31/2024) 30 tablet 0   • [DISCONTINUED] norethindrone-ethinyl estradiol (JUNEL FE 1/20) 1-20 MG-MCG per tablet Take 1 tablet by mouth daily (Patient not taking: Reported on 7/31/2024) 28 tablet 3   • [DISCONTINUED] OneTouch Delica Lancets 33G MISC Check blood sugars once daily. Please substitute with appropriate alternative as covered by patient's insurance. Dx: E11.65 " "(Patient not taking: Reported on 7/31/2024) 100 each 3     No current facility-administered medications on file prior to visit.   No Known Allergies   Current Outpatient Medications on File Prior to Visit   Medication Sig Dispense Refill   • B-D 3CC LUER-SHAE SYR 25GX1/2\" 25G X 1-1/2\" 3 ML MISC Inject into a muscle once a week 9 each 0   • Blood Glucose Monitoring Suppl (OneTouch Verio Reflect) w/Device KIT Check blood sugars once daily. Please substitute with appropriate alternative as covered by patient's insurance. Dx: E11.65 1 kit 0   • glucose blood (OneTouch Verio) test strip Check blood sugars once daily. Please substitute with appropriate alternative as covered by patient's insurance. Dx: E11.65 100 each 3   • Insulin Pen Needle (B-D UF III MINI PEN NEEDLES) 31G X 5 MM MISC Use every 7 days 30 each 0   • metFORMIN (GLUCOPHAGE-XR) 500 mg 24 hr tablet take 1 tablet by mouth with dinner 100 tablet 1   • VITAMIN D PO Take 1 tablet by mouth in the morning     • [DISCONTINUED] semaglutide, 0.25 or 0.5 mg/dose, (Ozempic, 0.25 or 0.5 MG/DOSE,) 2 mg/3 mL injection pen INJECT 0.75 ML (0.5 MG TOTAL) UNDER THE SKIN EVERY 7 DAYS 9 mL 2   • [DISCONTINUED] cyanocobalamin 1,000 mcg/mL Weekly for 5 weeks, monthly for 4 months (Patient not taking: Reported on 7/31/2024) 6 mL 0   • [DISCONTINUED] naproxen (Naprosyn) 500 mg tablet Take 1 tablet (500 mg total) by mouth 2 (two) times a day as needed for mild pain (Patient not taking: Reported on 7/31/2024) 30 tablet 0   • [DISCONTINUED] norethindrone-ethinyl estradiol (JUNEL FE 1/20) 1-20 MG-MCG per tablet Take 1 tablet by mouth daily (Patient not taking: Reported on 7/31/2024) 28 tablet 3   • [DISCONTINUED] OneTouch Delica Lancets 33G MISC Check blood sugars once daily. Please substitute with appropriate alternative as covered by patient's insurance. Dx: E11.65 (Patient not taking: Reported on 7/31/2024) 100 each 3     No current facility-administered medications on file prior to " visit.      Social History     Tobacco Use   • Smoking status: Never     Passive exposure: Never   • Smokeless tobacco: Never   Vaping Use   • Vaping status: Never Used   Substance and Sexual Activity   • Alcohol use: Not Currently   • Drug use: No   • Sexual activity: Yes     Partners: Male     Birth control/protection: Female Sterilization     Objective     /80 (BP Location: Right arm, Patient Position: Sitting)   Pulse 96   Temp 97.9 °F (36.6 °C) (Temporal)   Ht 5' (1.524 m)   Wt 61.8 kg (136 lb 3.2 oz)   SpO2 99%   BMI 26.60 kg/m²     Physical Exam  Constitutional:       General: She is not in acute distress.     Appearance: She is well-developed.   HENT:      Head: Normocephalic and atraumatic.   Eyes:      Pupils: Pupils are equal, round, and reactive to light.   Neck:      Thyroid: No thyromegaly.   Cardiovascular:      Rate and Rhythm: Normal rate and regular rhythm.      Pulses: no weak pulses.           Dorsalis pedis pulses are 2+ on the right side and 2+ on the left side.        Posterior tibial pulses are 2+ on the right side and 2+ on the left side.      Heart sounds: Normal heart sounds. No murmur heard.  Pulmonary:      Effort: Pulmonary effort is normal. No respiratory distress.      Breath sounds: Normal breath sounds. No wheezing.   Abdominal:      General: Bowel sounds are normal.      Palpations: Abdomen is soft.   Musculoskeletal:         General: Normal range of motion.      Cervical back: Normal range of motion.   Feet:      Right foot:      Skin integrity: No ulcer, skin breakdown, erythema, warmth, callus or dry skin.      Left foot:      Skin integrity: No ulcer, skin breakdown, erythema, warmth, callus or dry skin.   Skin:     General: Skin is warm and dry.   Neurological:      Mental Status: She is alert and oriented to person, place, and time.       Diabetic Foot Exam    Patient's shoes and socks removed.    Right Foot/Ankle   Right Foot Inspection  Skin Exam: skin normal  and skin intact. No dry skin, no warmth, no callus, no erythema, no maceration, no abnormal color, no pre-ulcer, no ulcer and no callus.     Toe Exam: ROM and strength within normal limits.     Sensory   Vibration: intact  Proprioception: intact  Monofilament testing: intact    Vascular  Capillary refills: < 3 seconds  The right DP pulse is 2+. The right PT pulse is 2+.     Left Foot/Ankle  Left Foot Inspection  Skin Exam: skin normal and skin intact. No dry skin, no warmth, no erythema, no maceration, normal color, no pre-ulcer, no ulcer and no callus.     Toe Exam: ROM and strength within normal limits.     Sensory   Vibration: intact  Proprioception: intact  Monofilament testing: intact    Vascular  Capillary refills: < 3 seconds  The left DP pulse is 2+. The left PT pulse is 2+.     Assign Risk Category  No deformity present  No loss of protective sensation  No weak pulses  Risk: 0

## 2024-08-23 ENCOUNTER — PATIENT MESSAGE (OUTPATIENT)
Dept: FAMILY MEDICINE CLINIC | Facility: CLINIC | Age: 29
End: 2024-08-23

## 2024-08-26 NOTE — PATIENT COMMUNICATION
Patient called for update. Advised paperwork can take 7-10 business days and there was nothing in chart stating it was completed yet. Patient requests call when available for pickup.

## 2024-08-27 ENCOUNTER — TELEPHONE (OUTPATIENT)
Dept: FAMILY MEDICINE CLINIC | Facility: CLINIC | Age: 29
End: 2024-08-27

## 2024-10-05 ENCOUNTER — APPOINTMENT (OUTPATIENT)
Dept: LAB | Facility: CLINIC | Age: 29
End: 2024-10-05
Payer: COMMERCIAL

## 2024-10-05 DIAGNOSIS — Z90.89 STATUS POST PARATHYROIDECTOMY: ICD-10-CM

## 2024-10-05 DIAGNOSIS — Z98.890 STATUS POST PARATHYROIDECTOMY: ICD-10-CM

## 2024-10-05 DIAGNOSIS — E83.52 HYPERCALCEMIA: ICD-10-CM

## 2024-10-05 LAB
CALCIUM SERPL-MCNC: 11.2 MG/DL (ref 8.4–10.2)
PTH-INTACT SERPL-MCNC: 53 PG/ML (ref 12–88)

## 2024-10-05 PROCEDURE — 82652 VIT D 1 25-DIHYDROXY: CPT

## 2024-10-05 PROCEDURE — 83970 ASSAY OF PARATHORMONE: CPT

## 2024-10-05 PROCEDURE — 36415 COLL VENOUS BLD VENIPUNCTURE: CPT

## 2024-10-07 LAB — 1,25(OH)2D SERPL-MCNC: 52.3 PG/ML (ref 5–200)

## 2024-10-09 DIAGNOSIS — Z00.6 ENCOUNTER FOR EXAMINATION FOR NORMAL COMPARISON OR CONTROL IN CLINICAL RESEARCH PROGRAM: ICD-10-CM

## 2024-10-10 ENCOUNTER — APPOINTMENT (OUTPATIENT)
Dept: LAB | Facility: CLINIC | Age: 29
End: 2024-10-10

## 2024-10-10 DIAGNOSIS — Z00.6 ENCOUNTER FOR EXAMINATION FOR NORMAL COMPARISON OR CONTROL IN CLINICAL RESEARCH PROGRAM: ICD-10-CM

## 2024-10-10 PROCEDURE — 36415 COLL VENOUS BLD VENIPUNCTURE: CPT

## 2024-10-24 ENCOUNTER — TELEPHONE (OUTPATIENT)
Age: 29
End: 2024-10-24

## 2024-10-24 NOTE — TELEPHONE ENCOUNTER
Patient called in requesting that tomorrows appointment with Dr Leger be virtual.  Please call her back to confirm & she stated that it's ok to leave a message if she doesn't answer

## 2024-10-25 ENCOUNTER — TELEPHONE (OUTPATIENT)
Dept: SURGICAL ONCOLOGY | Facility: CLINIC | Age: 29
End: 2024-10-25

## 2024-10-25 ENCOUNTER — TELEMEDICINE (OUTPATIENT)
Dept: SURGICAL ONCOLOGY | Facility: CLINIC | Age: 29
End: 2024-10-25
Payer: COMMERCIAL

## 2024-10-25 DIAGNOSIS — Z98.890 STATUS POST PARATHYROIDECTOMY: ICD-10-CM

## 2024-10-25 DIAGNOSIS — E83.52 HYPERCALCEMIA: Primary | ICD-10-CM

## 2024-10-25 DIAGNOSIS — Z90.89 STATUS POST PARATHYROIDECTOMY: ICD-10-CM

## 2024-10-25 LAB
APOB+LDLR+PCSK9 GENE MUT ANL BLD/T: NOT DETECTED
BRCA1+BRCA2 DEL+DUP + FULL MUT ANL BLD/T: NOT DETECTED
MLH1+MSH2+MSH6+PMS2 GN DEL+DUP+FUL M: NOT DETECTED

## 2024-10-25 PROCEDURE — 99441 PR PHYS/QHP TELEPHONE EVALUATION 5-10 MIN: CPT | Performed by: SURGERY

## 2024-10-25 NOTE — TELEPHONE ENCOUNTER
Left message for patient to follow prompts in my chart and she will receive a call, called  so he is able to get that message to patient that this a virtual appointment with Dr Leger today @9

## 2024-10-25 NOTE — PROGRESS NOTES
Virtual Brief Visit  Name: Yuridia Barr      : 1995      MRN: [id number]  Encounter Provider: Vik Leger MD  Encounter Date: 10/25/2024   Encounter department: [facility] [place]    This Visit is being completed by telephone. The Patient is located at Home and in the following state in which I hold an active license [place]    The patient was identified by name and date of birth. Yuridia Barr was informed that this is a telemedicine visit and that the visit is being conducted through Telephone.  My office door was closed. No one else was in the room.  She acknowledged consent and understanding of privacy and security of the video platform. The patient has agreed to participate and understands they can discontinue the visit at any time.    Patient is aware this is a billable service.     Assessment & Plan  Hypercalcemia    Orders:    CT parathyroid study w wo contrast; Future      Status post parathyroidectomy               History of Present Illness   HPI    Visit Time  Total Visit Duration: 10 min      Status post 3 gland parathyroidectomy, with left superior gland identified and left in situ.  Persistent elevations of calcium level despite normalization of PTH levels.  Will order CAT scan to reidentify left superior gland to see if this is hypertrophied and consideration for possible reexploration.    Lab Results   Component Value Date    PTH 53.0 10/05/2024    CALCIUM 11.2 (H) 10/05/2024    PHOS 2.7 10/07/2023

## 2024-11-01 ENCOUNTER — HOSPITAL ENCOUNTER (OUTPATIENT)
Dept: CT IMAGING | Facility: HOSPITAL | Age: 29
End: 2024-11-01
Payer: COMMERCIAL

## 2024-11-01 DIAGNOSIS — E83.52 HYPERCALCEMIA: ICD-10-CM

## 2024-11-01 PROCEDURE — 70492 CT SFT TSUE NCK W/O & W/DYE: CPT

## 2024-11-01 RX ADMIN — IOHEXOL 100 ML: 350 INJECTION, SOLUTION INTRAVENOUS at 09:22

## 2024-11-08 ENCOUNTER — OFFICE VISIT (OUTPATIENT)
Dept: SURGICAL ONCOLOGY | Facility: CLINIC | Age: 29
End: 2024-11-08
Payer: COMMERCIAL

## 2024-11-08 VITALS
TEMPERATURE: 98.3 F | RESPIRATION RATE: 14 BRPM | SYSTOLIC BLOOD PRESSURE: 126 MMHG | OXYGEN SATURATION: 99 % | BODY MASS INDEX: 28.43 KG/M2 | HEIGHT: 60 IN | HEART RATE: 88 BPM | WEIGHT: 144.8 LBS | DIASTOLIC BLOOD PRESSURE: 86 MMHG

## 2024-11-08 DIAGNOSIS — Z98.890 STATUS POST PARATHYROIDECTOMY: Primary | ICD-10-CM

## 2024-11-08 DIAGNOSIS — Z90.89 STATUS POST PARATHYROIDECTOMY: Primary | ICD-10-CM

## 2024-11-08 DIAGNOSIS — E83.52 HYPERCALCEMIA: ICD-10-CM

## 2024-11-08 PROCEDURE — 99214 OFFICE O/P EST MOD 30 MIN: CPT | Performed by: SURGERY

## 2024-11-08 NOTE — PROGRESS NOTES
Surgical Oncology Follow Up       [address]  [facility]  [address]  [facility] [id number]    Yuridia Blum Barr  1995  [id number]  [address]  [facility] [place]  [address] [place] [address]    Chief Complaint   Patient presents with    Follow-up       Assessment/Plan:    No problem-specific Assessment & Plan notes found for this encounter.       Diagnoses and all orders for this visit:    Status post parathyroidectomy  -     Ambulatory Referral to Endocrinology; Future  -     Calcium; Future  -     PTH, intact; Future  -     Vitamin D 1,25 dihydroxy; Future    Hypercalcemia      Advance Care Planning/Advance Directives:  Discussed disease status, cancer treatment plans and/or cancer treatment goals with the patient.     Oncology History    No history exists.       History of Present Illness: 29-year-old woman with history of 3 gland parathyroidectomy for hyperplasia.  -Interval History: She comes in for follow-up visit with normal PTH levels and elevated calcium levels.  She has occasional fatigue.  She had recent CAT scan to look for potential recurrent adenoma.  One of her left parathyroid glands was left in situ.    Review of Systems:  Review of Systems   Constitutional: Negative.    HENT: Negative.     Eyes: Negative.    Respiratory: Negative.     Cardiovascular: Negative.    Gastrointestinal: Negative.    Endocrine: Negative.    Genitourinary: Negative.    Musculoskeletal: Negative.    Skin: Negative.    Allergic/Immunologic: Negative.    Neurological: Negative.    Hematological: Negative.    Psychiatric/Behavioral: Negative.     All other systems reviewed and are negative.      Patient Active Problem List   Diagnosis    Type 2 diabetes mellitus without complication, without long-term current use of insulin (HCC)    Hypercalcemia    Primary hyperparathyroidism (HCC)    BMI 25.0-25.9,adult    Status  post parathyroidectomy     Past Medical History:   Diagnosis Date    Diabetes mellitus (HCC)     Gestational diabetes     Kidney stone     Migraine      Past Surgical History:   Procedure Laterality Date    CERVICAL BIOPSY  W/ LOOP ELECTRODE EXCISION  2017?     SECTION  15, 16, 18    DILATION AND CURETTAGE OF UTERUS WITH HYSTEROSOCPY N/A 2020    Procedure: DILATATION AND CURETTAGE (D&C);  Surgeon: Kyler Tristan MD;  Location:  MAIN OR;  Service: Gynecology    ENDOMETRIAL ABLATION N/A 2020    Procedure: ABLATION ENDOMETRIAL JOSHUA;  Surgeon: Kyler Tristan MD;  Location:  MAIN OR;  Service: Gynecology    [place]  DELIVERY ONLY N/A 6/3/2016    Procedure:  SECTION () REPEAT;  Surgeon: Kyler Tristan MD;  Location: [facility];  Service: Obstetrics    [place]  DELIVERY ONLY N/A 2018    Procedure:  SECTION () REPEAT;  Surgeon: Kyler Tristan MD;  Location: [facility];  Service: Obstetrics    [place] PARATHYROIDECTOMY/EXPLORATION PARATHYROIDS Left 3/28/2023    Procedure: MINIMALLY INVASIVE LEFT PARATHYROIDECTOMY,  4 GLAND EXPLORATION, INTRAOPERATIVE PTH MONITORING;  Surgeon: Vik Leger MD;  Location:  MAIN OR;  Service: Surgical Oncology    TUBAL LIGATION N/A 2018    Procedure: LIGATION/COAGULATION TUBAL;  Surgeon: Kyler Tristan MD;  Location: [facility];  Service: Obstetrics     Family History   Problem Relation Age of Onset    Hyperthyroidism Mother     Hyperthyroidism Father     Hypertension Father     No Known Problems Sister     No Known Problems Brother     No Known Problems Son     No Known Problems Daughter     No Known Problems Daughter     No Known Problems Paternal Grandmother     Diabetes Paternal Grandfather     Down syndrome Cousin         paternal cousin     Social History     Socioeconomic History    Marital status: /Civil Union     Spouse name: Not on file    Number of children: Not on file    Years of education:  "Not on file    Highest education level: Not on file   Occupational History    Not on file   Tobacco Use    Smoking status: Never     Passive exposure: Never    Smokeless tobacco: Never   Vaping Use    Vaping status: Never Used   Substance and Sexual Activity    Alcohol use: Not Currently    Drug use: No    Sexual activity: Yes     Partners: Male     Birth control/protection: Female Sterilization   Other Topics Concern    Not on file   Social History Narrative    Not on file     Social Determinants of Health     Financial Resource Strain: Not on file   Food Insecurity: Not on file   Transportation Needs: Not on file   Physical Activity: Not on file   Stress: Not on file   Social Connections: Not on file   Intimate Partner Violence: Not on file   Housing Stability: Not on file       Current Outpatient Medications:     B-D 3CC LUER-SHAE SYR 25GX1/2\" 25G X 1-1/2\" 3 ML MISC, Inject into a muscle once a week, Disp: 9 each, Rfl: 0    Blood Glucose Monitoring Suppl (OneTouch Verio Reflect) w/Device KIT, Check blood sugars once daily. Please substitute with appropriate alternative as covered by patient's insurance. Dx: E11.65, Disp: 1 kit, Rfl: 0    glucose blood (OneTouch Verio) test strip, Check blood sugars once daily. Please substitute with appropriate alternative as covered by patient's insurance. Dx: E11.65, Disp: 100 each, Rfl: 3    Insulin Pen Needle (B-D UF III MINI PEN NEEDLES) 31G X 5 MM MISC, Use every 7 days, Disp: 30 each, Rfl: 0    metFORMIN (GLUCOPHAGE-XR) 500 mg 24 hr tablet, take 1 tablet by mouth with dinner, Disp: 100 tablet, Rfl: 1    VITAMIN D PO, Take 1 tablet by mouth in the morning, Disp: , Rfl:   No Known Allergies  Vitals:    11/08/24 1325   BP: 126/86   Pulse: 88   Resp: 14   Temp: 98.3 °F (36.8 °C)   SpO2: 99%       Physical Exam      Results:  Labs:  Lab Results   Component Value Date    PTH 53.0 10/05/2024    CALCIUM 11.2 (H) 10/05/2024    PHOS 2.7 10/07/2023         Imaging  CT parathyroid " study w wo contrast    Result Date: 11/2/2024  Narrative: CT  NECK  WITHOUT AND WITH CONTRAST FROM CAREN TO SKULL BASE INDICATION: Hyperparathyroidism, which is persistent after a 3 gland parathyroidectomy. Multiple studies have failed to identify a fourth gland. COMPARISON: Prior CT parathyroid dated April 26, 2024 TECHNIQUE:This examination, like all CT scans performed in the [facility], was performed utilizing techniques to minimize radiation dose exposure, including the use of iterative reconstruction and automated exposure control. Both noncontrast, contrast, and post contrast delayed images were performed according to standard parathyroid protocol (4D technique) Coronal and sagittal reconstructions were performed. 3D reconstructions were performed on an independent workstation, and are supplied for review. 100 mL of iohexol (OMNIPAQUE) was injected intravenously without immediate consequence. Radiation dose: 1650.15 mGy-cm FINDINGS: SERIAL NONCONTRAST, POST CONTRAST AND DELAYS: There is no mass lesion demonstrated which meets the enhancement pattern suspicious for parathyroid adenoma. VASCULAR STRUCTURES: There is no carotid stenosis by NASCET criteria. VISUALIZED PARANASAL SINUSES: Normal. NASAL CAVITY AND NASOPHARYNX: Normal. SUPRAHYOID NECK: Normal oropharynx, oral cavity, parapharyngeal and retropharyngeal spaces. INFRAHYOID NECK: Normal oropharynx, oral cavity, parapharyngeal and retropharyngeal spaces. THYROID GLAND: Unremarkable. Again noted is slight asymmetry with the right lobe larger than the left. Postoperative changes are noted around the thyroid in the form of surgical clips. LYMPH NODES: No pathologic or enlarged adenopathy. MEDIASTINUM: Unremarkable. BONY STRUCTURES Normal. LUNG APICES: Unremarkable.     Impression: 1. Fourth parathyroid gland again not identified. 2. Slight asymmetry to the thyroid gland with the right lobe larger than the left and surrounding postoperative  changes again noted. Workstation performed: [id number]     I reviewed the above laboratory and imaging data.    Discussion/Summary: 29-year-old woman status post 3 gland parathyroidectomy.  Persistently high calcium levels despite normal PTH levels.  Referral to endocrinology for further workup.  Otherwise we will plan to follow-up in 1 year with repeat blood work to assess at that time.  If PTH levels started, elevated in conjunction with elevated calcium levels, may consider reexploration at that time.

## 2024-11-29 DIAGNOSIS — E83.52 HYPERCALCEMIA: Primary | ICD-10-CM

## 2024-11-30 ENCOUNTER — APPOINTMENT (OUTPATIENT)
Dept: LAB | Facility: CLINIC | Age: 29
End: 2024-11-30
Payer: COMMERCIAL

## 2024-11-30 ENCOUNTER — RESULTS FOLLOW-UP (OUTPATIENT)
Dept: FAMILY MEDICINE CLINIC | Facility: CLINIC | Age: 29
End: 2024-11-30

## 2024-11-30 DIAGNOSIS — E83.52 HYPERCALCEMIA: ICD-10-CM

## 2024-11-30 DIAGNOSIS — E21.0 PRIMARY HYPERPARATHYROIDISM (HCC): ICD-10-CM

## 2024-11-30 LAB
ALBUMIN SERPL BCG-MCNC: 4.2 G/DL (ref 3.5–5)
ALP SERPL-CCNC: 23 U/L (ref 34–104)
ALT SERPL W P-5'-P-CCNC: 12 U/L (ref 7–52)
ANION GAP SERPL CALCULATED.3IONS-SCNC: 10 MMOL/L (ref 4–13)
AST SERPL W P-5'-P-CCNC: 12 U/L (ref 13–39)
BILIRUB SERPL-MCNC: 0.55 MG/DL (ref 0.2–1)
BUN SERPL-MCNC: 8 MG/DL (ref 5–25)
CALCIUM SERPL-MCNC: 10.6 MG/DL (ref 8.4–10.2)
CHLORIDE SERPL-SCNC: 103 MMOL/L (ref 96–108)
CO2 SERPL-SCNC: 22 MMOL/L (ref 21–32)
CREAT SERPL-MCNC: 0.51 MG/DL (ref 0.6–1.3)
GFR SERPL CREATININE-BSD FRML MDRD: 128 ML/MIN/1.73SQ M
GLUCOSE P FAST SERPL-MCNC: 240 MG/DL (ref 65–99)
POTASSIUM SERPL-SCNC: 3.7 MMOL/L (ref 3.5–5.3)
PROT SERPL-MCNC: 6.9 G/DL (ref 6.4–8.4)
SODIUM SERPL-SCNC: 135 MMOL/L (ref 135–147)

## 2024-11-30 PROCEDURE — 36415 COLL VENOUS BLD VENIPUNCTURE: CPT

## 2024-11-30 PROCEDURE — 80053 COMPREHEN METABOLIC PANEL: CPT

## 2024-12-10 ENCOUNTER — HOSPITAL ENCOUNTER (OUTPATIENT)
Dept: CT IMAGING | Facility: HOSPITAL | Age: 29
Discharge: HOME | End: 2024-12-10
Attending: STUDENT IN AN ORGANIZED HEALTH CARE EDUCATION/TRAINING PROGRAM
Payer: COMMERCIAL

## 2024-12-10 ENCOUNTER — IMMUNIZATIONS (OUTPATIENT)
Dept: FAMILY MEDICINE CLINIC | Facility: CLINIC | Age: 29
End: 2024-12-10
Payer: COMMERCIAL

## 2024-12-10 DIAGNOSIS — E21.0 PRIMARY HYPERPARATHYROIDISM (HCC): ICD-10-CM

## 2024-12-10 DIAGNOSIS — Z23 ENCOUNTER FOR IMMUNIZATION: Primary | ICD-10-CM

## 2024-12-10 PROCEDURE — G1004 CDSM NDSC: HCPCS

## 2024-12-10 PROCEDURE — 90471 IMMUNIZATION ADMIN: CPT

## 2024-12-10 PROCEDURE — 90658 IIV3 VACCINE SPLT 0.5 ML IM: CPT

## 2024-12-10 PROCEDURE — 74170 CT ABD WO CNTRST FLWD CNTRST: CPT

## 2024-12-10 RX ADMIN — IOHEXOL 100 ML: 350 INJECTION, SOLUTION INTRAVENOUS at 10:09

## 2024-12-16 ENCOUNTER — RESULTS FOLLOW-UP (OUTPATIENT)
Dept: ENDOCRINOLOGY | Facility: CLINIC | Age: 29
End: 2024-12-16

## 2024-12-18 ENCOUNTER — APPOINTMENT (OUTPATIENT)
Dept: LAB | Facility: CLINIC | Age: 29
End: 2024-12-18
Payer: COMMERCIAL

## 2024-12-18 DIAGNOSIS — E11.9 TYPE 2 DIABETES MELLITUS WITHOUT COMPLICATION, WITHOUT LONG-TERM CURRENT USE OF INSULIN (HCC): ICD-10-CM

## 2024-12-18 LAB
CHOLEST SERPL-MCNC: 173 MG/DL (ref ?–200)
CREAT UR-MCNC: 144.6 MG/DL
EST. AVERAGE GLUCOSE BLD GHB EST-MCNC: 197 MG/DL
HBA1C MFR BLD: 8.5 %
HDLC SERPL-MCNC: 52 MG/DL
LDLC SERPL CALC-MCNC: 91 MG/DL (ref 0–100)
MICROALBUMIN UR-MCNC: 28.1 MG/L
MICROALBUMIN/CREAT 24H UR: 19 MG/G CREATININE (ref 0–30)
PTH-INTACT SERPL-MCNC: 64 PG/ML (ref 12–88)
TRIGL SERPL-MCNC: 150 MG/DL (ref ?–150)

## 2024-12-18 PROCEDURE — 82043 UR ALBUMIN QUANTITATIVE: CPT

## 2024-12-18 PROCEDURE — 83036 HEMOGLOBIN GLYCOSYLATED A1C: CPT

## 2024-12-18 PROCEDURE — 83970 ASSAY OF PARATHORMONE: CPT

## 2024-12-18 PROCEDURE — 80061 LIPID PANEL: CPT

## 2024-12-18 PROCEDURE — 82570 ASSAY OF URINE CREATININE: CPT

## 2024-12-19 ENCOUNTER — RESULTS FOLLOW-UP (OUTPATIENT)
Dept: FAMILY MEDICINE CLINIC | Facility: CLINIC | Age: 29
End: 2024-12-19

## 2024-12-24 ENCOUNTER — OFFICE VISIT (OUTPATIENT)
Dept: FAMILY MEDICINE CLINIC | Facility: CLINIC | Age: 29
End: 2024-12-24
Payer: COMMERCIAL

## 2024-12-24 VITALS
WEIGHT: 144 LBS | DIASTOLIC BLOOD PRESSURE: 82 MMHG | HEIGHT: 60 IN | HEART RATE: 91 BPM | BODY MASS INDEX: 28.27 KG/M2 | SYSTOLIC BLOOD PRESSURE: 110 MMHG | TEMPERATURE: 97.2 F | OXYGEN SATURATION: 98 %

## 2024-12-24 DIAGNOSIS — Z90.89 STATUS POST PARATHYROIDECTOMY: ICD-10-CM

## 2024-12-24 DIAGNOSIS — Z00.00 ANNUAL PHYSICAL EXAM: Primary | ICD-10-CM

## 2024-12-24 DIAGNOSIS — E11.9 TYPE 2 DIABETES MELLITUS WITHOUT COMPLICATION, WITHOUT LONG-TERM CURRENT USE OF INSULIN (HCC): ICD-10-CM

## 2024-12-24 DIAGNOSIS — Z98.890 STATUS POST PARATHYROIDECTOMY: ICD-10-CM

## 2024-12-24 DIAGNOSIS — R73.09 ELEVATED GLUCOSE: ICD-10-CM

## 2024-12-24 DIAGNOSIS — E21.0 PRIMARY HYPERPARATHYROIDISM (HCC): ICD-10-CM

## 2024-12-24 DIAGNOSIS — E11.65 TYPE 2 DIABETES MELLITUS WITH HYPERGLYCEMIA, WITHOUT LONG-TERM CURRENT USE OF INSULIN (HCC): ICD-10-CM

## 2024-12-24 PROBLEM — E83.52 HYPERCALCEMIA: Status: RESOLVED | Noted: 2022-09-29 | Resolved: 2024-12-24

## 2024-12-24 PROCEDURE — 99395 PREV VISIT EST AGE 18-39: CPT | Performed by: NURSE PRACTITIONER

## 2024-12-24 RX ORDER — METFORMIN HYDROCHLORIDE 500 MG/1
500 TABLET, EXTENDED RELEASE ORAL
Qty: 100 TABLET | Refills: 1 | Status: SHIPPED | OUTPATIENT
Start: 2024-12-24

## 2024-12-24 NOTE — ASSESSMENT & PLAN NOTE
Lab Results   Component Value Date    HGBA1C 8.5 (H) 12/18/2024   Metformin and will restart Ozempic and update labs in 4 months   Orders:  •  semaglutide, 0.25 or 0.5 mg/dose, (Ozempic, 0.25 or 0.5 MG/DOSE,) 2 mg/3 mL injection pen; Inject 0.25 mg under the skin once weekly for 28 days, THEN inject 0.5 mg under the skin once weekly  •  Hemoglobin A1C; Future  •  Comprehensive metabolic panel; Future  •  CBC and differential; Future  •  Albumin / creatinine urine ratio; Future  •  Lipid Panel with Direct LDL reflex; Future

## 2024-12-24 NOTE — PROGRESS NOTES
Adult Annual Physical  Name: Yuridia Barr      : 1995      MRN: [id number]  Encounter Provider: ANYI Noel  Encounter Date: 2024   Encounter department: [facility]    Assessment & Plan  Annual physical exam         Type 2 diabetes mellitus without complication, without long-term current use of insulin (HCC)    Lab Results   Component Value Date    HGBA1C 8.5 (H) 2024   Metformin and will restart Ozempic and update labs in 4 months   Orders:  •  semaglutide, 0.25 or 0.5 mg/dose, (Ozempic, 0.25 or 0.5 MG/DOSE,) 2 mg/3 mL injection pen; Inject 0.25 mg under the skin once weekly for 28 days, THEN inject 0.5 mg under the skin once weekly  •  Hemoglobin A1C; Future  •  Comprehensive metabolic panel; Future  •  CBC and differential; Future  •  Albumin / creatinine urine ratio; Future  •  Lipid Panel with Direct LDL reflex; Future    Primary hyperparathyroidism (HCC)  Following with endocrine        Status post parathyroidectomy  Following with endocirne       BMI 25.0-25.9,adult         Elevated glucose    Orders:  •  metFORMIN (GLUCOPHAGE-XR) 500 mg 24 hr tablet; Take 1 tablet (500 mg total) by mouth daily with dinner    Type 2 diabetes mellitus with hyperglycemia, without long-term current use of insulin (HCC)    Lab Results   Component Value Date    HGBA1C 8.5 (H) 2024     Orders:  •  metFORMIN (GLUCOPHAGE-XR) 500 mg 24 hr tablet; Take 1 tablet (500 mg total) by mouth daily with dinner    Immunizations and preventive care screenings were discussed with patient today. Appropriate education was printed on patient's after visit summary.    Counseling:  Injury prevention: discussed safety/seat belts, safety helmets, smoke detectors, carbon monoxide detectors, and smoking near bedding or upholstery.  Exercise: the importance of regular exercise/physical activity was discussed. Recommend exercise 3-5 times per week for at least 30 minutes.          History  of Present Illness     Adult Annual Physical:  Patient presents for annual physical. Patient here today for her check up and has no new complaints .     Diet and Physical Activity:  - Diet/Nutrition: well balanced diet.  - Exercise: no formal exercise.    Depression Screening:  - PHQ-2 Score: 0    General Health:  - Sleep: sleeps poorly.  - Vision: goes for regular eye exams, wears glasses and most recent eye exam < 1 year ago.  - Dental: no dental visits for > 1 year.    /GYN Health:  - Follows with GYN: no.   - Menopause: premenopausal.     Review of Systems   Constitutional:  Negative for activity change, appetite change, chills, diaphoresis, fatigue, fever and unexpected weight change.   HENT:  Negative for congestion, ear pain, hearing loss, postnasal drip, sinus pressure, sinus pain, sneezing and sore throat.    Eyes:  Negative for pain, redness and visual disturbance.   Respiratory:  Negative for cough and shortness of breath.    Cardiovascular:  Negative for chest pain and leg swelling.   Gastrointestinal:  Negative for abdominal pain, diarrhea, nausea and vomiting.   Musculoskeletal:  Negative for arthralgias.   Neurological:  Negative for dizziness and light-headedness.   Psychiatric/Behavioral:  Negative for behavioral problems and dysphoric mood.        Objective   /82 (BP Location: Left arm, Patient Position: Sitting, Cuff Size: Large)   Pulse 91   Temp (!) 97.2 °F (36.2 °C)   Ht 5' (1.524 m)   Wt 65.3 kg (144 lb)   SpO2 98%   BMI 28.12 kg/m²     Physical Exam  Constitutional:       General: She is not in acute distress.     Appearance: She is well-developed.   HENT:      Head: Normocephalic and atraumatic.   Eyes:      Pupils: Pupils are equal, round, and reactive to light.   Neck:      Thyroid: No thyromegaly.   Cardiovascular:      Rate and Rhythm: Normal rate and regular rhythm.      Pulses: no weak pulses.           Dorsalis pedis pulses are 2+ on the right side and 2+ on the left  side.        Posterior tibial pulses are 2+ on the right side and 2+ on the left side.      Heart sounds: Normal heart sounds. No murmur heard.  Pulmonary:      Effort: Pulmonary effort is normal. No respiratory distress.      Breath sounds: Normal breath sounds. No wheezing.   Abdominal:      General: Bowel sounds are normal.      Palpations: Abdomen is soft.   Musculoskeletal:         General: Normal range of motion.      Cervical back: Normal range of motion.   Feet:      Right foot:      Skin integrity: No ulcer, skin breakdown, erythema, warmth, callus or dry skin.      Left foot:      Skin integrity: No ulcer, skin breakdown, erythema, warmth, callus or dry skin.   Skin:     General: Skin is warm and dry.   Neurological:      Mental Status: She is alert and oriented to person, place, and time.     Patient's shoes and socks removed.    Right Foot/Ankle   Right Foot Inspection  Skin Exam: skin normal and skin intact. No dry skin, no warmth, no callus, no erythema, no maceration, no abnormal color, no pre-ulcer, no ulcer and no callus.     Toe Exam: ROM and strength within normal limits.     Sensory   Vibration: intact  Proprioception: intact  Monofilament testing: intact    Vascular  Capillary refills: < 3 seconds  The right DP pulse is 2+. The right PT pulse is 2+.     Left Foot/Ankle  Left Foot Inspection  Skin Exam: skin normal and skin intact. No dry skin, no warmth, no erythema, no maceration, normal color, no pre-ulcer, no ulcer and no callus.     Toe Exam: ROM and strength within normal limits.     Sensory   Vibration: intact  Proprioception: intact  Monofilament testing: intact    Vascular  Capillary refills: < 3 seconds  The left DP pulse is 2+. The left PT pulse is 2+.     Assign Risk Category  No deformity present  No loss of protective sensation  No weak pulses  Risk: 0

## 2024-12-24 NOTE — PATIENT INSTRUCTIONS
"Patient Education     Routine physical for adults   The Basics   Written by the doctors and editors at [facility]   What is a physical? -- A physical is a routine visit, or \"check-up,\" with your doctor. You might also hear it called a \"wellness visit\" or \"preventive visit.\"  During each visit, the doctor will:   Ask about your physical and mental health   Ask about your habits, behaviors, and lifestyle   Do an exam   Give you vaccines if needed   Talk to you about any medicines you take   Give advice about your health   Answer your questions  Getting regular check-ups is an important part of taking care of your health. It can help your doctor find and treat any problems you have. But it's also important for preventing health problems.  A routine physical is different from a \"sick visit.\" A sick visit is when you see a doctor because of a health concern or problem. Since physicals are scheduled ahead of time, you can think about what you want to ask the doctor.  How often should I get a physical? -- It depends on your age and health. In general, for people age 21 years and older:   If you are younger than 50 years, you might be able to get a physical every 3 years.   If you are 50 years or older, your doctor might recommend a physical every year.  If you have an ongoing health condition, like diabetes or high blood pressure, your doctor will probably want to see you more often.  What happens during a physical? -- In general, each visit will include:   Physical exam - The doctor or nurse will check your height, weight, heart rate, and blood pressure. They will also look at your eyes and ears. They will ask about how you are feeling and whether you have any symptoms that bother you.   Medicines - It's a good idea to bring a list of all the medicines you take to each doctor visit. Your doctor will talk to you about your medicines and answer any questions. Tell them if you are having any side effects that bother you. You " "should also tell them if you are having trouble paying for any of your medicines.   Habits and behaviors - This includes:   Your diet   Your exercise habits   Whether you smoke, drink alcohol, or use drugs   Whether you are sexually active   Whether you feel safe at home  Your doctor will talk to you about things you can do to improve your health and lower your risk of health problems. They will also offer help and support. For example, if you want to quit smoking, they can give you advice and might prescribe medicines. If you want to improve your diet or get more physical activity, they can help you with this, too.   Lab tests, if needed - The tests you get will depend on your age and situation. For example, your doctor might want to check your:   Cholesterol   Blood sugar   Iron level   Vaccines - The recommended vaccines will depend on your age, health, and what vaccines you already had. Vaccines are very important because they can prevent certain serious or deadly infections.   Discussion of screening - \"Screening\" means checking for diseases or other health problems before they cause symptoms. Your doctor can recommend screening based on your age, risk, and preferences. This might include tests to check for:   Cancer, such as breast, prostate, cervical, ovarian, colorectal, prostate, lung, or skin cancer   Sexually transmitted infections, such as chlamydia and gonorrhea   Mental health conditions like depression and anxiety  Your doctor will talk to you about the different types of screening tests. They can help you decide which screenings to have. They can also explain what the results might mean.   Answering questions - The physical is a good time to ask the doctor or nurse questions about your health. If needed, they can refer you to other doctors or specialists, too.  Adults older than 65 years often need other care, too. As you get older, your doctor will talk to you about:   How to prevent falling at " home   Hearing or vision tests   Memory testing   How to take your medicines safely   Making sure that you have the help and support you need at home  All topics are updated as new evidence becomes available and our peer review process is complete.  This topic retrieved from [employer] on: May 02, 2024.  Topic 320473 Version 1.0  Release: 32.4.3 - C32.122  © 2024 [employer] and/or its affiliates. All rights reserved.  Consumer Information Use and Disclaimer   Disclaimer: This generalized information is a limited summary of diagnosis, treatment, and/or medication information. It is not meant to be comprehensive and should be used as a tool to help the user understand and/or assess potential diagnostic and treatment options. It does NOT include all information about conditions, treatments, medications, side effects, or risks that may apply to a specific patient. It is not intended to be medical advice or a substitute for the medical advice, diagnosis, or treatment of a health care provider based on the health care provider's examination and assessment of a patient's specific and unique circumstances. Patients must speak with a health care provider for complete information about their health, medical questions, and treatment options, including any risks or benefits regarding use of medications. This information does not endorse any treatments or medications as safe, effective, or approved for treating a specific patient. [employer] and its affiliates disclaim any warranty or liability relating to this information or the use thereof.The use of this information is governed by the Terms of Use, available at https://www.[employer]Kl[employer]uwer.com/en/know/clinical-effectiveness-terms. 2024© [employer] and its affiliates and/or licensors. All rights reserved.  Copyright   © 2024 [employer] and/or its affiliates. All rights reserved.

## 2024-12-30 ENCOUNTER — CONSULT (OUTPATIENT)
Dept: ENDOCRINOLOGY | Facility: CLINIC | Age: 29
End: 2024-12-30
Payer: COMMERCIAL

## 2024-12-30 VITALS
WEIGHT: 145.6 LBS | TEMPERATURE: 97.8 F | SYSTOLIC BLOOD PRESSURE: 120 MMHG | OXYGEN SATURATION: 99 % | HEART RATE: 92 BPM | DIASTOLIC BLOOD PRESSURE: 68 MMHG | HEIGHT: 60 IN | BODY MASS INDEX: 28.58 KG/M2

## 2024-12-30 DIAGNOSIS — Z90.89 STATUS POST PARATHYROIDECTOMY: ICD-10-CM

## 2024-12-30 DIAGNOSIS — E21.0 PRIMARY HYPERPARATHYROIDISM (HCC): ICD-10-CM

## 2024-12-30 DIAGNOSIS — E83.52 HYPERCALCEMIA: Primary | ICD-10-CM

## 2024-12-30 DIAGNOSIS — Z98.890 STATUS POST PARATHYROIDECTOMY: ICD-10-CM

## 2024-12-30 DIAGNOSIS — E11.65 TYPE 2 DIABETES MELLITUS WITH HYPERGLYCEMIA, WITHOUT LONG-TERM CURRENT USE OF INSULIN (HCC): ICD-10-CM

## 2024-12-30 PROCEDURE — 99215 OFFICE O/P EST HI 40 MIN: CPT | Performed by: STUDENT IN AN ORGANIZED HEALTH CARE EDUCATION/TRAINING PROGRAM

## 2024-12-30 NOTE — PROGRESS NOTES
Name: Yuridia Barr      : 1995      MRN: [id number]  Encounter Provider: Eric Ovalle MD  Encounter Date: 2024   Encounter department: [facility] [place]  :  Assessment & Plan  Status post parathyroidectomy    Orders:    Ambulatory Referral to Endocrinology    Calcium, ionized; Future    PTH, intact; Future    Phosphorus; Future    Vitamin D 25 hydroxy; Future    Hypercalcemia  Hypercalcemia which is PTH-mediated due to primary hyperparathyroidism considering elevated PTH and inappropriately normal PTH, and other diagnosis which is familial hypocalcinuric hypercalcemia was ruled out  Given age, severity of hypercalcemia and history of nephrolithiasis , she undergone 3 glands parathyroidectomy and pathology showed hypercellular parathyroid  She continues to have hypercalcemia after surgery and repeated imaging with parathyroid imaging with 4D CT, was not able to localize the 4th gland.  We reviewed persistent hyperparathyroidism, which a condition where calcium either does not return to normal values or rises again with 6 months of parathyroidectomy. Which is mainly due to multigland parathyroid disease or parathyroid hyperplasia.  In her case despite hypercalcemia improved, still remains elevated, and parathyroid hormone remains inappropriately normal, consistent with primary hyperparathyroidism.  We reviewed her medication list, there is no medication culprit for hypercalcemia.  Previously was evaluated for MEN, which is one the reasons for persistent hypercalcemia and especially in her case with family hx of hypercalcemia., and so far unremarkable, although, she was referred to genetic counseling for further evaluation.  Next question is if she still has indication for parathyroidectomy, which is at least one, and age younger < 50, therefor. Localization prior to reoperation is important,  4D CT parathyroid, negative x 2. I reviewed literature, repeating  sestamibi scan with parathyroid ultrasound is recommended.  One the best modality is 18F- Fluoholine PET/CT, which unfortunately is not available in [facility].  Other imaging is 3D Isabel MRI, which I will reach out to radiology team for availability. We may need to extend MRI to mediastinum for ectopic parathyroid.  Last resort will be invasive procedures, which will be considered if non-invasive fails.  I checked 24 hours urine for calcium and creatinine,  We will order DXA bone scan as well.'           Orders:    Creatinine, urine, 24 hour; Future    Calcium, urine, 24 hour; Future    Basic metabolic panel; Future    Primary hyperparathyroidism (HCC)    Orders:    Ambulatory Referral to Genetics; Future    Type 2 diabetes mellitus with hyperglycemia, without long-term current use of insulin (HCC)    Lab Results   Component Value Date    HGBA1C 8.5 (H) 12/18/2024     Diabetes is suboptimally controlled,  the goal < 7%,  Primary team just started Ozempic 0.5 mg which will be increased slowly to higher dose as she tolerates.  Continue Metformin,               History of Present Illness   HPI  Yuridia Barr is a 29 y.o. female who presents for follow up for primary hyperparathyroidism    She has history of hypercalcemia since 2018, ranging from 10.6 to 12.0 mg/dl with elevated PTH 80.9 ,82.8 and inappropriately normal PTH,    She has history of nephrolithiasis,   Family history of hyperparathyroidism in her father    CT parathyroid showed 0.4 x 0.5 x 0.8 cm lesion sitting inferior to the left thyroid lobe as described above which meets the CT enhancement criteria typical for parathyroid adenoma or hyperplasia. 2 additional lesions identified in the right and left neck sitting adjacent to the esophagus at the same level bilaterally, approximately 2 cm caudal to the cricoid cartilage. These lesions may represent normal or hyperplastic glands, or possibly   parathyroid adenomata    24 hours urine calcium , 280   "mg/24 hrs,    She was checked for pheochromocytoma which was negative, normal calcitonin, and thyroid ultrasound negative for thyroid nodules.    She is s/p parathyroidectomy 3 glands. On 328/2023. And final pathology showed :    A. Parathyroid, Right Superior, Parathyroidectomy:  - Hypercellular parathyroid tissue, 160 mg.     B. Parathyroid, Right Inferior, Parathyroidectomy:  - Hypercellular parathyroid tissue, 130 mg.      C. Parathyroid, Left Inferior, Parathyroidectomy:  - Hypercellular parathyroid tissue, 110 mg.      D. Parathyroid, Left Superior, Parathyroidectomy:  - Hypercellular parathyroid tissue, 10 mg.     History obtained from: patient    Review of Systems   Constitutional:  Negative for fatigue and unexpected weight change.   Endocrine: Negative for polydipsia and polyuria.     Medical History Reviewed by provider this encounter:     .  Current Outpatient Medications on File Prior to Visit   Medication Sig Dispense Refill    B-D 3CC LUER-SHAE SYR 25GX1/2\" 25G X 1-1/2\" 3 ML MISC Inject into a muscle once a week 9 each 0    Blood Glucose Monitoring Suppl (OneTouch Verio Reflect) w/Device KIT Check blood sugars once daily. Please substitute with appropriate alternative as covered by patient's insurance. Dx: E11.65 1 kit 0    glucose blood (OneTouch Verio) test strip Check blood sugars once daily. Please substitute with appropriate alternative as covered by patient's insurance. Dx: E11.65 100 each 3    Insulin Pen Needle (B-D UF III MINI PEN NEEDLES) 31G X 5 MM MISC Use every 7 days 30 each 0    metFORMIN (GLUCOPHAGE-XR) 500 mg 24 hr tablet Take 1 tablet (500 mg total) by mouth daily with dinner 100 tablet 1    semaglutide, 0.25 or 0.5 mg/dose, (Ozempic, 0.25 or 0.5 MG/DOSE,) 2 mg/3 mL injection pen Inject 0.25 mg under the skin once weekly for 28 days, THEN inject 0.5 mg under the skin once weekly 9 mL 0    VITAMIN D PO Take 1 tablet by mouth in the morning       No current facility-administered " medications on file prior to visit.         Objective   /68 (BP Location: Right arm, Patient Position: Sitting, Cuff Size: Adult)   Pulse 92   Temp 97.8 °F (36.6 °C) (Temporal)   Ht 5' (1.524 m)   Wt 66 kg (145 lb 9.6 oz)   SpO2 99%   BMI 28.44 kg/m²      Physical Exam  Vitals and nursing note reviewed.   Constitutional:       General: She is not in acute distress.     Appearance: She is well-developed.   HENT:      Head: Normocephalic and atraumatic.   Neck:      Comments: Parathyroidectomy scar  Cardiovascular:      Rate and Rhythm: Normal rate and regular rhythm.   Pulmonary:      Effort: Pulmonary effort is normal. No respiratory distress.   Musculoskeletal:         General: No swelling.      Cervical back: Neck supple.   Skin:     General: Skin is warm and dry.   Neurological:      Mental Status: She is alert.         Administrative Statements   I have spent a total time of 60 minutes in caring for this patient on the day of the visit/encounter including Diagnostic results, Prognosis, Risks and benefits of tx options, Instructions for management, Patient and family education, Importance of tx compliance, Risk factor reductions, Impressions, Counseling / Coordination of care, Documenting in the medical record, Reviewing / ordering tests, medicine, procedures  , Obtaining or reviewing history  , and Communicating with other healthcare professionals .     Component      Latest Ref Rng 10/31/2022 9/4/2023 10/7/2023 8/20/2024 10/5/2024   Sodium      135 - 147 mmol/L    137     Potassium      3.5 - 5.3 mmol/L    3.9     Chloride      96 - 108 mmol/L    104     Carbon Dioxide      21 - 32 mmol/L    24     ANION GAP      4 - 13 mmol/L    9     BUN      5 - 25 mg/dL    10     Creatinine      0.60 - 1.30 mg/dL    0.44 (L)     GLUCOSE, FASTING      65 - 99 mg/dL    150 (H)     Calcium      8.4 - 10.2 mg/dL    10.8 (H)  11.2 (H)    AST      13 - 39 U/L    11 (L)     ALT      7 - 52 U/L    9     ALK PHOS      34  - 104 U/L    27 (L)     Total Protein      6.4 - 8.4 g/dL    6.8     Albumin      3.5 - 5.0 g/dL    4.3     Total Bilirubin      0.20 - 1.00 mg/dL    0.28     GFR, Calculated      ml/min/1.73sq m    134     24 HR URINE VOLUME      mL 2,000        CALCIUM 24 HOUR URINE      42 - 353 mg/24 hrs 280        PTH      12.0 - 88.0 pg/mL   60.4   53.0    Phosphorus      2.7 - 4.5 mg/dL   2.7      CALCITONIN LEVEL      0.0 - 5.0 pg/mL   <2.0      Calcium, Ionized      1.12 - 1.32 mmol/L  1.35 (H)         Component      Latest Ref Rng 11/30/2024 12/18/2024   Sodium      135 - 147 mmol/L 135     Potassium      3.5 - 5.3 mmol/L 3.7     Chloride      96 - 108 mmol/L 103     Carbon Dioxide      21 - 32 mmol/L 22     ANION GAP      4 - 13 mmol/L 10     BUN      5 - 25 mg/dL 8     Creatinine      0.60 - 1.30 mg/dL 0.51 (L)     GLUCOSE, FASTING      65 - 99 mg/dL 240 (H)     Calcium      8.4 - 10.2 mg/dL 10.6 (H)     AST      13 - 39 U/L 12 (L)     ALT      7 - 52 U/L 12     ALK PHOS      34 - 104 U/L 23 (L)     Total Protein      6.4 - 8.4 g/dL 6.9     Albumin      3.5 - 5.0 g/dL 4.2     Total Bilirubin      0.20 - 1.00 mg/dL 0.55     GFR, Calculated      ml/min/1.73sq m 128     24 HR URINE VOLUME      mL     CALCIUM 24 HOUR URINE      42 - 353 mg/24 hrs     PTH      12.0 - 88.0 pg/mL  64.0    Phosphorus      2.7 - 4.5 mg/dL     CALCITONIN LEVEL      0.0 - 5.0 pg/mL     Calcium, Ionized      1.12 - 1.32 mmol/L        CT -  ABDOMEN        INDICATION: E21.0: Primary hyperparathyroidism.     COMPARISON: None.     TECHNIQUE: Initial CT of the abdomen and pelvis was performed without intravenous contrast. Subsequent dynamic CT evaluation of the abdomen was performed after the administration of intravenous contrast in both nephrographic and delayed phases after the   administration of intravenous contrast. Multiplanar 2D reformatted images were created from the source data.     This examination, like all CT scans performed in the .  On license of [facility], was performed utilizing techniques to minimize radiation dose exposure, including the use of iterative reconstruction and automated exposure control. Radiation dose length   product (DLP) for this visit: 1202.51 mGy-cm     IV Contrast: 100 mL of iohexol (OMNIPAQUE)  Enteric Contrast: Not administered.     FINDINGS:     LOWER CHEST: No clinically significant abnormality in the visualized lower chest.     LIVER/BILIARY TREE: Hypoattenuation at the central portion of hepatic segment 4B adjacent to the gallbladder fossa, likely focal fatty deposition (series 2 image #40 and series 3 image #40).     GALLBLADDER: No calcified gallstones. No pericholecystic inflammatory change.     SPLEEN: Unremarkable.     PANCREAS: No arterial phase was performed. Within the limits of evaluation, there is no apparent pancreatic mass identified.     ADRENAL GLANDS: Unremarkable.     KIDNEYS/VISUALIZED URETERS: No hydronephrosis.  3 mm left upper pole nonobstructive calculus.  2 mm left lower pole nonobstructive calculus.     STOMACH AND VISUALIZED BOWEL: Unremarkable.     ABDOMINAL CAVITY: No ascites. No pneumoperitoneum. No lymphadenopathy.     VESSELS: Unremarkable for patient's age.     ABDOMINAL WALL: Unremarkable.     BONES: No acute fracture or suspicious osseous lesion.     IMPRESSION:     No pancreatic mass identified within the limits of this study.  Nonobstructive left renal calculi.      CT  NECK  WITHOUT AND WITH CONTRAST FROM CAREN TO SKULL BASE     INDICATION: Hyperparathyroidism, which is persistent after a 3 gland parathyroidectomy. Multiple studies have failed to identify a fourth gland.     COMPARISON: Prior CT parathyroid dated April 26, 2024     TECHNIQUE:This examination, like all CT scans performed in the [facility], was performed utilizing techniques to minimize radiation dose exposure, including the use of iterative reconstruction and automated exposure control.     Both  noncontrast, contrast, and post contrast delayed images were performed according to standard parathyroid protocol (4D technique) Coronal and sagittal reconstructions were performed. 3D reconstructions were performed on an independent workstation,   and are supplied for review.     100 mL of iohexol (OMNIPAQUE) was injected intravenously without immediate consequence.     Radiation dose: 1650.15 mGy-cm     FINDINGS:     SERIAL NONCONTRAST, POST CONTRAST AND DELAYS: There is no mass lesion demonstrated which meets the enhancement pattern suspicious for parathyroid adenoma.     VASCULAR STRUCTURES:  There is no carotid stenosis by NASCET criteria.     VISUALIZED PARANASAL SINUSES:  Normal.     NASAL CAVITY AND NASOPHARYNX:  Normal.     SUPRAHYOID NECK:  Normal oropharynx, oral cavity, parapharyngeal and retropharyngeal spaces.     INFRAHYOID NECK:  Normal oropharynx, oral cavity, parapharyngeal and retropharyngeal spaces.     THYROID GLAND:  Unremarkable. Again noted is slight asymmetry with the right lobe larger than the left. Postoperative changes are noted around the thyroid in the form of surgical clips.     LYMPH NODES:  No pathologic or enlarged adenopathy.     MEDIASTINUM:  Unremarkable.     BONY STRUCTURES  Normal.     LUNG APICES:  Unremarkable.     IMPRESSION:     1. Fourth parathyroid gland again not identified.  2. Slight asymmetry to the thyroid gland with the right lobe larger than the left and surrounding postoperative changes again noted

## 2024-12-31 NOTE — ASSESSMENT & PLAN NOTE
Orders:    Ambulatory Referral to Endocrinology    Calcium, ionized; Future    PTH, intact; Future    Phosphorus; Future    Vitamin D 25 hydroxy; Future

## 2024-12-31 NOTE — ASSESSMENT & PLAN NOTE
Hypercalcemia which is PTH-mediated due to primary hyperparathyroidism considering elevated PTH and inappropriately normal PTH, and other diagnosis which is familial hypocalcinuric hypercalcemia was ruled out  Given age, severity of hypercalcemia and history of nephrolithiasis , she undergone 3 glands parathyroidectomy and pathology showed hypercellular parathyroid  She continues to have hypercalcemia after surgery and repeated imaging with parathyroid imaging with 4D CT, was not able to localize the 4th gland.  We reviewed persistent hyperparathyroidism, which a condition where calcium either does not return to normal values or rises again with 6 months of parathyroidectomy. Which is mainly due to multigland parathyroid disease or parathyroid hyperplasia.  In her case despite hypercalcemia improved, still remains elevated, and parathyroid hormone remains inappropriately normal, consistent with primary hyperparathyroidism.  We reviewed her medication list, there is no medication culprit for hypercalcemia.  Previously was evaluated for MEN, which is one the reasons for persistent hypercalcemia and especially in her case with family hx of hypercalcemia., and so far unremarkable, although, she was referred to genetic counseling for further evaluation.  Next question is if she still has indication for parathyroidectomy, which is at least one, and age younger < 50, therefor. Localization prior to reoperation is important,  4D CT parathyroid, negative x 2. I reviewed literature, repeating sestamibi scan with parathyroid ultrasound is recommended.  One the best modality is 18F- Fluoholine PET/CT, which unfortunately is not available in [facility].  Other imaging is 3D Isabel MRI, which I will reach out to radiology team for availability. We may need to extend MRI to mediastinum for ectopic parathyroid.  Last resort will be invasive procedures, which will be considered if non-invasive fails.  I checked 24 hours urine for  calcium and creatinine,  We will order DXA bone scan as well.'           Orders:    Creatinine, urine, 24 hour; Future    Calcium, urine, 24 hour; Future    Basic metabolic panel; Future

## 2025-01-31 ENCOUNTER — OFFICE VISIT (OUTPATIENT)
Dept: FAMILY MEDICINE CLINIC | Facility: CLINIC | Age: 30
End: 2025-01-31
Payer: COMMERCIAL

## 2025-01-31 VITALS
HEART RATE: 76 BPM | OXYGEN SATURATION: 100 % | HEIGHT: 60 IN | DIASTOLIC BLOOD PRESSURE: 74 MMHG | BODY MASS INDEX: 27.56 KG/M2 | SYSTOLIC BLOOD PRESSURE: 116 MMHG | TEMPERATURE: 97.8 F | WEIGHT: 140.4 LBS

## 2025-01-31 DIAGNOSIS — E21.0 PRIMARY HYPERPARATHYROIDISM (HCC): ICD-10-CM

## 2025-01-31 DIAGNOSIS — R05.9 COUGH, UNSPECIFIED TYPE: Primary | ICD-10-CM

## 2025-01-31 DIAGNOSIS — J01.00 ACUTE NON-RECURRENT MAXILLARY SINUSITIS: ICD-10-CM

## 2025-01-31 DIAGNOSIS — E11.65 TYPE 2 DIABETES MELLITUS WITH HYPERGLYCEMIA, WITHOUT LONG-TERM CURRENT USE OF INSULIN (HCC): ICD-10-CM

## 2025-01-31 LAB
SARS-COV-2 AG UPPER RESP QL IA: NEGATIVE
SL AMB POCT RAPID FLU A: NEGATIVE
SL AMB POCT RAPID FLU B: NEGATIVE
VALID CONTROL: NORMAL

## 2025-01-31 PROCEDURE — 99213 OFFICE O/P EST LOW 20 MIN: CPT | Performed by: NURSE PRACTITIONER

## 2025-01-31 PROCEDURE — 87804 INFLUENZA ASSAY W/OPTIC: CPT | Performed by: NURSE PRACTITIONER

## 2025-01-31 PROCEDURE — 87811 SARS-COV-2 COVID19 W/OPTIC: CPT | Performed by: NURSE PRACTITIONER

## 2025-01-31 RX ORDER — AZITHROMYCIN 250 MG/1
TABLET, FILM COATED ORAL
Qty: 6 TABLET | Refills: 0 | Status: SHIPPED | OUTPATIENT
Start: 2025-01-31 | End: 2025-02-04

## 2025-01-31 NOTE — PROGRESS NOTES
Name: Yuridia Barr      : 1995      MRN: [id number]  Encounter Provider: ANYI Noel  Encounter Date: 2025   Encounter department: [facility]  :  Assessment & Plan  Primary hyperparathyroidism (HCC)       Folloiwng with endocrine  Type 2 diabetes mellitus with hyperglycemia, without long-term current use of insulin (HCC)    Lab Results   Component Value Date    HGBA1C 8.5 (H) 2024     Patient is currently on Metformin and Ozempic        Cough, unspecified type    Orders:  •  POCT Rapid Covid Ag  •  POCT rapid flu A and B  •  azithromycin (ZITHROMAX) 250 mg tablet; Take 2 tablets today then 1 tablet daily x 4 days    Acute non-recurrent maxillary sinusitis    Orders:  •  azithromycin (ZITHROMAX) 250 mg tablet; Take 2 tablets today then 1 tablet daily x 4 days  Will cover for the atypical and IH COVID and FLU are negative          History of Present Illness   Patient here today and reports that she was exposed to her nephew who has the influenza yesterday and patient reports that she is having occasional coughing and bringing up yellow and green mucus and taking OTC medications symptoms present for the past several days.       Review of Systems   Constitutional:  Positive for fatigue. Negative for activity change, appetite change, chills, diaphoresis, fever and unexpected weight change.   HENT:  Positive for congestion. Negative for ear pain, hearing loss, postnasal drip, sinus pressure, sinus pain, sneezing and sore throat.    Eyes:  Negative for pain, redness and visual disturbance.   Respiratory:  Positive for cough. Negative for shortness of breath.    Cardiovascular:  Negative for chest pain and leg swelling.   Gastrointestinal:  Positive for nausea. Negative for abdominal pain, diarrhea and vomiting.   Endocrine: Negative.    Genitourinary: Negative.    Musculoskeletal:  Negative for arthralgias.   Skin: Negative.    Allergic/Immunologic: Negative.     Neurological:  Positive for headaches. Negative for dizziness and light-headedness.   Hematological: Negative.    Psychiatric/Behavioral:  Negative for behavioral problems and dysphoric mood.        Objective   /74 (BP Location: Left arm, Patient Position: Sitting)   Pulse 76   Temp 97.8 °F (36.6 °C) (Temporal)   Ht 5' (1.524 m)   Wt 63.7 kg (140 lb 6.4 oz)   SpO2 100%   BMI 27.42 kg/m²      Physical Exam  Vitals and nursing note reviewed.   Constitutional:       General: She is not in acute distress.     Appearance: She is well-developed.   HENT:      Head: Normocephalic and atraumatic.      Right Ear: Tympanic membrane normal.      Left Ear: Tympanic membrane normal.      Nose: Nose normal.      Mouth/Throat:      Mouth: Mucous membranes are moist.   Eyes:      Conjunctiva/sclera: Conjunctivae normal.   Cardiovascular:      Rate and Rhythm: Normal rate and regular rhythm.      Heart sounds: No murmur heard.  Pulmonary:      Effort: Pulmonary effort is normal. No respiratory distress.      Breath sounds: Normal breath sounds.   Abdominal:      Palpations: Abdomen is soft.      Tenderness: There is no abdominal tenderness.   Musculoskeletal:         General: No swelling.      Cervical back: Neck supple.   Skin:     General: Skin is warm and dry.      Capillary Refill: Capillary refill takes less than 2 seconds.   Neurological:      General: No focal deficit present.      Mental Status: She is alert and oriented to person, place, and time.   Psychiatric:         Mood and Affect: Mood normal.

## 2025-03-03 ENCOUNTER — TELEPHONE (OUTPATIENT)
Age: 30
End: 2025-03-03

## 2025-03-03 NOTE — TELEPHONE ENCOUNTER
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     Pt opted to schedule for: 03/04 - 10:30 AM

## 2025-03-04 ENCOUNTER — CLINICAL SUPPORT (OUTPATIENT)
Dept: FAMILY MEDICINE CLINIC | Facility: CLINIC | Age: 30
End: 2025-03-04
Payer: COMMERCIAL

## 2025-03-04 DIAGNOSIS — Z11.1 SCREENING FOR TUBERCULOSIS: Primary | ICD-10-CM

## 2025-03-04 PROCEDURE — 86580 TB INTRADERMAL TEST: CPT

## 2025-03-06 ENCOUNTER — CLINICAL SUPPORT (OUTPATIENT)
Dept: FAMILY MEDICINE CLINIC | Facility: CLINIC | Age: 30
End: 2025-03-06

## 2025-03-06 DIAGNOSIS — Z11.1 ENCOUNTER FOR PPD SKIN TEST READING: Primary | ICD-10-CM

## 2025-03-06 LAB
INDURATION: 0 MM
TB SKIN TEST: NEGATIVE

## 2025-03-06 PROCEDURE — NURSE

## 2025-03-28 ENCOUNTER — APPOINTMENT (OUTPATIENT)
Dept: LAB | Facility: CLINIC | Age: 30
End: 2025-03-28
Payer: COMMERCIAL

## 2025-03-28 DIAGNOSIS — E83.52 HYPERCALCEMIA: ICD-10-CM

## 2025-03-28 DIAGNOSIS — Z90.89 STATUS POST PARATHYROIDECTOMY: ICD-10-CM

## 2025-03-28 DIAGNOSIS — Z98.890 STATUS POST PARATHYROIDECTOMY: ICD-10-CM

## 2025-03-28 LAB
25(OH)D3 SERPL-MCNC: 29.3 NG/ML (ref 30–100)
ANION GAP SERPL CALCULATED.3IONS-SCNC: 9 MMOL/L (ref 4–13)
BUN SERPL-MCNC: 9 MG/DL (ref 5–25)
CA-I BLD-SCNC: 1.46 MMOL/L (ref 1.12–1.32)
CALCIUM SERPL-MCNC: 11.4 MG/DL (ref 8.4–10.2)
CHLORIDE SERPL-SCNC: 102 MMOL/L (ref 96–108)
CO2 SERPL-SCNC: 27 MMOL/L (ref 21–32)
CREAT SERPL-MCNC: 0.48 MG/DL (ref 0.6–1.3)
GFR SERPL CREATININE-BSD FRML MDRD: 131 ML/MIN/1.73SQ M
GLUCOSE P FAST SERPL-MCNC: 126 MG/DL (ref 65–99)
PHOSPHATE SERPL-MCNC: 2.1 MG/DL (ref 2.7–4.5)
POTASSIUM SERPL-SCNC: 3.3 MMOL/L (ref 3.5–5.3)
PTH-INTACT SERPL-MCNC: 76.3 PG/ML (ref 12–88)
SODIUM SERPL-SCNC: 138 MMOL/L (ref 135–147)

## 2025-03-28 PROCEDURE — 82330 ASSAY OF CALCIUM: CPT

## 2025-03-28 PROCEDURE — 83970 ASSAY OF PARATHORMONE: CPT

## 2025-03-28 PROCEDURE — 84100 ASSAY OF PHOSPHORUS: CPT

## 2025-03-28 PROCEDURE — 36415 COLL VENOUS BLD VENIPUNCTURE: CPT

## 2025-03-28 PROCEDURE — 82306 VITAMIN D 25 HYDROXY: CPT

## 2025-03-28 PROCEDURE — 80048 BASIC METABOLIC PNL TOTAL CA: CPT

## 2025-03-31 LAB
LEFT EYE DIABETIC RETINOPATHY: NORMAL
RIGHT EYE DIABETIC RETINOPATHY: NORMAL

## 2025-04-04 DIAGNOSIS — E11.9 TYPE 2 DIABETES MELLITUS WITHOUT COMPLICATION, WITHOUT LONG-TERM CURRENT USE OF INSULIN (HCC): ICD-10-CM

## 2025-04-07 ENCOUNTER — RESULTS FOLLOW-UP (OUTPATIENT)
Dept: ENDOCRINOLOGY | Facility: CLINIC | Age: 30
End: 2025-04-07

## 2025-04-07 RX ORDER — SEMAGLUTIDE 0.68 MG/ML
INJECTION, SOLUTION SUBCUTANEOUS
Qty: 9 ML | Refills: 0 | Status: SHIPPED | OUTPATIENT
Start: 2025-04-07

## 2025-04-21 ENCOUNTER — APPOINTMENT (OUTPATIENT)
Dept: LAB | Facility: CLINIC | Age: 30
End: 2025-04-21
Payer: COMMERCIAL

## 2025-04-21 DIAGNOSIS — E11.9 TYPE 2 DIABETES MELLITUS WITHOUT COMPLICATION, WITHOUT LONG-TERM CURRENT USE OF INSULIN (HCC): ICD-10-CM

## 2025-04-21 LAB
ALBUMIN SERPL BCG-MCNC: 4.1 G/DL (ref 3.5–5)
ALP SERPL-CCNC: 26 U/L (ref 34–104)
ALT SERPL W P-5'-P-CCNC: 8 U/L (ref 7–52)
ANION GAP SERPL CALCULATED.3IONS-SCNC: 8 MMOL/L (ref 4–13)
AST SERPL W P-5'-P-CCNC: 10 U/L (ref 13–39)
BASOPHILS # BLD AUTO: 0.04 THOUSANDS/ÂΜL (ref 0–0.1)
BASOPHILS NFR BLD AUTO: 1 % (ref 0–1)
BILIRUB SERPL-MCNC: 0.46 MG/DL (ref 0.2–1)
BUN SERPL-MCNC: 8 MG/DL (ref 5–25)
CALCIUM SERPL-MCNC: 10.7 MG/DL (ref 8.4–10.2)
CHLORIDE SERPL-SCNC: 104 MMOL/L (ref 96–108)
CHOLEST SERPL-MCNC: 154 MG/DL (ref ?–200)
CO2 SERPL-SCNC: 26 MMOL/L (ref 21–32)
CREAT SERPL-MCNC: 0.44 MG/DL (ref 0.6–1.3)
CREAT UR-MCNC: 112.8 MG/DL
EOSINOPHIL # BLD AUTO: 0.1 THOUSAND/ÂΜL (ref 0–0.61)
EOSINOPHIL NFR BLD AUTO: 1 % (ref 0–6)
ERYTHROCYTE [DISTWIDTH] IN BLOOD BY AUTOMATED COUNT: 12.3 % (ref 11.6–15.1)
EST. AVERAGE GLUCOSE BLD GHB EST-MCNC: 120 MG/DL
GFR SERPL CREATININE-BSD FRML MDRD: 134 ML/MIN/1.73SQ M
GLUCOSE P FAST SERPL-MCNC: 138 MG/DL (ref 65–99)
HBA1C MFR BLD: 5.8 %
HCT VFR BLD AUTO: 38 % (ref 34.8–46.1)
HDLC SERPL-MCNC: 49 MG/DL
HGB BLD-MCNC: 13.3 G/DL (ref 11.5–15.4)
IMM GRANULOCYTES # BLD AUTO: 0.08 THOUSAND/UL (ref 0–0.2)
IMM GRANULOCYTES NFR BLD AUTO: 1 % (ref 0–2)
LDLC SERPL CALC-MCNC: 83 MG/DL (ref 0–100)
LYMPHOCYTES # BLD AUTO: 1.66 THOUSANDS/ÂΜL (ref 0.6–4.47)
LYMPHOCYTES NFR BLD AUTO: 23 % (ref 14–44)
MCH RBC QN AUTO: 31.4 PG (ref 26.8–34.3)
MCHC RBC AUTO-ENTMCNC: 35 G/DL (ref 31.4–37.4)
MCV RBC AUTO: 90 FL (ref 82–98)
MICROALBUMIN UR-MCNC: 13.9 MG/L
MICROALBUMIN/CREAT 24H UR: 12 MG/G CREATININE (ref 0–30)
MONOCYTES # BLD AUTO: 0.56 THOUSAND/ÂΜL (ref 0.17–1.22)
MONOCYTES NFR BLD AUTO: 8 % (ref 4–12)
NEUTROPHILS # BLD AUTO: 4.93 THOUSANDS/ÂΜL (ref 1.85–7.62)
NEUTS SEG NFR BLD AUTO: 66 % (ref 43–75)
NRBC BLD AUTO-RTO: 0 /100 WBCS
PLATELET # BLD AUTO: 240 THOUSANDS/UL (ref 149–390)
PMV BLD AUTO: 11.4 FL (ref 8.9–12.7)
POTASSIUM SERPL-SCNC: 3.8 MMOL/L (ref 3.5–5.3)
PROT SERPL-MCNC: 6.6 G/DL (ref 6.4–8.4)
RBC # BLD AUTO: 4.23 MILLION/UL (ref 3.81–5.12)
SODIUM SERPL-SCNC: 138 MMOL/L (ref 135–147)
TRIGL SERPL-MCNC: 108 MG/DL (ref ?–150)
WBC # BLD AUTO: 7.37 THOUSAND/UL (ref 4.31–10.16)

## 2025-04-21 PROCEDURE — 80061 LIPID PANEL: CPT

## 2025-04-21 PROCEDURE — 85025 COMPLETE CBC W/AUTO DIFF WBC: CPT

## 2025-04-21 PROCEDURE — 36415 COLL VENOUS BLD VENIPUNCTURE: CPT

## 2025-04-21 PROCEDURE — 82043 UR ALBUMIN QUANTITATIVE: CPT

## 2025-04-21 PROCEDURE — 80053 COMPREHEN METABOLIC PANEL: CPT

## 2025-04-21 PROCEDURE — 83036 HEMOGLOBIN GLYCOSYLATED A1C: CPT

## 2025-04-21 PROCEDURE — 82570 ASSAY OF URINE CREATININE: CPT

## 2025-04-22 ENCOUNTER — RESULTS FOLLOW-UP (OUTPATIENT)
Dept: FAMILY MEDICINE CLINIC | Facility: CLINIC | Age: 30
End: 2025-04-22

## 2025-04-23 ENCOUNTER — APPOINTMENT (OUTPATIENT)
Dept: LAB | Facility: CLINIC | Age: 30
End: 2025-04-23
Payer: COMMERCIAL

## 2025-04-23 LAB
CALCIUM 24H UR-MCNC: 133.95 MG/24 HRS (ref 100–300)
CREAT 24H UR-MRATE: 1.1 G/24HR (ref 0.6–1.8)
SPECIMEN VOL UR: 950 ML
SPECIMEN VOL UR: 950 ML

## 2025-04-23 PROCEDURE — 82340 ASSAY OF CALCIUM IN URINE: CPT

## 2025-04-23 PROCEDURE — 82570 ASSAY OF URINE CREATININE: CPT

## 2025-04-24 ENCOUNTER — OFFICE VISIT (OUTPATIENT)
Dept: FAMILY MEDICINE CLINIC | Facility: CLINIC | Age: 30
End: 2025-04-24
Payer: COMMERCIAL

## 2025-04-24 VITALS
WEIGHT: 131.6 LBS | HEART RATE: 78 BPM | TEMPERATURE: 97.8 F | DIASTOLIC BLOOD PRESSURE: 78 MMHG | HEIGHT: 60 IN | BODY MASS INDEX: 25.84 KG/M2 | SYSTOLIC BLOOD PRESSURE: 122 MMHG | OXYGEN SATURATION: 99 %

## 2025-04-24 DIAGNOSIS — E21.0 PRIMARY HYPERPARATHYROIDISM (HCC): ICD-10-CM

## 2025-04-24 DIAGNOSIS — E11.9 TYPE 2 DIABETES MELLITUS WITHOUT COMPLICATION, WITHOUT LONG-TERM CURRENT USE OF INSULIN (HCC): Primary | ICD-10-CM

## 2025-04-24 DIAGNOSIS — E83.52 HYPERCALCEMIA: ICD-10-CM

## 2025-04-24 PROBLEM — Z00.00 ANNUAL PHYSICAL EXAM: Status: RESOLVED | Noted: 2024-12-24 | Resolved: 2025-04-24

## 2025-04-24 PROCEDURE — 99214 OFFICE O/P EST MOD 30 MIN: CPT | Performed by: NURSE PRACTITIONER

## 2025-04-24 NOTE — ASSESSMENT & PLAN NOTE
Lab Results   Component Value Date    HGBA1C 5.8 (H) 04/21/2025       Orders:    Hemoglobin A1C; Future    Comprehensive metabolic panel; Future    CBC and differential; Future    Albumin / creatinine urine ratio; Future    Lipid Panel with Direct LDL reflex; Future    semaglutide, 0.25 or 0.5 mg/dose, (Ozempic, 0.25 or 0.5 MG/DOSE,) 2 mg/3 mL injection pen; Inject 0.75 mL (0.5 mg total) under the skin every 7 days  Will continue wit the Metformin and Ozempic

## 2025-04-24 NOTE — ASSESSMENT & PLAN NOTE
Following with endocrine and is being worked up calcium levels increased and had parathyroid nodes taken out

## 2025-04-24 NOTE — PROGRESS NOTES
Name: Yuridia Barr      : 1995      MRN: [id number]  Encounter Provider: ANYI Noel  Encounter Date: 2025   Encounter department: [facility]  :  Assessment & Plan  Type 2 diabetes mellitus without complication, without long-term current use of insulin (HCC)    Lab Results   Component Value Date    HGBA1C 5.8 (H) 2025       Orders:    Hemoglobin A1C; Future    Comprehensive metabolic panel; Future    CBC and differential; Future    Albumin / creatinine urine ratio; Future    Lipid Panel with Direct LDL reflex; Future    semaglutide, 0.25 or 0.5 mg/dose, (Ozempic, 0.25 or 0.5 MG/DOSE,) 2 mg/3 mL injection pen; Inject 0.75 mL (0.5 mg total) under the skin every 7 days  Will continue wit the Metformin and Ozempic   Primary hyperparathyroidism (HCC)  Following with endocrine and is being worked up calcium levels increased and had parathyroid nodes taken out        Hypercalcemia    Orders:    Comprehensive metabolic panel; Future  working up her calcium increased with endocrine   BMI 25.0-25.9,adult       doing well and has has lost some weight since last check          History of Present Illness   Patien there today to review her recent labs and to check up and has no issues to report       Review of Systems   Constitutional:  Negative for activity change, appetite change, chills, diaphoresis, fatigue, fever and unexpected weight change.   HENT:  Negative for congestion, ear pain, hearing loss, postnasal drip, sinus pressure, sinus pain, sneezing and sore throat.    Eyes:  Negative for pain, redness and visual disturbance.   Respiratory:  Negative for cough and shortness of breath.    Cardiovascular:  Negative for chest pain and leg swelling.   Gastrointestinal:  Negative for abdominal pain, diarrhea, nausea and vomiting.   Musculoskeletal:  Negative for arthralgias.   Neurological:  Negative for dizziness and light-headedness.   Psychiatric/Behavioral:   Negative for behavioral problems and dysphoric mood.        Objective   /78 (BP Location: Right arm, Patient Position: Sitting)   Pulse 78   Temp 97.8 °F (36.6 °C) (Temporal)   Ht 5' (1.524 m)   Wt 59.7 kg (131 lb 9.6 oz)   SpO2 99%   BMI 25.70 kg/m²      Physical Exam  Constitutional:       General: She is not in acute distress.     Appearance: She is well-developed.   HENT:      Head: Normocephalic and atraumatic.   Eyes:      Pupils: Pupils are equal, round, and reactive to light.   Neck:      Thyroid: No thyromegaly.   Cardiovascular:      Rate and Rhythm: Normal rate and regular rhythm.      Pulses: no weak pulses.           Dorsalis pedis pulses are 2+ on the right side and 2+ on the left side.        Posterior tibial pulses are 2+ on the right side and 2+ on the left side.      Heart sounds: Normal heart sounds. No murmur heard.  Pulmonary:      Effort: Pulmonary effort is normal. No respiratory distress.      Breath sounds: Normal breath sounds. No wheezing.   Abdominal:      General: Bowel sounds are normal.      Palpations: Abdomen is soft.   Musculoskeletal:         General: Normal range of motion.      Cervical back: Normal range of motion.   Feet:      Right foot:      Skin integrity: No ulcer, skin breakdown, erythema, warmth, callus or dry skin.      Left foot:      Skin integrity: No ulcer, skin breakdown, erythema, warmth, callus or dry skin.   Skin:     General: Skin is warm and dry.   Neurological:      Mental Status: She is alert and oriented to person, place, and time.       Patient's shoes and socks removed.    Right Foot/Ankle   Right Foot Inspection  Skin Exam: skin normal and skin intact. No dry skin, no warmth, no callus, no erythema, no maceration, no abnormal color, no pre-ulcer, no ulcer and no callus.     Toe Exam: ROM and strength within normal limits.     Sensory   Vibration: intact  Proprioception: intact  Monofilament testing: intact    Vascular  Capillary refills: < 3  seconds  The right DP pulse is 2+. The right PT pulse is 2+.     Left Foot/Ankle  Left Foot Inspection  Skin Exam: skin normal and skin intact. No dry skin, no warmth, no erythema, no maceration, normal color, no pre-ulcer, no ulcer and no callus.     Toe Exam: ROM and strength within normal limits.     Sensory   Vibration: intact  Proprioception: intact  Monofilament testing: intact    Vascular  Capillary refills: < 3 seconds  The left DP pulse is 2+. The left PT pulse is 2+.     Assign Risk Category  No deformity present  No loss of protective sensation  No weak pulses  Risk: 0

## 2025-04-24 NOTE — ASSESSMENT & PLAN NOTE
Orders:    Comprehensive metabolic panel; Future  working up her calcium increased with endocrine

## 2025-04-25 DIAGNOSIS — E21.0 PRIMARY HYPERPARATHYROIDISM (HCC): Primary | ICD-10-CM

## 2025-05-04 ENCOUNTER — HOSPITAL ENCOUNTER (OUTPATIENT)
Dept: MRI IMAGING | Facility: HOSPITAL | Age: 30
Discharge: HOME | End: 2025-05-04
Attending: STUDENT IN AN ORGANIZED HEALTH CARE EDUCATION/TRAINING PROGRAM
Payer: COMMERCIAL

## 2025-05-04 DIAGNOSIS — E21.0 PRIMARY HYPERPARATHYROIDISM (HCC): ICD-10-CM

## 2025-05-04 PROCEDURE — 70543 MRI ORBT/FAC/NCK W/O &W/DYE: CPT

## 2025-05-04 PROCEDURE — A9585 GADOBUTROL INJECTION: HCPCS | Performed by: STUDENT IN AN ORGANIZED HEALTH CARE EDUCATION/TRAINING PROGRAM

## 2025-05-04 RX ORDER — GADOBUTROL 604.72 MG/ML
5 INJECTION INTRAVENOUS
Status: COMPLETED | OUTPATIENT
Start: 2025-05-04 | End: 2025-05-04

## 2025-05-04 RX ADMIN — GADOBUTROL 5 ML: 604.72 INJECTION INTRAVENOUS at 16:35

## 2025-05-06 ENCOUNTER — RESULTS FOLLOW-UP (OUTPATIENT)
Dept: ENDOCRINOLOGY | Facility: CLINIC | Age: 30
End: 2025-05-06

## 2025-05-27 ENCOUNTER — OFFICE VISIT (OUTPATIENT)
Dept: ENDOCRINOLOGY | Facility: CLINIC | Age: 30
End: 2025-05-27
Payer: COMMERCIAL

## 2025-05-27 VITALS
SYSTOLIC BLOOD PRESSURE: 130 MMHG | HEIGHT: 60 IN | OXYGEN SATURATION: 99 % | HEART RATE: 59 BPM | DIASTOLIC BLOOD PRESSURE: 72 MMHG | BODY MASS INDEX: 26.31 KG/M2 | TEMPERATURE: 98.3 F | RESPIRATION RATE: 18 BRPM | WEIGHT: 134 LBS

## 2025-05-27 DIAGNOSIS — E11.9 TYPE 2 DIABETES MELLITUS WITHOUT COMPLICATION, WITHOUT LONG-TERM CURRENT USE OF INSULIN (HCC): ICD-10-CM

## 2025-05-27 DIAGNOSIS — E21.0 FAMILIAL HYPERPARATHYROIDISM (HCC): Primary | ICD-10-CM

## 2025-05-27 DIAGNOSIS — E83.52 HYPERCALCEMIA: Primary | ICD-10-CM

## 2025-05-27 DIAGNOSIS — E21.0 FAMILIAL HYPERPARATHYROIDISM (HCC): ICD-10-CM

## 2025-05-27 PROCEDURE — 99214 OFFICE O/P EST MOD 30 MIN: CPT | Performed by: STUDENT IN AN ORGANIZED HEALTH CARE EDUCATION/TRAINING PROGRAM

## 2025-05-27 NOTE — PROGRESS NOTES
Name: Yuridia Barr      : 1995      MRN: [id number]  Encounter Provider: Eric Ovalle MD  Encounter Date: 2025   Encounter department: [facility] [place]    Chief Complaint   Patient presents with   • Follow-up   :  Assessment & Plan  Hypercalcemia  PTH mediated hyperparathyroidism in setting of primary hyperparathyroidism, s/p 3 glands parathyroidectomy and pathology showed hypercellular parathyroid likely due to parathyroid hyperplasia, although she continues to have hypercalcemia after surgery and repeated imaging with parathyroid imaging with 4D CT, was not able to localize the 4th gland. We ordered a neck and upper mediastinum MRI which was not able to localize the 4th gland as well.  Other modalities including 18F- Fluoholine PET/CT and 3D Isabel MRI are not available in [facility].  A sestamibi scan ordered and eventually we may need to refer her back to surgical oncology team for exploratory parathyroidectomy,   A referral to [facility] for second opinion may be considered as well.    Orders:  •  NM parathyroid scan w spect; Future  •  PTH, intact; Future  •  Calcium, ionized; Future  •  Phosphorus; Future  •  Vitamin D 25 hydroxy; Future    Familial hyperparathyroidism (HCC)  Report hx of hyperparathyroidism in her father, we discussed familial form of PHP, including familial PHP, MEN, 1 and 2A, and hyperparathyroid- jaw tumor.  Previously evaluation for MTC, PCC showed unremarkable, results.  She was referred to genetic counselor and she was advised to follow up            Type 2 diabetes mellitus without complication, without long-term current use of insulin (HCC)    Lab Results   Component Value Date    HGBA1C 5.8 (H) 2025     Diabetes is well controlled, and A1C at goal  To continue current regimen,                Pertinent Medical History           History of Present Illness   History of Present Illness    Yuridia Barr is a 29 y.o. female  who presents for follow up for hypercalcemia due to primary hyperparathyroidism,     She is s/p parathyroidectomy 3 glands. On 328/2023. And final pathology showed :     A. Parathyroid, Right Superior, Parathyroidectomy:  - Hypercellular parathyroid tissue, 160 mg.     B. Parathyroid, Right Inferior, Parathyroidectomy:  - Hypercellular parathyroid tissue, 130 mg.      C. Parathyroid, Left Inferior, Parathyroidectomy:  - Hypercellular parathyroid tissue, 110 mg.      D. Parathyroid, Left Superior, Parathyroidectomy:  - Hypercellular parathyroid tissue, 10 mg.        Review of Systems as per HPI  Medical History Reviewed by provider this encounter:     .  Medications Ordered Prior to Encounter[1]      Medical History Reviewed by provider this encounter:     .    Objective   /72 (BP Location: Right arm, Patient Position: Sitting, Cuff Size: Adult)   Pulse 59   Temp 98.3 °F (36.8 °C) (Temporal)   Resp 18   Ht 5' (1.524 m)   Wt 60.8 kg (134 lb)   SpO2 99%   BMI 26.17 kg/m²      Body mass index is 26.17 kg/m².  Wt Readings from Last 3 Encounters:   05/27/25 60.8 kg (134 lb)   04/24/25 59.7 kg (131 lb 9.6 oz)   01/31/25 63.7 kg (140 lb 6.4 oz)     Physical Exam  Physical Exam      Results    Labs: I have reviewed pertinent labs including:   Lab Results   Component Value Date    HGBA1C 5.8 (H) 04/21/2025    HGBA1C 8.5 (H) 12/18/2024    HGBA1C 6.0 (H) 08/20/2024      Lab Results   Component Value Date    CREATININE 0.44 (L) 04/21/2025    CREATININE 0.48 (L) 03/28/2025    CREATININE 0.51 (L) 11/30/2024    BUN 8 04/21/2025     04/08/2018    K 3.8 04/21/2025     04/21/2025    CO2 26 04/21/2025      Lab Results   Component Value Date    FFMT48WEGLSP 29.3 (L) 03/28/2025    [id number] 23.6 (L) 10/31/2022      Radiology Results Review: I have reviewed the following images/report studies in PACS: No DEXA results found in the past 2 years   There are no Patient Instructions on file for this visit.  MRI OF  THE SOFT TISSUES OF THE NECK - WITH AND WITHOUT CONTRAST     INDICATION: E21.0: Primary hyperparathyroidism history of 3 previous parathyroidectomies with persistent hypercalcemia.     COMPARISON: CT dated 12/16/2022 and 11/1/2024     TECHNIQUE:  Multiplanar, multisequence imaging of the soft tissues of the neck was performed before and after gadolinium administration. .     IV Contrast:  5 mL of Gadobutrol injection     IMAGE QUALITY:  Diagnostic.     FINDINGS:     VISUALIZED BRAIN PARENCHYMA:  Normal.     VISUALIZED ORBITS AND PARANASAL SINUSES:  Normal visualized orbits.   Normal visualized paranasal sinuses.     NASAL CAVITY AND NASOPHARYNX:  Normal.     SUPRAHYOID NECK:  Normal oral cavity, tongue base, tonsillar fossa and epiglottis.     INFRAHYOID NECK:  Aryepiglottic folds, laryngeal tissues, and piriform sinuses are normal.     THYROID/PARATHYROID GLANDS:   No definite thyroid or parathyroid mass identified. There are small paratracheal nodular foci which are stable dating back to 12/16/2022 and likely reflect normal lymph nodes. There are no findings on MRI suspicious for   parathyroid adenoma.     PAROTID AND SUBMANDIBULAR GLANDS:  Normal.     LYMPH NODES:  No pathologic or enlarged adenopathy.     VASCULAR STRUCTURES:  Grossly normal flow voids of the cervical vasculature.     THORACIC INLET: Lung apices and upper mediastinum are grossly unremarkable.     CERVICAL SPINE:  Normal alignment of the cervical spine.  Normal marrow signal.  No gross evidence of degenerative disease.  Cervical cord is grossly normal in signal.     IMPRESSION:     1. No parathyroid adenoma identified on MRI. Consider nuclear medicine parathyroid scan  2. No neck mass or adenopathy.     Workstation performed: [id number]     Discussed with the patient and all questioned fully answered. She will call me if any problems arise.             [1]  Current Outpatient Medications on File Prior to Visit   Medication Sig Dispense Refill   •  "B-D 3CC LUER-SHAE SYR 25GX1/2\" 25G X 1-1/2\" 3 ML MISC Inject into a muscle once a week 9 each 0   • Blood Glucose Monitoring Suppl (OneTouch Verio Reflect) w/Device KIT Check blood sugars once daily. Please substitute with appropriate alternative as covered by patient's insurance. Dx: E11.65 1 kit 0   • glucose blood (OneTouch Verio) test strip Check blood sugars once daily. Please substitute with appropriate alternative as covered by patient's insurance. Dx: E11.65 100 each 3   • Insulin Pen Needle (B-D UF III MINI PEN NEEDLES) 31G X 5 MM MISC Use every 7 days 30 each 0   • metFORMIN (GLUCOPHAGE-XR) 500 mg 24 hr tablet Take 1 tablet (500 mg total) by mouth daily with dinner 100 tablet 1   • semaglutide, 0.25 or 0.5 mg/dose, (Ozempic, 0.25 or 0.5 MG/DOSE,) 2 mg/3 mL injection pen Inject 0.75 mL (0.5 mg total) under the skin every 7 days 9 mL 0   • VITAMIN D PO Take 1 tablet by mouth in the morning       No current facility-administered medications on file prior to visit.   "

## 2025-05-27 NOTE — ASSESSMENT & PLAN NOTE
PTH mediated hyperparathyroidism in setting of primary hyperparathyroidism, s/p 3 glands parathyroidectomy and pathology showed hypercellular parathyroid likely due to parathyroid hyperplasia, although she continues to have hypercalcemia after surgery and repeated imaging with parathyroid imaging with 4D CT, was not able to localize the 4th gland. We ordered a neck and upper mediastinum MRI which was not able to localize the 4th gland as well.  Other modalities including 18F- Fluoholine PET/CT and 3D Isabel MRI are not available in [facility].  A sestamibi scan ordered and eventually we may need to refer her back to surgical oncology team for exploratory parathyroidectomy,   A referral to [facility] for second opinion may be considered as well.    Orders:  •  NM parathyroid scan w spect; Future  •  PTH, intact; Future  •  Calcium, ionized; Future  •  Phosphorus; Future  •  Vitamin D 25 hydroxy; Future

## 2025-05-29 NOTE — ASSESSMENT & PLAN NOTE
Lab Results   Component Value Date    HGBA1C 5.8 (H) 04/21/2025     Diabetes is well controlled, and A1C at goal  To continue current regimen,         No

## 2025-06-03 ENCOUNTER — HOSPITAL ENCOUNTER (OUTPATIENT)
Dept: NUCLEAR MEDICINE | Facility: HOSPITAL | Age: 30
Discharge: HOME | End: 2025-06-03
Attending: STUDENT IN AN ORGANIZED HEALTH CARE EDUCATION/TRAINING PROGRAM
Payer: COMMERCIAL

## 2025-06-03 DIAGNOSIS — E83.52 HYPERCALCEMIA: ICD-10-CM

## 2025-06-03 PROCEDURE — A9500 TC99M SESTAMIBI: HCPCS

## 2025-06-03 PROCEDURE — 78071 PARATHYRD PLANAR W/WO SUBTRJ: CPT

## 2025-06-05 ENCOUNTER — RESULTS FOLLOW-UP (OUTPATIENT)
Dept: ENDOCRINOLOGY | Facility: CLINIC | Age: 30
End: 2025-06-05

## 2025-06-17 ENCOUNTER — DOCUMENTATION (OUTPATIENT)
Dept: GENETICS | Facility: CLINIC | Age: 30
End: 2025-06-17

## 2025-06-18 ENCOUNTER — OFFICE VISIT (OUTPATIENT)
Dept: GENETICS | Facility: CLINIC | Age: 30
End: 2025-06-18

## 2025-06-18 DIAGNOSIS — E21.0 PRIMARY HYPERPARATHYROIDISM (HCC): Primary | ICD-10-CM

## 2025-06-18 NOTE — PROGRESS NOTES
Pre-Test Genetic Counseling Consult Note    Patient Name: Yuridia Barr   /Age: 1995/29 y.o.  Referring Provider: Eric Ovalle MD    Date of Service: 2025  Genetic Counselor: Marylin Membreno MS, [facility]  Interpretation Services: None  Location: Telephone consult   Length of Visit: 20 Minutes    Yuridia was referred to the [facility] due to her personal and family history of parathyroid hyperplasia. she presents today to discuss the possibility of a hereditary cancer syndrome, options for genetic testing, and implications for her and her family.       Screening Hx:   Breast:  Breast Imaging: none     Colon:  Colonoscopy: none    Gynecologic:  Ovaries and Uterus intact     Skin:  Did not assess    Other screening:   MRI Soft Tissue Neck ():   1. No parathyroid adenoma identified on MRI. Consider nuclear medicine parathyroid scan  2. No neck mass or adenopathy.    Parathyroid Scan ():   -No focal tracer activity on delayed imaging characteristic of parathyroid adenoma.  -Please note that significant incomplete washout of tracer activity from the thyroid gland on delayed imaging which limits evaluation for parathyroid adenoma on scintigraphy.  -There is persistent asymmetric increased right-sided thyroid activity on delayed imaging as compared to the left thyroid lobe which could reflect incomplete washout of tracer activity with subtle underlying parathyroid adenoma not excluded, see   discussion above.    Calcium, ionized (): 1.46 mmol/L    Reproductive History  Age at menarche: 8  Age at first live birth: 19  Menopause: Premenopausal  Hormone replacement: none    Medical and Surgical History  Pertinent surgical history: Past Surgical History[1]   Pertinent medical history:Past Medical History[2]      Other History:  Height:   Ht Readings from Last 1 Encounters:   25 5' (1.524 m)     Weight:   Wt Readings from Last 1 Encounters:   25 60.8  kg (134 lb)       Relevant Family History       Maternal Family History:  Non-contributory     Paternal Family History:  Father: s/p parathyroidectomy; gastric polyps (currently 57 y/o)  Grandfather: stomach cancer, unknown type (d.74)  Distant relative: stomach cancer; degree of relation unknown     Please refer to the scanned pedigree in the Media Tab for a complete family history     *All history is reported as provided by the patient; records are not available for review, except where indicated.     Assessment:  We discussed sporadic, familial and hereditary cancer.  We also discussed the many factors that influence our risk for cancer such as age, environmental exposures, lifestyle choices and family history.      We reviewed the indications suggestive of a hereditary predisposition to cancer.  We discussed the tumor risks and screening recommendations for individuals with pathogenic variants in genes such as MEN1.     Genetic testing is indicated for Yuridia based on the following criteria:   Meets [facility] Neuroendocrine and Adrenal Tumors Testing Criteria V2.2025: personal and family history of primary hyperparathyroidism (first-degree relative-father)    The risks, benefits, and limitations of genetic testing were reviewed with the patient, as well as genetic discrimination laws, and possible test results (positive, negative, variants of uncertain significance) and their clinical implications. If positive for a mutation, options for managing cancer risk including increased surveillance, chemoprevention, and in some cases prophylactic surgery were discussed.     Yuridia was informed that if a hereditary cancer syndrome was identified in her, first degree relatives (parents, siblings, and children) have a chance of also inheriting the condition. Genetic testing would allow for predictive genetic testing in other relatives, who may also be at risk depending on their degree of relation.     [facility] billing policy  reviewed.     Plan: Patient decided to proceed with testing and provided consent.    Summary:     Sample Collection:  No sample was collected and testing was ordered as a reuse    Genetic Testing Performed:  [facility] Hereditary Multi-Cancer Panel (70 genes): AIP, ALK, APC, JUSTIN, AXIN2, BAP1, BARD1, BLM, BMPR1A, BRCA1, BRCA2, BRIP1, CDC73, CDH1, CDK4, CDKN1B, CDKN2A, CHEK2, CTNNA1, DICER1, EGFR, EPCAM, FH, FLCN, GREM1, HOXB13, KIT, LZTR1, MAX, MBD4, MEN1, MET, MITF, MLH1, MSH2, MSH3, MSH6, MUTYH, NF1, NF2, NTHL1, PALB2, PDGFRA, PMS2, POLD1, POLE, POT1, ITKZX5N, PTCH1, PTEN, RAD51C, RAD51D, RB1, RET, SDHA, SDHAF2, SDHB, SDHC, SDHD, SMAD4, SMARCA4, SMARCB1, SMARCE1, STK11, SUFU, TJGK831, TP53, TSC1, TSC2, VHL      Result Call Information:  In the event that we need to reach Yuridia via telephone:  I confirmed the patient's mobile number on file as the best number to call with results  I confirmed with the patient that we can leave a voicemail on her mobile number    Results take approximately 2-3 weeks to complete once test is started.    Yuridia will be notified via [employer] once results are available.      Additional recommendations for surveillance/medical management will be made pending genetic test results.              [1]   Past Surgical History:  Procedure Laterality Date    CERVICAL BIOPSY  W/ LOOP ELECTRODE EXCISION  ?     SECTION  15, 16, 18    DILATION AND CURETTAGE OF UTERUS WITH HYSTEROSOCPY N/A 2020    Procedure: DILATATION AND CURETTAGE (D&C);  Surgeon: Kyler Tristan MD;  Location:  MAIN OR;  Service: Gynecology    ENDOMETRIAL ABLATION N/A 2020    Procedure: ABLATION ENDOMETRIAL JOSHUA;  Surgeon: Kyler Tristan MD;  Location:  MAIN OR;  Service: Gynecology    [place]  DELIVERY ONLY N/A 6/3/2016    Procedure:  SECTION () REPEAT;  Surgeon: Kyler Tristan MD;  Location: [facility];  Service: Obstetrics    [place]  DELIVERY ONLY N/A 2018     Procedure:  SECTION () REPEAT;  Surgeon: Kyler Tristan MD;  Location: [facility];  Service: Obstetrics    OK PARATHYROIDECTOMY/EXPLORATION PARATHYROIDS Left 3/28/2023    Procedure: MINIMALLY INVASIVE LEFT PARATHYROIDECTOMY,  4 GLAND EXPLORATION, INTRAOPERATIVE PTH MONITORING;  Surgeon: Vik Leger MD;  Location: [facility];  Service: Surgical Oncology    TUBAL LIGATION N/A 2018    Procedure: LIGATION/COAGULATION TUBAL;  Surgeon: Kyler Tristan MD;  Location: [facility];  Service: Obstetrics   [2]   Past Medical History:  Diagnosis Date    Diabetes mellitus (HCC)     Gestational diabetes     Kidney stone     Migraine

## 2025-06-23 LAB
GENE DIS ANL INTERP-IMP: NEGATIVE
GENES TESTED: NORMAL
INTERPRETATION METHODS AND LIMITATIONS: NORMAL
Lab: NORMAL
SEQUENCING LOCATION: NORMAL

## 2025-06-25 ENCOUNTER — RESULTS FOLLOW-UP (OUTPATIENT)
Dept: GENETICS | Facility: CLINIC | Age: 30
End: 2025-06-25

## 2025-06-25 ENCOUNTER — DOCUMENTATION (OUTPATIENT)
Dept: GENETICS | Facility: CLINIC | Age: 30
End: 2025-06-25

## 2025-06-25 NOTE — TELEPHONE ENCOUNTER
Post-Test Genetic Counseling Consult Note  Today I spoke with Yuridia to review the results of her genetic test for hereditary cancer. We met previously on 6/18/25 for pre-test counseling.      SUMMARY:    Test(s): [facility] Hereditary Multi-Cancer Panel (70 genes): AIP, ALK, APC, JUSTIN, AXIN2, BAP1, BARD1, BLM, BMPR1A, BRCA1, BRCA2, BRIP1, CDC73, CDH1, CDK4, CDKN1B, CDKN2A, CHEK2, CTNNA1, DICER1, EGFR, EPCAM, FH, FLCN, GREM1, HOXB13, KIT, LZTR1, MAX, MBD4, MEN1, MET, MITF, MLH1, MSH2, MSH3, MSH6, MUTYH, NF1, NF2, NTHL1, PALB2, PDGFRA, PMS2, POLD1, POLE, POT1, YGFCH9D, PTCH1, PTEN, RAD51C, RAD51D, RB1, RET, SDHA, SDHAF2, SDHB, SDHC, SDHD, SMAD4, SMARCA4, SMARCB1, SMARCE1, STK11, SUFU, ANOZ169, TP53, TSC1, TSC2, VHL      Result: Negative - No Clinically Significant Variants Detected      Assessment:   A negative result significantly reduces the likelihood that Yuridia has a hereditary cancer syndrome. However, this testing is unable to completely rule out the presence of hereditary cancer. It remains possible that:  There is a variant in an area of a gene which was not tested or there is a variant not detectable due to technical limitations of this test.     There is a variant in another gene that was not included in this test or in a gene not known to be linked to cancer or tumors.   A family member has a genetic variant that the patient did not inherit.   The cancer in the family is sporadic and is related to non-hereditary factors.     Of note, it is estimated that between 14-18% of families with familial isolated hyperparathyroidism  have pathogenic variants in the CASR gene (PMID: [id number], [id number]). [facility] does not offer testing for this gene as other mutations could cause increase the risk for other hereditary conditions such as hereditary pancreatitis.     In light of Yuridia's personal and reported family history of hyperparathyroidism, we discussed the possibility of testing for pathogenic variants in other  genes with the [facility] Hereditary Hyperparathyroidism Panel.     [facility] billing policy reviewed.     Plan: Yuridia is amenable to additional testing with the [facility] Hereditary Hyperparathyroidism Panel.      We will ship the sample collection kit to her address on file. She was instructed to bring the sample collection kit to any [facility]'s lab for sample collection. Results return 2-3 weeks after sample collection. Additional recommendations will be made at that time.

## 2025-06-27 ENCOUNTER — APPOINTMENT (OUTPATIENT)
Dept: LAB | Facility: CLINIC | Age: 30
End: 2025-06-27
Payer: COMMERCIAL

## 2025-06-27 DIAGNOSIS — E83.52 HYPERCALCEMIA: ICD-10-CM

## 2025-06-27 DIAGNOSIS — Z98.890 STATUS POST PARATHYROIDECTOMY: ICD-10-CM

## 2025-06-27 DIAGNOSIS — E21.0 PRIMARY HYPERPARATHYROIDISM (HCC): ICD-10-CM

## 2025-06-27 DIAGNOSIS — Z90.89 STATUS POST PARATHYROIDECTOMY: ICD-10-CM

## 2025-06-27 PROCEDURE — 36415 COLL VENOUS BLD VENIPUNCTURE: CPT

## 2025-07-21 DIAGNOSIS — Z90.89 STATUS POST PARATHYROIDECTOMY: Primary | ICD-10-CM

## 2025-07-21 DIAGNOSIS — E11.65 TYPE 2 DIABETES MELLITUS WITH HYPERGLYCEMIA, WITHOUT LONG-TERM CURRENT USE OF INSULIN (HCC): ICD-10-CM

## 2025-07-21 DIAGNOSIS — R73.09 ELEVATED GLUCOSE: ICD-10-CM

## 2025-07-21 DIAGNOSIS — Z98.890 STATUS POST PARATHYROIDECTOMY: Primary | ICD-10-CM

## 2025-07-22 RX ORDER — METFORMIN HYDROCHLORIDE 500 MG/1
500 TABLET, EXTENDED RELEASE ORAL
Qty: 100 TABLET | Refills: 1 | Status: SHIPPED | OUTPATIENT
Start: 2025-07-22

## 2025-07-23 DIAGNOSIS — Z98.890 STATUS POST PARATHYROIDECTOMY: Primary | ICD-10-CM

## 2025-07-23 DIAGNOSIS — Z90.89 STATUS POST PARATHYROIDECTOMY: Primary | ICD-10-CM

## 2025-07-25 ENCOUNTER — APPOINTMENT (OUTPATIENT)
Dept: LAB | Facility: CLINIC | Age: 30
End: 2025-07-25
Payer: COMMERCIAL

## 2025-07-25 DIAGNOSIS — Z90.89 STATUS POST PARATHYROIDECTOMY: ICD-10-CM

## 2025-07-25 DIAGNOSIS — Z98.890 STATUS POST PARATHYROIDECTOMY: ICD-10-CM

## 2025-07-25 LAB
ANION GAP SERPL CALCULATED.3IONS-SCNC: 7 MMOL/L (ref 4–13)
BUN SERPL-MCNC: 7 MG/DL (ref 5–25)
CALCIUM SERPL-MCNC: 10.6 MG/DL (ref 8.4–10.2)
CHLORIDE SERPL-SCNC: 103 MMOL/L (ref 96–108)
CO2 SERPL-SCNC: 27 MMOL/L (ref 21–32)
CREAT SERPL-MCNC: 0.44 MG/DL (ref 0.6–1.3)
GFR SERPL CREATININE-BSD FRML MDRD: 133 ML/MIN/1.73SQ M
GLUCOSE P FAST SERPL-MCNC: 117 MG/DL (ref 65–99)
POTASSIUM SERPL-SCNC: 3.7 MMOL/L (ref 3.5–5.3)
SODIUM SERPL-SCNC: 137 MMOL/L (ref 135–147)

## 2025-07-25 PROCEDURE — 80048 BASIC METABOLIC PNL TOTAL CA: CPT

## 2025-07-25 PROCEDURE — 36415 COLL VENOUS BLD VENIPUNCTURE: CPT

## 2025-07-30 ENCOUNTER — HOSPITAL ENCOUNTER (OUTPATIENT)
Dept: CT IMAGING | Facility: HOSPITAL | Age: 30
Discharge: HOME | End: 2025-07-30
Attending: SURGERY
Payer: COMMERCIAL

## 2025-07-30 DIAGNOSIS — Z98.890 STATUS POST PARATHYROIDECTOMY: ICD-10-CM

## 2025-07-30 DIAGNOSIS — Z90.89 STATUS POST PARATHYROIDECTOMY: ICD-10-CM

## 2025-07-30 PROCEDURE — 70492 CT SFT TSUE NCK W/O & W/DYE: CPT

## 2025-07-30 RX ADMIN — IOHEXOL 85 ML: 350 INJECTION, SOLUTION INTRAVENOUS at 12:40

## 2025-08-16 DIAGNOSIS — E11.9 TYPE 2 DIABETES MELLITUS WITHOUT COMPLICATION, WITHOUT LONG-TERM CURRENT USE OF INSULIN (HCC): ICD-10-CM

## 2025-08-18 ENCOUNTER — TELEPHONE (OUTPATIENT)
Dept: FAMILY MEDICINE CLINIC | Facility: CLINIC | Age: 30
End: 2025-08-18

## 2025-08-18 RX ORDER — SEMAGLUTIDE 0.68 MG/ML
INJECTION, SOLUTION SUBCUTANEOUS
Qty: 3 ML | Refills: 5 | Status: SHIPPED | OUTPATIENT
Start: 2025-08-18

## 2025-08-19 ENCOUNTER — APPOINTMENT (OUTPATIENT)
Dept: LAB | Facility: CLINIC | Age: 30
End: 2025-08-19
Payer: COMMERCIAL

## 2025-08-19 DIAGNOSIS — E83.52 HYPERCALCEMIA: ICD-10-CM

## 2025-08-19 DIAGNOSIS — E21.0 PRIMARY HYPERPARATHYROIDISM (HCC): ICD-10-CM

## 2025-08-19 DIAGNOSIS — Z90.89 STATUS POST PARATHYROIDECTOMY: ICD-10-CM

## 2025-08-19 DIAGNOSIS — Z98.890 STATUS POST PARATHYROIDECTOMY: ICD-10-CM

## 2025-08-19 LAB
25(OH)D3 SERPL-MCNC: 36.9 NG/ML (ref 30–100)
CALCIUM SERPL-MCNC: 11.4 MG/DL (ref 8.4–10.2)
PTH-INTACT SERPL-MCNC: 42.5 PG/ML (ref 12–88)

## 2025-08-19 PROCEDURE — 36415 COLL VENOUS BLD VENIPUNCTURE: CPT

## 2025-08-19 PROCEDURE — 82306 VITAMIN D 25 HYDROXY: CPT

## 2025-08-19 PROCEDURE — 83970 ASSAY OF PARATHORMONE: CPT

## (undated) DEVICE — LIGACLIP MCA MULTIPLE CLIP APPLIERS, 20 SMALL CLIPS: Brand: LIGACLIP

## (undated) DEVICE — ABG MICROSTICKS SAFETY

## (undated) DEVICE — SUT VICRYL 0 CTX 36 IN J978H

## (undated) DEVICE — X-RAY DETECTABLE SPONGES,16 PLY: Brand: VISTEC

## (undated) DEVICE — SUT MONOCRYL 5-0 P-3 18 IN Y493G

## (undated) DEVICE — SUT VICRYL 3-0 SH 27 IN J416H

## (undated) DEVICE — PACK UNIVERSAL NECK

## (undated) DEVICE — GLOVE SRG LF STRL BGL SKNSNS 7 PF

## (undated) DEVICE — ELECTRODE BLADE MOD E-Z CLEAN 2.5IN 6.4CM -0012M

## (undated) DEVICE — ROSEBUD DISSECTORS: Brand: DEROYAL

## (undated) DEVICE — 3M™ STERI-STRIP™ REINFORCED ADHESIVE SKIN CLOSURES, R1547, 1/2 IN X 4 IN (12 MM X 100 MM), 6 STRIPS/ENVELOPE: Brand: 3M™ STERI-STRIP™

## (undated) DEVICE — MINOR PROCEDURE DRAPE: Brand: CONVERTORS

## (undated) DEVICE — CHLORAPREP HI-LITE 26ML ORANGE

## (undated) DEVICE — SUT VICRYL 0 CT-1 36 IN J946H

## (undated) DEVICE — SCD SEQUENTIAL COMPRESSION COMFORT SLEEVE MEDIUM KNEE LENGTH: Brand: KENDALL SCD

## (undated) DEVICE — D&C PACK: Brand: MEDLINE INDUSTRIES, INC.

## (undated) DEVICE — STERILE MINERVA DISPOSABLE HANDPIECECONTENTS:(1) ONE SINGLE USE STERILE MINERVA ES DISPOSABLE HANDPIECE (1) ONE SINGLE USE STERILE SYRINGE(1) ONE SINGLE USE STERILE 8MM HEGAR DILATOR(1) ONE SINGLE USE NON-STERILE DESICCANT(1) ONE NON-STERILE HANDPIECE INSTRUCTIONS FOR USE(1)  ONE NON-STERILE DILATOR INSTRUCTIONS FOR USE: Brand: MINERVA SINGLE STERILE DISPOSABLE HANDPIECE

## (undated) DEVICE — SUT VICRYL 2-0 CT-1 36 IN J945H

## (undated) DEVICE — SWABSTCK, BENZOIN TINCTURE, 1/PK, STRL: Brand: APLICARE

## (undated) DEVICE — GLOVE INDICATOR PI UNDERGLOVE SZ 7 BLUE

## (undated) DEVICE — DRAPE SURGIKIT SADDLE BAG

## (undated) DEVICE — SUT MONOCRYL 4-0 PS-2 27 IN Y426H

## (undated) DEVICE — GLOVE SRG BIOGEL 7

## (undated) DEVICE — PACK C-SECTION PBDS

## (undated) DEVICE — SURGICEL 4 X 8

## (undated) DEVICE — 3M™ STERI-STRIP™ COMPOUND BENZOIN TINCTURE 40 BAGS/CARTON 4 CARTONS/CASE C1544: Brand: 3M™ STERI-STRIP™

## (undated) DEVICE — 3M™ STERI-STRIP™ REINFORCED ADHESIVE SKIN CLOSURES, R1542, 1/4 IN X 1-1/2 IN (6 MM X 38 MM), 6 STRIPS/ENVELOPE: Brand: 3M™ STERI-STRIP™

## (undated) DEVICE — SUT VICRYL 4-0 PS-2 27 IN J426H